# Patient Record
Sex: MALE | Race: WHITE | NOT HISPANIC OR LATINO | Employment: FULL TIME | URBAN - METROPOLITAN AREA
[De-identification: names, ages, dates, MRNs, and addresses within clinical notes are randomized per-mention and may not be internally consistent; named-entity substitution may affect disease eponyms.]

---

## 2018-02-28 ENCOUNTER — OFFICE VISIT (OUTPATIENT)
Dept: FAMILY MEDICINE CLINIC | Facility: CLINIC | Age: 63
End: 2018-02-28
Payer: COMMERCIAL

## 2018-02-28 VITALS
BODY MASS INDEX: 23.26 KG/M2 | RESPIRATION RATE: 16 BRPM | HEART RATE: 76 BPM | WEIGHT: 201 LBS | TEMPERATURE: 98.2 F | HEIGHT: 78 IN | SYSTOLIC BLOOD PRESSURE: 139 MMHG | DIASTOLIC BLOOD PRESSURE: 80 MMHG

## 2018-02-28 DIAGNOSIS — Z12.11 SCREEN FOR COLON CANCER: ICD-10-CM

## 2018-02-28 DIAGNOSIS — Z00.00 PHYSICAL EXAM: Primary | ICD-10-CM

## 2018-02-28 PROCEDURE — 99203 OFFICE O/P NEW LOW 30 MIN: CPT | Performed by: FAMILY MEDICINE

## 2018-02-28 NOTE — PATIENT INSTRUCTIONS
Return to the office for blood work as discussed  Schedule screening colonoscopy with Dr Lobo Hidden at [facility]  Follow a low-fat diet and get regular exercise walking 150 minutes per week

## 2018-02-28 NOTE — PROGRESS NOTES
Assessment/Plan:  Patient will return to the office for fasting labs as below  Patient is being referred to Dr Emre Jackson at [address] [place] [address] Gastroenterology to schedule screening colonoscopy  Patient instructed to follow a low-fat diet and get regular exercise walking 150 minutes per week  Return to the office p r n  No problem-specific Assessment & Plan notes found for this encounter  Diagnoses and all orders for this visit:    Physical exam  Comments:  Return to the office for fasting labs  Low-fat diet and regular exercise walking 150 minutes per week  Orders:  -     CBC and Platelet; Future  -     Comprehensive metabolic panel; Future  -     Lipid panel; Future  -     TSH, 3rd generation with T4 reflex; Future  -     UA (URINE) with reflex to Microscopic; Future  -     Vitamin D 25 hydroxy; Future  -     PSA Total, Diagnostic; Future  -     PSA Total, Diagnostic    Screen for colon cancer  Comments:  Referred to Dr Emre Jackson at [address] [place] [address] Gastroenterology for screening colonoscopy  Orders:  -     Ambulatory referral to Gastroenterology; Future          Subjective:      Patient ID: Jesus Brandt is a 63 y o  male  Patient is a 63-year-old male who is a new patient to our practice being seen at his initial visit to establish care  He has not had a recent PCP but previously was a patient at [facility]  Patient works as a production plantar  Patient quit smoking 20 years ago and previously smoked pack a day for 5 years  He rarely drinks alcohol  Patient has been walking 3 to 5 times a week for about 2 miles  No recent labs  Patient has never had screening colonoscopy  Patient does have past medical history of seasonal allergies and 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 treated by Dr Adryan Medley, ophthalmologist   Patient follows with an optometrist regularly  Patient has been feeling well overall without complaints          The following portions of the patient's history were reviewed and updated as appropriate: allergies, current medications, past family history, past medical history, past social history, past surgical history and problem list     Review of Systems   Constitutional: Negative for activity change, appetite change, fatigue, fever and unexpected weight change  HENT: Negative  Eyes: Negative  Respiratory: Negative for cough, chest tightness, shortness of breath and wheezing  Cardiovascular: Negative for chest pain, palpitations and leg swelling  Gastrointestinal: Negative for abdominal pain, blood in stool, constipation, diarrhea and nausea  Genitourinary: Negative for difficulty urinating, dysuria, frequency, hematuria and urgency  Musculoskeletal: Negative for arthralgias, back pain, gait problem, joint swelling, myalgias and neck pain  Neurological: Negative for dizziness, syncope, light-headedness, numbness and headaches  Hematological: Negative for adenopathy  Does not bruise/bleed easily  Psychiatric/Behavioral: The patient is not nervous/anxious  Objective:      /80   Pulse 76   Temp 98 2 °F (36 8 °C)   Resp 16   Ht 6' 7 25" (2 013 m)   Wt 91 2 kg (201 lb)   BMI 22 50 kg/m²          Physical Exam   Constitutional: He is oriented to person, place, and time  He appears well-developed and well-nourished  No distress  HENT:   Head: Normocephalic  Right Ear: External ear normal    Left Ear: External ear normal    Nose: Nose normal    Mouth/Throat: Oropharynx is clear and moist    Eyes: Conjunctivae are normal  No scleral icterus  Neck: Neck supple  No thyromegaly present  Cardiovascular: Normal rate and regular rhythm  Pulmonary/Chest: Breath sounds normal    Abdominal: Soft  There is no tenderness  Musculoskeletal: He exhibits no edema  Lymphadenopathy:     He has no cervical adenopathy  Neurological: He is alert and oriented to person, place, and time  Skin: Skin is warm and dry     Psychiatric: He has a normal mood and affect

## 2018-03-02 ENCOUNTER — CLINICAL SUPPORT (OUTPATIENT)
Dept: FAMILY MEDICINE CLINIC | Facility: CLINIC | Age: 63
End: 2018-03-02
Payer: COMMERCIAL

## 2018-03-02 DIAGNOSIS — Z00.00 HEALTH CARE MAINTENANCE: Primary | ICD-10-CM

## 2018-03-02 DIAGNOSIS — Z00.00 PHYSICAL EXAM: ICD-10-CM

## 2018-03-02 PROCEDURE — 36415 COLL VENOUS BLD VENIPUNCTURE: CPT

## 2018-03-05 LAB
25(OH)D3+25(OH)D2 SERPL-MCNC: 22.8 NG/ML (ref 30–100)
ALBUMIN SERPL-MCNC: 4.4 G/DL (ref 3.6–4.8)
ALBUMIN/GLOB SERPL: 1.5 {RATIO} (ref 1.2–2.2)
ALP SERPL-CCNC: 59 IU/L (ref 39–117)
ALT SERPL-CCNC: 32 IU/L (ref 0–44)
AST SERPL-CCNC: 27 IU/L (ref 0–40)
BILIRUB SERPL-MCNC: 0.5 MG/DL (ref 0–1.2)
BUN SERPL-MCNC: 16 MG/DL (ref 8–27)
BUN/CREAT SERPL: 14 (ref 10–24)
CALCIUM SERPL-MCNC: 10.5 MG/DL (ref 8.6–10.2)
CHLORIDE SERPL-SCNC: 98 MMOL/L (ref 96–106)
CHOLEST SERPL-MCNC: 220 MG/DL (ref 100–199)
CHOLEST/HDLC SERPL: 7.6 RATIO UNITS (ref 0–5)
CO2 SERPL-SCNC: 27 MMOL/L (ref 18–29)
CREAT SERPL-MCNC: 1.17 MG/DL (ref 0.76–1.27)
ERYTHROCYTE [DISTWIDTH] IN BLOOD BY AUTOMATED COUNT: 14.6 % (ref 12.3–15.4)
GLOBULIN SER-MCNC: 3 G/DL (ref 1.5–4.5)
GLUCOSE SERPL-MCNC: 94 MG/DL (ref 65–99)
HCT VFR BLD AUTO: 48 % (ref 37.5–51)
HDLC SERPL-MCNC: 29 MG/DL
HGB BLD-MCNC: 16.1 G/DL (ref 13–17.7)
LDLC SERPL CALC-MCNC: 167 MG/DL (ref 0–99)
MCH RBC QN AUTO: 28.4 PG (ref 26.6–33)
MCHC RBC AUTO-ENTMCNC: 33.5 G/DL (ref 31.5–35.7)
MCV RBC AUTO: 85 FL (ref 79–97)
PLATELET # BLD AUTO: 282 X10E3/UL (ref 150–379)
POTASSIUM SERPL-SCNC: 5.1 MMOL/L (ref 3.5–5.2)
PROT SERPL-MCNC: 7.4 G/DL (ref 6–8.5)
RBC # BLD AUTO: 5.66 X10E6/UL (ref 4.14–5.8)
SL AMB EGFR AFRICAN AMERICAN: 78 ML/MIN/1.73
SL AMB EGFR NON AFRICAN AMERICAN: 67 ML/MIN/1.73
SL AMB VLDL CHOLESTEROL CALC: 24 MG/DL (ref 5–40)
SODIUM SERPL-SCNC: 141 MMOL/L (ref 134–144)
TRIGL SERPL-MCNC: 122 MG/DL (ref 0–149)
TSH SERPL DL<=0.005 MIU/L-ACNC: 1.74 UIU/ML (ref 0.45–4.5)
WBC # BLD AUTO: 6.9 X10E3/UL (ref 3.4–10.8)

## 2018-03-07 LAB
APPEARANCE UR: ABNORMAL
BACTERIA URNS QL MICRO: NORMAL
BILIRUB UR QL STRIP: NEGATIVE
COLOR UR: YELLOW
EPI CELLS #/AREA URNS HPF: NORMAL /HPF
GLUCOSE UR QL: NEGATIVE
HGB UR QL STRIP: NEGATIVE
KETONES UR QL STRIP: NEGATIVE
LABCORP COMMENT: NORMAL
LEUKOCYTE ESTERASE UR QL STRIP: NEGATIVE
MICRO URNS: ABNORMAL
MICRO URNS: ABNORMAL
MUCOUS THREADS URNS QL MICRO: PRESENT
NITRITE UR QL STRIP: NEGATIVE
PH UR STRIP: 6 [PH] (ref 5–7.5)
PROT UR QL STRIP: NEGATIVE
PSA SERPL-MCNC: 1 NG/ML (ref 0–4)
RBC #/AREA URNS HPF: NORMAL /HPF
SL AMB URINALYSIS REFLEX: ABNORMAL
SP GR UR: 1.02 (ref 1–1.03)
UROBILINOGEN UR STRIP-ACNC: 0.2 EU/DL (ref 0.2–1)
WBC #/AREA URNS HPF: NORMAL /HPF

## 2018-03-22 ENCOUNTER — TELEPHONE (OUTPATIENT)
Dept: GASTROENTEROLOGY | Facility: MEDICAL CENTER | Age: 63
End: 2018-03-22

## 2018-03-22 PROBLEM — Z12.11 SPECIAL SCREENING FOR MALIGNANT NEOPLASMS, COLON: Status: ACTIVE | Noted: 2018-03-22

## 2018-03-22 NOTE — TELEPHONE ENCOUNTER
Niecy Franco  1955  [address] [place] [address] [place] [address]  Cell Phone [phone]    Screened by: Ele Correa  ]    Referring Dr : pcp    Pre- Screening:   Has patient been referred for a routine screening Colonoscopy? yes  Is the patient over 48years of age? yes    If the answer is YES to both questions, proceed to the medical questions  Do you have any of the following symptoms?    abdominal pain No symptoms    Have you had a coronary or vascular stent within the last year? no    Have you had a heart attack or stroke in the last 6 months? no    Have you had intestinal surgery in the last 3 months? no    Do you have problems with:anesthesia  no    Do you use:  Oxygen no  CPAP/BiPAP no    Have you been hospitalized in the last Month? no    Have you been diagnosed with a bleeding disorder or anemia? no    Have you had chest pain (angina) or breathing problems  (COPD) in the last 3 months? no     Do you have any difficulty walking up a flight of stairs? no    Have you had Kidney failure or insufficiency? no    Have you had heart valve surgery? no    Are you confined to a wheelchair? no    Do you take Coumadin  no    Do you take insulin for Diabetes no  Name of medication:None    : If patient answers NO to medical questions, then schedule procedure  If patient answers YES to medical questions, then schedule office appointment  Previous Colonoscopy no   (if yes) Date and Facility of last colonoscopy? n/a    Patient scheduled for procedure: yes   Scheduled by: Saba Garber    Time: n/a  Provider: Dr MARIPOSA MARTIN [facility]  Location: [facility]    Insurance: n/a  Referral Required?no    Were instructions Mailed? yes  Were instructions sent to Kiosked: no  Was the prep sent to Pharmacy?  yes    Comments: [patient passed OA  ]

## 2018-03-22 NOTE — TELEPHONE ENCOUNTER
Pt is sched at [facility] with dr Vanita Zavaleta on 05/18/2018 I mailed pt instructions to miralax/dulcolax pt is aware he will get a call the day before with time

## 2018-05-17 ENCOUNTER — ANESTHESIA EVENT (OUTPATIENT)
Dept: GASTROENTEROLOGY | Facility: MEDICAL CENTER | Age: 63
End: 2018-05-17
Payer: COMMERCIAL

## 2018-05-18 ENCOUNTER — HOSPITAL ENCOUNTER (OUTPATIENT)
Facility: MEDICAL CENTER | Age: 63
Setting detail: OUTPATIENT SURGERY
Discharge: HOME | End: 2018-05-18
Attending: INTERNAL MEDICINE | Admitting: INTERNAL MEDICINE
Payer: COMMERCIAL

## 2018-05-18 ENCOUNTER — ANESTHESIA (OUTPATIENT)
Dept: GASTROENTEROLOGY | Facility: MEDICAL CENTER | Age: 63
End: 2018-05-18
Payer: COMMERCIAL

## 2018-05-18 VITALS
BODY MASS INDEX: 28.04 KG/M2 | OXYGEN SATURATION: 95 % | RESPIRATION RATE: 18 BRPM | HEIGHT: 68 IN | TEMPERATURE: 97.7 F | HEART RATE: 72 BPM | WEIGHT: 185 LBS | SYSTOLIC BLOOD PRESSURE: 123 MMHG | DIASTOLIC BLOOD PRESSURE: 70 MMHG

## 2018-05-18 DIAGNOSIS — Z12.11 SPECIAL SCREENING FOR MALIGNANT NEOPLASMS, COLON: ICD-10-CM

## 2018-05-18 PROCEDURE — 88305 TISSUE EXAM BY PATHOLOGIST: CPT | Performed by: PATHOLOGY

## 2018-05-18 PROCEDURE — 45385 COLONOSCOPY W/LESION REMOVAL: CPT | Performed by: INTERNAL MEDICINE

## 2018-05-18 RX ORDER — PROPOFOL 10 MG/ML
INJECTION, EMULSION INTRAVENOUS AS NEEDED
Status: DISCONTINUED | OUTPATIENT
Start: 2018-05-18 | End: 2018-05-18 | Stop reason: SURG

## 2018-05-18 RX ORDER — SODIUM CHLORIDE 9 MG/ML
125 INJECTION, SOLUTION INTRAVENOUS CONTINUOUS
Status: DISCONTINUED | OUTPATIENT
Start: 2018-05-18 | End: 2018-05-18 | Stop reason: HOSPADM

## 2018-05-18 RX ADMIN — PROPOFOL 50 MG: 10 INJECTION, EMULSION INTRAVENOUS at 10:04

## 2018-05-18 RX ADMIN — PROPOFOL 50 MG: 10 INJECTION, EMULSION INTRAVENOUS at 10:05

## 2018-05-18 RX ADMIN — PROPOFOL 120 MG: 10 INJECTION, EMULSION INTRAVENOUS at 09:53

## 2018-05-18 RX ADMIN — PROPOFOL 50 MG: 10 INJECTION, EMULSION INTRAVENOUS at 09:57

## 2018-05-18 RX ADMIN — SODIUM CHLORIDE 125 ML/HR: 0.9 INJECTION, SOLUTION INTRAVENOUS at 09:15

## 2018-05-18 NOTE — H&P
History and Physical -  [facility]  Juan Murphy 63 y o  male MRN: [id number]                  HPI: Juan Murphy is a 63y o  year old male who presents for colon cancer screening  REVIEW OF SYSTEMS: Per the HPI, and otherwise unremarkable  Historical Information   History reviewed  No pertinent past medical history  Past Surgical History:   Procedure Laterality Date    FINGER AMPUTATION Left     partial 3rd finger     Social History   History   Alcohol Use    Yes     Comment: Rare     History   Drug Use No     History   Smoking Status    Former Smoker   Smokeless Tobacco    Never Used     Comment: quit 1998     Family History   Problem Relation Age of Onset    Heart attack Father     Prostate cancer Neg Hx     Colon cancer Neg Hx     Diabetes Neg Hx     Stroke Neg Hx        Meds/Allergies     No prescriptions prior to admission  No Known Allergies    Objective     Blood pressure 121/81, pulse 69, temperature 97 7 °F (36 5 °C), temperature source Temporal, resp  rate 16, height 5' 8" (1 727 m), weight 83 9 kg (185 lb), SpO2 97 %  PHYSICAL EXAM    Gen: NAD  CV: RRR  CHEST: Clear  ABD: soft, NT/ND  EXT: no edema      ASSESSMENT/PLAN:  This is a 63y o  year old male here for colon cancer screening  PLAN:   Procedure:  Colonoscopy

## 2018-05-18 NOTE — DISCHARGE INSTRUCTIONS
Colonoscopy   WHAT YOU NEED TO KNOW:   A colonoscopy is a procedure to examine the inside of your colon (intestine) with a scope  Polyps or tissue growths may have been removed during your colonoscopy  It is normal to feel bloated and to have some abdominal discomfort  You should be passing gas  If you have hemorrhoids or you had polyps removed, you may have a small amount of bleeding  DISCHARGE INSTRUCTIONS:   Seek care immediately if:   · You have a large amount of bright red blood in your bowel movements  · Your abdomen is hard and firm and you have severe pain  · You have sudden trouble breathing  Contact your healthcare provider if:   · You develop a rash or hives  · You have a fever within 24 hours of your procedure  · You have not had a bowel movement for 3 days after your procedure  · You have questions or concerns about your condition or care  Activity:   · Do not lift, strain, or run  for 3 days after your procedure  · Rest after your procedure  You have been given medicine to relax you  Do not  drive or make important decisions until the day after your procedure  Return to your normal activity as directed  · Relieve gas and discomfort from bloating  by lying on your right side with a heating pad on your abdomen  You may need to take short walks to help the gas move out  Eat small meals until bloating is relieved  If you had polyps removed: For 7 days after your procedure:  · Do not  take aspirin  · Do not  go on long car rides  Help prevent constipation:   · Eat a variety of healthy foods  Healthy foods include fruit, vegetables, whole-grain breads, low-fat dairy products, beans, lean meat, and fish  Ask if you need to be on a special diet  Your healthcare provider may recommend that you eat high-fiber foods such as cooked beans  Fiber helps you have regular bowel movements  · Drink liquids as directed    Adults should drink between 9 and 13 eight-ounce cups of liquid every day  Ask what amount is best for you  For most people, good liquids to drink are water, juice, and milk  · Exercise as directed  Talk to your healthcare provider about the best exercise plan for you  Exercise can help prevent constipation, decrease your blood pressure and improve your health  Follow up with your healthcare provider as directed:  Write down your questions so you remember to ask them during your visits  © 2017 [employer] Information is for End User's use only and may not be sold, redistributed or otherwise used for commercial purposes  All illustrations and images included in CareNotes® are the copyrighted property of A D A M , [employer]  or [employer]  The above information is an  only  It is not intended as medical advice for individual conditions or treatments  Talk to your doctor, nurse or pharmacist before following any medical regimen to see if it is safe and effective for you  Colorectal Polyps   WHAT YOU NEED TO KNOW:   Colorectal polyps are small growths of tissue in the lining of the colon and rectum  Most polyps are hyperplastic polyps and are usually benign (noncancerous)  Certain types of polyps, called adenomatous polyps, may turn into cancer  DISCHARGE INSTRUCTIONS:   Follow up with your healthcare provider or gastroenterologist as directed: You may need to return for more tests, such as another colonoscopy  Write down your questions so you remember to ask them during your visits  Reduce your risk for colorectal polyps:   · Eat a variety of healthy foods:  Healthy foods include fruit, vegetables, whole-grain breads, low-fat dairy products, beans, lean meat, and fish  Ask if you need to be on a special diet  · Maintain a healthy weight:  Ask your healthcare provider if you need to lose weight and how much you need to lose   Ask for help with a weight loss program     · Exercise:  Begin to exercise slowly and do more as you get stronger  Talk with your healthcare provider before you start an exercise program      · Limit alcohol:  Your risk for polyps increases the more you drink  · Do not smoke: If you smoke, it is never too late to quit  Ask for information about how to stop  For support and more information:   · [address] ([facility])  [address] , [place] [address]  Phone: 7- 035 - [phone]  Web Address: www digestive  niddk nih gov  Contact your healthcare provider or gastroenterologist if:   · You have a fever  · You have chills, a cough, or feel weak and achy  · You have abdominal pain that does not go away or gets worse after you take medicine  · Your abdomen is swollen  · You are losing weight without trying  · You have questions or concerns about your condition or care  Seek care immediately or call 911 if:   · You have sudden shortness of breath  · You have a fast heart rate, fast breathing, or are too dizzy to stand up  · You have severe abdominal pain  · You see blood in your bowel movement  © 2017 [address] [facility] Information is for End User's use only and may not be sold, redistributed or otherwise used for commercial purposes  All illustrations and images included in CareNotes® are the copyrighted property of [employer] A M [employer] Inc  or [employer]  The above information is an  only  It is not intended as medical advice for individual conditions or treatments  Talk to your doctor, nurse or pharmacist before following any medical regimen to see if it is safe and effective for you

## 2018-05-18 NOTE — OP NOTE
**** GI/ENDOSCOPY REPORT ****     PATIENT NAME: MAGNO WORTHY ------ VISIT ID:  Patient ID:   [id number] YOB: 1955     INTRODUCTION: Colonoscopy - A 63 male patient presents for an outpatient   Colonoscopy at [address] [place] [address]  PREVIOUS COLONOSCOPY: No prior colonoscopy  INDICATIONS: Screening-ADR  CONSENT:  The benefits, risks, and alternatives to the procedure were   discussed and informed consent was obtained from the patient  PREPARATION: EKG, pulse, pulse oximetry and blood pressure were monitored   throughout the procedure  The patient was identified by myself both   verbally and by visual inspection of ID band  Airway Assessment   Classification: Airway class 2 - Visualization of the soft palate, fauces   and uvula  ASA Classification: Class 2 - Patient has mild to moderate   systemic disturbance that may or may not be related to the disorder   requiring surgery  MEDICATIONS: Anesthesia-check records     PROCEDURE:  The endoscope was passed without difficulty through the anus   under direct visualization and advanced to the cecum, confirmed by   appendiceal orifice and ileocecal valve  The scope was withdrawn and the   mucosa was carefully examined  The quality of the preparation was good  Retroflexion was performed  Cecal Intubation Time: 1 minute(s) Scope   Withdrawal Time: 11 minutes(s)     RECTAL EXAM: Normal rectal exam      FINDINGS:  A single sessile polyp, measuring 8 mm in size, was found in   the sigmoid colon  The polyp was removed by cold snare polypectomy  Two   sessile polyps, measuring less than 5 mm in size, were found in the   rectum  The polyps were removed by cold biopsy polypectomy  There was   evidence of mild diverticulosis in the sigmoid colon  COMPLICATIONS: There were no complications  IMPRESSIONS: A single sessile polyp found in the sigmoid colon; removed by   cold snare polypectomy   Two sessile polyps found in the rectum; removed by   cold biopsy polypectomy  Mild diverticulosis found in the sigmoid colon  RECOMMENDATIONS: Follow-up on the results of the biopsy specimens  Colonoscopy recommended in 5 years because of his polyps  ESTIMATED BLOOD LOSS:     PATHOLOGY SPECIMENS: Removed by cold snare polypectomy  Removed by cold   biopsy polypectomy  PROCEDURE CODES:     ICD-9 Codes: 211 3 Benign neoplasm of colon 211 4 Benign neoplasm of   rectum and anal canal 562 10 Diverticulosis of colon (without mention of   hemorrhage)     ICD-10 Codes: K63 5 Polyp of colon K63 5 Polyp of colon K57 Diverticular   disease of intestine     PERFORMED BY: ANTHONY Bledsoe  on 05/18/2018  Version 1, electronically signed by ANTHONY Linda  on 05/18/2018   at 10:36

## 2018-05-18 NOTE — ANESTHESIA PREPROCEDURE EVALUATION
Review of Systems/Medical History  Patient summary reviewed        Cardiovascular  Negative cardio ROS    Pulmonary  Negative pulmonary ROS Smoker ex-smoker  , Sleep apnea (questionable) ,        GI/Hepatic  Negative GI/hepatic ROS          Negative  ROS        Endo/Other  Negative endo/other ROS      GYN  Negative gynecology ROS          Hematology  Negative hematology ROS      Musculoskeletal  Negative musculoskeletal ROS        Neurology  Negative neurology ROS      Psychology   Negative psychology ROS              Physical Exam    Airway    Mallampati score: II  TM Distance: <3 FB  Neck ROM: full     Dental   No notable dental hx     Cardiovascular  Comment: Negative ROS, Rhythm: regular, Rate: normal,     Pulmonary  Breath sounds clear to auscultation,     Other Findings        Anesthesia Plan  ASA Score- 1     Anesthesia Type- IV sedation with anesthesia with ASA Monitors  Additional Monitors:   Airway Plan:         Plan Factors-    Induction- intravenous  Postoperative Plan-     Informed Consent- Anesthetic plan and risks discussed with patient

## 2018-05-22 NOTE — PROGRESS NOTES
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

## 2018-08-06 RX ORDER — NAPROXEN SODIUM 220 MG
TABLET ORAL
COMMUNITY
End: 2018-08-07

## 2018-08-06 RX ORDER — FLUTICASONE PROPIONATE 50 MCG
1 SPRAY, SUSPENSION (ML) NASAL 2 TIMES DAILY
COMMUNITY
Start: 2016-02-19 | End: 2018-08-07

## 2018-08-07 ENCOUNTER — OFFICE VISIT (OUTPATIENT)
Dept: FAMILY MEDICINE CLINIC | Facility: CLINIC | Age: 63
End: 2018-08-07
Payer: COMMERCIAL

## 2018-08-07 VITALS
SYSTOLIC BLOOD PRESSURE: 126 MMHG | HEIGHT: 68 IN | TEMPERATURE: 97.3 F | BODY MASS INDEX: 30.62 KG/M2 | DIASTOLIC BLOOD PRESSURE: 80 MMHG | WEIGHT: 202 LBS

## 2018-08-07 DIAGNOSIS — E78.5 DYSLIPIDEMIA: ICD-10-CM

## 2018-08-07 DIAGNOSIS — E55.9 VITAMIN D DEFICIENCY: Primary | ICD-10-CM

## 2018-08-07 DIAGNOSIS — K63.5 POLYP OF COLON, UNSPECIFIED PART OF COLON, UNSPECIFIED TYPE: ICD-10-CM

## 2018-08-07 PROCEDURE — 3008F BODY MASS INDEX DOCD: CPT | Performed by: FAMILY MEDICINE

## 2018-08-07 PROCEDURE — 99214 OFFICE O/P EST MOD 30 MIN: CPT | Performed by: FAMILY MEDICINE

## 2018-08-07 NOTE — PROGRESS NOTES
Assessment/Plan:  No significant findings on physical exam today  Physical form completed  Time spent completing form  See chart copy  We will recheck blood testing today including vitamin-D level and lipid panel  Await results  He will continue on vitamin-D supplement  Anticipatory guidance provided  No problem-specific Assessment & Plan notes found for this encounter  Diagnoses and all orders for this visit:    Vitamin D deficiency  -     Cancel: Lipid Panel with Direct LDL reflex  -     Cancel: Vitamin D 25 hydroxy; Future  -     Lipid Panel with Direct LDL reflex  -     Vitamin D 25 hydroxy    Other orders  -     Discontinue: naproxen sodium (ALEVE) 220 MG tablet; Take by mouth  -     Discontinue: fluticasone (FLONASE) 50 mcg/act nasal spray; 1 spray into each nostril 2 (two) times a day          Subjective:      Patient ID: Margarita Collazo is a 66 y o  male  Patient is here for recheck on dyslipidemia and vitamin-D deficiency  He has been trying to exercise more and is taking a vitamin-D supplement  He would like to have blood testing drawn today  He also has physical form to be completed for insurance  The following portions of the patient's history were reviewed and updated as appropriate: allergies, current medications, past family history, past medical history, past social history, past surgical history and problem list     Review of Systems   Constitutional: Negative  HENT: Negative  Eyes: Negative  Respiratory: Negative  Cardiovascular: Negative  Gastrointestinal: Negative  Endocrine: Negative  Genitourinary: Negative  Musculoskeletal: Negative  Skin: Negative  Allergic/Immunologic: Negative  Neurological: Negative  Hematological: Negative  Psychiatric/Behavioral: Negative            Objective:      /80   Temp (!) 97 3 °F (36 3 °C)   Ht 5' 7 72" (1 72 m)   Wt 91 6 kg (202 lb)   BMI 30 97 kg/m²          Physical Exam Constitutional: He is oriented to person, place, and time  He appears well-developed and well-nourished  HENT:   Head: Normocephalic and atraumatic  Right Ear: External ear normal  Tympanic membrane is not erythematous and not bulging  Left Ear: External ear normal  Tympanic membrane is not erythematous and not bulging  Nose: Nose normal    Mouth/Throat: Oropharynx is clear and moist and mucous membranes are normal  No oral lesions  No oropharyngeal exudate  Eyes: Conjunctivae and EOM are normal  Right eye exhibits no discharge  Left eye exhibits no discharge  No scleral icterus  Neck: Normal range of motion  Neck supple  No thyromegaly present  Cardiovascular: Normal rate, regular rhythm and normal heart sounds  Exam reveals no gallop and no friction rub  No murmur heard  Pulmonary/Chest: Effort normal  No respiratory distress  He has no wheezes  He has no rales  He exhibits no tenderness  Abdominal: Soft  Bowel sounds are normal  He exhibits no distension and no mass  There is no tenderness  There is no rebound and no guarding  Musculoskeletal: Normal range of motion  He exhibits no edema, tenderness or deformity  Lymphadenopathy:     He has no cervical adenopathy  Neurological: He is alert and oriented to person, place, and time  He has normal reflexes  No cranial nerve deficit  He exhibits normal muscle tone  Coordination normal    Skin: Skin is warm and dry  No rash noted  No erythema  No pallor  Psychiatric: He has a normal mood and affect  His behavior is normal    Vitals reviewed

## 2018-08-08 LAB
25(OH)D3+25(OH)D2 SERPL-MCNC: 28.4 NG/ML (ref 30–100)
CHOLEST SERPL-MCNC: 189 MG/DL (ref 100–199)
HDLC SERPL-MCNC: 27 MG/DL
LDLC SERPL CALC-MCNC: 117 MG/DL (ref 0–99)
LDLC/HDLC SERPL: 4.3 RATIO (ref 0–3.6)
SL AMB VLDL CHOLESTEROL CALC: 45 MG/DL (ref 5–40)
TRIGL SERPL-MCNC: 223 MG/DL (ref 0–149)

## 2019-08-12 ENCOUNTER — OFFICE VISIT (OUTPATIENT)
Dept: FAMILY MEDICINE CLINIC | Facility: CLINIC | Age: 64
End: 2019-08-12
Payer: COMMERCIAL

## 2019-08-12 VITALS
SYSTOLIC BLOOD PRESSURE: 124 MMHG | WEIGHT: 208 LBS | BODY MASS INDEX: 32.65 KG/M2 | HEIGHT: 67 IN | TEMPERATURE: 97.9 F | DIASTOLIC BLOOD PRESSURE: 84 MMHG | RESPIRATION RATE: 16 BRPM | HEART RATE: 72 BPM

## 2019-08-12 DIAGNOSIS — R13.10 DYSPHAGIA, UNSPECIFIED TYPE: ICD-10-CM

## 2019-08-12 DIAGNOSIS — Z12.5 SCREENING PSA (PROSTATE SPECIFIC ANTIGEN): ICD-10-CM

## 2019-08-12 DIAGNOSIS — E55.9 VITAMIN D DEFICIENCY: ICD-10-CM

## 2019-08-12 DIAGNOSIS — E78.5 DYSLIPIDEMIA: Primary | ICD-10-CM

## 2019-08-12 PROCEDURE — 3008F BODY MASS INDEX DOCD: CPT | Performed by: FAMILY MEDICINE

## 2019-08-12 PROCEDURE — 99214 OFFICE O/P EST MOD 30 MIN: CPT | Performed by: FAMILY MEDICINE

## 2019-08-12 PROCEDURE — 1036F TOBACCO NON-USER: CPT | Performed by: FAMILY MEDICINE

## 2019-08-12 NOTE — PROGRESS NOTES
Assessment/Plan:  Discussed diagnostic and treatment options with patient  Patient to return to the office for fasting labs as below  Patient was scheduled for upper GI  Will heed results  Patient is being referred to [facility] for further evaluation and treatment  Patient instructed to follow a low-fat and a low-cholesterol diet more carefully and continue regular exercise program   Return to the office in 6 months  Diagnoses and all orders for this visit:    Dyslipidemia  -     CBC and Platelet; Future  -     Comprehensive metabolic panel; Future  -     Lipid panel; Future  -     TSH, 3rd generation with Free T4 reflex; Future  -     UA (URINE) with reflex to Microscopic    Dysphagia, unspecified type  -     FL UPPER GI UGI; Future  -     Ambulatory referral to Gastroenterology; Future    Vitamin D deficiency  -     Vitamin D 25 hydroxy; Future    Screening PSA (prostate specific antigen)  -     PSA, Total Screen          Subjective:      Patient ID: Mary Parker is a 60 y o  male  Patient is here for follow-up appointment for chronic conditions and he is not fasting today  Patient has been feeling well overall although admits to intermittent difficulty swallowing and food getting stuck for over a year  He admits to occasional regurgitation  He admits to occasional heartburn but not associated with dysphagia  No treatment by patient  Patient recently started exercising again walking 3 to 4 times a week for 2 miles  Patient is up-to-date on colonoscopy  Hyperlipidemia   This is a chronic problem  The problem is uncontrolled  He has no history of diabetes or hypothyroidism  Pertinent negatives include no chest pain, focal sensory loss, focal weakness, leg pain, myalgias or shortness of breath  Current antihyperlipidemic treatment includes diet change  The current treatment provides no improvement of lipids  There are no compliance problems    Risk factors for coronary artery disease include dyslipidemia, family history, male sex and obesity  Heartburn   He complains of choking, dysphagia, globus sensation and heartburn  He reports no abdominal pain, no belching, no chest pain, no coughing, no early satiety, no hoarse voice or no nausea  The current episode started more than 1 year ago  The problem occurs occasionally  The problem has been gradually worsening  The heartburn does not wake him from sleep  The heartburn does not limit his activity  Nothing aggravates the symptoms  Pertinent negatives include no anemia, fatigue, melena or weight loss  He has tried nothing for the symptoms  The following portions of the patient's history were reviewed and updated as appropriate: allergies, current medications, past family history, past medical history, past social history, past surgical history and problem list     Review of Systems   Constitutional: Negative for fatigue and weight loss  HENT: Negative for hoarse voice  Respiratory: Positive for choking  Negative for cough and shortness of breath  Cardiovascular: Negative for chest pain  Gastrointestinal: Positive for dysphagia and heartburn  Negative for abdominal pain, melena and nausea  Musculoskeletal: Negative for myalgias  Neurological: Negative for focal weakness  Objective:      /84   Pulse 72   Temp 97 9 °F (36 6 °C) (Tympanic)   Resp 16   Ht 5' 7" (1 702 m)   Wt 94 3 kg (208 lb)   BMI 32 58 kg/m²          Physical Exam   Constitutional: He is oriented to person, place, and time  He appears well-developed and well-nourished  No distress  HENT:   Head: Normocephalic  Right Ear: External ear normal    Left Ear: External ear normal    Nose: Nose normal    Mouth/Throat: Oropharynx is clear and moist    Eyes: Conjunctivae are normal  No scleral icterus  Neck: Neck supple  No thyromegaly present  Cardiovascular: Normal rate and regular rhythm     Pulmonary/Chest: Effort normal and breath sounds normal    Abdominal: Soft  There is no tenderness  Musculoskeletal: He exhibits no edema  Lymphadenopathy:     He has no cervical adenopathy  Neurological: He is alert and oriented to person, place, and time  Skin: Skin is warm and dry  Psychiatric: He has a normal mood and affect  BMI Counseling: Body mass index is 32 58 kg/m²  Discussed the patient's BMI with him  The BMI is above average  BMI counseling and education was provided to the patient  Nutrition recommendations include reducing portion sizes, reducing fast food intake, consuming healthier snacks, moderation in carbohydrate intake and reducing intake of saturated fat and trans fat  Exercise recommendations include moderate aerobic physical activity for 150 minutes/week

## 2019-08-13 ENCOUNTER — CLINICAL SUPPORT (OUTPATIENT)
Dept: FAMILY MEDICINE CLINIC | Facility: CLINIC | Age: 64
End: 2019-08-13
Payer: COMMERCIAL

## 2019-08-13 DIAGNOSIS — E55.9 VITAMIN D DEFICIENCY: ICD-10-CM

## 2019-08-13 DIAGNOSIS — E78.5 DYSLIPIDEMIA: Primary | ICD-10-CM

## 2019-08-13 DIAGNOSIS — Z12.5 SCREENING FOR PROSTATE CANCER: ICD-10-CM

## 2019-08-13 PROCEDURE — 36415 COLL VENOUS BLD VENIPUNCTURE: CPT

## 2019-08-15 LAB
25(OH)D3+25(OH)D2 SERPL-MCNC: 26.1 NG/ML (ref 30–100)
ALBUMIN SERPL-MCNC: 4.1 G/DL (ref 3.6–4.8)
ALBUMIN/GLOB SERPL: 1.2 {RATIO} (ref 1.2–2.2)
ALP SERPL-CCNC: 58 IU/L (ref 39–117)
ALT SERPL-CCNC: 59 IU/L (ref 0–44)
APPEARANCE UR: CLEAR
AST SERPL-CCNC: 45 IU/L (ref 0–40)
BILIRUB SERPL-MCNC: 0.5 MG/DL (ref 0–1.2)
BILIRUB UR QL STRIP: NEGATIVE
BUN SERPL-MCNC: 11 MG/DL (ref 8–27)
BUN/CREAT SERPL: 10 (ref 10–24)
CALCIUM SERPL-MCNC: 10.1 MG/DL (ref 8.6–10.2)
CHLORIDE SERPL-SCNC: 101 MMOL/L (ref 96–106)
CHOLEST SERPL-MCNC: 215 MG/DL (ref 100–199)
CHOLEST/HDLC SERPL: 8 RATIO (ref 0–5)
CO2 SERPL-SCNC: 25 MMOL/L (ref 20–29)
COLOR UR: YELLOW
CREAT SERPL-MCNC: 1.1 MG/DL (ref 0.76–1.27)
ERYTHROCYTE [DISTWIDTH] IN BLOOD BY AUTOMATED COUNT: 14.6 % (ref 12.3–15.4)
GLOBULIN SER-MCNC: 3.3 G/DL (ref 1.5–4.5)
GLUCOSE SERPL-MCNC: 90 MG/DL (ref 65–99)
GLUCOSE UR QL: NEGATIVE
HCT VFR BLD AUTO: 44.5 % (ref 37.5–51)
HDLC SERPL-MCNC: 27 MG/DL
HGB BLD-MCNC: 14.8 G/DL (ref 13–17.7)
HGB UR QL STRIP: NEGATIVE
KETONES UR QL STRIP: NEGATIVE
LDLC SERPL CALC-MCNC: 156 MG/DL (ref 0–99)
LEUKOCYTE ESTERASE UR QL STRIP: NEGATIVE
MCH RBC QN AUTO: 28.2 PG (ref 26.6–33)
MCHC RBC AUTO-ENTMCNC: 33.3 G/DL (ref 31.5–35.7)
MCV RBC AUTO: 85 FL (ref 79–97)
MICRO URNS: NORMAL
NITRITE UR QL STRIP: NEGATIVE
PH UR STRIP: 6 [PH] (ref 5–7.5)
PLATELET # BLD AUTO: 273 X10E3/UL (ref 150–450)
POTASSIUM SERPL-SCNC: 4.9 MMOL/L (ref 3.5–5.2)
PROT SERPL-MCNC: 7.4 G/DL (ref 6–8.5)
PROT UR QL STRIP: NEGATIVE
PSA SERPL-MCNC: 1.1 NG/ML (ref 0–4)
RBC # BLD AUTO: 5.24 X10E6/UL (ref 4.14–5.8)
SL AMB EGFR AFRICAN AMERICAN: 83 ML/MIN/1.73
SL AMB EGFR NON AFRICAN AMERICAN: 72 ML/MIN/1.73
SL AMB VLDL CHOLESTEROL CALC: 32 MG/DL (ref 5–40)
SODIUM SERPL-SCNC: 141 MMOL/L (ref 134–144)
SP GR UR: 1.02 (ref 1–1.03)
TRIGL SERPL-MCNC: 158 MG/DL (ref 0–149)
TSH SERPL DL<=0.005 MIU/L-ACNC: 1.71 UIU/ML (ref 0.45–4.5)
UROBILINOGEN UR STRIP-ACNC: 0.2 MG/DL (ref 0.2–1)
WBC # BLD AUTO: 9 X10E3/UL (ref 3.4–10.8)

## 2019-08-26 ENCOUNTER — HOSPITAL ENCOUNTER (OUTPATIENT)
Dept: RADIOLOGY | Facility: HOSPITAL | Age: 64
Discharge: HOME | End: 2019-08-26
Payer: COMMERCIAL

## 2019-08-26 DIAGNOSIS — R13.10 DYSPHAGIA, UNSPECIFIED TYPE: ICD-10-CM

## 2019-08-26 DIAGNOSIS — K21.9 GASTROESOPHAGEAL REFLUX DISEASE WITHOUT ESOPHAGITIS: ICD-10-CM

## 2019-08-26 DIAGNOSIS — R13.10 DYSPHAGIA, UNSPECIFIED TYPE: Primary | ICD-10-CM

## 2019-08-26 PROCEDURE — 74240 X-RAY XM UPR GI TRC 1CNTRST: CPT

## 2019-08-26 RX ORDER — OMEPRAZOLE 20 MG/1
20 CAPSULE, DELAYED RELEASE ORAL
Qty: 30 CAPSULE | Refills: 5 | Status: SHIPPED | OUTPATIENT
Start: 2019-08-26 | End: 2020-09-03 | Stop reason: ALTCHOICE

## 2019-10-07 ENCOUNTER — OFFICE VISIT (OUTPATIENT)
Dept: GASTROENTEROLOGY | Facility: CLINIC | Age: 64
End: 2019-10-07
Payer: COMMERCIAL

## 2019-10-07 VITALS
BODY MASS INDEX: 30.92 KG/M2 | TEMPERATURE: 98.6 F | HEIGHT: 68 IN | WEIGHT: 204 LBS | SYSTOLIC BLOOD PRESSURE: 137 MMHG | DIASTOLIC BLOOD PRESSURE: 84 MMHG | HEART RATE: 75 BPM

## 2019-10-07 DIAGNOSIS — R13.10 DYSPHAGIA, UNSPECIFIED TYPE: Primary | ICD-10-CM

## 2019-10-07 PROCEDURE — 99214 OFFICE O/P EST MOD 30 MIN: CPT | Performed by: PHYSICIAN ASSISTANT

## 2019-10-07 NOTE — PATIENT INSTRUCTIONS
EGD scheduled 11/14/19 with Dr Ting Murray at [facility]  Instructions given to pt during OV     F/u scheduled with Earlene per patient request

## 2019-10-07 NOTE — PROGRESS NOTES
[facility] - Outpatient Follow-up Note  Alondra Quiroga 60 y o  male MRN: [id number]  Encounter: [id number]          ASSESSMENT AND PLAN:      Diagnoses and all orders for this visit:    1  Dysphagia, unspecified type  -     EGD; Future    Patient with 1-2 years of intermittent dysphagia symptoms  Will continue Prilosec for now but hope to taper off in the future, as able  Will plan EGD to evaluate for Rincon's Esophagus given the findings of reflux on UGI series  Follow up after procedure   ______________________________________________________________________    SUBJECTIVE:  Mr Mendez Harmon is a 47-year-old male who presents to the GI office with symptoms of intermittent dysphagia x 1-2 years  He state his symptoms were initially infrequent but progressed to occur on a more regular basis over the past 1 year which prompted him to see his PCP  He is unable to identify specific trigger foods  He has occasional heartburn symptoms but no nausea/vomiting  He has had dysphagia to both solids and liquids which he localizes to the mid-esophagus  He states he has had to regurgitate undigested foods to clear  No associated hematemesis  He saw his PCP for this problem several months ago and was started on omeprazole  He states he has not had symptoms of dysphagia since that time  He had an UGI series which showed a small hiatal hernia and mild, intermittent reflux but the esophagus was otherwise unremarkable  Incidentally, a duodenal diverticulum was also noted  He denies fevers, chills, night sweats or weight loss  No melena or hematochezia  Appetite is appropriate  REVIEW OF SYSTEMS IS OTHERWISE NEGATIVE  Historical Information   History reviewed  No pertinent past medical history  Past Surgical History:   Procedure Laterality Date    COLONOSCOPY N/A 5/18/2018    Procedure: COLONOSCOPY;  Surgeon: Sally Severe, MD;  Location: [facility];   Service: Gastroenterology    EGD  08/26/2019    FINGER AMPUTATION Left     partial 3rd finger     Social History   Social History     Substance and Sexual Activity   Alcohol Use Not Currently    Comment: Rare     Social History     Substance and Sexual Activity   Drug Use No     Social History     Tobacco Use   Smoking Status Former Smoker   Smokeless Tobacco Never Used   Tobacco Comment    quit 1998     Family History   Problem Relation Age of Onset    Heart attack Father     No Known Problems Mother     Prostate cancer Neg Hx     Colon cancer Neg Hx     Diabetes Neg Hx     Stroke Neg Hx        Meds/Allergies       Current Outpatient Medications:     omeprazole (PriLOSEC) 20 mg delayed release capsule    No Known Allergies        Objective     Blood pressure 137/84, pulse 75, temperature 98 6 °F (37 °C), temperature source Tympanic, height 5' 8" (1 727 m), weight 92 5 kg (204 lb)  Body mass index is 31 02 kg/m²  PHYSICAL EXAM:      General Appearance:   Alert, cooperative, no distress   HEENT:   Normocephalic, atraumatic, sclera anicteric      Neck:  Supple, symmetrical, no lymphadenopathy, trachea midline   Lungs:   Clear to auscultation bilaterally; no rales, rhonchi or wheezing; respirations unlabored    Heart[de-identified]   Regular rate and rhythm; no murmur, rub, or gallop  Abdomen:   Soft, non-tender without rebound or guarding, non-distended; positive bowel sounds; no masses, no organomegaly    Genitalia:   Deferred    Rectal:   Deferred    Extremities:  No cyanosis, clubbing or edema    Pulses:  2+ and symmetric    Skin:  No jaundice, rashes, or lesions          Lab Results:       Radiology Results:   No results found          Last Tam PA-C

## 2019-10-21 ENCOUNTER — TELEPHONE (OUTPATIENT)
Dept: GASTROENTEROLOGY | Facility: CLINIC | Age: 64
End: 2019-10-21

## 2019-10-21 NOTE — TELEPHONE ENCOUNTER
Called [facility] to obtain a prior authorization for patient's EGD  Per automated system, no Cj Dejesus is required  Call ref #[id number]

## 2019-10-31 ENCOUNTER — ANESTHESIA EVENT (OUTPATIENT)
Dept: GASTROENTEROLOGY | Facility: AMBULARY SURGERY CENTER | Age: 64
End: 2019-10-31

## 2019-11-14 ENCOUNTER — ANESTHESIA (OUTPATIENT)
Dept: GASTROENTEROLOGY | Facility: AMBULARY SURGERY CENTER | Age: 64
End: 2019-11-14

## 2019-11-14 ENCOUNTER — HOSPITAL ENCOUNTER (OUTPATIENT)
Dept: GASTROENTEROLOGY | Facility: AMBULARY SURGERY CENTER | Age: 64
Setting detail: OUTPATIENT SURGERY
Discharge: HOME | End: 2019-11-14
Admitting: STUDENT IN AN ORGANIZED HEALTH CARE EDUCATION/TRAINING PROGRAM
Payer: COMMERCIAL

## 2019-11-14 VITALS
TEMPERATURE: 97 F | DIASTOLIC BLOOD PRESSURE: 68 MMHG | HEART RATE: 74 BPM | OXYGEN SATURATION: 97 % | WEIGHT: 195 LBS | RESPIRATION RATE: 18 BRPM | SYSTOLIC BLOOD PRESSURE: 117 MMHG | BODY MASS INDEX: 29.55 KG/M2 | HEIGHT: 68 IN

## 2019-11-14 DIAGNOSIS — R13.10 DYSPHAGIA, UNSPECIFIED TYPE: ICD-10-CM

## 2019-11-14 PROCEDURE — C1726 CATH, BAL DIL, NON-VASCULAR: HCPCS

## 2019-11-14 PROCEDURE — 43249 ESOPH EGD DILATION <30 MM: CPT | Performed by: INTERNAL MEDICINE

## 2019-11-14 PROCEDURE — 88305 TISSUE EXAM BY PATHOLOGIST: CPT | Performed by: PATHOLOGY

## 2019-11-14 PROCEDURE — 43239 EGD BIOPSY SINGLE/MULTIPLE: CPT | Performed by: INTERNAL MEDICINE

## 2019-11-14 RX ORDER — SODIUM CHLORIDE, SODIUM LACTATE, POTASSIUM CHLORIDE, CALCIUM CHLORIDE 600; 310; 30; 20 MG/100ML; MG/100ML; MG/100ML; MG/100ML
100 INJECTION, SOLUTION INTRAVENOUS CONTINUOUS
Status: DISCONTINUED | OUTPATIENT
Start: 2019-11-14 | End: 2019-11-18 | Stop reason: HOSPADM

## 2019-11-14 RX ORDER — PROPOFOL 10 MG/ML
INJECTION, EMULSION INTRAVENOUS AS NEEDED
Status: DISCONTINUED | OUTPATIENT
Start: 2019-11-14 | End: 2019-11-14 | Stop reason: SURG

## 2019-11-14 RX ORDER — SODIUM CHLORIDE, SODIUM LACTATE, POTASSIUM CHLORIDE, CALCIUM CHLORIDE 600; 310; 30; 20 MG/100ML; MG/100ML; MG/100ML; MG/100ML
INJECTION, SOLUTION INTRAVENOUS CONTINUOUS PRN
Status: DISCONTINUED | OUTPATIENT
Start: 2019-11-14 | End: 2019-11-14 | Stop reason: SURG

## 2019-11-14 RX ORDER — MELATONIN
1000 DAILY
COMMUNITY

## 2019-11-14 RX ORDER — ASCORBIC ACID 500 MG
500 TABLET ORAL DAILY
COMMUNITY

## 2019-11-14 RX ADMIN — SODIUM CHLORIDE, SODIUM LACTATE, POTASSIUM CHLORIDE, AND CALCIUM CHLORIDE: .6; .31; .03; .02 INJECTION, SOLUTION INTRAVENOUS at 09:52

## 2019-11-14 RX ADMIN — PROPOFOL 50 MG: 10 INJECTION, EMULSION INTRAVENOUS at 11:01

## 2019-11-14 RX ADMIN — PROPOFOL 50 MG: 10 INJECTION, EMULSION INTRAVENOUS at 10:55

## 2019-11-14 RX ADMIN — PROPOFOL 50 MG: 10 INJECTION, EMULSION INTRAVENOUS at 11:15

## 2019-11-14 RX ADMIN — PROPOFOL 50 MG: 10 INJECTION, EMULSION INTRAVENOUS at 10:59

## 2019-11-14 RX ADMIN — PROPOFOL 50 MG: 10 INJECTION, EMULSION INTRAVENOUS at 11:07

## 2019-11-14 RX ADMIN — PROPOFOL 50 MG: 10 INJECTION, EMULSION INTRAVENOUS at 10:54

## 2019-11-14 RX ADMIN — PROPOFOL 50 MG: 10 INJECTION, EMULSION INTRAVENOUS at 11:12

## 2019-11-14 NOTE — H&P
History and Physical - [facility]  Claudia Tracey 60 y o  male MRN: [id number]    HPI: Claudia Tracey is a 60y o  year old male who presents for EGD  He is having dysphagia to solid and liquid      Review of Systems    Historical Information   Past Medical History:   Diagnosis Date    Dysphagia      Past Surgical History:   Procedure Laterality Date    COLONOSCOPY N/A 5/18/2018    Procedure: COLONOSCOPY;  Surgeon: Britton Chu MD;  Location: [facility];   Service: Gastroenterology    EGD  08/26/2019    FINGER AMPUTATION Left     partial 3rd finger     Social History   Social History     Substance and Sexual Activity   Alcohol Use Not Currently    Comment: Rare     Social History     Substance and Sexual Activity   Drug Use No     Social History     Tobacco Use   Smoking Status Former Smoker   Smokeless Tobacco Never Used   Tobacco Comment    quit 1998     Family History   Problem Relation Age of Onset    Heart attack Father     No Known Problems Mother     Prostate cancer Neg Hx     Colon cancer Neg Hx     Diabetes Neg Hx     Stroke Neg Hx        Meds/Allergies       (Not in a hospital admission)    No Known Allergies    Objective     /74   Pulse 61   Temp (!) 96 9 °F (36 1 °C) (Temporal)   Resp 18   Ht 5' 8" (1 727 m)   Wt 88 5 kg (195 lb)   SpO2 (!) 18%   BMI 29 65 kg/m²       PHYSICAL EXAM    Gen: NAD  CV: RRR  CHEST: Clear  ABD: soft, NT/ND  EXT: no edema  Neuro: AAO      ASSESSMENT/PLAN:  This is a 60y o  year old male here for EGD for evaluation of dysphagia to solid and liquid    PLAN:   Procedure:  EGD with biopsy and possible dilation

## 2019-11-18 ENCOUNTER — OFFICE VISIT (OUTPATIENT)
Dept: GASTROENTEROLOGY | Facility: CLINIC | Age: 64
End: 2019-11-18
Payer: COMMERCIAL

## 2019-11-18 VITALS
WEIGHT: 200 LBS | HEART RATE: 69 BPM | BODY MASS INDEX: 30.31 KG/M2 | DIASTOLIC BLOOD PRESSURE: 82 MMHG | SYSTOLIC BLOOD PRESSURE: 121 MMHG | TEMPERATURE: 97.1 F | HEIGHT: 68 IN

## 2019-11-18 DIAGNOSIS — R13.10 DYSPHAGIA, UNSPECIFIED TYPE: ICD-10-CM

## 2019-11-18 DIAGNOSIS — K44.9 HIATAL HERNIA: Primary | ICD-10-CM

## 2019-11-18 PROCEDURE — 99213 OFFICE O/P EST LOW 20 MIN: CPT | Performed by: PHYSICIAN ASSISTANT

## 2019-11-18 NOTE — PROGRESS NOTES
[facility] - Outpatient Follow-up Note  Hamida Robison 60 y o  male MRN: [id number]  Encounter: [id number]          ASSESSMENT AND PLAN:      Diagnoses and all orders for this visit:    1  Hiatal hernia  2  Dysphagia, unspecified type - resolved  3  Non-obstructing Schatzki's ring s/p dilation    Patient seen for dysphagia symptoms which have resolved with PPI therapy and following EGD with dilation of a Schatzki's ring  Bx from the EGD are pending so further recommendations will be forthcoming based on these results  Provided bx results are unremarkable, can consider tapering off PPI and observing  Discussed recommendations for an anti-reflux diet which he understands  Will follow up on an as-needed basis unless bx results suggest otherwise   ______________________________________________________________________    SUBJECTIVE:  Mr Jimi Aguero is a 60-year-old male who presents for follow up to an EGD performed for intermittent symptoms of dysphagia  He was started on PPI by his PCP and his dysphagia symptoms had improved  He underwent EGD on 11/14/19 which showed a small hiatal hernia, non-obstructing Schatzki's ring, s/p dilation, and a large duodenal diverticulum which had been seen previously on UGI series  Multiple biopsies were taken at the time of the EGD to r/o EoE but these results are pending  He states he is doing well at this time  He denies abdominal pain, nausea/vomiting, dysphagia or odynophagia  No fevers, chills, weight loss or night sweats  REVIEW OF SYSTEMS IS OTHERWISE NEGATIVE  Historical Information   Past Medical History:   Diagnosis Date    Dysphagia      Past Surgical History:   Procedure Laterality Date    COLONOSCOPY N/A 5/18/2018    Procedure: COLONOSCOPY;  Surgeon: Sweta Mccain MD;  Location: [facility];   Service: Gastroenterology    EGD  08/26/2019    FINGER AMPUTATION Left     partial 3rd finger     Social History   Social History     Substance and Sexual Activity   Alcohol Use Not Currently    Comment: Rare     Social History     Substance and Sexual Activity   Drug Use No     Social History     Tobacco Use   Smoking Status Former Smoker   Smokeless Tobacco Never Used   Tobacco Comment    quit 1998     Family History   Problem Relation Age of Onset    Heart attack Father     No Known Problems Mother     Prostate cancer Neg Hx     Colon cancer Neg Hx     Diabetes Neg Hx     Stroke Neg Hx        Meds/Allergies       Current Outpatient Medications:     ascorbic acid (VITAMIN C) 500 mg tablet    cholecalciferol (VITAMIN D3) 1,000 units tablet    omeprazole (PriLOSEC) 20 mg delayed release capsule    vitamin A 10,000 units capsule  No current facility-administered medications for this visit  No Known Allergies        Objective     Blood pressure 121/82, pulse 69, temperature (!) 97 1 °F (36 2 °C), temperature source Tympanic, height 5' 8" (1 727 m), weight 90 7 kg (200 lb)  Body mass index is 30 41 kg/m²  PHYSICAL EXAM:      General Appearance:   Alert, cooperative, no distress   HEENT:   Normocephalic, atraumatic, sclera anicteric      Neck:  Supple, symmetrical, no lymphadenopathy, trachea midline   Lungs:   Clear to auscultation bilaterally; no rales, rhonchi or wheezing; respirations unlabored    Heart[de-identified]   Regular rate and rhythm; no murmur, rub, or gallop     Abdomen:   Soft, non-tender without rebound or guarding, non-distended; positive bowel sounds; no masses, no organomegaly    Genitalia:   Deferred    Rectal:   Deferred    Extremities:  No cyanosis, clubbing or edema    Pulses:  2+ and symmetric    Skin:  No jaundice, rashes, or lesions            Leyla Wesley PA-C

## 2020-09-03 ENCOUNTER — OFFICE VISIT (OUTPATIENT)
Dept: FAMILY MEDICINE CLINIC | Facility: CLINIC | Age: 65
End: 2020-09-03
Payer: COMMERCIAL

## 2020-09-03 VITALS
HEART RATE: 72 BPM | BODY MASS INDEX: 32.02 KG/M2 | RESPIRATION RATE: 16 BRPM | SYSTOLIC BLOOD PRESSURE: 122 MMHG | DIASTOLIC BLOOD PRESSURE: 80 MMHG | OXYGEN SATURATION: 97 % | HEIGHT: 67 IN | WEIGHT: 204 LBS | TEMPERATURE: 98.3 F

## 2020-09-03 DIAGNOSIS — E78.5 DYSLIPIDEMIA: ICD-10-CM

## 2020-09-03 DIAGNOSIS — Z23 NEED FOR INFLUENZA VACCINATION: ICD-10-CM

## 2020-09-03 DIAGNOSIS — E55.9 VITAMIN D DEFICIENCY: ICD-10-CM

## 2020-09-03 DIAGNOSIS — Z00.00 ANNUAL PHYSICAL EXAM: Primary | ICD-10-CM

## 2020-09-03 DIAGNOSIS — K44.9 HIATAL HERNIA: ICD-10-CM

## 2020-09-03 DIAGNOSIS — Z12.5 SCREENING PSA (PROSTATE SPECIFIC ANTIGEN): ICD-10-CM

## 2020-09-03 PROCEDURE — 99396 PREV VISIT EST AGE 40-64: CPT | Performed by: FAMILY MEDICINE

## 2020-09-03 PROCEDURE — 90471 IMMUNIZATION ADMIN: CPT

## 2020-09-03 PROCEDURE — 1036F TOBACCO NON-USER: CPT | Performed by: FAMILY MEDICINE

## 2020-09-03 PROCEDURE — 90682 RIV4 VACC RECOMBINANT DNA IM: CPT

## 2020-09-03 PROCEDURE — 3725F SCREEN DEPRESSION PERFORMED: CPT | Performed by: FAMILY MEDICINE

## 2020-09-03 NOTE — PROGRESS NOTES
Assessment/Plan:  Patient received flublok vaccine today  Patient to return the office for fasting labs as below  Patient instructed to follow a low-fat low-cholesterol diet get regular aerobic exercise 150 minutes per week  Weight loss encouraged  Return to the office in 1 year  Diagnoses and all orders for this visit:    Annual physical exam  -     CBC and Platelet; Future  -     Comprehensive metabolic panel; Future  -     UA w Reflex to Microscopic w Reflex to Culture - Clinic Collect; Future    Dyslipidemia  -     Comprehensive metabolic panel; Future  -     Lipid panel; Future  -     TSH, 3rd generation with Free T4 reflex; Future    Hiatal hernia    Vitamin D deficiency  -     Vitamin D 25 hydroxy; Future    Need for influenza vaccination  -     influenza vaccine, quadrivalent, recombinant, PF, 0 5 mL, for patients 18 yr+ (FLUBLOK)    Screening PSA (prostate specific antigen)  -     PSA, Total Screen; Future          Subjective:      Patient ID: Delilah Moritz is a 63 y o  male  Patient is here for annual physical exam he is not fasting today  Patient requests flu vaccine  Patient has been feeling well overall and has been walking 5 days a week for 30 minutes  He is up-to-date on colonoscopy and had EGD last year with dilatation with resolution of his dysphagia  Hyperlipidemia   This is a chronic problem  Recent lipid tests were reviewed and are high  He has no history of diabetes  Pertinent negatives include no chest pain, focal sensory loss, focal weakness, leg pain, myalgias or shortness of breath  Current antihyperlipidemic treatment includes diet change  There are no compliance problems  Risk factors for coronary artery disease include dyslipidemia, male sex and family history         The following portions of the patient's history were reviewed and updated as appropriate: allergies, current medications, past family history, past medical history, past social history, past surgical history and problem list     Review of Systems   Constitutional: Negative for activity change, appetite change, chills, diaphoresis, fatigue, fever and unexpected weight change  HENT: Negative  Eyes: Negative  Respiratory: Negative for cough, chest tightness, shortness of breath and wheezing  Cardiovascular: Negative for chest pain, palpitations and leg swelling  Gastrointestinal: Negative for abdominal pain, blood in stool, constipation, diarrhea, nausea and vomiting  Endocrine: Negative for cold intolerance and heat intolerance  Genitourinary: Negative for difficulty urinating, dysuria, frequency, hematuria and urgency  Musculoskeletal: Negative for arthralgias, back pain, gait problem, joint swelling, myalgias, neck pain and neck stiffness  Skin: Negative  Neurological: Negative for dizziness, focal weakness, syncope, weakness, light-headedness and headaches  Hematological: Negative for adenopathy  Does not bruise/bleed easily  Psychiatric/Behavioral: Negative for decreased concentration, dysphoric mood and sleep disturbance  The patient is not nervous/anxious  Objective:      /80   Pulse 72   Temp 98 3 °F (36 8 °C) (Temporal)   Resp 16   Ht 5' 7" (1 702 m)   Wt 92 5 kg (204 lb)   SpO2 97%   BMI 31 95 kg/m²          Physical Exam  Constitutional:       General: He is not in acute distress  Appearance: Normal appearance  HENT:      Head: Normocephalic  Mouth/Throat:      Mouth: Mucous membranes are moist    Eyes:      General: No scleral icterus  Conjunctiva/sclera: Conjunctivae normal    Neck:      Musculoskeletal: Neck supple  Vascular: No carotid bruit  Cardiovascular:      Rate and Rhythm: Normal rate and regular rhythm  Pulmonary:      Effort: Pulmonary effort is normal       Breath sounds: Normal breath sounds  Abdominal:      Palpations: Abdomen is soft  Tenderness: There is no abdominal tenderness     Musculoskeletal: Right lower leg: No edema  Left lower leg: No edema  Lymphadenopathy:      Cervical: No cervical adenopathy  Skin:     General: Skin is warm and dry  Neurological:      General: No focal deficit present  Mental Status: He is alert and oriented to person, place, and time  Psychiatric:         Mood and Affect: Mood normal          Behavior: Behavior normal          Thought Content: Thought content normal          Judgment: Judgment normal          BMI Counseling: Body mass index is 31 95 kg/m²  The BMI is above normal  Nutrition recommendations include decreasing overall calorie intake, 3-5 servings of fruits/vegetables daily, reducing fast food intake, consuming healthier snacks, decreasing soda and/or juice intake, moderation in carbohydrate intake and reducing intake of saturated fat and trans fat  Exercise recommendations include moderate aerobic physical activity for 150 minutes/week

## 2020-09-04 ENCOUNTER — CLINICAL SUPPORT (OUTPATIENT)
Dept: FAMILY MEDICINE CLINIC | Facility: CLINIC | Age: 65
End: 2020-09-04
Payer: COMMERCIAL

## 2020-09-04 VITALS — TEMPERATURE: 97.8 F

## 2020-09-04 DIAGNOSIS — Z12.5 SCREENING PSA (PROSTATE SPECIFIC ANTIGEN): ICD-10-CM

## 2020-09-04 DIAGNOSIS — E55.9 VITAMIN D DEFICIENCY: ICD-10-CM

## 2020-09-04 DIAGNOSIS — E78.5 DYSLIPIDEMIA: ICD-10-CM

## 2020-09-04 DIAGNOSIS — Z00.00 ANNUAL PHYSICAL EXAM: ICD-10-CM

## 2020-09-04 LAB
25(OH)D3 SERPL-MCNC: 35.6 NG/ML (ref 30–100)
ALBUMIN SERPL BCP-MCNC: 4.2 G/DL (ref 3.5–5)
ALP SERPL-CCNC: 56 U/L (ref 46–116)
ALT SERPL W P-5'-P-CCNC: 40 U/L (ref 12–78)
ANION GAP SERPL CALCULATED.3IONS-SCNC: 6 MMOL/L (ref 4–13)
AST SERPL W P-5'-P-CCNC: 28 U/L (ref 5–45)
BILIRUB SERPL-MCNC: 0.62 MG/DL (ref 0.2–1)
BILIRUB UR QL STRIP: NEGATIVE
BUN SERPL-MCNC: 11 MG/DL (ref 5–25)
CALCIUM SERPL-MCNC: 9.8 MG/DL (ref 8.3–10.1)
CHLORIDE SERPL-SCNC: 106 MMOL/L (ref 100–108)
CHOLEST SERPL-MCNC: 225 MG/DL (ref 50–200)
CLARITY UR: NORMAL
CO2 SERPL-SCNC: 26 MMOL/L (ref 21–32)
COLOR UR: YELLOW
CREAT SERPL-MCNC: 1.07 MG/DL (ref 0.6–1.3)
ERYTHROCYTE [DISTWIDTH] IN BLOOD BY AUTOMATED COUNT: 14.2 % (ref 11.6–15.1)
GFR SERPL CREATININE-BSD FRML MDRD: 73 ML/MIN/1.73SQ M
GLUCOSE P FAST SERPL-MCNC: 87 MG/DL (ref 65–99)
GLUCOSE UR STRIP-MCNC: NEGATIVE MG/DL
HCT VFR BLD AUTO: 49.2 % (ref 36.5–49.3)
HDLC SERPL-MCNC: 31 MG/DL
HGB BLD-MCNC: 15.5 G/DL (ref 12–17)
HGB UR QL STRIP.AUTO: NEGATIVE
KETONES UR STRIP-MCNC: NEGATIVE MG/DL
LDLC SERPL CALC-MCNC: 173 MG/DL (ref 0–100)
LEUKOCYTE ESTERASE UR QL STRIP: NEGATIVE
MCH RBC QN AUTO: 28.3 PG (ref 26.8–34.3)
MCHC RBC AUTO-ENTMCNC: 31.5 G/DL (ref 31.4–37.4)
MCV RBC AUTO: 90 FL (ref 82–98)
NITRITE UR QL STRIP: NEGATIVE
NONHDLC SERPL-MCNC: 194 MG/DL
PH UR STRIP.AUTO: 8 [PH]
PLATELET # BLD AUTO: 280 THOUSANDS/UL (ref 149–390)
PMV BLD AUTO: 10.4 FL (ref 8.9–12.7)
POTASSIUM SERPL-SCNC: 4.3 MMOL/L (ref 3.5–5.3)
PROT SERPL-MCNC: 7.5 G/DL (ref 6.4–8.2)
PROT UR STRIP-MCNC: NEGATIVE MG/DL
PSA SERPL-MCNC: 0.7 NG/ML (ref 0–4)
RBC # BLD AUTO: 5.47 MILLION/UL (ref 3.88–5.62)
SODIUM SERPL-SCNC: 138 MMOL/L (ref 136–145)
SP GR UR STRIP.AUTO: 1.02 (ref 1–1.03)
TRIGL SERPL-MCNC: 103 MG/DL
TSH SERPL DL<=0.05 MIU/L-ACNC: 1.64 UIU/ML (ref 0.36–3.74)
UROBILINOGEN UR QL STRIP.AUTO: 0.2 E.U./DL
WBC # BLD AUTO: 8.78 THOUSAND/UL (ref 4.31–10.16)

## 2020-09-04 PROCEDURE — 81003 URINALYSIS AUTO W/O SCOPE: CPT | Performed by: FAMILY MEDICINE

## 2020-09-04 PROCEDURE — 36415 COLL VENOUS BLD VENIPUNCTURE: CPT | Performed by: FAMILY MEDICINE

## 2020-09-04 PROCEDURE — G0103 PSA SCREENING: HCPCS | Performed by: FAMILY MEDICINE

## 2020-09-04 PROCEDURE — 80053 COMPREHEN METABOLIC PANEL: CPT | Performed by: FAMILY MEDICINE

## 2020-09-04 PROCEDURE — 84443 ASSAY THYROID STIM HORMONE: CPT | Performed by: FAMILY MEDICINE

## 2020-09-04 PROCEDURE — 85027 COMPLETE CBC AUTOMATED: CPT | Performed by: FAMILY MEDICINE

## 2020-09-04 PROCEDURE — 82306 VITAMIN D 25 HYDROXY: CPT | Performed by: FAMILY MEDICINE

## 2020-09-04 PROCEDURE — 80061 LIPID PANEL: CPT | Performed by: FAMILY MEDICINE

## 2020-09-08 DIAGNOSIS — E78.5 HYPERLIPIDEMIA, UNSPECIFIED HYPERLIPIDEMIA TYPE: Primary | ICD-10-CM

## 2020-09-08 RX ORDER — SIMVASTATIN 20 MG
20 TABLET ORAL
Qty: 30 TABLET | Refills: 5 | Status: SHIPPED | OUTPATIENT
Start: 2020-09-08 | End: 2021-03-09

## 2020-12-23 ENCOUNTER — CLINICAL SUPPORT (OUTPATIENT)
Dept: FAMILY MEDICINE CLINIC | Facility: CLINIC | Age: 65
End: 2020-12-23
Payer: COMMERCIAL

## 2020-12-23 DIAGNOSIS — E78.5 HYPERLIPIDEMIA, UNSPECIFIED HYPERLIPIDEMIA TYPE: Primary | ICD-10-CM

## 2020-12-23 LAB
ALBUMIN SERPL BCP-MCNC: 4 G/DL (ref 3.5–5)
ALP SERPL-CCNC: 54 U/L (ref 46–116)
ALT SERPL W P-5'-P-CCNC: 41 U/L (ref 12–78)
AST SERPL W P-5'-P-CCNC: 28 U/L (ref 5–45)
BILIRUB DIRECT SERPL-MCNC: 0.12 MG/DL (ref 0–0.2)
BILIRUB SERPL-MCNC: 0.49 MG/DL (ref 0.2–1)
CHOLEST SERPL-MCNC: 169 MG/DL (ref 50–200)
HDLC SERPL-MCNC: 32 MG/DL
LDLC SERPL CALC-MCNC: 122 MG/DL (ref 0–100)
NONHDLC SERPL-MCNC: 137 MG/DL
PROT SERPL-MCNC: 7.4 G/DL (ref 6.4–8.2)
TRIGL SERPL-MCNC: 75 MG/DL

## 2020-12-23 PROCEDURE — 80061 LIPID PANEL: CPT | Performed by: FAMILY MEDICINE

## 2020-12-23 PROCEDURE — 36415 COLL VENOUS BLD VENIPUNCTURE: CPT | Performed by: FAMILY MEDICINE

## 2020-12-23 PROCEDURE — 80076 HEPATIC FUNCTION PANEL: CPT | Performed by: FAMILY MEDICINE

## 2021-03-09 DIAGNOSIS — E78.5 HYPERLIPIDEMIA, UNSPECIFIED HYPERLIPIDEMIA TYPE: ICD-10-CM

## 2021-03-09 RX ORDER — SIMVASTATIN 20 MG
TABLET ORAL
Qty: 90 TABLET | Refills: 1 | Status: SHIPPED | OUTPATIENT
Start: 2021-03-09 | End: 2021-04-29 | Stop reason: HOSPADM

## 2021-04-02 DIAGNOSIS — Z23 ENCOUNTER FOR IMMUNIZATION: ICD-10-CM

## 2021-04-20 ENCOUNTER — APPOINTMENT (EMERGENCY)
Dept: RADIOLOGY | Facility: HOSPITAL | Age: 66
End: 2021-04-20
Payer: COMMERCIAL

## 2021-04-20 ENCOUNTER — HOSPITAL ENCOUNTER (OUTPATIENT)
Facility: HOSPITAL | Age: 66
Setting detail: OBSERVATION
End: 2021-04-21
Attending: EMERGENCY MEDICINE | Admitting: INTERNAL MEDICINE
Payer: COMMERCIAL

## 2021-04-20 DIAGNOSIS — R77.8 ELEVATED TROPONIN: ICD-10-CM

## 2021-04-20 DIAGNOSIS — R07.9 CHEST PAIN: Primary | ICD-10-CM

## 2021-04-20 PROBLEM — R79.89 ELEVATED TROPONIN: Status: ACTIVE | Noted: 2021-04-20

## 2021-04-20 LAB
ALBUMIN SERPL BCP-MCNC: 4.1 G/DL (ref 3.5–5)
ALP SERPL-CCNC: 58 U/L (ref 46–116)
ALT SERPL W P-5'-P-CCNC: 33 U/L (ref 12–78)
ANION GAP SERPL CALCULATED.3IONS-SCNC: 7 MMOL/L (ref 4–13)
APTT PPP: 28 SECONDS (ref 23–37)
APTT PPP: 73 SECONDS (ref 23–37)
AST SERPL W P-5'-P-CCNC: 26 U/L (ref 5–45)
ATRIAL RATE: 53 BPM
ATRIAL RATE: 58 BPM
BASOPHILS # BLD AUTO: 0.04 THOUSANDS/ΜL (ref 0–0.1)
BASOPHILS NFR BLD AUTO: 1 % (ref 0–1)
BILIRUB SERPL-MCNC: 0.5 MG/DL (ref 0.2–1)
BUN SERPL-MCNC: 12 MG/DL (ref 5–25)
CALCIUM SERPL-MCNC: 9.2 MG/DL (ref 8.3–10.1)
CHLORIDE SERPL-SCNC: 102 MMOL/L (ref 100–108)
CHOLEST SERPL-MCNC: 172 MG/DL (ref 50–200)
CO2 SERPL-SCNC: 30 MMOL/L (ref 21–32)
CREAT SERPL-MCNC: 1.18 MG/DL (ref 0.6–1.3)
EOSINOPHIL # BLD AUTO: 0.09 THOUSAND/ΜL (ref 0–0.61)
EOSINOPHIL NFR BLD AUTO: 1 % (ref 0–6)
ERYTHROCYTE [DISTWIDTH] IN BLOOD BY AUTOMATED COUNT: 14.6 % (ref 11.6–15.1)
EST. AVERAGE GLUCOSE BLD GHB EST-MCNC: 126 MG/DL
FLUAV RNA RESP QL NAA+PROBE: NEGATIVE
FLUBV RNA RESP QL NAA+PROBE: NEGATIVE
GFR SERPL CREATININE-BSD FRML MDRD: 65 ML/MIN/1.73SQ M
GLUCOSE SERPL-MCNC: 94 MG/DL (ref 65–140)
HBA1C MFR BLD: 6 %
HCT VFR BLD AUTO: 48.1 % (ref 36.5–49.3)
HDLC SERPL-MCNC: 30 MG/DL
HGB BLD-MCNC: 15 G/DL (ref 12–17)
IMM GRANULOCYTES # BLD AUTO: 0.03 THOUSAND/UL (ref 0–0.2)
IMM GRANULOCYTES NFR BLD AUTO: 0 % (ref 0–2)
INR PPP: 0.94 (ref 0.84–1.19)
LDLC SERPL CALC-MCNC: 125 MG/DL (ref 0–100)
LIPASE SERPL-CCNC: 87 U/L (ref 73–393)
LYMPHOCYTES # BLD AUTO: 2.37 THOUSANDS/ΜL (ref 0.6–4.47)
LYMPHOCYTES NFR BLD AUTO: 31 % (ref 14–44)
MCH RBC QN AUTO: 27.7 PG (ref 26.8–34.3)
MCHC RBC AUTO-ENTMCNC: 31.2 G/DL (ref 31.4–37.4)
MCV RBC AUTO: 89 FL (ref 82–98)
MONOCYTES # BLD AUTO: 0.61 THOUSAND/ΜL (ref 0.17–1.22)
MONOCYTES NFR BLD AUTO: 8 % (ref 4–12)
NEUTROPHILS # BLD AUTO: 4.55 THOUSANDS/ΜL (ref 1.85–7.62)
NEUTS SEG NFR BLD AUTO: 59 % (ref 43–75)
NRBC BLD AUTO-RTO: 0 /100 WBCS
P AXIS: 37 DEGREES
P AXIS: 41 DEGREES
PLATELET # BLD AUTO: 255 THOUSANDS/UL (ref 149–390)
PLATELET # BLD AUTO: 285 THOUSANDS/UL (ref 149–390)
PMV BLD AUTO: 9.4 FL (ref 8.9–12.7)
PMV BLD AUTO: 9.6 FL (ref 8.9–12.7)
POTASSIUM SERPL-SCNC: 4.3 MMOL/L (ref 3.5–5.3)
PR INTERVAL: 158 MS
PR INTERVAL: 162 MS
PROT SERPL-MCNC: 7.5 G/DL (ref 6.4–8.2)
PROTHROMBIN TIME: 12.7 SECONDS (ref 11.6–14.5)
QRS AXIS: 38 DEGREES
QRS AXIS: 46 DEGREES
QRSD INTERVAL: 80 MS
QRSD INTERVAL: 86 MS
QT INTERVAL: 418 MS
QT INTERVAL: 426 MS
QTC INTERVAL: 399 MS
QTC INTERVAL: 410 MS
RBC # BLD AUTO: 5.42 MILLION/UL (ref 3.88–5.62)
RSV RNA RESP QL NAA+PROBE: NEGATIVE
SARS-COV-2 RNA RESP QL NAA+PROBE: NEGATIVE
SODIUM SERPL-SCNC: 139 MMOL/L (ref 136–145)
T WAVE AXIS: 61 DEGREES
T WAVE AXIS: 67 DEGREES
TRIGL SERPL-MCNC: 83 MG/DL
TROPONIN I SERPL-MCNC: 0.05 NG/ML
TROPONIN I SERPL-MCNC: 0.06 NG/ML
TROPONIN I SERPL-MCNC: 0.07 NG/ML
TSH SERPL DL<=0.05 MIU/L-ACNC: 1.88 UIU/ML (ref 0.36–3.74)
VENTRICULAR RATE: 53 BPM
VENTRICULAR RATE: 58 BPM
WBC # BLD AUTO: 7.69 THOUSAND/UL (ref 4.31–10.16)

## 2021-04-20 PROCEDURE — 83036 HEMOGLOBIN GLYCOSYLATED A1C: CPT | Performed by: NURSE PRACTITIONER

## 2021-04-20 PROCEDURE — 99285 EMERGENCY DEPT VISIT HI MDM: CPT

## 2021-04-20 PROCEDURE — 0241U HB NFCT DS VIR RESP RNA 4 TRGT: CPT | Performed by: INTERNAL MEDICINE

## 2021-04-20 PROCEDURE — 84443 ASSAY THYROID STIM HORMONE: CPT | Performed by: NURSE PRACTITIONER

## 2021-04-20 PROCEDURE — 93005 ELECTROCARDIOGRAM TRACING: CPT

## 2021-04-20 PROCEDURE — 93010 ELECTROCARDIOGRAM REPORT: CPT | Performed by: INTERNAL MEDICINE

## 2021-04-20 PROCEDURE — 83690 ASSAY OF LIPASE: CPT | Performed by: PHYSICIAN ASSISTANT

## 2021-04-20 PROCEDURE — 99285 EMERGENCY DEPT VISIT HI MDM: CPT | Performed by: PHYSICIAN ASSISTANT

## 2021-04-20 PROCEDURE — 85025 COMPLETE CBC W/AUTO DIFF WBC: CPT | Performed by: PHYSICIAN ASSISTANT

## 2021-04-20 PROCEDURE — 80061 LIPID PANEL: CPT | Performed by: NURSE PRACTITIONER

## 2021-04-20 PROCEDURE — 84484 ASSAY OF TROPONIN QUANT: CPT | Performed by: PHYSICIAN ASSISTANT

## 2021-04-20 PROCEDURE — 85730 THROMBOPLASTIN TIME PARTIAL: CPT | Performed by: NURSE PRACTITIONER

## 2021-04-20 PROCEDURE — 85049 AUTOMATED PLATELET COUNT: CPT | Performed by: INTERNAL MEDICINE

## 2021-04-20 PROCEDURE — 84484 ASSAY OF TROPONIN QUANT: CPT | Performed by: INTERNAL MEDICINE

## 2021-04-20 PROCEDURE — 85730 THROMBOPLASTIN TIME PARTIAL: CPT | Performed by: PHYSICIAN ASSISTANT

## 2021-04-20 PROCEDURE — 99205 OFFICE O/P NEW HI 60 MIN: CPT | Performed by: INTERNAL MEDICINE

## 2021-04-20 PROCEDURE — 85610 PROTHROMBIN TIME: CPT | Performed by: PHYSICIAN ASSISTANT

## 2021-04-20 PROCEDURE — 99220 PR INITIAL OBSERVATION CARE/DAY 70 MINUTES: CPT | Performed by: INTERNAL MEDICINE

## 2021-04-20 PROCEDURE — 96374 THER/PROPH/DIAG INJ IV PUSH: CPT

## 2021-04-20 PROCEDURE — 36415 COLL VENOUS BLD VENIPUNCTURE: CPT | Performed by: PHYSICIAN ASSISTANT

## 2021-04-20 PROCEDURE — 71045 X-RAY EXAM CHEST 1 VIEW: CPT

## 2021-04-20 PROCEDURE — 80053 COMPREHEN METABOLIC PANEL: CPT | Performed by: PHYSICIAN ASSISTANT

## 2021-04-20 RX ORDER — MULTIVITAMIN
1 TABLET ORAL DAILY
COMMUNITY

## 2021-04-20 RX ORDER — HEPARIN SODIUM 5000 [USP'U]/ML
5000 INJECTION, SOLUTION INTRAVENOUS; SUBCUTANEOUS EVERY 8 HOURS SCHEDULED
Status: DISCONTINUED | OUTPATIENT
Start: 2021-04-20 | End: 2021-04-20

## 2021-04-20 RX ORDER — HEPARIN SODIUM 1000 [USP'U]/ML
4000 INJECTION, SOLUTION INTRAVENOUS; SUBCUTANEOUS
Status: DISCONTINUED | OUTPATIENT
Start: 2021-04-20 | End: 2021-04-21 | Stop reason: HOSPADM

## 2021-04-20 RX ORDER — ASPIRIN 325 MG
325 TABLET ORAL ONCE
Status: COMPLETED | OUTPATIENT
Start: 2021-04-20 | End: 2021-04-20

## 2021-04-20 RX ORDER — ASPIRIN 81 MG/1
81 TABLET, CHEWABLE ORAL DAILY
Status: DISCONTINUED | OUTPATIENT
Start: 2021-04-22 | End: 2021-04-21 | Stop reason: HOSPADM

## 2021-04-20 RX ORDER — ASCORBIC ACID 500 MG
500 TABLET ORAL DAILY
Status: DISCONTINUED | OUTPATIENT
Start: 2021-04-20 | End: 2021-04-21 | Stop reason: HOSPADM

## 2021-04-20 RX ORDER — HEPARIN SODIUM 1000 [USP'U]/ML
4000 INJECTION, SOLUTION INTRAVENOUS; SUBCUTANEOUS ONCE
Status: COMPLETED | OUTPATIENT
Start: 2021-04-20 | End: 2021-04-20

## 2021-04-20 RX ORDER — MELATONIN
1000 DAILY
Status: DISCONTINUED | OUTPATIENT
Start: 2021-04-20 | End: 2021-04-21 | Stop reason: HOSPADM

## 2021-04-20 RX ORDER — KETOROLAC TROMETHAMINE 30 MG/ML
15 INJECTION, SOLUTION INTRAMUSCULAR; INTRAVENOUS ONCE
Status: COMPLETED | OUTPATIENT
Start: 2021-04-20 | End: 2021-04-20

## 2021-04-20 RX ORDER — HEPARIN SODIUM 1000 [USP'U]/ML
2000 INJECTION, SOLUTION INTRAVENOUS; SUBCUTANEOUS
Status: DISCONTINUED | OUTPATIENT
Start: 2021-04-20 | End: 2021-04-21 | Stop reason: HOSPADM

## 2021-04-20 RX ORDER — PRAVASTATIN SODIUM 40 MG
40 TABLET ORAL
Status: DISCONTINUED | OUTPATIENT
Start: 2021-04-20 | End: 2021-04-21

## 2021-04-20 RX ORDER — HEPARIN SODIUM 10000 [USP'U]/100ML
3-20 INJECTION, SOLUTION INTRAVENOUS
Status: DISCONTINUED | OUTPATIENT
Start: 2021-04-20 | End: 2021-04-21 | Stop reason: HOSPADM

## 2021-04-20 RX ORDER — ASPIRIN 325 MG
325 TABLET ORAL ONCE
Status: COMPLETED | OUTPATIENT
Start: 2021-04-21 | End: 2021-04-21

## 2021-04-20 RX ADMIN — HEPARIN SODIUM 11.1 UNITS/KG/HR: 10000 INJECTION, SOLUTION INTRAVENOUS at 12:11

## 2021-04-20 RX ADMIN — KETOROLAC TROMETHAMINE 15 MG: 30 INJECTION, SOLUTION INTRAMUSCULAR at 07:00

## 2021-04-20 RX ADMIN — Medication 10000 UNITS: at 11:04

## 2021-04-20 RX ADMIN — ASPIRIN 325 MG ORAL TABLET 325 MG: 325 PILL ORAL at 07:43

## 2021-04-20 RX ADMIN — Medication 1000 UNITS: at 11:03

## 2021-04-20 RX ADMIN — HEPARIN SODIUM 5000 UNITS: 5000 INJECTION INTRAVENOUS; SUBCUTANEOUS at 11:04

## 2021-04-20 RX ADMIN — OXYCODONE HYDROCHLORIDE AND ACETAMINOPHEN 500 MG: 500 TABLET ORAL at 11:04

## 2021-04-20 RX ADMIN — HEPARIN SODIUM 4000 UNITS: 1000 INJECTION INTRAVENOUS; SUBCUTANEOUS at 12:11

## 2021-04-20 NOTE — H&P
[facility]  H&P- Rebecca Dorman 1955, 63 y o  male MRN: [id number]  Unit/Bed#: ED 15 Encounter: [id number]  Primary Care Provider: Aym Burnette DO   Date and time admitted to hospital: 4/20/2021  6:23 AM    * Chest pain  Assessment & Plan  Typical chest pain  LINDSEY score is 2 however would consult cardiology for possible cath given his story and risk factors  Trend troponins 3  Telemetry  Repeat EKG now - no ischemia  Elevated troponin  Assessment & Plan  Continue to trend  Only mildly elevated  Dyslipidemia  Assessment & Plan  Start statin and asa  VTE Prophylaxis: Heparin  / sequential compression device   Code Status:  Full code  POLST: There is no POLST form on file for this patient (pre-hospital)  Discussion with family:  Discussed with the patient also update his wife valve    Anticipated Length of Stay:  Patient will be admitted on an Observation basis with an anticipated length of stay of  less 2 midnights  Justification for Hospital Stay:  Rule out acute coronary syndrome    Total Time for Visit, including Counseling / Coordination of Care: 45 minutes  Greater than 50% of this total time spent on direct patient counseling and coordination of care  Chief Complaint:   Chest pain, typical    History of Present Illness:    Rebecca Dorman is a 63 y o  male with hyperlipidemia, presenting with chest pain which came on this morning as he was going for his daily morning walk  He usually walks 3-4 miles a day in the morning and today he experienced left-sided/central chest pain 10/10 radiating to his left arm associated with shortness of breath diaphoreses cysts and nausea  He qualifies the pain as terrible and states he has never had similar pain in the past   His pain was relieved by resting came on again when he attempted to exert himself again      The patient also notes that on Saturday and Friday evening he had some brief episodes of chest pain which he brushed off as indigestion  The patient also has some very mildly elevated troponin of 0 05 his EKG is nonischemic  In terms of risk factors he is an ex-smoker, his dad  of heart attack at 62, he does have hyperlipidemia and also there is documentation of pre diabetes his PCPs most recent office visit  Review of Systems:    Review of Systems   Constitutional: Negative  Eyes: Negative  Respiratory: Positive for chest tightness and shortness of breath  Cardiovascular: Positive for chest pain  Gastrointestinal: Negative  Endocrine: Negative  Genitourinary: Negative  Musculoskeletal: Negative  Skin: Negative  Allergic/Immunologic: Negative  Neurological: Negative  Hematological: Negative  Psychiatric/Behavioral: Negative  Past Medical and Surgical History:     Past Medical History:   Diagnosis Date    Dysphagia        Past Surgical History:   Procedure Laterality Date    COLONOSCOPY N/A 2018    Procedure: COLONOSCOPY;  Surgeon: Alexis Berkowitz MD;  Location: [facility]; Service: Gastroenterology    EGD  2019    FINGER AMPUTATION Left     partial 3rd finger       Meds/Allergies:    Prior to Admission medications    Medication Sig Start Date End Date Taking? Authorizing Provider   ascorbic acid (VITAMIN C) 500 mg tablet Take 500 mg by mouth daily   Yes Historical Provider, MD   cholecalciferol (VITAMIN D3) 1,000 units tablet Take 1,000 Units by mouth daily   Yes Historical Provider, MD   Multiple Vitamin (multivitamin) tablet Take 1 tablet by mouth daily   Yes Historical Provider, MD   simvastatin (ZOCOR) 20 mg tablet TAKE ONE TABLET BY MOUTH EVERY NIGHT AT BEDTIME 3/9/21  Yes Jesus Baum, DO   vitamin A 10,000 units capsule Take 10,000 Units by mouth daily    Historical Provider, MD     I have reviewed home medications with patient personally      Allergies: No Known Allergies    Social History:     Marital Status: Single   Occupation: Works  Patient Pre-hospital Living Situation:  Lives with wife  Patient Pre-hospital Level of Mobility:  Fully mobile  Patient Pre-hospital Diet Restrictions:  None  Substance Use History:   Social History     Substance and Sexual Activity   Alcohol Use Not Currently    Comment: Rare     Social History     Tobacco Use   Smoking Status Former Smoker   Smokeless Tobacco Never Used   Tobacco Comment    quit 1998     Social History     Substance and Sexual Activity   Drug Use No       Family History:    Family History   Problem Relation Age of Onset    Heart attack Father     No Known Problems Mother     Prostate cancer Neg Hx     Colon cancer Neg Hx     Diabetes Neg Hx     Stroke Neg Hx        Physical Exam:     Vitals:   Blood Pressure: 136/77 (04/20/21 0900)  Pulse: 58 (04/20/21 0900)  Temperature: 98 3 °F (36 8 °C) (04/20/21 0657)  Temp Source: Oral (04/20/21 0657)  Respirations: 18 (04/20/21 0900)  Height: 5' 7" (170 2 cm) (04/20/21 0626)  Weight - Scale: 92 5 kg (204 lb) (04/20/21 0626)  SpO2: 96 % (04/20/21 0900)    Physical Exam  Constitutional:       General: He is not in acute distress  Appearance: He is not ill-appearing, toxic-appearing or diaphoretic  HENT:      Head: Normocephalic  Nose: Nose normal       Mouth/Throat:      Pharynx: No oropharyngeal exudate  Eyes:      Pupils: Pupils are equal, round, and reactive to light  Cardiovascular:      Rate and Rhythm: Normal rate  Abdominal:      General: Abdomen is flat  There is no distension  Palpations: There is no mass  Tenderness: There is no abdominal tenderness  There is no right CVA tenderness, left CVA tenderness, guarding or rebound  Hernia: No hernia is present  Musculoskeletal:         General: No swelling, tenderness, deformity or signs of injury  Right lower leg: No edema  Left lower leg: No edema  Skin:     Coloration: Skin is pale  Skin is not jaundiced  Findings: No lesion  Neurological:      General: No focal deficit present  Mental Status: He is alert  Cranial Nerves: No cranial nerve deficit  Sensory: No sensory deficit  Motor: No weakness  Gait: Gait normal       Deep Tendon Reflexes: Reflexes normal    Psychiatric:         Mood and Affect: Mood normal              Additional Data:     Lab Results: I have personally reviewed pertinent reports  Results from last 7 days   Lab Units 04/20/21  0946 04/20/21  0656   WBC Thousand/uL  --  7 69   HEMOGLOBIN g/dL  --  15 0   HEMATOCRIT %  --  48 1   PLATELETS Thousands/uL 255 285   NEUTROS PCT %  --  59   LYMPHS PCT %  --  31   MONOS PCT %  --  8   EOS PCT %  --  1     Results from last 7 days   Lab Units 04/20/21  0656   SODIUM mmol/L 139   POTASSIUM mmol/L 4 3   CHLORIDE mmol/L 102   CO2 mmol/L 30   BUN mg/dL 12   CREATININE mg/dL 1 18   ANION GAP mmol/L 7   CALCIUM mg/dL 9 2   ALBUMIN g/dL 4 1   TOTAL BILIRUBIN mg/dL 0 50   ALK PHOS U/L 58   ALT U/L 33   AST U/L 26   GLUCOSE RANDOM mg/dL 94     Results from last 7 days   Lab Units 04/20/21  0656   INR  0 94                   Imaging: I have personally reviewed pertinent reports  XR chest 1 view portable   ED Interpretation by Margarita Villanueva PA-C (04/20 8622)   No acute cardiopulmonary disease on initial read          EKG, Pathology, and Other Studies Reviewed on Admission:   · EKG:  Nonischemic    Allscripts / Epic Records Reviewed: Yes     ** Please Note: This note has been constructed using a voice recognition system   **

## 2021-04-20 NOTE — ASSESSMENT & PLAN NOTE
Typical chest pain  LINDSEY score is 2 however would consult cardiology for possible cath given his story and risk factors  Trend troponins 3  Telemetry  Repeat EKG now - no ischemia

## 2021-04-20 NOTE — ED PROVIDER NOTES
History  Chief Complaint   Patient presents with    Chest Pain     pt presenting w mid sternal chest pain that started about 45 minutes ago while he was walking  Denies any SOB or dizziness      Patient is a 79-year-old male with history of hyperlipidemia presents emergency department with gradual onset improving left-sided chest pressure pain nonradiating for 1 hour  Patient denies associated symptomatology  Patient states that while he was walking this morning, after reaching 3/4 of a mi he noted that he had some left-sided chest pressure pain which caused patient to stop walking and catch his breath  Patient emergency department this evening for further evaluation of left-sided chest pressure pain  Patient has history of esophageal spasm and further reports that current symptoms are not indicative of those previously experienced  Patient states that he has no chest pain at this time  Patient denies palliative and provocative factors  Patient denies not effective treatment  Patient's fevers, chills, nausea vomiting diarrhea, constipation urinary symptoms  Patient denies recent fall or recent trauma  Patient sick contacts or recent travel  Patient denies URI cough symptoms  Patient denies rashes skin changes  Patient denies DVT, PE , and thrombophlebitis  Patient denies shortness of breath, abdominal pain        History provided by:  Patient   used: No    Chest Pain  Pain location:  L chest  Pain quality: pressure    Pain radiates to:  Does not radiate  Pain radiates to the back: no    Pain severity:  Mild  Onset quality:  Gradual  Duration:  1 hour  Timing:  Sporadic  Progression:  Partially resolved  Chronicity:  New  Relieved by:  Nothing  Worsened by:  Nothing tried  Ineffective treatments:  None tried  Associated symptoms: no abdominal pain, no anxiety, no back pain, no cough, no dizziness, no fatigue, no fever, no headache, no nausea, no numbness, no palpitations, no shortness of breath, not vomiting and no weakness        Prior to Admission Medications   Prescriptions Last Dose Informant Patient Reported? Taking? Multiple Vitamin (multivitamin) tablet 4/20/2021 at Unknown time  Yes Yes   Sig: Take 1 tablet by mouth daily   ascorbic acid (VITAMIN C) 500 mg tablet 4/20/2021 at Unknown time Self Yes Yes   Sig: Take 500 mg by mouth daily   cholecalciferol (VITAMIN D3) 1,000 units tablet 4/20/2021 at Unknown time Self Yes Yes   Sig: Take 1,000 Units by mouth daily   simvastatin (ZOCOR) 20 mg tablet 4/19/2021 at Unknown time  No Yes   Sig: TAKE ONE TABLET BY MOUTH EVERY NIGHT AT BEDTIME   vitamin A 10,000 units capsule  Self Yes No   Sig: Take 10,000 Units by mouth daily      Facility-Administered Medications: None       Past Medical History:   Diagnosis Date    Dysphagia        Past Surgical History:   Procedure Laterality Date    COLONOSCOPY N/A 5/18/2018    Procedure: COLONOSCOPY;  Surgeon: Mendel Goldman MD;  Location: [facility]; Service: Gastroenterology    EGD  08/26/2019    FINGER AMPUTATION Left     partial 3rd finger       Family History   Problem Relation Age of Onset    Heart attack Father     No Known Problems Mother     Prostate cancer Neg Hx     Colon cancer Neg Hx     Diabetes Neg Hx     Stroke Neg Hx      I have reviewed and agree with the history as documented  E-Cigarette/Vaping    E-Cigarette Use Never User      E-Cigarette/Vaping Substances     Social History     Tobacco Use    Smoking status: Former Smoker    Smokeless tobacco: Never Used    Tobacco comment: quit 1998   Substance Use Topics    Alcohol use: Not Currently     Comment: Rare    Drug use: No       Review of Systems   Constitutional: Negative for activity change, appetite change, chills, fatigue and fever  HENT: Negative for congestion, postnasal drip, rhinorrhea, sinus pressure, sinus pain, sore throat and tinnitus  Eyes: Negative for photophobia and visual disturbance  Respiratory: Negative for cough, chest tightness and shortness of breath  Cardiovascular: Positive for chest pain  Negative for palpitations  Gastrointestinal: Negative for abdominal pain, constipation, diarrhea, nausea and vomiting  Genitourinary: Negative for difficulty urinating, dysuria, flank pain, frequency and urgency  Musculoskeletal: Negative for back pain, gait problem, neck pain and neck stiffness  Skin: Negative for pallor and rash  Allergic/Immunologic: Negative for environmental allergies and food allergies  Neurological: Negative for dizziness, weakness, numbness and headaches  Psychiatric/Behavioral: Negative for confusion  All other systems reviewed and are negative  Physical Exam  Physical Exam  Vitals signs and nursing note reviewed  Constitutional:       General: He is awake  Appearance: Normal appearance  He is well-developed  He is not ill-appearing, toxic-appearing or diaphoretic  Comments: /74 (BP Location: Right arm)   Pulse 70   Resp 18   Ht 5' 7" (1 702 m)   Wt 92 5 kg (204 lb)   SpO2 100%   BMI 31 95 kg/m²      HENT:      Head: Normocephalic and atraumatic  Right Ear: Hearing and external ear normal  No decreased hearing noted  No drainage, swelling or tenderness  No mastoid tenderness  Left Ear: Hearing and external ear normal  No decreased hearing noted  No drainage, swelling or tenderness  No mastoid tenderness  Nose: Nose normal       Mouth/Throat:      Lips: Pink  Mouth: Mucous membranes are moist       Pharynx: Oropharynx is clear  Uvula midline  Eyes:      General: Lids are normal  Vision grossly intact  Right eye: No discharge  Left eye: No discharge  Extraocular Movements: Extraocular movements intact  Conjunctiva/sclera: Conjunctivae normal       Pupils: Pupils are equal, round, and reactive to light     Neck:      Musculoskeletal: Full passive range of motion without pain, normal range of motion and neck supple  Normal range of motion  No neck rigidity, spinous process tenderness or muscular tenderness  Vascular: No JVD  Trachea: Trachea and phonation normal  No tracheal tenderness or tracheal deviation  Cardiovascular:      Rate and Rhythm: Normal rate and regular rhythm  Pulses: Normal pulses  Radial pulses are 2+ on the right side and 2+ on the left side  Posterior tibial pulses are 2+ on the right side and 2+ on the left side  Heart sounds: Normal heart sounds  Pulmonary:      Effort: Pulmonary effort is normal       Breath sounds: Normal breath sounds  No stridor  No decreased breath sounds, wheezing, rhonchi or rales  Chest:      Chest wall: No tenderness  Abdominal:      General: Abdomen is flat  Bowel sounds are normal  There is no distension  Palpations: Abdomen is soft  Abdomen is not rigid  Tenderness: There is no abdominal tenderness  There is no guarding or rebound  Musculoskeletal: Normal range of motion  Lymphadenopathy:      Head:      Right side of head: No submental, submandibular, tonsillar, preauricular, posterior auricular or occipital adenopathy  Left side of head: No submental, submandibular, tonsillar, preauricular, posterior auricular or occipital adenopathy  Cervical: No cervical adenopathy  Right cervical: No superficial, deep or posterior cervical adenopathy  Left cervical: No superficial, deep or posterior cervical adenopathy  Skin:     General: Skin is warm  Capillary Refill: Capillary refill takes less than 2 seconds  Neurological:      General: No focal deficit present  Mental Status: He is alert and oriented to person, place, and time  GCS: GCS eye subscore is 4  GCS verbal subscore is 5  GCS motor subscore is 6  Sensory: No sensory deficit  Deep Tendon Reflexes: Reflexes are normal and symmetric        Reflex Scores:       Patellar reflexes are 2+ on the right side and 2+ on the left side  Psychiatric:         Mood and Affect: Mood normal          Speech: Speech normal          Behavior: Behavior normal  Behavior is cooperative  Thought Content:  Thought content normal          Judgment: Judgment normal          Vital Signs  ED Triage Vitals   Temperature Pulse Respirations Blood Pressure SpO2   04/20/21 0657 04/20/21 0626 04/20/21 0626 04/20/21 0626 04/20/21 0626   98 3 °F (36 8 °C) 70 18 152/74 100 %      Temp Source Heart Rate Source Patient Position - Orthostatic VS BP Location FiO2 (%)   04/20/21 0657 04/20/21 0626 04/20/21 0626 04/20/21 0626 --   Oral Monitor Sitting Right arm       Pain Score       04/20/21 0626       8           Vitals:    04/20/21 0626 04/20/21 0700 04/20/21 0900   BP: 152/74 118/69 136/77   Pulse: 70 62 58   Patient Position - Orthostatic VS: Sitting           Visual Acuity      ED Medications  Medications   ketorolac (TORADOL) injection 15 mg (15 mg Intravenous Given 4/20/21 0700)   aspirin tablet 325 mg (325 mg Oral Given 4/20/21 0743)       Diagnostic Studies  Results Reviewed     Procedure Component Value Units Date/Time    COVID19, Influenza A/B, RSV PCR, [facility] [[id number]]     Lab Status: No result Specimen: Nares from Nose     Troponin I [[id number]]     Lab Status: No result Specimen: Blood     Protime-INR [[id number]]  (Normal) Collected: 04/20/21 0656    Lab Status: Final result Specimen: Blood from Arm, Left Updated: 04/20/21 0757     Protime 12 7 seconds      INR 0 94    APTT [[id number]]  (Normal) Collected: 04/20/21 0656    Lab Status: Final result Specimen: Blood from Arm, Left Updated: 04/20/21 0757     PTT 28 seconds     Comprehensive metabolic panel [[id number]] Collected: 04/20/21 0656    Lab Status: Final result Specimen: Blood from Arm, Left Updated: 04/20/21 0736     Sodium 139 mmol/L      Potassium 4 3 mmol/L      Chloride 102 mmol/L      CO2 30 mmol/L      ANION GAP 7 mmol/L      BUN 12 mg/dL      Creatinine 1 18 mg/dL      Glucose 94 mg/dL      Calcium 9 2 mg/dL      AST 26 U/L      ALT 33 U/L      Alkaline Phosphatase 58 U/L      Total Protein 7 5 g/dL      Albumin 4 1 g/dL      Total Bilirubin 0 50 mg/dL      eGFR 65 ml/min/1 73sq m     Narrative:      [place] guidelines for Chronic Kidney Disease (CKD):     Stage 1 with normal or high GFR (GFR > 90 mL/min/1 73 square meters)    Stage 2 Mild CKD (GFR = 60-89 mL/min/1 73 square meters)    Stage 3A Moderate CKD (GFR = 45-59 mL/min/1 73 square meters)    Stage 3B Moderate CKD (GFR = 30-44 mL/min/1 73 square meters)    Stage 4 Severe CKD (GFR = 15-29 mL/min/1 73 square meters)    Stage 5 End Stage CKD (GFR <15 mL/min/1 73 square meters)  Note: GFR calculation is accurate only with a steady state creatinine    Lipase [[id number]]  (Normal) Collected: 04/20/21 0656    Lab Status: Final result Specimen: Blood from Arm, Left Updated: 04/20/21 0736     Lipase 87 u/L     Troponin I [[id number]]  (Abnormal) Collected: 04/20/21 0656    Lab Status: Final result Specimen: Blood from Arm, Left Updated: 04/20/21 0734     Troponin I 0 05 ng/mL     CBC and differential [[id number]]  (Abnormal) Collected: 04/20/21 0656    Lab Status: Final result Specimen: Blood from Arm, Left Updated: 04/20/21 0707     WBC 7 69 Thousand/uL      RBC 5 42 Million/uL      Hemoglobin 15 0 g/dL      Hematocrit 48 1 %      MCV 89 fL      MCH 27 7 pg      MCHC 31 2 g/dL      RDW 14 6 %      MPV 9 6 fL      Platelets 881 Thousands/uL      nRBC 0 /100 WBCs      Neutrophils Relative 59 %      Immat GRANS % 0 %      Lymphocytes Relative 31 %      Monocytes Relative 8 %      Eosinophils Relative 1 %      Basophils Relative 1 %      Neutrophils Absolute 4 55 Thousands/µL      Immature Grans Absolute 0 03 Thousand/uL      Lymphocytes Absolute 2 37 Thousands/µL      Monocytes Absolute 0 61 Thousand/µL      Eosinophils Absolute 0 09 Thousand/µL      Basophils Absolute 0 04 Thousands/µL XR chest 1 view portable   ED Interpretation by Caro Quijano PA-C (04/20 1861)   No acute cardiopulmonary disease on initial read                 Procedures  ECG 12 Lead Documentation Only    Date/Time: 4/20/2021 6:32 AM  Performed by: Caro Quijano PA-C  Authorized by: Caro Quijano PA-C     Indications / Diagnosis:  Chest pain  ECG reviewed by me, the ED Provider: yes    Patient location:  ED  Previous ECG:     Previous ECG:  Compared to current    Comparison ECG info: When compared with ECG February 19, 2016 no significant change was found    Similarity:  No change    Comparison to cardiac monitor: Yes    Interpretation:     Interpretation: normal    Rate:     ECG rate:  58    ECG rate assessment: bradycardic    Rhythm:     Rhythm: sinus bradycardia    Ectopy:     Ectopy: none    QRS:     QRS axis:  Normal    QRS intervals:  Normal  Conduction:     Conduction: normal    ST segments:     ST segments:  Normal  T waves:     T waves: normal               ED Course  ED Course as of Apr 20 0930   Tue Apr 20, 2021   0710 Patient has no pain at this time  HEART Risk Score      Most Recent Value   Heart Score Risk Calculator   History  1 Filed at: 04/20/2021 0744   ECG  0 Filed at: 04/20/2021 0744   Age  1 Filed at: 04/20/2021 0744   Risk Factors  1 Filed at: 04/20/2021 0744   Troponin  1 Filed at: 04/20/2021 0744   HEART Score  4 Filed at: 04/20/2021 0744                  Initial Sepsis Screening     Row Name 04/20/21 0831                Is the patient's history suggestive of a new or worsening infection? No  -EP        Suspected source of infection  --        Are two or more of the following signs & symptoms of infection both present and new to the patient?   No  -EP        Indicate SIRS criteria  --        If the answer is yes to both questions, suspicion of sepsis is present  --        If severe sepsis is present AND tissue hypoperfusion perists in the hour after fluid resuscitation or lactate > 4, the patient meets criteria for SEPTIC SHOCK  --        Are any of the following organ dysfunction criteria present within 6 hours of suspected infection and SIRS criteria that are NOT considered to be chronic conditions? --        Organ dysfunction  --        Date of presentation of severe sepsis  --        Time of presentation of severe sepsis  --        Tissue hypoperfusion persists in the hour after crystalloid fluid administration, evidenced, by either:  --        Was hypotension present within one hour of the conclusion of crystalloid fluid administration?  --        Date of presentation of septic shock  --        Time of presentation of septic shock  --          User Key  (r) = Recorded By, (t) = Taken By, (c) = Cosigned By    [address] Name Provider Moshe Rucker MD Physician          SBIRT 22yo+      Most Recent Value   SBIRT (25 yo +)   In order to provide better care to our patients, we are screening all of our patients for alcohol and drug use  Would it be okay to ask you these screening questions?   No Filed at: 04/20/2021 0631        LINDSEY Risk Score      Most Recent Value   Age >= 72  0 Filed at: 04/20/2021 2979   Known CAD (stenosis >= 50%)  0 Filed at: 04/20/2021 9626   Recent (<=24 hrs) Service Angina  1 Filed at: 04/20/2021 0832   ST Deviation >= 0 5 mm  0 Filed at: 04/20/2021 4065   3+ CAD Risk Factors (FHx, HTN, HLP, DM, Smoker)  1 Filed at: 04/20/2021 9427   Aspirin Use Past 7 Days  0 Filed at: 04/20/2021 2824   Elevated Cardiac Markers  1 Filed at: 04/20/2021 0397   LINDSEY Risk Score (Calculated)  3 Filed at: 04/20/2021 5720          Wells' Criteria for DVT      Most Recent Value   Wells' Criteria for DVT   Active cancer Treatment or palliation within 6 months  0 Filed at: 04/20/2021 9651   Bedridden recently >3 days or major surgery within 12 weeks  0 Filed at: 04/20/2021 7262   Calf swelling >3 cm compared to the other leg  0 Filed at: 04/20/2021 2797   Entire leg swollen  0 Filed at: 04/20/2021 6265   Collateral (nonvaricose) superficial veins present  0 Filed at: 04/20/2021 8997   Localized tenderness along the deep venous system  0 Filed at: 04/20/2021 4732   Pitting edema, confined to symptomatic leg  0 Filed at: 04/20/2021 0815   Paralysis, paresis, or recent plaster immobilization of the lower extremity  0 Filed at: 04/20/2021 3857   Previously documented DVT  0 Filed at: 04/20/2021 9332   Alternative diagnosis to DVT as likely or more likely  0 Filed at: 04/20/2021 6128   Wells DVT Critera Score  0 Filed at: 04/20/2021 9694              MDM  Number of Diagnoses or Management Options  Chest pain: new and requires workup  Elevated troponin: new and requires workup     Amount and/or Complexity of Data Reviewed  Clinical lab tests: ordered and reviewed  Tests in the radiology section of CPT®: ordered and reviewed  Review and summarize past medical records: yes  Independent visualization of images, tracings, or specimens: yes    Risk of Complications, Morbidity, and/or Mortality  Presenting problems: moderate  Diagnostic procedures: moderate  Management options: moderate    Patient Progress  Patient progress: stable     Patient is a 77-year-old male with history of hyperlipidemia presents emergency department with gradual onset improving left-sided chest pressure pain nonradiating for 1 hour  Patient denies associated symptomatology  Patient states that while he was walking this morning, after reaching 3/4 of a mi he noted that he had some left-sided chest pressure pain which caused patient to stop walking and catch his breath     Patient hemodynamically stable and afebrile  ECG with sinus bradycardia; troponin 0 05  Patient denied chest pain right time of initial and subsequent evaluations  No leukocytosis, no banding  Electrolytes unremarkable; normal kidney function  Heart score 4  Patient denied pleuritic chest pain and denied shortness of breath; doubt PE  Delivered 325 mg ASA  Spoke with slim and both agreed to place patient in patient status under the care of Dr Adan Lam, Internal Medicine  Chest x-ray with no acute cardiopulmonary disease on initial read  Patient with verbal understanding all clinical laboratory and imaging findings, admission instructions and verbalized agreement with current treatment plan  Disposition  Final diagnoses:   Chest pain   Elevated troponin     Time reflects when diagnosis was documented in both MDM as applicable and the Disposition within this note     Time User Action Codes Description Comment    4/20/2021  8:00 AM Lori Derby Add [I20 8] Stable angina pectoris ([address] )     4/20/2021  8:01 AM Lori Katie Add [R77 8] Elevated troponin     4/20/2021  8:01 AM Lori Katie Modify [R77 8] Elevated troponin     4/20/2021  8:01 AM Tez Stewart Remove [I20 8] Stable angina pectoris ([address] )     4/20/2021  8:01 AM Lori Derby Add [R07 9] Chest pain     4/20/2021  8:01 AM Lori Katie Modify [R07 9] Chest pain     4/20/2021  8:01 AM Lori Katie Remove [R77 8] Elevated troponin     4/20/2021  8:02 AM Lori Derby Add [R77 8] Elevated troponin       ED Disposition     ED Disposition Condition Date/Time Comment    Admit Stable Tue Apr 20, 2021  8:00 AM Case was discussed with Tade[facility] Internal [facility] and the patient's admission status was agreed to be Admission Status: observation status to the service of Dr Adan Lam, Internal Medicine   Follow-up Information    None         Patient's Medications   Discharge Prescriptions    No medications on file     No discharge procedures on file      PDMP Review     None          ED Provider  Electronically Signed by           Koko Hyde PA-C  04/20/21 0929       Koko Hyde PA-C  04/20/21 7737

## 2021-04-20 NOTE — CONSULTS
Consultation - Cardiology Team One  Jillian Love 63 y o  male MRN: [id number]  Unit/Bed#: ED 15 Encounter: [id number]    Inpatient consult to Cardiology  Consult performed by: SHIV Tierney  Consult ordered by: Carlos Hagen MD      Physician Requesting Consult: Carlos Hagen MD  Reason for Consult / Principal Problem: typical chest pain, elevated troponin    Assessment/ Plan    1  Unstable angina  Presented with exertional chest pressure, almost completely relieved with rest but still remains at about a 1-2/10  Troponin 0 05 x 2 continue to trend x 3 or until peak  ECG- non-ischemic  Hemodynamically stable  Already received 324 mg ASA  Start IV heparin per ACS protocol  Cardiac cath tomorrow- patient agreeable  2  Dyslipidemia- , TG 75, HDL 32,  from 12/2020  Will repeat fasting lipid panel  Continue statin  History of Present Illness   HPI: Jillian Lvoe is a 63y o  year old male with dyslipidemia and a family history of heart disease who presented to the ED this morning with chest pain that began during his morning walk  He approximately 3/4 of a mi when he experienced a sudden onset of midsternal chest pressure/tightness that he rated a 7 to 8/10  He denies any associated symptoms  It is not worse with inspiration or movement nor is it reproducible with palpation  He states that it improved after stopping to rest for a few minutes and he was able to walk home (at a slower pace) without an increase in his pain  He states the pain decreased to a 1-2/10 and has remained constant at that level since he returned to his office  He spoke with his wife who encouraged him to go to the hospital to get checked out  His troponin is 0 05 x 2 and ECGs show NSR without ischemic changes  BP is controlled and CMP/CBC are WNL  Flu/RSV/Covid testing are all negative  He lives at home with his wife and works as a     He notes indigestion over the weekend that is unusual for him and only lasted for a few minutes  However he did have pizza and felt that might be the cause  He has otherwise been in his usual state of health up until symptom onset this morning  He denies any recent stressors  He has a remote history of smoking having quit at least 25 years ago  He denies any alcohol or drug use  His PCP recently started him on statin therapy about 2 months ago  His father  of an MI at the age of 62; no other family history of heart disease that the patient is aware of  EKG reviewed personally: NSR    Telemetry reviewed personally: NSR, occasional PVCs    Review of Systems   Constitution: Positive for malaise/fatigue  Negative for chills, diaphoresis and weight gain  Cardiovascular: Positive for chest pain  Negative for dyspnea on exertion, leg swelling, orthopnea, palpitations and syncope  Respiratory: Negative for cough, shortness of breath, sleep disturbances due to breathing and sputum production  Gastrointestinal: Negative for bloating, nausea and vomiting  Neurological: Negative for dizziness, light-headedness and weakness  Psychiatric/Behavioral: Negative for altered mental status  All other systems reviewed and are negative  Historical Information   Past Medical History:   Diagnosis Date    Dysphagia      Past Surgical History:   Procedure Laterality Date    COLONOSCOPY N/A 2018    Procedure: COLONOSCOPY;  Surgeon: Salvador Thomas MD;  Location: [facility];   Service: Gastroenterology    EGD  2019    FINGER AMPUTATION Left     partial 3rd finger     Social History     Substance and Sexual Activity   Alcohol Use Not Currently    Comment: Rare     Social History     Substance and Sexual Activity   Drug Use No     Social History     Tobacco Use   Smoking Status Former Smoker   Smokeless Tobacco Never Used   Tobacco Comment    quit      Family History:   Family History   Problem Relation Age of Onset    Heart attack Father  No Known Problems Mother     Prostate cancer Neg Hx     Colon cancer Neg Hx     Diabetes Neg Hx     Stroke Neg Hx        Meds/Allergies   all current active meds have been reviewed and current meds:   Current Facility-Administered Medications   Medication Dose Route Frequency    ascorbic acid (VITAMIN C) tablet 500 mg  500 mg Oral Daily    cholecalciferol (VITAMIN D3) tablet 1,000 Units  1,000 Units Oral Daily    heparin (porcine) subcutaneous injection 5,000 Units  5,000 Units Subcutaneous Q8H [facility]    pravastatin (PRAVACHOL) tablet 40 mg  40 mg Oral Daily With Dinner    vitamin A capsule 10,000 Units  10,000 Units Oral Daily        No Known Allergies    Objective   Vitals: Blood pressure 136/77, pulse 58, temperature 98 3 °F (36 8 °C), temperature source Oral, resp  rate 18, height 5' 7" (1 702 m), weight 92 5 kg (204 lb), SpO2 96 %  , Body mass index is 31 95 kg/m²  ,     Systolic (18NWG), IFU:471 , Min:118 , KZO:426     Diastolic (23AXH), KKL:61, Min:69, Max:77    No intake or output data in the 24 hours ending 04/20/21 1043  Wt Readings from Last 3 Encounters:   04/20/21 92 5 kg (204 lb)   09/03/20 92 5 kg (204 lb)   11/18/19 90 7 kg (200 lb)     Invasive Devices     Peripheral Intravenous Line            Peripheral IV 04/20/21 Left Antecubital less than 1 day              Physical Exam  Vitals signs reviewed  Constitutional:       General: He is not in acute distress  Neck:      Musculoskeletal: Neck supple  Vascular: No hepatojugular reflux or JVD  Cardiovascular:      Rate and Rhythm: Normal rate and regular rhythm  Heart sounds: Normal heart sounds  No murmur  No friction rub  No gallop  Pulmonary:      Effort: Pulmonary effort is normal  No respiratory distress  Breath sounds: No rales  Abdominal:      General: Bowel sounds are normal  There is no distension  Palpations: Abdomen is soft  Tenderness: There is no abdominal tenderness     Musculoskeletal: Normal range of motion  General: No tenderness  Right lower leg: No edema  Left lower leg: No edema  Skin:     General: Skin is warm and dry  Capillary Refill: Capillary refill takes less than 2 seconds  Findings: No erythema  Neurological:      Mental Status: He is alert and oriented to person, place, and time  Psychiatric:         Mood and Affect: Mood normal      LABORATORY RESULTS:  Results from last 7 days   Lab Units 04/20/21  0946 04/20/21  0656   TROPONIN I ng/mL 0 05* 0 05*     CBC with diff:   Results from last 7 days   Lab Units 04/20/21  0946 04/20/21  0656   WBC Thousand/uL  --  7 69   HEMOGLOBIN g/dL  --  15 0   HEMATOCRIT %  --  48 1   MCV fL  --  89   PLATELETS Thousands/uL 255 285   MCH pg  --  27 7   MCHC g/dL  --  31 2*   RDW %  --  14 6   MPV fL 9 4 9 6   NRBC AUTO /100 WBCs  --  0     CMP:  Results from last 7 days   Lab Units 04/20/21  0656   POTASSIUM mmol/L 4 3   CHLORIDE mmol/L 102   CO2 mmol/L 30   BUN mg/dL 12   CREATININE mg/dL 1 18   CALCIUM mg/dL 9 2   AST U/L 26   ALT U/L 33   ALK PHOS U/L 58   EGFR ml/min/1 73sq m 65     BMP:  Results from last 7 days   Lab Units 04/20/21  0656   POTASSIUM mmol/L 4 3   CHLORIDE mmol/L 102   CO2 mmol/L 30   BUN mg/dL 12   CREATININE mg/dL 1 18   CALCIUM mg/dL 9 2     Lab Results   Component Value Date    CREATININE 1 18 04/20/2021    CREATININE 1 07 09/04/2020    CREATININE 1 10 08/13/2019        Results from last 7 days   Lab Units 04/20/21  0656   INR  0 94     Lipid Profile:   No results found for: CHOL  Lab Results   Component Value Date    HDL 32 (L) 12/23/2020    HDL 31 (L) 09/04/2020    HDL 27 (L) 08/13/2019     Lab Results   Component Value Date    LDLCALC 122 (H) 12/23/2020    LDLCALC 173 (H) 09/04/2020    LDLCALC 156 (H) 08/13/2019     Lab Results   Component Value Date    TRIG 75 12/23/2020    TRIG 103 09/04/2020    TRIG 158 (H) 08/13/2019     Imaging: I have personally reviewed pertinent reports     and I have personally reviewed pertinent films in PACS  Xr Chest 1 View Portable    Result Date: 4/20/2021  Narrative: CHEST INDICATION:   left sided chest pain  Patient has suspected COVID-19  COMPARISON:  None EXAM PERFORMED/VIEWS:  XR CHEST PORTABLE FINDINGS: Cardiomediastinal silhouette appears unremarkable  No focal airspace consolidation  No pneumothorax or pleural effusion  Osseous structures appear within normal limits for patient age  Impression: No acute cardiopulmonary disease  In the setting of clinically suspected/proven COVID-19, this plain film appearance does not contain findings that raise concern for viral pneumonia such as COVID-19, but does not rule out this diagnosis  Workstation performed: [id number]     Counseling / Coordination of Care  Total floor / unit time spent today 45 minutes  Greater than 50% of total time was spent with the patient and / or family counseling and / or coordination of care  A description of the counseling / coordination of care: Review of history, current assessment, development of a plan  Code Status: Level 1 - Full Code    ** Please Note: Dragon 360 Dictation voice to text software may have been used in the creation of this document   **

## 2021-04-21 ENCOUNTER — APPOINTMENT (OUTPATIENT)
Dept: NON INVASIVE DIAGNOSTICS | Facility: HOSPITAL | Age: 66
End: 2021-04-21
Payer: COMMERCIAL

## 2021-04-21 ENCOUNTER — HOSPITAL ENCOUNTER (INPATIENT)
Facility: HOSPITAL | Age: 66
LOS: 8 days | Discharge: HOME HEALTH | DRG: 235 | End: 2021-04-29
Attending: INTERNAL MEDICINE | Admitting: THORACIC SURGERY (CARDIOTHORACIC VASCULAR SURGERY)
Payer: COMMERCIAL

## 2021-04-21 VITALS
HEART RATE: 71 BPM | WEIGHT: 204 LBS | SYSTOLIC BLOOD PRESSURE: 141 MMHG | BODY MASS INDEX: 32.02 KG/M2 | TEMPERATURE: 98.4 F | RESPIRATION RATE: 18 BRPM | OXYGEN SATURATION: 96 % | HEIGHT: 67 IN | DIASTOLIC BLOOD PRESSURE: 78 MMHG

## 2021-04-21 DIAGNOSIS — I25.10 CORONARY ARTERY DISEASE: Primary | ICD-10-CM

## 2021-04-21 DIAGNOSIS — Z95.1 S/P CABG (CORONARY ARTERY BYPASS GRAFT): ICD-10-CM

## 2021-04-21 DIAGNOSIS — E78.5 DYSLIPIDEMIA: ICD-10-CM

## 2021-04-21 LAB
ANION GAP SERPL CALCULATED.3IONS-SCNC: 11 MMOL/L (ref 4–13)
APTT PPP: 56 SECONDS (ref 23–37)
APTT PPP: 61 SECONDS (ref 23–37)
APTT PPP: 81 SECONDS (ref 23–37)
BUN SERPL-MCNC: 14 MG/DL (ref 5–25)
CALCIUM SERPL-MCNC: 9.9 MG/DL (ref 8.3–10.1)
CHLORIDE SERPL-SCNC: 105 MMOL/L (ref 100–108)
CO2 SERPL-SCNC: 24 MMOL/L (ref 21–32)
CREAT SERPL-MCNC: 1.03 MG/DL (ref 0.6–1.3)
ERYTHROCYTE [DISTWIDTH] IN BLOOD BY AUTOMATED COUNT: 14.6 % (ref 11.6–15.1)
GFR SERPL CREATININE-BSD FRML MDRD: 77 ML/MIN/1.73SQ M
GLUCOSE P FAST SERPL-MCNC: 103 MG/DL (ref 65–99)
GLUCOSE SERPL-MCNC: 103 MG/DL (ref 65–140)
HCT VFR BLD AUTO: 48.2 % (ref 36.5–49.3)
HGB BLD-MCNC: 15.6 G/DL (ref 12–17)
MCH RBC QN AUTO: 28.4 PG (ref 26.8–34.3)
MCHC RBC AUTO-ENTMCNC: 32.4 G/DL (ref 31.4–37.4)
MCV RBC AUTO: 88 FL (ref 82–98)
PLATELET # BLD AUTO: 280 THOUSANDS/UL (ref 149–390)
PMV BLD AUTO: 9.5 FL (ref 8.9–12.7)
POTASSIUM SERPL-SCNC: 4.4 MMOL/L (ref 3.5–5.3)
RBC # BLD AUTO: 5.5 MILLION/UL (ref 3.88–5.62)
SODIUM SERPL-SCNC: 140 MMOL/L (ref 136–145)
WBC # BLD AUTO: 9.54 THOUSAND/UL (ref 4.31–10.16)

## 2021-04-21 PROCEDURE — 85730 THROMBOPLASTIN TIME PARTIAL: CPT | Performed by: INTERNAL MEDICINE

## 2021-04-21 PROCEDURE — 80048 BASIC METABOLIC PNL TOTAL CA: CPT | Performed by: INTERNAL MEDICINE

## 2021-04-21 PROCEDURE — 99153 MOD SED SAME PHYS/QHP EA: CPT | Performed by: NURSE PRACTITIONER

## 2021-04-21 PROCEDURE — C1887 CATHETER, GUIDING: HCPCS | Performed by: NURSE PRACTITIONER

## 2021-04-21 PROCEDURE — C1894 INTRO/SHEATH, NON-LASER: HCPCS | Performed by: NURSE PRACTITIONER

## 2021-04-21 PROCEDURE — 93458 L HRT ARTERY/VENTRICLE ANGIO: CPT | Performed by: NURSE PRACTITIONER

## 2021-04-21 PROCEDURE — 85027 COMPLETE CBC AUTOMATED: CPT | Performed by: INTERNAL MEDICINE

## 2021-04-21 PROCEDURE — 99152 MOD SED SAME PHYS/QHP 5/>YRS: CPT | Performed by: INTERNAL MEDICINE

## 2021-04-21 PROCEDURE — C1769 GUIDE WIRE: HCPCS | Performed by: NURSE PRACTITIONER

## 2021-04-21 PROCEDURE — 99152 MOD SED SAME PHYS/QHP 5/>YRS: CPT | Performed by: NURSE PRACTITIONER

## 2021-04-21 PROCEDURE — 93458 L HRT ARTERY/VENTRICLE ANGIO: CPT | Performed by: INTERNAL MEDICINE

## 2021-04-21 PROCEDURE — 99223 1ST HOSP IP/OBS HIGH 75: CPT | Performed by: FAMILY MEDICINE

## 2021-04-21 PROCEDURE — 99214 OFFICE O/P EST MOD 30 MIN: CPT | Performed by: INTERNAL MEDICINE

## 2021-04-21 RX ORDER — FENTANYL CITRATE 50 UG/ML
INJECTION, SOLUTION INTRAMUSCULAR; INTRAVENOUS CODE/TRAUMA/SEDATION MEDICATION
Status: COMPLETED | OUTPATIENT
Start: 2021-04-21 | End: 2021-04-21

## 2021-04-21 RX ORDER — NITROGLYCERIN 20 MG/100ML
INJECTION INTRAVENOUS CODE/TRAUMA/SEDATION MEDICATION
Status: COMPLETED | OUTPATIENT
Start: 2021-04-21 | End: 2021-04-21

## 2021-04-21 RX ORDER — HEPARIN SODIUM 10000 [USP'U]/100ML
3-20 INJECTION, SOLUTION INTRAVENOUS
Status: CANCELLED | OUTPATIENT
Start: 2021-04-21

## 2021-04-21 RX ORDER — ATORVASTATIN CALCIUM 40 MG/1
80 TABLET, FILM COATED ORAL
Status: CANCELLED | OUTPATIENT
Start: 2021-04-21

## 2021-04-21 RX ORDER — SODIUM CHLORIDE 9 MG/ML
100 INJECTION, SOLUTION INTRAVENOUS CONTINUOUS
Status: CANCELLED | OUTPATIENT
Start: 2021-04-21 | End: 2021-04-25

## 2021-04-21 RX ORDER — SODIUM CHLORIDE 9 MG/ML
125 INJECTION, SOLUTION INTRAVENOUS CONTINUOUS
Status: CANCELLED | OUTPATIENT
Start: 2021-04-21

## 2021-04-21 RX ORDER — ASPIRIN 81 MG/1
81 TABLET, CHEWABLE ORAL DAILY
Status: DISCONTINUED | OUTPATIENT
Start: 2021-04-22 | End: 2021-04-26 | Stop reason: HOSPADM

## 2021-04-21 RX ORDER — MELATONIN
1000 DAILY
Status: CANCELLED | OUTPATIENT
Start: 2021-04-22

## 2021-04-21 RX ORDER — SODIUM CHLORIDE 9 MG/ML
125 INJECTION, SOLUTION INTRAVENOUS CONTINUOUS
Status: DISCONTINUED | OUTPATIENT
Start: 2021-04-21 | End: 2021-04-21 | Stop reason: HOSPADM

## 2021-04-21 RX ORDER — NITROGLYCERIN 0.4 MG/1
0.4 TABLET SUBLINGUAL
Status: CANCELLED | OUTPATIENT
Start: 2021-04-21

## 2021-04-21 RX ORDER — HEPARIN SODIUM 1000 [USP'U]/ML
4000 INJECTION, SOLUTION INTRAVENOUS; SUBCUTANEOUS
Status: DISCONTINUED | OUTPATIENT
Start: 2021-04-21 | End: 2021-04-26 | Stop reason: HOSPADM

## 2021-04-21 RX ORDER — HEPARIN SODIUM 10000 [USP'U]/100ML
3-20 INJECTION, SOLUTION INTRAVENOUS
Status: DISCONTINUED | OUTPATIENT
Start: 2021-04-21 | End: 2021-04-25

## 2021-04-21 RX ORDER — ASCORBIC ACID 500 MG
500 TABLET ORAL DAILY
Status: CANCELLED | OUTPATIENT
Start: 2021-04-22

## 2021-04-21 RX ORDER — HEPARIN SODIUM 1000 [USP'U]/ML
2000 INJECTION, SOLUTION INTRAVENOUS; SUBCUTANEOUS
Status: CANCELLED | OUTPATIENT
Start: 2021-04-21

## 2021-04-21 RX ORDER — NITROGLYCERIN 0.4 MG/1
0.4 TABLET SUBLINGUAL
Status: DISCONTINUED | OUTPATIENT
Start: 2021-04-21 | End: 2021-04-26 | Stop reason: HOSPADM

## 2021-04-21 RX ORDER — VERAPAMIL HYDROCHLORIDE 2.5 MG/ML
INJECTION, SOLUTION INTRAVENOUS CODE/TRAUMA/SEDATION MEDICATION
Status: COMPLETED | OUTPATIENT
Start: 2021-04-21 | End: 2021-04-21

## 2021-04-21 RX ORDER — NITROGLYCERIN 0.4 MG/1
0.4 TABLET SUBLINGUAL
Status: DISCONTINUED | OUTPATIENT
Start: 2021-04-21 | End: 2021-04-21 | Stop reason: HOSPADM

## 2021-04-21 RX ORDER — ASPIRIN 81 MG/1
81 TABLET, CHEWABLE ORAL DAILY
Status: CANCELLED | OUTPATIENT
Start: 2021-04-22

## 2021-04-21 RX ORDER — HEPARIN SODIUM 1000 [USP'U]/ML
2000 INJECTION, SOLUTION INTRAVENOUS; SUBCUTANEOUS
Status: DISCONTINUED | OUTPATIENT
Start: 2021-04-21 | End: 2021-04-26 | Stop reason: HOSPADM

## 2021-04-21 RX ORDER — ATORVASTATIN CALCIUM 80 MG/1
80 TABLET, FILM COATED ORAL
Status: DISCONTINUED | OUTPATIENT
Start: 2021-04-22 | End: 2021-04-26 | Stop reason: HOSPADM

## 2021-04-21 RX ORDER — MIDAZOLAM HYDROCHLORIDE 2 MG/2ML
INJECTION, SOLUTION INTRAMUSCULAR; INTRAVENOUS CODE/TRAUMA/SEDATION MEDICATION
Status: COMPLETED | OUTPATIENT
Start: 2021-04-21 | End: 2021-04-21

## 2021-04-21 RX ORDER — MELATONIN
1000 DAILY
Status: DISCONTINUED | OUTPATIENT
Start: 2021-04-22 | End: 2021-04-29 | Stop reason: HOSPADM

## 2021-04-21 RX ORDER — HEPARIN SODIUM 1000 [USP'U]/ML
INJECTION, SOLUTION INTRAVENOUS; SUBCUTANEOUS CODE/TRAUMA/SEDATION MEDICATION
Status: COMPLETED | OUTPATIENT
Start: 2021-04-21 | End: 2021-04-21

## 2021-04-21 RX ORDER — HEPARIN SODIUM 1000 [USP'U]/ML
4000 INJECTION, SOLUTION INTRAVENOUS; SUBCUTANEOUS
Status: CANCELLED | OUTPATIENT
Start: 2021-04-21

## 2021-04-21 RX ORDER — SODIUM CHLORIDE 9 MG/ML
125 INJECTION, SOLUTION INTRAVENOUS CONTINUOUS
Status: DISCONTINUED | OUTPATIENT
Start: 2021-04-21 | End: 2021-04-21

## 2021-04-21 RX ORDER — ASCORBIC ACID 500 MG
500 TABLET ORAL DAILY
Status: DISCONTINUED | OUTPATIENT
Start: 2021-04-22 | End: 2021-04-26 | Stop reason: HOSPADM

## 2021-04-21 RX ORDER — SODIUM CHLORIDE 9 MG/ML
100 INJECTION, SOLUTION INTRAVENOUS CONTINUOUS
Status: DISCONTINUED | OUTPATIENT
Start: 2021-04-21 | End: 2021-04-22

## 2021-04-21 RX ORDER — ATORVASTATIN CALCIUM 40 MG/1
80 TABLET, FILM COATED ORAL
Status: DISCONTINUED | OUTPATIENT
Start: 2021-04-21 | End: 2021-04-21 | Stop reason: HOSPADM

## 2021-04-21 RX ORDER — LIDOCAINE HYDROCHLORIDE 10 MG/ML
INJECTION, SOLUTION EPIDURAL; INFILTRATION; INTRACAUDAL; PERINEURAL CODE/TRAUMA/SEDATION MEDICATION
Status: COMPLETED | OUTPATIENT
Start: 2021-04-21 | End: 2021-04-21

## 2021-04-21 RX ADMIN — OXYCODONE HYDROCHLORIDE AND ACETAMINOPHEN 500 MG: 500 TABLET ORAL at 08:10

## 2021-04-21 RX ADMIN — LIDOCAINE HYDROCHLORIDE 1 ML: 10 INJECTION, SOLUTION EPIDURAL; INFILTRATION; INTRACAUDAL; PERINEURAL at 10:04

## 2021-04-21 RX ADMIN — HEPARIN SODIUM 4000 UNITS: 1000 INJECTION INTRAVENOUS; SUBCUTANEOUS at 10:10

## 2021-04-21 RX ADMIN — VERAPAMIL HYDROCHLORIDE 2.5 MG: 2.5 INJECTION, SOLUTION INTRAVENOUS at 10:10

## 2021-04-21 RX ADMIN — Medication 1000 UNITS: at 08:10

## 2021-04-21 RX ADMIN — HEPARIN SODIUM 11.1 UNITS/KG/HR: 10000 INJECTION, SOLUTION INTRAVENOUS at 18:20

## 2021-04-21 RX ADMIN — NITROGLYCERIN 200 MCG: 20 INJECTION INTRAVENOUS at 10:09

## 2021-04-21 RX ADMIN — SODIUM CHLORIDE 125 ML/HR: 0.9 INJECTION, SOLUTION INTRAVENOUS at 05:18

## 2021-04-21 RX ADMIN — IOHEXOL 70 ML: 350 INJECTION, SOLUTION INTRAVENOUS at 10:18

## 2021-04-21 RX ADMIN — HEPARIN SODIUM 11.1 UNITS/KG/HR: 10000 INJECTION, SOLUTION INTRAVENOUS at 17:00

## 2021-04-21 RX ADMIN — ATORVASTATIN CALCIUM 80 MG: 40 TABLET, FILM COATED ORAL at 16:39

## 2021-04-21 RX ADMIN — FENTANYL CITRATE 50 MCG: 50 INJECTION, SOLUTION INTRAMUSCULAR; INTRAVENOUS at 09:56

## 2021-04-21 RX ADMIN — ASPIRIN 325 MG ORAL TABLET 325 MG: 325 PILL ORAL at 05:38

## 2021-04-21 RX ADMIN — MIDAZOLAM HYDROCHLORIDE 2 MG: 1 INJECTION, SOLUTION INTRAMUSCULAR; INTRAVENOUS at 09:56

## 2021-04-21 RX ADMIN — SODIUM CHLORIDE 100 ML/HR: 0.9 INJECTION, SOLUTION INTRAVENOUS at 18:20

## 2021-04-21 NOTE — UTILIZATION REVIEW
Observation Admission Authorization Request   NOTIFICATION OF OBSERVATION ADMISSION/OBSERVATION AUTHORIZATION REQUEST   SERVICING FACILITY:   [facility] [address]  Tax ID: [id number]  NPI: [id number]  Place of Service: On [address] [facility] Code: 22  CPT Code for Observation: CPT   CPT 55550     ATTENDING PROVIDER:  Attending Name and NPI#: Celestino Forte Md [[id number]]  Address: [place] [address]  Phone: [phone]     UTILIZATION REVIEW CONTACT:  Yana Malik Utilization   Network Utilization Review Department  Phone: [phone]  Fax: [phone]  Email: [email or web address]     PHYSICIAN ADVISORY SERVICES:  FOR PNJE-[id number] REVIEW - MEDICAL NECESSITY DENIAL  Phone: [phone]  Fax: [phone]  Email: [email or web address]     TYPE OF REQUEST:  Observation  Status     ADMISSION INFORMATION:  ADMISSION DATE/TIME:   PATIENT DIAGNOSIS CODE/DESCRIPTION:  Chest pain [R07 9]  Elevated troponin [R77 8]  DISCHARGE DATE/TIME: No discharge date for patient encounter  DISCHARGE DISPOSITION (IF DISCHARGED): Home/Self Care     IMPORTANT INFORMATION:  Please contact the Yana Malik directly with any questions or concerns regarding this request  Department voicemails are confidential     Send requests for admission clinical reviews, concurrent reviews, approvals, and administrative denials due to lack of clinical to fax [phone]

## 2021-04-21 NOTE — PLAN OF CARE
Problem: PAIN - ADULT  Goal: Verbalizes/displays adequate comfort level or baseline comfort level  Description: Interventions:  - Encourage patient to monitor pain and request assistance  - Assess pain using appropriate pain scale  - Administer analgesics based on type and severity of pain and evaluate response  - Implement non-pharmacological measures as appropriate and evaluate response  - Consider cultural and social influences on pain and pain management  - Notify physician/advanced practitioner if interventions unsuccessful or patient reports new pain  Outcome: Progressing     Problem: SAFETY ADULT  Goal: Patient will remain free of falls  Description: INTERVENTIONS:  - Assess patient frequently for physical needs  -  Identify cognitive and physical deficits and behaviors that affect risk of falls    -  Walker fall precautions as indicated by assessment   - Educate patient/family on patient safety including physical limitations  - Instruct patient to call for assistance with activity based on assessment  - Modify environment to reduce risk of injury  - Consider OT/PT consult to assist with strengthening/mobility  Outcome: Progressing  Goal: Maintain or return to baseline ADL function  Description: INTERVENTIONS:  -  Assess patient's ability to carry out ADLs; assess patient's baseline for ADL function and identify physical deficits which impact ability to perform ADLs (bathing, care of mouth/teeth, toileting, grooming, dressing, etc )  - Assess/evaluate cause of self-care deficits   - Assess range of motion  - Assess patient's mobility; develop plan if impaired  - Assess patient's need for assistive devices and provide as appropriate  - Encourage maximum independence but intervene and supervise when necessary  - Involve family in performance of ADLs  - Assess for home care needs following discharge   - Consider OT consult to assist with ADL evaluation and planning for discharge  - Provide patient education as appropriate  Outcome: Progressing  Goal: Maintain or return mobility status to optimal level  Description: INTERVENTIONS:  - Assess patient's baseline mobility status (ambulation, transfers, stairs, etc )    - Identify cognitive and physical deficits and behaviors that affect mobility  - Identify mobility aids required to assist with transfers and/or ambulation (gait belt, sit-to-stand, lift, walker, cane, etc )  - [place] fall precautions as indicated by assessment  - Record patient progress and toleration of activity level on Mobility SBAR; progress patient to next Phase/Stage  - Instruct patient to call for assistance with activity based on assessment  - Consider rehabilitation consult to assist with strengthening/weightbearing, etc   Outcome: Progressing     Problem: DISCHARGE PLANNING  Goal: Discharge to home or other facility with appropriate resources  Description: INTERVENTIONS:  - Identify barriers to discharge w/patient and caregiver  - Arrange for needed discharge resources and transportation as appropriate  - Identify discharge learning needs (meds, wound care, etc )  - Arrange for interpretive services to assist at discharge as needed  - Refer to Case Management Department for coordinating discharge planning if the patient needs post-hospital services based on physician/advanced practitioner order or complex needs related to functional status, cognitive ability, or social support system  Outcome: Progressing

## 2021-04-21 NOTE — UTILIZATION REVIEW
Initial Clinical Review    Admission: Date/Time/Statement: 4/20/2021 0803 Observation   Admission Orders (From admission, onward)     Ordered        04/20/21 0803  Place in Observation  Once                   Orders Placed This Encounter   Procedures    Place in Observation     Standing Status:   Standing     Number of Occurrences:   1     Order Specific Question:   Level of Care     Answer:   Med Surg [16]     ED Arrival Information     Expected Arrival Acuity Means of Arrival Escorted By Service Admission Type    - 4/20/2021 06:20 Urgent Walk-In Self General Medicine Urgent    Arrival Complaint    Chest Pain        Chief Complaint   Patient presents with    Chest Pain     pt presenting w mid sternal chest pain that started about 45 minutes ago while he was walking  Denies any SOB or dizziness        Initial Presentation:  this is a 63year old male from home to ED admitted to observation due to chest pain/elevated troponin  Presented due to chest pain starting about 1 hour prior to arrival after waling about 3/4 mile with shortness of breath  Exam non focal   LINDSEY 2  Troponin 0 05  ECG sinus bradycardia  In the ED given Toradol, Asa,  Heparin bolus and started on heparin gtt  Plan is trend troponin, telemetry  Consult cardiology     4/20/2021 per cardiology: patient has unstable angina and recommendation is cardiac cath, continue anti platelet and IV heparin  Beta blocker contraindicated due to bradycardia  Continue statin  Date: 4/21/2021   Day 2:  Cardiac cath in progress       ED Triage Vitals   Temperature Pulse Respirations Blood Pressure SpO2   04/20/21 0657 04/20/21 0626 04/20/21 0626 04/20/21 0626 04/20/21 0626   98 3 °F (36 8 °C) 70 18 152/74 100 %      Temp Source Heart Rate Source Patient Position - Orthostatic VS BP Location FiO2 (%)   04/20/21 0657 04/20/21 0626 04/20/21 0626 04/20/21 0626 --   Oral Monitor Sitting Right arm       Pain Score       04/20/21 0626       8          Wt Readings from Last 1 Encounters:   04/20/21 92 5 kg (204 lb)     Additional Vital Signs:   04/21/21 0737  98 °F (36 7 °C)  68  18  140/77  --  96 %  None (Room air)  Lying   04/20/21 2210  97 5 °F (36 4 °C)  67  18  137/83  107  95 %  None (Room air)  Lying   04/20/21 1926  97 6 °F (36 4 °C)  75  18  128/63  86  97 %  None (Room air)         Pertinent Labs/Diagnostic Test Results:   4/20/2021 CxR No acute cardiopulmonary disease  In the setting of clinically suspected/proven COVID-19, this plain film appearance does not contain findings that raise concern for viral pneumonia such as COVID-19, but does not rule out this diagnosis      4/20/2021 ECG Sinus bradycardia  Otherwise normal ECG  When compared with ECG of 19-FEB-2016 16:45,  No significant change was found    4/20/2021 ECG Sinus bradycardia  Otherwise normal ECG  When compared with ECG of 20-APR-2021 06:32, (unconfirmed)  No significant change was found  Results from last 7 days   Lab Units 04/20/21  0943   SARS-COV-2  Negative     Results from last 7 days   Lab Units 04/21/21  0559 04/20/21  0946 04/20/21  0656   WBC Thousand/uL 9 54  --  7 69   HEMOGLOBIN g/dL 15 6  --  15 0   HEMATOCRIT % 48 2  --  48 1   PLATELETS Thousands/uL 280 255 285   NEUTROS ABS Thousands/µL  --   --  4 55     Results from last 7 days   Lab Units 04/21/21  0559 04/20/21  0656   SODIUM mmol/L 140 139   POTASSIUM mmol/L 4 4 4 3   CHLORIDE mmol/L 105 102   CO2 mmol/L 24 30   ANION GAP mmol/L 11 7   BUN mg/dL 14 12   CREATININE mg/dL 1 03 1 18   EGFR ml/min/1 73sq m 77 65   CALCIUM mg/dL 9 9 9 2     Results from last 7 days   Lab Units 04/20/21  0656   AST U/L 26   ALT U/L 33   ALK PHOS U/L 58   TOTAL PROTEIN g/dL 7 5   ALBUMIN g/dL 4 1   TOTAL BILIRUBIN mg/dL 0 50     Results from last 7 days   Lab Units 04/21/21  0559 04/20/21  0656   GLUCOSE RANDOM mg/dL 103 94     Results from last 7 days   Lab Units 04/20/21  0946   HEMOGLOBIN A1C % 6 0*   EAG mg/dl 126     Results from last 7 days   Lab Units 04/20/21  1906 04/20/21  1538 04/20/21  1310 04/20/21  0946 04/20/21  0656   TROPONIN I ng/mL 0 05* 0 07* 0 06* 0 05* 0 05*     Results from last 7 days   Lab Units 04/21/21  0559 04/21/21  0015 04/20/21  1819 04/20/21  0656   PROTIME seconds  --   --   --  12 7   INR   --   --   --  0 94   PTT seconds 56* 81* 73* 28     Results from last 7 days   Lab Units 04/20/21  0656   TSH 3RD GENERATON uIU/mL 1 882     Results from last 7 days   Lab Units 04/20/21  0656   LIPASE u/L 87     Results from last 7 days   Lab Units 04/20/21  0943   INFLUENZA A PCR  Negative   INFLUENZA B PCR  Negative   RSV PCR  Negative     ED Treatment:   Medication Administration from 04/20/2021 0620 to 04/20/2021 1926       Date/Time Order Dose Route Action Comments     04/20/2021 0700 ketorolac (TORADOL) injection 15 mg 15 mg Intravenous Given      04/20/2021 0743 aspirin tablet 325 mg 325 mg Oral Given      04/20/2021 1104 vitamin A capsule 10,000 Units 10,000 Units Oral Given      04/20/2021 1104 ascorbic acid (VITAMIN C) tablet 500 mg 500 mg Oral Given      04/20/2021 1103 cholecalciferol (VITAMIN D3) tablet 1,000 Units 1,000 Units Oral Given      04/20/2021 1104 heparin (porcine) subcutaneous injection 5,000 Units 5,000 Units Subcutaneous Given      04/20/2021 1211 heparin (porcine) injection 4,000 Units 4,000 Units Intravenous Given      04/20/2021 1211 heparin (porcine) 25,000 units in 0 45% NaCl 250 mL infusion (premix) 11 1 Units/kg/hr Intravenous New Bag         Past Medical History:   Diagnosis Date    Dysphagia      Present on Admission:   Dyslipidemia      Admitting Diagnosis: Chest pain [R07 9]  Elevated troponin [R77 8]  Age/Sex: 63 y o  male  Admission Orders:  Scheduled Medications:  ascorbic acid, 500 mg, Oral, Daily  [START ON 4/22/2021] aspirin, 81 mg, Oral, Daily  cholecalciferol, 1,000 Units, Oral, Daily  pravastatin, 40 mg, Oral, Daily With Dinner  vitamin A, 10,000 Units, Oral, Daily      Continuous IV Infusions:  heparin (porcine), 3-20 Units/kg/hr (Order-Specific), Intravenous, Titrated  sodium chloride, 125 mL/hr, Intravenous, Continuous      PRN Meds: not used   heparin (porcine), 2,000 Units, Intravenous, Q1H PRN  heparin (porcine), 4,000 Units, Intravenous, Q1H PRN      IP CONSULT TO CARDIOLOGY    Network Utilization Review Department  ATTENTION: Please call with any questions or concerns to [phone] and carefully listen to the prompts so that you are directed to the right person  All voicemails are confidential   Eneida Cochran all requests for admission clinical reviews, approved or denied determinations and any other requests to dedicated fax number below belonging to the campus where the patient is receiving treatment   List of dedicated fax numbers for the Facilities:  [address] DENIALS (Administrative/Medical Necessity) [phone]   [address] (Maternity/NICU/Pediatrics) [phone]   [address] Amber Pruittel Buster [phone] [address] [place] [address] [facility] [facility] [phone]

## 2021-04-21 NOTE — H&P
[address] [facility]  DANIELLE&PLeluli Kohli 1955, 63 y o  male MRN: [id number]  Unit/Bed#: -01 Encounter: [id number]  Primary Care Provider: Paul Gutierrez DO   Date and time admitted to hospital: No admission date for patient encounter  * Coronary artery disease  Assessment & Plan  · Patient was initially admitted to [address] [place] [address] for chest pain  · Troponin peaked at 0 07  · Had cardiac catheterization today showed multivessel coronary artery disease  · Transferred [facility] for CABG evaluation  · Continue with heparin drip  · Consult Cardiology  · Consult CT surgery    Elevated troponin  Assessment & Plan  · Troponin peaked at 0 07  · Status post cardiac catheterization showing multivessel coronary artery disease  · For CABG evaluation    Dyslipidemia  Assessment & Plan  · Continue with statin    VTE Prophylaxis: Heparin Drip  / sequential compression device   Code Status: full code  POLST: POLST form is not discussed and not completed at this time  Discussion with family:     Anticipated Length of Stay:  Patient will be admitted on an Inpatient basis with an anticipated length of stay of  > 2 midnights  Justification for Hospital Stay:  Multivessel coronary artery disease    Total Time for Visit, including Counseling / Coordination of Care: 70 minutes  Greater than 50% of this total time spent on direct patient counseling and coordination of care  Chief Complaint:   Chest pain    History of Present Illness:    Jesus Brandt is a 63 y o  male who presents as a transfer from [address] [place] [address] for [address] surgery evaluation  Patient was initially presented to the [address] [place] [address] with chest pain  Patient with known history of hypertension and hyperlipidemia  Patient presented to the hospital with 10 out of sudden onset of chest pain after walking     Associated with shortness of breath and diaphoresis  Patient reported that he took rest and the pain improved but came back with exertion  Patient had mild troponin elevation and peaked at 0 07  Patient also had intermittent chest pain 2 days before this which was attributed to indigestion  Was evaluated by Cardiology and had cardiac catheterization today showed multivessel coronary artery disease and patient is transferred to [facility] for [address] surgery evaluation  Currently patient is on heparin drip    Review of Systems:    Review of Systems   Constitutional: Positive for diaphoresis  HENT: Negative  Respiratory: Positive for chest tightness and shortness of breath  Cardiovascular: Positive for chest pain  Gastrointestinal: Negative  Endocrine: Negative  Genitourinary: Negative  Musculoskeletal: Negative  Skin: Negative  Neurological: Negative  Hematological: Negative  Psychiatric/Behavioral: Negative  Past Medical and Surgical History:     Past Medical History:   Diagnosis Date    CAD (coronary artery disease)     Dysphagia     Former tobacco use     Hyperlipidemia     Pre-diabetes        Past Surgical History:   Procedure Laterality Date    CARDIAC CATHETERIZATION      COLONOSCOPY N/A 5/18/2018    Procedure: COLONOSCOPY;  Surgeon: Hugh Alfaro MD;  Location: [facility] GI [facility]; Service: Gastroenterology    EGD  08/26/2019    FINGER AMPUTATION Left     partial 3rd finger       Meds/Allergies:    Prior to Admission medications    Medication Sig Start Date End Date Taking?  Authorizing Provider   ascorbic acid (VITAMIN C) 500 mg tablet Take 500 mg by mouth daily    Historical Provider, MD   cholecalciferol (VITAMIN D3) 1,000 units tablet Take 1,000 Units by mouth daily    Historical Provider, MD   Multiple Vitamin (multivitamin) tablet Take 1 tablet by mouth daily    Historical Provider, MD   simvastatin (ZOCOR) 20 mg tablet TAKE ONE TABLET BY MOUTH EVERY NIGHT AT BEDTIME 3/9/21 Minnie Tavarez DO     I have reviewed home medications with patient personally  Allergies: No Known Allergies    Social History:     Marital Status: Single   Occupation:   Patient Pre-hospital Living Situation:  Lives at home with family  Patient Pre-hospital Level of Mobility:   Patient Pre-hospital Diet Restrictions:   Substance Use History:   Social History     Substance and Sexual Activity   Alcohol Use Not Currently    Comment: Rare     Social History     Tobacco Use   Smoking Status Former Smoker   Smokeless Tobacco Never Used   Tobacco Comment    quit 1998     Social History     Substance and Sexual Activity   Drug Use No       Family History:    Family History   Problem Relation Age of Onset    Heart attack Father 62    No Known Problems Mother     Prostate cancer Neg Hx     Colon cancer Neg Hx     Diabetes Neg Hx     Stroke Neg Hx        Physical Exam:     Vitals:        Physical Exam  Constitutional:       General: He is not in acute distress  HENT:      Head: Normocephalic and atraumatic  Nose: Nose normal    Eyes:      General: No scleral icterus  Neck:      Musculoskeletal: No neck rigidity  Cardiovascular:      Rate and Rhythm: Normal rate and regular rhythm  Pulses: Normal pulses  Heart sounds: Normal heart sounds  Pulmonary:      Effort: Pulmonary effort is normal       Breath sounds: Normal breath sounds  Abdominal:      General: There is no distension  Musculoskeletal: Normal range of motion  Comments: Right upper extremity catheterization site covered in dressing/no signs of bleeding   Skin:     General: Skin is warm and dry  Neurological:      General: No focal deficit present  Mental Status: He is alert  Additional Data:     Lab Results: I have personally reviewed pertinent reports        Results from last 7 days   Lab Units 04/21/21  0559  04/20/21  0656   WBC Thousand/uL 9 54  --  7 69   HEMOGLOBIN g/dL 15 6  --  15 0   HEMATOCRIT % 48  2  --  48 1   PLATELETS Thousands/uL 280   < > 285   NEUTROS PCT %  --   --  59   LYMPHS PCT %  --   --  31   MONOS PCT %  --   --  8   EOS PCT %  --   --  1    < > = values in this interval not displayed  Results from last 7 days   Lab Units 04/21/21  0559 04/20/21  0656   SODIUM mmol/L 140 139   POTASSIUM mmol/L 4 4 4 3   CHLORIDE mmol/L 105 102   CO2 mmol/L 24 30   BUN mg/dL 14 12   CREATININE mg/dL 1 03 1 18   ANION GAP mmol/L 11 7   CALCIUM mg/dL 9 9 9 2   ALBUMIN g/dL  --  4 1   TOTAL BILIRUBIN mg/dL  --  0 50   ALK PHOS U/L  --  58   ALT U/L  --  33   AST U/L  --  26   GLUCOSE RANDOM mg/dL 103 94     Results from last 7 days   Lab Units 04/20/21  0656   INR  0 94         Results from last 7 days   Lab Units 04/20/21  0946   HEMOGLOBIN A1C % 6 0*           Imaging: I have personally reviewed pertinent reports  No orders to display       EKG, Pathology, and Other Studies Reviewed on Admission:   · EKG:     [facility] Epic Records Reviewed: Yes     ** Please Note: This note has been constructed using a voice recognition system   **

## 2021-04-21 NOTE — ASSESSMENT & PLAN NOTE
Typical chest pain  LINDSEY score is 2 however would consult cardiology for possible cath given his story and risk factors  Troponin peaked at 0 07  Telemetry  Repeat EKG now - no ischemia

## 2021-04-21 NOTE — ASSESSMENT & PLAN NOTE
· Patient was initially admitted to [address] [place] [address] for chest pain  · Troponin peaked at 0 07  · Had cardiac catheterization today showed multivessel coronary artery disease  · Transferred [facility] for CABG evaluation  · Continue with heparin drip  · Consult Cardiology  · Consult CT surgery

## 2021-04-21 NOTE — ASSESSMENT & PLAN NOTE
· Troponin peaked at 0 07  · Status post cardiac catheterization showing multivessel coronary artery disease  · For CABG evaluation

## 2021-04-21 NOTE — PROGRESS NOTES
General Cardiology   Progress Note -  Team One   Aydin Conley 63 y o  male MRN: [id number]  Unit/Bed#: S -01 Encounter: [id number]    Assessment/ Plan    1  NSTEMI with multivessel CAD  Presented with exertional chest pressure, minimal troponin elevation (0 07)  ECG- non-ischemic  Cardiac cath today showed MVCAD- 80% mid LAD, 70% 1st diagonal, 80% ostial LCX, 80% OM1, 50% RPDA  Patient will need CT surgery evaluation at [facility]- transfer discussed with [facility]  Normal EF per cath, echocardiogram to be completed today  Resume IV heparin once TR band off and no bleeding from cath site  Continue ASA, statin  Will discuss adding low dose beta blocker with attending as patient has baseline sinus bradycardia with heart rates in the 50s      2  Dyslipidemia- , TG 83, HDL 30,   Atorvastatin 80 mg daily  3  Prediabetic- A1c 6 0%, management per primary team     Subjective  Seen post cardiac cath- feeling well  No chest pain but did have two short episodes of chest pain overnight that went away quickly by the time he had a chance to tell the nurse  Review of Systems   Constitution: Negative for chills, malaise/fatigue and weight gain  Cardiovascular: Positive for chest pain  Negative for dyspnea on exertion, leg swelling, orthopnea, palpitations and syncope  Respiratory: Negative for cough and shortness of breath  Gastrointestinal: Negative for bloating, nausea and vomiting  Neurological: Negative for dizziness, light-headedness and weakness  All other systems reviewed and are negative  Objective:   Vitals: Blood pressure 140/77, pulse 68, temperature 98 °F (36 7 °C), temperature source Oral, resp  rate 18, height 5' 7" (1 702 m), weight 92 5 kg (204 lb), SpO2 96 %  , Body mass index is 31 95 kg/m²  ,     Systolic (86UVV), NDT:833 , Min:128 , YIR:444     Diastolic (99NOR), FEE:93, Min:63, Max:88    Intake/Output Summary (Last 24 hours) at 4/21/2021 1531  Last data filed at 4/21/2021 0518  Gross per 24 hour   Intake 970 ml   Output --   Net 970 ml     Wt Readings from Last 3 Encounters:   04/20/21 92 5 kg (204 lb)   09/03/20 92 5 kg (204 lb)   11/18/19 90 7 kg (200 lb)     Telemetry Review: No significant arrhythmias seen on telemetry review  NSR/sinus bradycardia    Physical Exam  Vitals signs reviewed  Constitutional:       General: He is not in acute distress  Appearance: Normal appearance  Neck:      Musculoskeletal: Neck supple  Vascular: No hepatojugular reflux or JVD  Cardiovascular:      Rate and Rhythm: Normal rate and regular rhythm  Pulses: Normal pulses  Heart sounds: Normal heart sounds  No murmur  No friction rub  No gallop  Pulmonary:      Effort: Pulmonary effort is normal  No respiratory distress  Breath sounds: No rales  Abdominal:      General: Bowel sounds are normal  There is no distension  Palpations: Abdomen is soft  Tenderness: There is no abdominal tenderness  Musculoskeletal: Normal range of motion  General: No tenderness  Right lower leg: No edema  Left lower leg: No edema  Skin:     General: Skin is warm and dry  Capillary Refill: Capillary refill takes less than 2 seconds  Findings: No erythema  Comments: R radial cath site- TR band intact  No bleeding or hematoma noted  Neurological:      Mental Status: He is alert and oriented to person, place, and time     Psychiatric:         Mood and Affect: Mood normal      LABORATORY RESULTS  Results from last 7 days   Lab Units 04/20/21  1906 04/20/21  1538 04/20/21  1310   TROPONIN I ng/mL 0 05* 0 07* 0 06*     CBC with diff:   Results from last 7 days   Lab Units 04/21/21  0559 04/20/21  0946 04/20/21  0656   WBC Thousand/uL 9 54  --  7 69   HEMOGLOBIN g/dL 15 6  --  15 0   HEMATOCRIT % 48 2  --  48 1   MCV fL 88  --  89   PLATELETS Thousands/uL 280 255 285   MCH pg 28 4  --  27 7   MCHC g/dL 32 4  --  31 2*   RDW % 14 6  --  14 6 MPV fL 9 5 9 4 9 6   NRBC AUTO /100 WBCs  --   --  0       CMP:  Results from last 7 days   Lab Units 04/21/21  0559 04/20/21  0656   POTASSIUM mmol/L 4 4 4 3   CHLORIDE mmol/L 105 102   CO2 mmol/L 24 30   BUN mg/dL 14 12   CREATININE mg/dL 1 03 1 18   CALCIUM mg/dL 9 9 9 2   AST U/L  --  26   ALT U/L  --  33   ALK PHOS U/L  --  58   EGFR ml/min/1 73sq m 77 65       BMP:  Results from last 7 days   Lab Units 04/21/21  0559 04/20/21  0656   POTASSIUM mmol/L 4 4 4 3   CHLORIDE mmol/L 105 102   CO2 mmol/L 24 30   BUN mg/dL 14 12   CREATININE mg/dL 1 03 1 18   CALCIUM mg/dL 9 9 9 2       Lab Results   Component Value Date    CREATININE 1 03 04/21/2021    CREATININE 1 18 04/20/2021    CREATININE 1 07 09/04/2020       No results found for: NTBNP               Results from last 7 days   Lab Units 04/20/21  0946   HEMOGLOBIN A1C % 6 0*          Results from last 7 days   Lab Units 04/20/21  0656   TSH 3RD GENERATON uIU/mL 1 882       Results from last 7 days   Lab Units 04/20/21  0656   INR  0 94       Lipid Profile:   No results found for: CHOL  Lab Results   Component Value Date    HDL 30 (L) 04/20/2021    HDL 32 (L) 12/23/2020    HDL 31 (L) 09/04/2020     Lab Results   Component Value Date    LDLCALC 125 (H) 04/20/2021    LDLCALC 122 (H) 12/23/2020    LDLCALC 173 (H) 09/04/2020     Lab Results   Component Value Date    TRIG 83 04/20/2021    TRIG 75 12/23/2020    TRIG 103 09/04/2020     Meds/Allergies   all current active meds have been reviewed and current meds:   Current Facility-Administered Medications   Medication Dose Route Frequency    ascorbic acid (VITAMIN C) tablet 500 mg  500 mg Oral Daily    [START ON 4/22/2021] aspirin chewable tablet 81 mg  81 mg Oral Daily    cholecalciferol (VITAMIN D3) tablet 1,000 Units  1,000 Units Oral Daily    heparin (porcine) 25,000 units in 0 45% NaCl 250 mL infusion (premix)  3-20 Units/kg/hr (Order-Specific) Intravenous Titrated    heparin (porcine) injection 2,000 Units 2,000 Units Intravenous Q1H PRN    heparin (porcine) injection 4,000 Units  4,000 Units Intravenous Q1H PRN    pravastatin (PRAVACHOL) tablet 40 mg  40 mg Oral Daily With Dinner    sodium chloride 0 9 % infusion  125 mL/hr Intravenous Continuous    vitamin A capsule 10,000 Units  10,000 Units Oral Daily     Medications Prior to Admission   Medication    ascorbic acid (VITAMIN C) 500 mg tablet    cholecalciferol (VITAMIN D3) 1,000 units tablet    Multiple Vitamin (multivitamin) tablet    simvastatin (ZOCOR) 20 mg tablet     heparin (porcine), 3-20 Units/kg/hr (Order-Specific), Last Rate: Stopped (04/21/21 0500)  sodium chloride, 125 mL/hr, Last Rate: 125 mL/hr (04/21/21 0518)    Counseling / Coordination of Care  Total floor / unit time spent today 20 minutes  Greater than 50% of total time was spent with the patient and / or family counseling and / or coordination of care  ** Please Note: Dragon 360 Dictation voice to text software may have been used in the creation of this document   **

## 2021-04-22 ENCOUNTER — APPOINTMENT (INPATIENT)
Dept: NON INVASIVE DIAGNOSTICS | Facility: HOSPITAL | Age: 66
DRG: 235 | End: 2021-04-22
Payer: COMMERCIAL

## 2021-04-22 LAB
ANION GAP SERPL CALCULATED.3IONS-SCNC: 5 MMOL/L (ref 4–13)
APTT PPP: 67 SECONDS (ref 23–37)
BILIRUB UR QL STRIP: NEGATIVE
BUN SERPL-MCNC: 15 MG/DL (ref 5–25)
CALCIUM SERPL-MCNC: 8.9 MG/DL (ref 8.3–10.1)
CHLORIDE SERPL-SCNC: 109 MMOL/L (ref 100–108)
CLARITY UR: CLEAR
CO2 SERPL-SCNC: 26 MMOL/L (ref 21–32)
COLOR UR: YELLOW
CREAT SERPL-MCNC: 0.92 MG/DL (ref 0.6–1.3)
ERYTHROCYTE [DISTWIDTH] IN BLOOD BY AUTOMATED COUNT: 14.6 % (ref 11.6–15.1)
GFR SERPL CREATININE-BSD FRML MDRD: 88 ML/MIN/1.73SQ M
GLUCOSE SERPL-MCNC: 96 MG/DL (ref 65–140)
GLUCOSE UR STRIP-MCNC: NEGATIVE MG/DL
HCT VFR BLD AUTO: 43.1 % (ref 36.5–49.3)
HGB BLD-MCNC: 13.8 G/DL (ref 12–17)
HGB UR QL STRIP.AUTO: NEGATIVE
KETONES UR STRIP-MCNC: NEGATIVE MG/DL
LEUKOCYTE ESTERASE UR QL STRIP: NEGATIVE
MCH RBC QN AUTO: 28.3 PG (ref 26.8–34.3)
MCHC RBC AUTO-ENTMCNC: 32 G/DL (ref 31.4–37.4)
MCV RBC AUTO: 88 FL (ref 82–98)
NITRITE UR QL STRIP: NEGATIVE
PH UR STRIP.AUTO: 7 [PH]
PLATELET # BLD AUTO: 238 THOUSANDS/UL (ref 149–390)
PMV BLD AUTO: 9.4 FL (ref 8.9–12.7)
POTASSIUM SERPL-SCNC: 4 MMOL/L (ref 3.5–5.3)
PROT UR STRIP-MCNC: NEGATIVE MG/DL
RBC # BLD AUTO: 4.88 MILLION/UL (ref 3.88–5.62)
SODIUM SERPL-SCNC: 140 MMOL/L (ref 136–145)
SP GR UR STRIP.AUTO: 1.01 (ref 1–1.03)
UROBILINOGEN UR QL STRIP.AUTO: 2 E.U./DL
WBC # BLD AUTO: 10.84 THOUSAND/UL (ref 4.31–10.16)

## 2021-04-22 PROCEDURE — 93306 TTE W/DOPPLER COMPLETE: CPT | Performed by: INTERNAL MEDICINE

## 2021-04-22 PROCEDURE — 99232 SBSQ HOSP IP/OBS MODERATE 35: CPT | Performed by: NURSE PRACTITIONER

## 2021-04-22 PROCEDURE — 93306 TTE W/DOPPLER COMPLETE: CPT

## 2021-04-22 PROCEDURE — 93971 EXTREMITY STUDY: CPT | Performed by: SURGERY

## 2021-04-22 PROCEDURE — 99223 1ST HOSP IP/OBS HIGH 75: CPT | Performed by: THORACIC SURGERY (CARDIOTHORACIC VASCULAR SURGERY)

## 2021-04-22 PROCEDURE — 85027 COMPLETE CBC AUTOMATED: CPT | Performed by: INTERNAL MEDICINE

## 2021-04-22 PROCEDURE — 93880 EXTRACRANIAL BILAT STUDY: CPT | Performed by: SURGERY

## 2021-04-22 PROCEDURE — 81003 URINALYSIS AUTO W/O SCOPE: CPT | Performed by: PHYSICIAN ASSISTANT

## 2021-04-22 PROCEDURE — 87081 CULTURE SCREEN ONLY: CPT | Performed by: PHYSICIAN ASSISTANT

## 2021-04-22 PROCEDURE — 80048 BASIC METABOLIC PNL TOTAL CA: CPT | Performed by: INTERNAL MEDICINE

## 2021-04-22 PROCEDURE — 93971 EXTREMITY STUDY: CPT

## 2021-04-22 PROCEDURE — 93880 EXTRACRANIAL BILAT STUDY: CPT

## 2021-04-22 PROCEDURE — 99232 SBSQ HOSP IP/OBS MODERATE 35: CPT | Performed by: INTERNAL MEDICINE

## 2021-04-22 PROCEDURE — 85730 THROMBOPLASTIN TIME PARTIAL: CPT | Performed by: INTERNAL MEDICINE

## 2021-04-22 RX ORDER — CHLORHEXIDINE GLUCONATE 0.12 MG/ML
15 RINSE ORAL EVERY 12 HOURS SCHEDULED
Status: DISCONTINUED | OUTPATIENT
Start: 2021-04-22 | End: 2021-04-26 | Stop reason: HOSPADM

## 2021-04-22 RX ADMIN — OXYCODONE HYDROCHLORIDE AND ACETAMINOPHEN 500 MG: 500 TABLET ORAL at 09:26

## 2021-04-22 RX ADMIN — CHLORHEXIDINE GLUCONATE 0.12% ORAL RINSE 15 ML: 1.2 LIQUID ORAL at 21:47

## 2021-04-22 RX ADMIN — CHLORHEXIDINE GLUCONATE 0.12% ORAL RINSE 15 ML: 1.2 LIQUID ORAL at 10:55

## 2021-04-22 RX ADMIN — MUPIROCIN 1 APPLICATION: 20 OINTMENT TOPICAL at 10:55

## 2021-04-22 RX ADMIN — Medication 12.5 MG: at 21:47

## 2021-04-22 RX ADMIN — Medication 1000 UNITS: at 09:26

## 2021-04-22 RX ADMIN — HEPARIN SODIUM 11.1 UNITS/KG/HR: 10000 INJECTION, SOLUTION INTRAVENOUS at 15:59

## 2021-04-22 RX ADMIN — Medication 12.5 MG: at 15:53

## 2021-04-22 RX ADMIN — MUPIROCIN 1 APPLICATION: 20 OINTMENT TOPICAL at 21:47

## 2021-04-22 RX ADMIN — Medication 10000 UNITS: at 09:27

## 2021-04-22 RX ADMIN — ASPIRIN 81 MG: 81 TABLET, CHEWABLE ORAL at 09:26

## 2021-04-22 RX ADMIN — ATORVASTATIN CALCIUM 80 MG: 80 TABLET, FILM COATED ORAL at 15:53

## 2021-04-22 NOTE — PROGRESS NOTES
General Cardiology   Progress Note -  Team One   Joi Galeas 63 y o  male MRN: [id number]    Unit/Bed#: -01 Encounter: [id number]    Assessment/ Plan:    1  Unstable Anginal with MV CAD:   Pt remains chest pain free post cardiac cath: On appropriate medications with ASA, high intensity statin and intravenous heparin  Start low dose beta blocker: metoprolol tartrate 12 5mg BID; HRs 60-70s here; no sustained bradycardia  Echocardiogram done this AM; pending read; LVEF calculated on cath yesterday showing 60%  Await consultation from cardiac surgery re CABG     2  Dyslipidemia: Tot 172; ; started on high intensity statin  3  Pre-diabetes: Hgb A1c 6 0    Subjective: Pt seen for follow up post transfer from [address] [place] [address] where he presented with unstable angina; underwent cardiac cath on 4/21 that showed multivessel CAD; he was transferred to [address] [place] [address] to be evaluated by cardiac surgery  At time of my exam pt reports feeling well  He had one very brief episode of pain last night around 2am  Thinks it woke him from his sleep; resolved without intervention within a few minutes; did not reports to staff  None since then  Review of Systems   Constitution: Negative for decreased appetite and fever  Cardiovascular: Negative for chest pain, dyspnea on exertion, leg swelling, orthopnea, palpitations and syncope  Respiratory: Negative for cough, shortness of breath and wheezing  Gastrointestinal: Negative for abdominal pain, nausea and vomiting  Genitourinary: Negative for dysuria  Neurological: Negative for dizziness and light-headedness  Psychiatric/Behavioral: Negative for altered mental status  All other systems reviewed and are negative  Objective:   Vitals: Blood pressure 128/75, pulse 69, temperature 98 5 °F (36 9 °C), resp  rate 16, height 5' 7" (1 702 m), weight 88 7 kg (195 lb 9 6 oz), SpO2 96 %  ,     Body mass index is 30 64 kg/m²  ,     Systolic (40PAO), Av , Min:114 , ERB:781     Diastolic (33QKI), LFM:26, Min:50, Max:78      Intake/Output Summary (Last 24 hours) at 2021 0306  Last data filed at 2021 0636  Gross per 24 hour   Intake 1460 99 ml   Output 900 ml   Net 560 99 ml     Weight (last 2 days)     Date/Time   Weight    21 17:58:40   88 7 (195 6)            Telemetry Review:   Sinus rhythm; no significant events; no bradycardia    Physical Exam  Vitals signs reviewed  Constitutional:       General: He is not in acute distress  Appearance: Normal appearance  He is not ill-appearing  HENT:      Head: Normocephalic and atraumatic  Mouth/Throat:      Mouth: Mucous membranes are moist    Cardiovascular:      Rate and Rhythm: Normal rate and regular rhythm  Heart sounds: S1 normal and S2 normal       Comments: Right radial cath site wnl; soft, no swelling or hematoma; RUE warm and well perfused  Pulmonary:      Effort: Pulmonary effort is normal       Breath sounds: Normal breath sounds  No wheezing or rales  Abdominal:      Palpations: Abdomen is soft  Musculoskeletal:      Right lower leg: No edema  Left lower leg: No edema  Skin:     General: Skin is warm  Neurological:      Mental Status: He is alert and oriented to person, place, and time     Psychiatric:         Mood and Affect: Mood normal        LABORATORY RESULTS  Results from last 7 days   Lab Units 21  1906 21  1538 21  1310   TROPONIN I ng/mL 0 05* 0 07* 0 06*     CBC with diff:   Results from last 7 days   Lab Units 21  0306 21  0559 21  0946 21  0656   WBC Thousand/uL 10 84* 9 54  --  7 69   HEMOGLOBIN g/dL 13 8 15 6  --  15 0   HEMATOCRIT % 43 1 48 2  --  48 1   MCV fL 88 88  --  89   PLATELETS Thousands/uL 238 280 255 285   MCH pg 28 3 28 4  --  27 7   MCHC g/dL 32 0 32 4  --  31 2*   RDW % 14 6 14 6  --  14 6   MPV fL 9 4 9 5 9 4 9 6   NRBC AUTO /100 WBCs  --   --   --  0     CMP:  Results from last 7 days Lab Units 04/22/21  0306 04/21/21  0559 04/20/21  0656   POTASSIUM mmol/L 4 0 4 4 4 3   CHLORIDE mmol/L 109* 105 102   CO2 mmol/L 26 24 30   BUN mg/dL 15 14 12   CREATININE mg/dL 0 92 1 03 1 18   CALCIUM mg/dL 8 9 9 9 9 2   AST U/L  --   --  26   ALT U/L  --   --  33   ALK PHOS U/L  --   --  58   EGFR ml/min/1 73sq m 88 77 65     BMP:  Results from last 7 days   Lab Units 04/22/21  0306 04/21/21  0559 04/20/21  0656   POTASSIUM mmol/L 4 0 4 4 4 3   CHLORIDE mmol/L 109* 105 102   CO2 mmol/L 26 24 30   BUN mg/dL 15 14 12   CREATININE mg/dL 0 92 1 03 1 18   CALCIUM mg/dL 8 9 9 9 9 2      Results from last 7 days   Lab Units 04/20/21  0946   HEMOGLOBIN A1C % 6 0*     Results from last 7 days   Lab Units 04/20/21  0656   TSH 3RD GENERATON uIU/mL 1 882     Results from last 7 days   Lab Units 04/20/21  0656   INR  0 94     Lipid Profile:   No results found for: CHOL  Lab Results   Component Value Date    HDL 30 (L) 04/20/2021    HDL 32 (L) 12/23/2020    HDL 31 (L) 09/04/2020     Lab Results   Component Value Date    LDLCALC 125 (H) 04/20/2021    LDLCALC 122 (H) 12/23/2020    LDLCALC 173 (H) 09/04/2020     Lab Results   Component Value Date    TRIG 83 04/20/2021    TRIG 75 12/23/2020    TRIG 103 09/04/2020     Meds/Allergies   current meds:   Current Facility-Administered Medications   Medication Dose Route Frequency    ascorbic acid (VITAMIN C) tablet 500 mg  500 mg Oral Daily    aspirin chewable tablet 81 mg  81 mg Oral Daily    atorvastatin (LIPITOR) tablet 80 mg  80 mg Oral Daily With Dinner    cholecalciferol (VITAMIN D3) tablet 1,000 Units  1,000 Units Oral Daily    heparin (porcine) 25,000 units in 0 45% NaCl 250 mL infusion (premix)  3-20 Units/kg/hr (Order-Specific) Intravenous Titrated    heparin (porcine) injection 2,000 Units  2,000 Units Intravenous Q1H PRN    heparin (porcine) injection 4,000 Units  4,000 Units Intravenous Q1H PRN    nitroglycerin (NITROSTAT) SL tablet 0 4 mg  0 4 mg Sublingual Q5 Min PRN    vitamin A capsule 10,000 Units  10,000 Units Oral Daily     Medications Prior to Admission   Medication    ascorbic acid (VITAMIN C) 500 mg tablet    cholecalciferol (VITAMIN D3) 1,000 units tablet    Multiple Vitamin (multivitamin) tablet    simvastatin (ZOCOR) 20 mg tablet     heparin (porcine), 3-20 Units/kg/hr (Order-Specific), Last Rate: 11 1 Units/kg/hr (04/21/21 1820)      Assessment:  Principal Problem:    Coronary artery disease  Active Problems:    Dyslipidemia    Elevated troponin    Counseling / Coordination of Care  Total floor / unit time spent today 20 minutes  Greater than 50% of total time was spent with the patient and / or family counseling and / or coordination of care  ** Please Note: Dragon 360 Dictation voice to text software may have been used in the creation of this document   **

## 2021-04-22 NOTE — CASE MANAGEMENT
LOS 1 Day  Not a Bundle  Not a Readmission  Unplanned Readmission Risk is 8 GREEN    CM spoke with pt regarding role of CM and d/c planning  Pt lives with his friend Rolan Lewis in a 1sh, 2 jorje to enter  IPTA  No DME  Pt denies hx of VNA, IP rehab, or treatment for MH or D/A  Pt drives  PCP is Dr Shanon Aragon ([phone])  Pt uses [address]  Primary Contact: Rolan Lewis ([phone])  No POA/LW  Friend to transport at d/c    CM reviewed d/c planning process including the following: identifying help at home, patient preference for d/c planning needs, Discharge Lounge, [facility] Meds to Bed program, availability of treatment team to discuss questions or concerns patient and/or family may have regarding understanding medications and recognizing signs and symptoms once discharged  CM also encouraged patient to follow up with all recommended appointments after discharge  Patient advised of importance for patient and family to participate in managing patients medical well being

## 2021-04-22 NOTE — PROGRESS NOTES
[facility]  Progress Note Chiitz Wilsonvanessa 1955, 63 y o  male MRN: [id number]  Unit/Bed#: -01 Encounter: [id number]  Primary Care Provider: Narendra Turner DO   Date and time admitted to hospital: 4/21/2021  5:48 PM    * Coronary artery disease  Assessment & Plan  · Patient was initially admitted to [address] [place] [address] for chest pain  · Troponin peaked at 0 07  · Had cardiac catheterization today showed multivessel coronary artery disease  · Transferred [facility] for CABG evaluation  · Continue with heparin drip at this time   · Start on low dose bb 12 5mg bid   · Statin high dose and asa   · Consult Cardiology  · Appreciate CT surgery Dr Catherine Saavedra with plan pending all workup for Monday CABG     Elevated troponin  Assessment & Plan  · Troponin peaked at 0 07  · Status post cardiac catheterization showing multivessel coronary artery disease  · For CABG evaluation with plan if preop testing stable for OR on Monday     Dyslipidemia  Assessment & Plan  · Continue with statin   · See lipid panel         VTE Pharmacologic Prophylaxis:   High Risk (Score >/= 5) - Pharmacological DVT Prophylaxis Ordered: heparin drip  Sequential Compression Devices Ordered  Patient Centered Rounds: I performed bedside rounds with nursing staff today  Discussions with Specialists or Other Care Team Provider: nursing     Education and Discussions with Family / Patient: Patient declined call to   states wife coming in he will update her     Time Spent for Care: 45 minutes  More than 50% of total time spent on counseling and coordination of care as described above      Current Length of Stay: 1 day(s)  Current Patient Status: Inpatient   Certification Statement: The patient will continue to require additional inpatient hospital stay due to plan for surgery on monday   Discharge Plan: Anticipate discharge in >72 hrs to pt will be reassessed post surgery on monday for final plan of care     Code Status: Level 1 - Full Code    Subjective:   Pt denies any sob no chest pain at this time   He understands plan of care moving forward and that various tests will be performed of which I will keep him updated on results  He had no complaints at this time no dizziness or lightheadedness     Objective:     Vitals:   Temp (24hrs), Av 4 °F (36 9 °C), Min:98 1 °F (36 7 °C), Max:98 9 °F (37 2 °C)    Temp:  [98 1 °F (36 7 °C)-98 9 °F (37 2 °C)] 98 4 °F (36 9 °C)  HR:  [68-70] 70  Resp:  [16-19] 18  BP: (123-148)/(50-89) 123/79  SpO2:  [94 %-98 %] 94 %  Body mass index is 30 64 kg/m²  Input and Output Summary (last 24 hours): Intake/Output Summary (Last 24 hours) at 2021 1610  Last data filed at 2021 1049  Gross per 24 hour   Intake 1460 99 ml   Output 1175 ml   Net 285 99 ml       Physical Exam:   Physical Exam  Constitutional:       General: He is not in acute distress  Appearance: He is not ill-appearing, toxic-appearing or diaphoretic  HENT:      Head: Normocephalic  Mouth/Throat:      Pharynx: Oropharynx is clear  No oropharyngeal exudate  Eyes:      General: No scleral icterus  Right eye: No discharge  Left eye: No discharge  Conjunctiva/sclera: Conjunctivae normal    Cardiovascular:      Rate and Rhythm: Normal rate  Heart sounds: No murmur  No friction rub  No gallop  Pulmonary:      Effort: No respiratory distress  Breath sounds: No stridor  No wheezing, rhonchi or rales  Chest:      Chest wall: No tenderness  Abdominal:      General: There is no distension  Palpations: There is no mass  Tenderness: There is no abdominal tenderness  There is no right CVA tenderness, left CVA tenderness, guarding or rebound  Hernia: No hernia is present  Musculoskeletal:         General: No swelling, tenderness, deformity or signs of injury  Right lower leg: No edema  Left lower leg: No edema  Skin:     Coloration: Skin is not jaundiced or pale  Findings: No bruising, erythema, lesion or rash  Neurological:      Mental Status: He is alert and oriented to person, place, and time  Psychiatric:         Behavior: Behavior normal           Additional Data:     Labs:  Results from last 7 days   Lab Units 04/22/21  0306  04/20/21  0656   WBC Thousand/uL 10 84*   < > 7 69   HEMOGLOBIN g/dL 13 8   < > 15 0   HEMATOCRIT % 43 1   < > 48 1   PLATELETS Thousands/uL 238   < > 285   NEUTROS PCT %  --   --  59   LYMPHS PCT %  --   --  31   MONOS PCT %  --   --  8   EOS PCT %  --   --  1    < > = values in this interval not displayed  Results from last 7 days   Lab Units 04/22/21  0306  04/20/21  0656   SODIUM mmol/L 140   < > 139   POTASSIUM mmol/L 4 0   < > 4 3   CHLORIDE mmol/L 109*   < > 102   CO2 mmol/L 26   < > 30   BUN mg/dL 15   < > 12   CREATININE mg/dL 0 92   < > 1 18   ANION GAP mmol/L 5   < > 7   CALCIUM mg/dL 8 9   < > 9 2   ALBUMIN g/dL  --   --  4 1   TOTAL BILIRUBIN mg/dL  --   --  0 50   ALK PHOS U/L  --   --  58   ALT U/L  --   --  33   AST U/L  --   --  26   GLUCOSE RANDOM mg/dL 96   < > 94    < > = values in this interval not displayed       Results from last 7 days   Lab Units 04/20/21  0656   INR  0 94         Results from last 7 days   Lab Units 04/20/21  0946   HEMOGLOBIN A1C % 6 0*           Lines/Drains:  Invasive Devices     Peripheral Intravenous Line            Peripheral IV 04/20/21 Left Antecubital 2 days    Peripheral IV 04/20/21 Right Forearm 2 days                  Telemetry:    Telemetry Reviewed: Normal Sinus Rhythm  Indication for Continued Telemetry Use: Acute MI/Unstable Angina/Rule out ACS           Imaging: Reviewed radiology reports from this admission including: chest xray    Recent Cultures (last 7 days):         Last 24 Hours Medication List:   Current Facility-Administered Medications   Medication Dose Route Frequency Provider Last Rate  ascorbic acid  500 mg Oral Daily Pravin Najera MD      aspirin  81 mg Oral Daily Pravin Najera MD      atorvastatin  80 mg Oral Daily With Rivera Rucker MD      chlorhexidine  15 mL Mouth/Throat Q12H [address]      cholecalciferol  1,000 Units Oral Daily Pravin Najera MD      heparin (porcine)  3-20 Units/kg/hr (Order-Specific) Intravenous Titrated Pravin Najear MD 11 1 Units/kg/hr (04/22/21 1559)    heparin (porcine)  2,000 Units Intravenous Q1H PRN Pravin Najera MD      heparin (porcine)  4,000 Units Intravenous Q1H PRN Pravin Najera MD      metoprolol tartrate  12 5 mg Oral Q12H [address] SHIV Lira      mupirocin  1 application Nasal I06K [address] [place], [place]      nitroglycerin  0 4 mg Sublingual Q5 Min PRN Pravin Najera MD      vitamin A  10,000 Units Oral Daily Pravin Najera MD          Today, Patient Was Seen By: SHIV Mauro    **Please Note: This note may have been constructed using a voice recognition system  **

## 2021-04-22 NOTE — ASSESSMENT & PLAN NOTE
· Patient was initially admitted to [address] [place] [address] for chest pain  · Troponin peaked at 0 07  · Had cardiac catheterization today showed multivessel coronary artery disease  · Transferred [facility] for CABG evaluation  · Continue with heparin drip at this time   · Start on low dose bb 12 5mg bid   · Statin high dose and asa   · Consult Cardiology  · Appreciate CT surgery Dr Hilda Gomez with plan pending all workup for Monday CABG

## 2021-04-22 NOTE — CONSULTS
Consultation - Cardiothoracic Surgery   Jillian Love 63 y o  male MRN: [id number]  Unit/Bed#: -60 Encounter: [id number]    Physician Requesting Consult: Anita Mireles MD    Reason for Consult / Principal Problem: MV CAD    Inpatient consult to Cardiothoracic Surgery  Consult performed by: Ros Reynolds PA-C  Consult ordered by: Zohaib Lamas MD        History of Present Illness: Jillian Love is a 63y o  year old male with no previous cardiac history who presented to [facility] ER for the evaluation of chest pain  The patient reports he was taking his daily walk in the morning when he experienced midsternal CP, 8/10 intensity  His CP was relieved with rest  He had no other associated symptoms  He was able to complete his walk at a slower pace, then sought care in the ER  He denies SOB, nausea, vomiting, diaphoresis, LE edema or palpitations  Upon presentation, his EKG was NSR with no evidence of ischemic changes  His troponin peaked at 0 07  He underwent cardiac catheterization, which revealed MV CAD  He was transferred to [facility] for cardiac surgical evaluation  The patient quit smoking in   He denies alcohol or illicit drug use  His father  at age 62 from an MI  He works as a , which does involve some lifting/physical activity, and lives at home with his wife  Past Medical History:  Past Medical History:   Diagnosis Date    CAD (coronary artery disease)     Dysphagia     Former tobacco use     Hyperlipidemia     Pre-diabetes          Past Surgical History:   Past Surgical History:   Procedure Laterality Date    CARDIAC CATHETERIZATION      COLONOSCOPY N/A 2018    Procedure: COLONOSCOPY;  Surgeon: Bree Sosa MD;  Location: [facility] GI LAB;   Service: Gastroenterology    EGD  2019    FINGER AMPUTATION Left     partial 3rd finger         Family History:  Family History   Problem Relation Age of Onset    Heart attack Father 62    No Known Problems Mother     Prostate cancer Neg Hx     Colon cancer Neg Hx     Diabetes Neg Hx     Stroke Neg Hx          Social History:  Social History     Substance and Sexual Activity   Alcohol Use Not Currently    Comment: Rare     Social History     Substance and Sexual Activity   Drug Use No     Social History     Tobacco Use   Smoking Status Former Smoker   Smokeless Tobacco Never Used   Tobacco Comment    quit 1998     Marital Status:     Home Medications:   Prior to Admission medications    Medication Sig Start Date End Date Taking?  Authorizing Provider   ascorbic acid (VITAMIN C) 500 mg tablet Take 500 mg by mouth daily   Yes Historical Provider, MD   cholecalciferol (VITAMIN D3) 1,000 units tablet Take 1,000 Units by mouth daily   Yes Historical Provider, MD   Multiple Vitamin (multivitamin) tablet Take 1 tablet by mouth daily   Yes Historical Provider, MD   simvastatin (ZOCOR) 20 mg tablet TAKE ONE TABLET BY MOUTH EVERY NIGHT AT BEDTIME 3/9/21  Yes Paul Gutierrez,        Inpatient Medications:  Scheduled Meds:   Current Facility-Administered Medications   Medication Dose Route Frequency Provider Last Rate    ascorbic acid  500 mg Oral Daily Alice Ceja MD      aspirin  81 mg Oral Daily Alice Ceja MD      atorvastatin  80 mg Oral Daily With Tonya Garibay MD      cholecalciferol  1,000 Units Oral Daily Alice Ceja MD      heparin (porcine)  3-20 Units/kg/hr (Order-Specific) Intravenous Titrated Alice Ceja MD 11 1 Units/kg/hr (04/21/21 1820)    heparin (porcine)  2,000 Units Intravenous Q1H PRN Alice Ceja MD      heparin (porcine)  4,000 Units Intravenous Q1H PRN Alice Ceja MD      nitroglycerin  0 4 mg Sublingual Q5 Min PRN Alice Ceja MD      vitamin A  10,000 Units Oral Daily Alice Ceja MD       Continuous Infusions: heparin (porcine), 3-20 Units/kg/hr (Order-Specific), Last Rate: 11 1 Units/kg/hr (04/21/21 1820)      PRN Meds:  heparin (porcine), 2,000 Units, Q1H PRN  heparin (porcine), 4,000 Units, Q1H PRN  nitroglycerin, 0 4 mg, Q5 Min PRN        Allergies:  No Known Allergies    Review of Systems:  Review of Systems   Constitutional: Negative  Negative for chills, diaphoresis, fatigue and fever  HENT: Negative  Eyes: Negative  Respiratory: Positive for chest tightness  Negative for shortness of breath  Cardiovascular: Positive for chest pain  Negative for palpitations and leg swelling  Gastrointestinal: Negative  Endocrine: Negative  Genitourinary: Negative  Musculoskeletal: Negative  Skin: Negative  Allergic/Immunologic: Negative  Neurological: Negative  Negative for syncope  Hematological: Negative for adenopathy  Does not bruise/bleed easily  Psychiatric/Behavioral: Negative  All other systems reviewed and are negative  Vital Signs:     Vitals:    04/21/21 1930 04/21/21 2226 04/22/21 0307 04/22/21 0733   BP:  132/73 129/73 128/75   Pulse:    69   Resp:  19 17 16   Temp: 98 1 °F (36 7 °C)   98 5 °F (36 9 °C)   TempSrc: Oral      SpO2:    96%   Weight:       Height:         Invasive Devices     Peripheral Intravenous Line            Peripheral IV 04/20/21 Left Antecubital 2 days    Peripheral IV 04/20/21 Right Forearm 2 days                Physical Exam:  Physical Exam  Vitals signs and nursing note reviewed  Constitutional:       General: He is awake  He is not in acute distress  Appearance: Normal appearance  He is well-developed and normal weight  He is not diaphoretic  HENT:      Head: Normocephalic and atraumatic  Right Ear: External ear normal       Left Ear: External ear normal       Nose: Nose normal       Mouth/Throat:      Pharynx: No oropharyngeal exudate  Eyes:      General: No scleral icterus  Right eye: No discharge  Left eye: No discharge  Conjunctiva/sclera: Conjunctivae normal       Pupils: Pupils are equal, round, and reactive to light     Neck: Musculoskeletal: Normal range of motion and neck supple  Vascular: No JVD  Trachea: No tracheal deviation  Cardiovascular:      Rate and Rhythm: Normal rate and regular rhythm  Heart sounds: Normal heart sounds  No murmur  No friction rub  Pulmonary:      Effort: Pulmonary effort is normal  No respiratory distress  Breath sounds: Normal breath sounds  No stridor  No wheezing or rales  Abdominal:      General: Bowel sounds are normal  There is no distension  Palpations: Abdomen is soft  There is no mass  Tenderness: There is no abdominal tenderness  There is no guarding  Musculoskeletal: Normal range of motion  General: No tenderness or deformity  Skin:     General: Skin is warm and dry  Coloration: Skin is not pale  Findings: No erythema or rash  Neurological:      Mental Status: He is alert and oriented to person, place, and time  Cranial Nerves: No cranial nerve deficit  Sensory: No sensory deficit  Motor: No abnormal muscle tone  Coordination: Coordination normal       Deep Tendon Reflexes: Reflexes normal    Psychiatric:         Behavior: Behavior normal  Behavior is cooperative  Thought Content:  Thought content normal          Judgment: Judgment normal          Lab Results:     Results from last 7 days   Lab Units 04/22/21 0306 04/21/21  0559 04/20/21  0946 04/20/21  0656   WBC Thousand/uL 10 84* 9 54  --  7 69   HEMOGLOBIN g/dL 13 8 15 6  --  15 0   HEMATOCRIT % 43 1 48 2  --  48 1   PLATELETS Thousands/uL 238 280 255 285     Results from last 7 days   Lab Units 04/22/21 0306 04/21/21  0559 04/20/21  0656   POTASSIUM mmol/L 4 0 4 4 4 3   CHLORIDE mmol/L 109* 105 102   CO2 mmol/L 26 24 30   BUN mg/dL 15 14 12   CREATININE mg/dL 0 92 1 03 1 18   CALCIUM mg/dL 8 9 9 9 9 2     Results from last 7 days   Lab Units 04/22/21 0306 04/21/21 2049 04/21/21  0559  04/20/21  0656   INR   --   --   --   --  0 94   PTT seconds 67* 61* 56*   < > 28    < > = values in this interval not displayed  Lab Results   Component Value Date    HGBA1C 6 0 (H) 04/20/2021     Lab Results   Component Value Date    TROPONINI 0 05 (H) 04/20/2021       Imaging Studies:     Cardiac Catheterization: CORONARY CIRCULATION:  Mid LAD: There was a 80 % stenosis just after D2   1st diagonal: There was a 70 % stenosis  Ostial circumflex: There was a 80 % stenosis  1st obtuse marginal: There was a 80 % stenosis at the ostium of the vessel segment  Right PDA: There was a 50 % stenosis in the middle third of the vessel segment      CARDIAC STRUCTURES:  Global left ventricular function was normal  EF calculated by contrast ventriculography was 60 %  Echocardiogram: pending    CXR: no acute cardiopulmonary disease    I have personally reviewed pertinent reports  and I have personally reviewed pertinent films in PACS    Assessment:  Principal Problem:    Coronary artery disease  Active Problems:    Dyslipidemia    Elevated troponin    Severe coronary artery disease; Ongoing CABG workup    Plan:  Risks and benefits of coronary artery bypass grafting were discussed in detail today with the patient  They understand and wish to proceed with further workup and ultimately surgical intervention  We have ordered routine preoperative laboratory and vascular studies  Pending the results of these tests, they will be scheduled for surgery with ANTHONY De La Paz was comfortable with our recommendations, and their questions were answered to their satisfaction  We will continue to evaluate the patient daily with further recommendations as work up is completed  Thank you for allowing us to participate in the care of this patient  SIGNATURE: [place], [place]  DATE: April 22, 2021  TIME: 8:45 AM    * This note was completed in part utilizing [employer] fluency direct voice recognition software     Grammatical errors, random word insertion, spelling mistakes, and incomplete sentences may be an occasional consequence of the system secondary to software limitations, ambient noise and hardware issues  At the time of dictation, efforts were made to edit, clarify and /or correct errors  Please read the chart carefully and recognize, using context, where substitutions have occurred  If you have any questions or concerns about the context, text or information contained within the body of this dictation, please contact myself, the provider, for further clarification

## 2021-04-22 NOTE — H&P (VIEW-ONLY)
Consultation - Cardiothoracic Surgery   Mely Gunter 63 y o  male MRN: [id number]  Unit/Bed#: -21 Encounter: [id number]    Physician Requesting Consult: Lindsay Perez MD    Reason for Consult / Principal Problem: MV CAD    Inpatient consult to Cardiothoracic Surgery  Consult performed by: Brittany Correa PA-C  Consult ordered by: Lien Veronica MD        History of Present Illness: Mely Gunter is a 63y o  year old male with no previous cardiac history who presented to [facility] for the evaluation of chest pain  The patient reports he was taking his daily walk in the morning when he experienced midsternal CP, 8/10 intensity  His CP was relieved with rest  He had no other associated symptoms  He was able to complete his walk at a slower pace, then sought care in the ER  He denies SOB, nausea, vomiting, diaphoresis, LE edema or palpitations  Upon presentation, his EKG was NSR with no evidence of ischemic changes  His troponin peaked at 0 07  He underwent cardiac catheterization, which revealed MV CAD  He was transferred to [facility] for cardiac surgical evaluation  The patient quit smoking in   He denies alcohol or illicit drug use  His father  at age 62 from an MI  He works as a , which does involve some lifting/physical activity, and lives at home with his wife  Past Medical History:  Past Medical History:   Diagnosis Date    CAD (coronary artery disease)     Dysphagia     Former tobacco use     Hyperlipidemia     Pre-diabetes          Past Surgical History:   Past Surgical History:   Procedure Laterality Date    CARDIAC CATHETERIZATION      COLONOSCOPY N/A 2018    Procedure: COLONOSCOPY;  Surgeon: Claudia Oliveros MD;  Location: [facility] GI LAB;   Service: Gastroenterology    EGD  2019    FINGER AMPUTATION Left     partial 3rd finger         Family History:  Family History   Problem Relation Age of Onset    Heart attack Father 62    No Known Problems Mother     Prostate cancer Neg Hx     Colon cancer Neg Hx     Diabetes Neg Hx     Stroke Neg Hx          Social History:  Social History     Substance and Sexual Activity   Alcohol Use Not Currently    Comment: Rare     Social History     Substance and Sexual Activity   Drug Use No     Social History     Tobacco Use   Smoking Status Former Smoker   Smokeless Tobacco Never Used   Tobacco Comment    quit 1998     Marital Status:     Home Medications:   Prior to Admission medications    Medication Sig Start Date End Date Taking?  Authorizing Provider   ascorbic acid (VITAMIN C) 500 mg tablet Take 500 mg by mouth daily   Yes Historical Provider, MD   cholecalciferol (VITAMIN D3) 1,000 units tablet Take 1,000 Units by mouth daily   Yes Historical Provider, MD   Multiple Vitamin (multivitamin) tablet Take 1 tablet by mouth daily   Yes Historical Provider, MD   simvastatin (ZOCOR) 20 mg tablet TAKE ONE TABLET BY MOUTH EVERY NIGHT AT BEDTIME 3/9/21  Yes Unknown DO Moraima       Inpatient Medications:  Scheduled Meds:   Current Facility-Administered Medications   Medication Dose Route Frequency Provider Last Rate    ascorbic acid  500 mg Oral Daily Nessa Loera MD      aspirin  81 mg Oral Daily Nessa Loera MD      atorvastatin  80 mg Oral Daily With Prasanth Landry MD      cholecalciferol  1,000 Units Oral Daily Nessa Loera MD      heparin (porcine)  3-20 Units/kg/hr (Order-Specific) Intravenous Titrated Nessa Loera MD 11 1 Units/kg/hr (04/21/21 1820)    heparin (porcine)  2,000 Units Intravenous Q1H PRN Nessa Loera MD      heparin (porcine)  4,000 Units Intravenous Q1H PRN Nessa Loera MD      nitroglycerin  0 4 mg Sublingual Q5 Min PRN Nessa Loera MD      vitamin A  10,000 Units Oral Daily Nessa Loera MD       Continuous Infusions: heparin (porcine), 3-20 Units/kg/hr (Order-Specific), Last Rate: 11 1 Units/kg/hr (04/21/21 1820)      PRN Meds:  heparin (porcine), 2,000 Units, Q1H PRN  heparin (porcine), 4,000 Units, Q1H PRN  nitroglycerin, 0 4 mg, Q5 Min PRN        Allergies:  No Known Allergies    Review of Systems:  Review of Systems   Constitutional: Negative  Negative for chills, diaphoresis, fatigue and fever  HENT: Negative  Eyes: Negative  Respiratory: Positive for chest tightness  Negative for shortness of breath  Cardiovascular: Positive for chest pain  Negative for palpitations and leg swelling  Gastrointestinal: Negative  Endocrine: Negative  Genitourinary: Negative  Musculoskeletal: Negative  Skin: Negative  Allergic/Immunologic: Negative  Neurological: Negative  Negative for syncope  Hematological: Negative for adenopathy  Does not bruise/bleed easily  Psychiatric/Behavioral: Negative  All other systems reviewed and are negative  Vital Signs:     Vitals:    04/21/21 1930 04/21/21 2226 04/22/21 0307 04/22/21 0733   BP:  132/73 129/73 128/75   Pulse:    69   Resp:  19 17 16   Temp: 98 1 °F (36 7 °C)   98 5 °F (36 9 °C)   TempSrc: Oral      SpO2:    96%   Weight:       Height:         Invasive Devices     Peripheral Intravenous Line            Peripheral IV 04/20/21 Left Antecubital 2 days    Peripheral IV 04/20/21 Right Forearm 2 days                Physical Exam:  Physical Exam  Vitals signs and nursing note reviewed  Constitutional:       General: He is awake  He is not in acute distress  Appearance: Normal appearance  He is well-developed and normal weight  He is not diaphoretic  HENT:      Head: Normocephalic and atraumatic  Right Ear: External ear normal       Left Ear: External ear normal       Nose: Nose normal       Mouth/Throat:      Pharynx: No oropharyngeal exudate  Eyes:      General: No scleral icterus  Right eye: No discharge  Left eye: No discharge  Conjunctiva/sclera: Conjunctivae normal       Pupils: Pupils are equal, round, and reactive to light     Neck: Musculoskeletal: Normal range of motion and neck supple  Vascular: No JVD  Trachea: No tracheal deviation  Cardiovascular:      Rate and Rhythm: Normal rate and regular rhythm  Heart sounds: Normal heart sounds  No murmur  No friction rub  Pulmonary:      Effort: Pulmonary effort is normal  No respiratory distress  Breath sounds: Normal breath sounds  No stridor  No wheezing or rales  Abdominal:      General: Bowel sounds are normal  There is no distension  Palpations: Abdomen is soft  There is no mass  Tenderness: There is no abdominal tenderness  There is no guarding  Musculoskeletal: Normal range of motion  General: No tenderness or deformity  Skin:     General: Skin is warm and dry  Coloration: Skin is not pale  Findings: No erythema or rash  Neurological:      Mental Status: He is alert and oriented to person, place, and time  Cranial Nerves: No cranial nerve deficit  Sensory: No sensory deficit  Motor: No abnormal muscle tone  Coordination: Coordination normal       Deep Tendon Reflexes: Reflexes normal    Psychiatric:         Behavior: Behavior normal  Behavior is cooperative  Thought Content:  Thought content normal          Judgment: Judgment normal          Lab Results:     Results from last 7 days   Lab Units 04/22/21 0306 04/21/21  0559 04/20/21  0946 04/20/21  0656   WBC Thousand/uL 10 84* 9 54  --  7 69   HEMOGLOBIN g/dL 13 8 15 6  --  15 0   HEMATOCRIT % 43 1 48 2  --  48 1   PLATELETS Thousands/uL 238 280 255 285     Results from last 7 days   Lab Units 04/22/21 0306 04/21/21  0559 04/20/21  0656   POTASSIUM mmol/L 4 0 4 4 4 3   CHLORIDE mmol/L 109* 105 102   CO2 mmol/L 26 24 30   BUN mg/dL 15 14 12   CREATININE mg/dL 0 92 1 03 1 18   CALCIUM mg/dL 8 9 9 9 9 2     Results from last 7 days   Lab Units 04/22/21 0306 04/21/21 2049 04/21/21  0559  04/20/21  0656   INR   --   --   --   --  0 94   PTT seconds 67* 61* 56*   < > 28    < > = values in this interval not displayed  Lab Results   Component Value Date    HGBA1C 6 0 (H) 04/20/2021     Lab Results   Component Value Date    TROPONINI 0 05 (H) 04/20/2021       Imaging Studies:     Cardiac Catheterization: CORONARY CIRCULATION:  Mid LAD: There was a 80 % stenosis just after D2   1st diagonal: There was a 70 % stenosis  Ostial circumflex: There was a 80 % stenosis  1st obtuse marginal: There was a 80 % stenosis at the ostium of the vessel segment  Right PDA: There was a 50 % stenosis in the middle third of the vessel segment      CARDIAC STRUCTURES:  Global left ventricular function was normal  EF calculated by contrast ventriculography was 60 %  Echocardiogram: pending    CXR: no acute cardiopulmonary disease    I have personally reviewed pertinent reports  and I have personally reviewed pertinent films in PACS    Assessment:  Principal Problem:    Coronary artery disease  Active Problems:    Dyslipidemia    Elevated troponin    Severe coronary artery disease; Ongoing CABG workup    Plan:  Risks and benefits of coronary artery bypass grafting were discussed in detail today with the patient  They understand and wish to proceed with further workup and ultimately surgical intervention  We have ordered routine preoperative laboratory and vascular studies  Pending the results of these tests, they will be scheduled for surgery with ANTHONY Manleych was comfortable with our recommendations, and their questions were answered to their satisfaction  We will continue to evaluate the patient daily with further recommendations as work up is completed  Thank you for allowing us to participate in the care of this patient  SIGNATURE: [place], [place]  DATE: April 22, 2021  TIME: 8:45 AM    * This note was completed in part utilizing [employer] fluency direct voice recognition software     Grammatical errors, random word insertion, spelling mistakes, and incomplete sentences may be an occasional consequence of the system secondary to software limitations, ambient noise and hardware issues  At the time of dictation, efforts were made to edit, clarify and /or correct errors  Please read the chart carefully and recognize, using context, where substitutions have occurred  If you have any questions or concerns about the context, text or information contained within the body of this dictation, please contact myself, the provider, for further clarification

## 2021-04-22 NOTE — UTILIZATION REVIEW
Initial Clinical Review    Admission: Date/Time/Statement:   Admission Orders (From admission, onward)     Ordered        04/21/21 1751  Inpatient Admission  Once                   Orders Placed This Encounter   Procedures    Inpatient Admission     Standing Status:   Standing     Number of Occurrences:   1     Order Specific Question:   Level of Care     Answer:   Med Surg [16]     Order Specific Question:   Estimated length of stay     Answer:   More than 2 Midnights     Order Specific Question:   Certification     Answer:   I certify that inpatient services are medically necessary for this patient for a duration of greater than two midnights  See H&P and MD Progress Notes for additional information about the patient's course of treatment  Initial Presentation: 63year old male, presented to the ED @ [facility],  Admitted  Transferred to [facility] level of care  Admitted as Inpatient due to Coronary artery Disease  Initially presented to the [facility] with chest pain, SOB and diaphoresis  PMH :  HTN and HLD  Pain for 2 days thought it was ingestion  Heart cath:   multivessel coronary artery disease and patient is transferred to [facility] for CT surgery evaluation  Currently patient is on heparin drip  Date: 04/21/2021  Trops peaked @ 0 07  Continue heparin gtt  Consult Cardo  Consult CT Surgery  VTE Prophylaxis: Heparin Drip  / sequential compression device     04/21/2021  Progress Note Cardio:  Unstable angina - His chest pain is concerning for angina given it occurring during a power walk, and resolving with rest   Given this event, his risk factors and a detectable troponin we will pursue an invasive strategy, and plan a cardiac catheterization within the next day  Agree with medical therapy prescribed, that include anti-platelet therapy and IV heparin  Avoid beta-blocker for now given bradycardia  Continue statin    Trops elevated, trend trops   ECG sinus bradycardia without ST segment changes  Monitor on telemetry  Check ECHO  Day 2: 04/22/2021  Coronary artery disease/unstable angina:  Mild chest pains last night  Start low-dose beta-blocker  Continue aspirin and high-intensity statin  Awaiting workup for CABG     04/22/2021  Consult CT Surgery:  Severe CAD  Ongoing CABG workup  Pending results of these tests a determination will be made when work up complete          ED Triage Vitals   Temperature Pulse Respirations Blood Pressure SpO2   04/21/21 1758 04/21/21 1801 04/21/21 1758 04/21/21 1758 04/21/21 1758   98 2 °F (36 8 °C) 68 18 139/50 98 %      Temp Source Heart Rate Source Patient Position - Orthostatic VS BP Location FiO2 (%)   04/21/21 1930 -- -- -- --   Oral          Pain Score       04/21/21 1758       No Pain          Wt Readings from Last 1 Encounters:   04/21/21 88 7 kg (195 lb 9 6 oz)     Additional Vital Signs:   Date/Time  Temp  Pulse  Resp  BP  MAP (mmHg)  SpO2  O2 Device   04/22/21 15:12:13  98 4 °F (36 9 °C)  70  18  123/79  94  94 %  --   04/22/21 11:12:01  98 9 °F (37 2 °C)  69  16  148/89  109  95 %  --   04/22/21 0900  --  --  --  --  --  --  None (Room air)   04/22/21 07:33:31  98 5 °F (36 9 °C)  69  16  128/75  93  96 %  --   04/22/21 03:07:30  --  --  17  129/73  92  --  --   04/21/21 22:26:20  --  --  19  132/73  93  --  --   04/21/21 1930  98 1 °F (36 7 °C)  --  --  --  --  --  --   04/21/21 19:22:16  --  --  19  132/72  92  --  --   04/21/21 1801  --  68  --  --  --  --  --     Date and Time Eye Opening Best Verbal Response Best Motor Response Law Coma Scale Score   04/22/21 0900 4 5 6 15   04/21/21 1430 4 5 6 15   04/21/21 1026 4 5 6 15   04/20/21 1950 4 5 6 15   04/20/21 1522 4 5 6 15     04/20/2021 @ 1034  Chest X: no acute cardiopulmonary disease    04/20/2021 @ 0947  ECG: sinus bradycardia, 53, without ST segment changes    04/21/2021 [facility]):  Cardiac Cath:  SUMMARY:  CORONARY CIRCULATION:  Mid LAD: There was a 80 % stenosis just after D2   1st diagonal: There was a 70 % stenosis  Ostial circumflex: There was a 80 % stenosis  1st obtuse marginal: There was a 80 % stenosis at the ostium of the vessel segment  Right PDA: There was a 50 % stenosis in the middle third of the vessel segment    CARDIAC STRUCTURES:  Global left ventricular function was normal  EF calculated by contrast ventriculography was 60 %    IMPRESSIONS:  2V CAD ostial lcx, mid lad, diagonal 1    RECOMMENDATIONS:  cabg x 4 consult with cardiac surgery (om1, om2, diag1, lad)     Pertinent Labs/Diagnostic Test Results:   Results from last 7 days   Lab Units 04/20/21  0943   SARS-COV-2  Negative     Results from last 7 days   Lab Units 04/22/21  0306 04/21/21  0559 04/20/21  0946 04/20/21  0656   WBC Thousand/uL 10 84* 9 54  --  7 69   HEMOGLOBIN g/dL 13 8 15 6  --  15 0   HEMATOCRIT % 43 1 48 2  --  48 1   PLATELETS Thousands/uL 238 280 255 285   NEUTROS ABS Thousands/µL  --   --   --  4 55     Results from last 7 days   Lab Units 04/22/21  0306 04/21/21  0559 04/20/21  0656   SODIUM mmol/L 140 140 139   POTASSIUM mmol/L 4 0 4 4 4 3   CHLORIDE mmol/L 109* 105 102   CO2 mmol/L 26 24 30   ANION GAP mmol/L 5 11 7   BUN mg/dL 15 14 12   CREATININE mg/dL 0 92 1 03 1 18   EGFR ml/min/1 73sq m 88 77 65   CALCIUM mg/dL 8 9 9 9 9 2     Results from last 7 days   Lab Units 04/20/21  0656   AST U/L 26   ALT U/L 33   ALK PHOS U/L 58   TOTAL PROTEIN g/dL 7 5   ALBUMIN g/dL 4 1   TOTAL BILIRUBIN mg/dL 0 50     Results from last 7 days   Lab Units 04/22/21  0306 04/21/21  0559 04/20/21  0656   GLUCOSE RANDOM mg/dL 96 103 94     Results from last 7 days   Lab Units 04/20/21  0946   HEMOGLOBIN A1C % 6 0*   EAG mg/dl 126     Results from last 7 days   Lab Units 04/20/21  1906 04/20/21  1538 04/20/21  1310 04/20/21  0946 04/20/21  0656   TROPONIN I ng/mL 0 05* 0 07* 0 06* 0 05* 0 05*     Results from last 7 days   Lab Units 04/22/21  8694 04/21/21 2049 04/21/21  0559  04/20/21  0656   PROTIME seconds  --   --   --   --  12 7   INR   --   --   --   --  0 94   PTT seconds 67* 61* 56*   < > 28    < > = values in this interval not displayed  Results from last 7 days   Lab Units 04/20/21  0656   TSH 3RD GENERATON uIU/mL 1 882     Results from last 7 days   Lab Units 04/20/21  0656   LIPASE u/L 87       Results from last 7 days   Lab Units 04/20/21  0943   INFLUENZA A PCR  Negative   INFLUENZA B PCR  Negative   RSV PCR  Negative     Past Medical History:   Diagnosis Date    CAD (coronary artery disease)     Former tobacco use     History of dysphagia     resolved    Hyperlipidemia     Pre-diabetes      Present on Admission:   Coronary artery disease   Dyslipidemia   Elevated troponin      Admitting Diagnosis: Triple vessel disease of the heart [I25 10]  Age/Sex: 63 y o  male  Admission Orders:  Scheduled Medications:  ascorbic acid, 500 mg, Oral, Daily  aspirin, 81 mg, Oral, Daily  atorvastatin, 80 mg, Oral, Daily With Dinner  chlorhexidine, 15 mL, Mouth/Throat, Q12H [address]  cholecalciferol, 1,000 Units, Oral, Daily  mupirocin, 1 application, Nasal, D74H [address]  vitamin A, 10,000 Units, Oral, Daily      Continuous IV Infusions:  heparin (porcine), 3-20 Units/kg/hr (Order-Specific), Intravenous, Titrated      PRN Meds:  heparin (porcine), 2,000 Units, Intravenous, Q1H PRN  heparin (porcine), 4,000 Units, Intravenous, Q1H PRN  nitroglycerin, 0 4 mg, Sublingual, Q5 Min PRN      Telemetry  VAS carotid complete study  VAS lower limb vein mapping bypass graft  Kareem SCDs  IP CONSULT TO CARDIOTHORACIC SURGERY    Network Utilization Review Department  ATTENTION: Please call with any questions or concerns to [phone] and carefully listen to the prompts so that you are directed to the right person   All voicemails are confidential   Zo Bee all requests for admission clinical reviews, approved or denied determinations and any other requests to dedicated fax number below belonging to the campus where the patient is receiving treatment   List of dedicated fax numbers for the Facilities:  [address] [place] [address] DENIALS (Administrative/Medical Necessity) [phone]   [address] (Maternity/NICU/Pediatrics) [phone]   [address] Amber Goins [id number] [place] [address] [address]ito Luz [address] [place] [address] [phone]

## 2021-04-22 NOTE — UTILIZATION REVIEW
Inpatient Admission Authorization Request   NOTIFICATION OF INPATIENT ADMISSION/INPATIENT AUTHORIZATION REQUEST   SERVICING FACILITY:   [facility]  Address: [address] [place] [address], [address] [place] [address]  Tax ID: [id number]  NPI: [id number]  Place of Service: Inpatient [address] [facility] Code: 24     ATTENDING PROVIDER:  Attending Name and NPI#: Alonso Cabrera Md [[id number]]  Address: [address] [place] [address], [address] [place] [address]  Phone: [phone]     UTILIZATION REVIEW CONTACT:  [facility] Utilization   Network Utilization Review Department  Phone: [phone]  Fax: [phone]  Email: [email or web address]     PHYSICIAN ADVISORY SERVICES:  FOR PHEP-BT-LYZE REVIEW - MEDICAL NECESSITY DENIAL  Phone: [phone]  Fax: [phone]  Email: [email or web address]     TYPE OF REQUEST:  Inpatient Status     ADMISSION INFORMATION:  ADMISSION DATE/TIME: 4/21/21 1748  PATIENT DIAGNOSIS CODE/DESCRIPTION:  Triple vessel disease of the heart [I25 10]  DISCHARGE DATE/TIME: No discharge date for patient encounter  DISCHARGE DISPOSITION (IF DISCHARGED): [address] [facility]     IMPORTANT INFORMATION:  Please contact the [facility] directly with any questions or concerns regarding this request  Department voicemails are confidential     Send requests for admission clinical reviews, concurrent reviews, approvals, and administrative denials due to lack of clinical to fax [phone]

## 2021-04-22 NOTE — ASSESSMENT & PLAN NOTE
· Troponin peaked at 0 07  · Status post cardiac catheterization showing multivessel coronary artery disease  · For CABG evaluation with plan if preop testing stable for OR on Monday

## 2021-04-23 LAB
ANION GAP SERPL CALCULATED.3IONS-SCNC: 7 MMOL/L (ref 4–13)
APTT PPP: 60 SECONDS (ref 23–37)
BUN SERPL-MCNC: 14 MG/DL (ref 5–25)
CALCIUM SERPL-MCNC: 9.5 MG/DL (ref 8.3–10.1)
CHLORIDE SERPL-SCNC: 107 MMOL/L (ref 100–108)
CO2 SERPL-SCNC: 25 MMOL/L (ref 21–32)
CREAT SERPL-MCNC: 1.01 MG/DL (ref 0.6–1.3)
GFR SERPL CREATININE-BSD FRML MDRD: 79 ML/MIN/1.73SQ M
GLUCOSE SERPL-MCNC: 88 MG/DL (ref 65–140)
POTASSIUM SERPL-SCNC: 4 MMOL/L (ref 3.5–5.3)
SODIUM SERPL-SCNC: 139 MMOL/L (ref 136–145)

## 2021-04-23 PROCEDURE — 99232 SBSQ HOSP IP/OBS MODERATE 35: CPT | Performed by: NURSE PRACTITIONER

## 2021-04-23 PROCEDURE — 80048 BASIC METABOLIC PNL TOTAL CA: CPT | Performed by: NURSE PRACTITIONER

## 2021-04-23 PROCEDURE — 85730 THROMBOPLASTIN TIME PARTIAL: CPT | Performed by: INTERNAL MEDICINE

## 2021-04-23 PROCEDURE — 99232 SBSQ HOSP IP/OBS MODERATE 35: CPT | Performed by: INTERNAL MEDICINE

## 2021-04-23 PROCEDURE — NC001 PR NO CHARGE: Performed by: THORACIC SURGERY (CARDIOTHORACIC VASCULAR SURGERY)

## 2021-04-23 RX ORDER — HEPARIN SODIUM 1000 [USP'U]/ML
400 INJECTION, SOLUTION INTRAVENOUS; SUBCUTANEOUS ONCE
Status: CANCELLED | OUTPATIENT
Start: 2021-04-26

## 2021-04-23 RX ORDER — DOCUSATE SODIUM 100 MG/1
100 CAPSULE, LIQUID FILLED ORAL 2 TIMES DAILY PRN
Status: DISCONTINUED | OUTPATIENT
Start: 2021-04-23 | End: 2021-04-26 | Stop reason: HOSPADM

## 2021-04-23 RX ORDER — ACETAMINOPHEN 325 MG/1
650 TABLET ORAL EVERY 6 HOURS PRN
Status: DISCONTINUED | OUTPATIENT
Start: 2021-04-23 | End: 2021-04-26 | Stop reason: HOSPADM

## 2021-04-23 RX ORDER — HEPARIN SODIUM 1000 [USP'U]/ML
10000 INJECTION, SOLUTION INTRAVENOUS; SUBCUTANEOUS ONCE
Status: CANCELLED | OUTPATIENT
Start: 2021-04-26

## 2021-04-23 RX ADMIN — MUPIROCIN 1 APPLICATION: 20 OINTMENT TOPICAL at 20:42

## 2021-04-23 RX ADMIN — Medication 1000 UNITS: at 08:45

## 2021-04-23 RX ADMIN — Medication 12.5 MG: at 08:44

## 2021-04-23 RX ADMIN — ATORVASTATIN CALCIUM 80 MG: 80 TABLET, FILM COATED ORAL at 15:52

## 2021-04-23 RX ADMIN — HEPARIN SODIUM 11.1 UNITS/KG/HR: 10000 INJECTION, SOLUTION INTRAVENOUS at 15:53

## 2021-04-23 RX ADMIN — Medication 12.5 MG: at 20:42

## 2021-04-23 RX ADMIN — ASPIRIN 81 MG: 81 TABLET, CHEWABLE ORAL at 08:45

## 2021-04-23 RX ADMIN — OXYCODONE HYDROCHLORIDE AND ACETAMINOPHEN 500 MG: 500 TABLET ORAL at 08:45

## 2021-04-23 RX ADMIN — MUPIROCIN 1 APPLICATION: 20 OINTMENT TOPICAL at 08:45

## 2021-04-23 RX ADMIN — CHLORHEXIDINE GLUCONATE 0.12% ORAL RINSE 15 ML: 1.2 LIQUID ORAL at 08:45

## 2021-04-23 RX ADMIN — CHLORHEXIDINE GLUCONATE 0.12% ORAL RINSE 15 ML: 1.2 LIQUID ORAL at 20:43

## 2021-04-23 NOTE — PROGRESS NOTES
Progress Note - Cardiothoracic Surgery   Natasha Hatchet 63 y o  male MRN: [id number]  Unit/Bed#: -01 Encounter: [id number]      24 Hour Events: No events  Denies angina/dyspnea       Medications:   Scheduled Meds:  Current Facility-Administered Medications   Medication Dose Route Frequency Provider Last Rate    ascorbic acid  500 mg Oral Daily Desirae Velez MD      aspirin  81 mg Oral Daily Desirae Velez MD      atorvastatin  80 mg Oral Daily With Veronica Pereyra MD      chlorhexidine  15 mL Mouth/Throat Q12H [address]      cholecalciferol  1,000 Units Oral Daily Desirae Velez MD      heparin (porcine)  3-20 Units/kg/hr (Order-Specific) Intravenous Titrated Desirae Velez MD 11 1 Units/kg/hr (04/22/21 1559)    heparin (porcine)  2,000 Units Intravenous Q1H PRN Desirae Velez MD      heparin (porcine)  4,000 Units Intravenous Q1H PRN Desirae Velez MD      metoprolol tartrate  12 5 mg Oral Q12H [address] SHIV Jurado      mupirocin  1 application Nasal U26H [address] [place], [place]      nitroglycerin  0 4 mg Sublingual Q5 Min PRN Desirae Velez MD      vitamin A  10,000 Units Oral Daily Desirae Velez MD       Continuous Infusions:heparin (porcine), 3-20 Units/kg/hr (Order-Specific), Last Rate: 11 1 Units/kg/hr (04/22/21 1559)        Results:   Results from last 7 days   Lab Units 04/22/21  0306 04/21/21  0559 04/20/21  0946 04/20/21  0656   WBC Thousand/uL 10 84* 9 54  --  7 69   HEMOGLOBIN g/dL 13 8 15 6  --  15 0   HEMATOCRIT % 43 1 48 2  --  48 1   PLATELETS Thousands/uL 238 280 255 285     Results from last 7 days   Lab Units 04/23/21  0457 04/22/21  0306 04/21/21  0559   POTASSIUM mmol/L 4 0 4 0 4 4   CHLORIDE mmol/L 107 109* 105   CO2 mmol/L 25 26 24   BUN mg/dL 14 15 14   CREATININE mg/dL 1 01 0 92 1 03   CALCIUM mg/dL 9 5 8 9 9 9     Results from last 7 days   Lab Units 04/23/21  0457 04/22/21  0306 04/21/21  2049  04/20/21  0656   INR   --   --   --   -- 0 94   PTT seconds 60* 67* 61*   < > 28    < > = values in this interval not displayed  Studies:     Cardiac Catheterization: 80% mid LAD s/p D2, 70% D1, 80% ostial circ, 80% ostial OM1, 50% mid RPDA     Echocardiogram: EF 67%  Trace MR  Trace TR  Carotid artery duplex:   < 50% right carotid stenosis  Vertebral artery flow is antegrade and There is no significant subclavian artery   50-69% left carotid stenosis  Vertebral artery flow is antegrade and There is no significant subclavian artery    Greater saphenous vein mapping: Adequate conduit for CABG, bilaterally    Vitals:   Vitals:    04/22/21 2313 04/23/21 0247 04/23/21 0537 04/23/21 0728   BP: 136/75 138/77  139/78   BP Location:    Right arm   Pulse: 62 60  59   Resp: 17 18  18   Temp: 98 3 °F (36 8 °C)   98 2 °F (36 8 °C)   TempSrc:    Oral   SpO2: 95% 96%  95%   Weight:   88 kg (194 lb 0 1 oz)    Height:           Physical Exam:    HEENT/NECK:  Normocephalic  Atraumatic  No jugular venous distention  Cardiac: Regular rate and rhythm  Pulmonary:  Breath sounds clear bilaterally  Abdomen:  Non-tender, Non-distended and Normal bowel sounds  Extremities: Extremities warm/dry  Neuro: Alert and oriented X 3, Sensation is grossly intact and No focal deficits  Skin: Warm/Dry, without rashes or lesions  Assessment:    Severe coronary artery disease; Ongoing CABG workup    Plan:  Patient agreeable to proceed with surgery; Informed consent signed    Incidental LICA stenosis 30-19%; Will arrange for O/P surveillance  Vein mapping completed and adequate       Blood type and cross match ordered for 4/25    Continue Mupirocin 2% nasal ointment q 12 hrs  Continue topical chlorhexidine bath and mouth rise    Discontinue heparin infusion on call to OR    Preoperative oxygen, beta blocker, insulin, and antibiotics ordered coronary artery bypass grafting scheduled for Monday with ANTHONY Dumont     [place]: Opal Cartagena PA-C  DATE: April 23, 2021  TIME: 7:51 AM    * This note was completed in part utilizing m-Mark[employer] fluency direct voice recognition software  Grammatical errors, random word insertion, spelling mistakes, and incomplete sentences may be an occasional consequence of the system secondary to software limitations, ambient noise and hardware issues  At the time of dictation, efforts were made to edit, clarify and /or correct errors  Please read the chart carefully and recognize, using context, where substitutions have occurred  If you have any questions or concerns about the context, text or information contained within the body of this dictation, please contact myself, the provider, for further clarification

## 2021-04-23 NOTE — ASSESSMENT & PLAN NOTE
· Patient was initially admitted to [facility] for chest pain  · Troponin peaked at 0 07  · Had cardiac catheterization today showed multivessel coronary artery disease  · Transferred [facility] for CABG evaluation  · Continue with heparin drip at this time   · Start on low dose bb 12 5mg bid   · Statin high dose and asa   · Appreciate Cardiology  · [facility] surgery Dr Sang Rosenberg with plan pending all workup for Monday CABG   -    coratid doppler - RIGHT:  There is <50% stenosis noted in the internal carotid artery  Plaque is  homogenous and smooth  Vertebral artery flow is antegrade  There is no significant subclavian artery  disease  LEFT:  There is 50-69% stenosis noted in the internal carotid artery  Plaque is  homogenous and smooth  Vertebral artery flow is antegrade  There is no significant subclavian artery disease  · Echo with ef 67% no reg wall abnormalities   Vein mapping : Impression:  RIGHT LOWER LIMB:  The great saphenous vein is small throughout the thigh; however, a large  anterior accessory is patent from the groin to the ankle and measures >2mm in  caliber throughout the leg  LEFT LOWER LIMB:  The great saphenous vein is patent  from the groin to the ankle    · The vein measures >2mm in caliber from the groin to the ankle

## 2021-04-23 NOTE — PROGRESS NOTES
General Cardiology   Progress Note -  Team One   Jesus Brandt 63 y o  male MRN: [id number]    Unit/Bed#: -01 Encounter: [id number]    Assessment/ Plan:    1  Unstable Anginal with MV CAD: Pt remains chest pain free  On appropriate medications with ASA, high intensity statin and intravenous heparin  Now tolerating low dose beta blocker; metoprolol tartrate 12 5mg BID without any significant bradycardia  Echocardiogram showed preserved LV systolic function 91%; no RWMA, diastolic function parameters WNL; no significant valvular disease  He was seen by cardiac surgery yesterday; undergoing workup and scheduled or CABG 4/26 pending remainder of w/u      2  Dyslipidemia: Tot 172; ; started on high intensity statin; lipitor 80mg daily  3  Pre-diabetes: Hgb A1c 6 0  4  Carotid Stenosis: 60-69% L ICA stenosis; < 50% stenosis of L ICA; follow up OP with repeat US      Subjective: Pt seen for follow up; no events overnight  He reports feeling well today  He has not had any recurrent chest pain past 24 hours  Concerned infrequency of bowel movements, last 1 yesterday  Review of Systems   Constitution: Negative for decreased appetite and fever  Cardiovascular: Negative for chest pain, dyspnea on exertion, leg swelling, orthopnea, palpitations and syncope  Respiratory: Negative for cough, shortness of breath and wheezing  Gastrointestinal: Positive for change in bowel habit (Less frequent bowel movements, last 1 4/22)  Negative for abdominal pain, nausea and vomiting  Genitourinary: Negative for dysuria  Neurological: Negative for dizziness and light-headedness  Psychiatric/Behavioral: Negative for altered mental status  All other systems reviewed and are negative  Objective:   Vitals: Blood pressure 139/78, pulse 59, temperature 98 2 °F (36 8 °C), temperature source Oral, resp  rate 18, height 5' 7" (1 702 m), weight 88 kg (194 lb 0 1 oz), SpO2 95 %  ,     Body mass index is 30 39 kg/m² ,     Systolic (13OFQ), MITCH:881 , Min:123 , YIT:767     Diastolic (73CYY), DNV:76, Min:75, Max:89      Intake/Output Summary (Last 24 hours) at 4/23/2021 0916  Last data filed at 4/23/2021 0728  Gross per 24 hour   Intake 360 ml   Output 925 ml   Net -565 ml     Weight (last 2 days)     Date/Time   Weight    04/23/21 0537   88 (194 01)    04/21/21 17:58:40   88 7 (195 6)            Telemetry Review:   Sinus rhythm, no significant events    Physical Exam  Vitals signs reviewed  Constitutional:       Appearance: Normal appearance  Comments: Patient is sitting up in chair in NAD alert pleasant cooperative   HENT:      Head: Normocephalic and atraumatic  Mouth/Throat:      Mouth: Mucous membranes are moist    Cardiovascular:      Rate and Rhythm: Normal rate and regular rhythm  Pulses:           Radial pulses are 2+ on the right side  Heart sounds: S1 normal and S2 normal  No murmur  Comments: Right radial cath site normal limits, soft nontender no hematoma bleeding or swelling  Pulmonary:      Effort: Pulmonary effort is normal       Breath sounds: Normal breath sounds  No wheezing or rales  Comments: Lung sounds clear to auscultation, on room air  Abdominal:      Palpations: Abdomen is soft  Musculoskeletal:      Right lower leg: No edema  Left lower leg: No edema  Skin:     General: Skin is warm  Neurological:      Mental Status: He is alert and oriented to person, place, and time     Psychiatric:         Mood and Affect: Mood normal          LABORATORY RESULTS  Results from last 7 days   Lab Units 04/20/21  1906 04/20/21  1538 04/20/21  1310   TROPONIN I ng/mL 0 05* 0 07* 0 06*     CBC with diff:   Results from last 7 days   Lab Units 04/22/21  0306 04/21/21  0559 04/20/21  0946 04/20/21  0656   WBC Thousand/uL 10 84* 9 54  --  7 69   HEMOGLOBIN g/dL 13 8 15 6  --  15 0   HEMATOCRIT % 43 1 48 2  --  48 1   MCV fL 88 88  --  89   PLATELETS Thousands/uL 238 280 255 285 MCH pg 28 3 28 4  --  27 7   MCHC g/dL 32 0 32 4  --  31 2*   RDW % 14 6 14 6  --  14 6   MPV fL 9 4 9 5 9 4 9 6   NRBC AUTO /100 WBCs  --   --   --  0       CMP:  Results from last 7 days   Lab Units 21  0457 21  0306 21  0559 21  0656   POTASSIUM mmol/L 4 0 4 0 4 4 4 3   CHLORIDE mmol/L 107 109* 105 102   CO2 mmol/L 25 26 24 30   BUN mg/dL 14 15 14 12   CREATININE mg/dL 1 01 0 92 1 03 1 18   CALCIUM mg/dL 9 5 8 9 9 9 9 2   AST U/L  --   --   --  26   ALT U/L  --   --   --  33   ALK PHOS U/L  --   --   --  58   EGFR ml/min/1 73sq m 79 88 77 65       BMP:  Results from last 7 days   Lab Units 21  0457 21  0306 21  0559 21  0656   POTASSIUM mmol/L 4 0 4 0 4 4 4 3   CHLORIDE mmol/L 107 109* 105 102   CO2 mmol/L 25 26 24 30   BUN mg/dL 14 15 14 12   CREATININE mg/dL 1 01 0 92 1 03 1 18   CALCIUM mg/dL 9 5 8 9 9 9 9 2     Results from last 7 days   Lab Units 21  0946   HEMOGLOBIN A1C % 6 0*     Results from last 7 days   Lab Units 21  0656   TSH 3RD GENERATON uIU/mL 1 882     Results from last 7 days   Lab Units 21  0656   INR  0 94     Lipid Profile:   No results found for: CHOL  Lab Results   Component Value Date    HDL 30 (L) 2021    HDL 32 (L) 2020    HDL 31 (L) 2020     Lab Results   Component Value Date    LDLCALC 125 (H) 2021    LDLCALC 122 (H) 2020    LDLCALC 173 (H) 2020     Lab Results   Component Value Date    TRIG 83 2021    TRIG 75 2020    TRIG 103 2020     Cardiac testing:   Results for orders placed during the hospital encounter of 21   Echo complete with contrast if indicated    Narrative Jose [address]  [address]  [phone]    Transthoracic Echocardiogram  2D, M-mode, Doppler, and Color Doppler    Study date:  2021    Patient: Lauri Cortés  MR number: [id number]  Account number: [de-identified]  : 1955  Age: 63 years  Gender: Male  Status: Inpatient  Location: Bedside  Height: 67 in  Weight: 194 9 lb  BP: 129/ 73 mmHg    Indications: Coronary Artery Disease    Diagnoses: I25 119 - Atherosclerotic heart disease of native coronary artery with unspecified angina pectoris    Sonographer:  Tadeo Ovalle RDCS  Primary Physician:  Vahe Vázquez DO  Referring Physician:  Yael Alexander MD  Group:  [id number]  Cardiology Fellow: Lolis Saucedo MD  Interpreting Physician:  Yamil Denis MD    SUMMARY    LEFT VENTRICLE:  Systolic function was normal  Ejection fraction was estimated to be 67 %  There were no regional wall motion abnormalities  Left ventricular diastolic function parameters were normal     RIGHT VENTRICLE:  The size was normal   Systolic function was normal     HISTORY: PRIOR HISTORY: Chest Pain, Hypertension, Hyperlipidemia, SOB, Elevated Troponin, CAD, Cath    PROCEDURE: The procedure was performed at the bedside  This was a routine study  The transthoracic approach was used  The study included complete 2D imaging, M-mode, complete spectral Doppler, and color Doppler  The heart rate was 65 bpm,  at the start of the study  Images were obtained from the parasternal, apical, subcostal, and suprasternal notch acoustic windows  Image quality was adequate  LEFT VENTRICLE: Size was normal  Systolic function was normal  Ejection fraction was estimated to be 67 %  There were no regional wall motion abnormalities  Wall thickness was normal  There was no evidence of concentric hypertrophy  DOPPLER: The pulmonary vein flow pattern was normal  Left ventricular diastolic function parameters were normal     RIGHT VENTRICLE: The size was normal  Systolic function was normal     LEFT ATRIUM: Size was at the upper limits of normal     RIGHT ATRIUM: Size was normal     MITRAL VALVE: There was mild annular calcification   Valve structure was normal  There was normal leaflet separation  DOPPLER: The transmitral velocity was within the normal range  There was no evidence for stenosis  There was trace  regurgitation  AORTIC VALVE: The valve was trileaflet  Leaflets exhibited mildly increased thickness and normal cuspal separation  DOPPLER: Transaortic velocity was within the normal range  There was no evidence for stenosis  There was no regurgitation  TRICUSPID VALVE: The valve structure was normal  There was normal leaflet separation  DOPPLER: The transtricuspid velocity was within the normal range  There was no evidence for stenosis  There was trace regurgitation  PULMONIC VALVE: Leaflets exhibited normal thickness, no calcification, and normal cuspal separation  DOPPLER: The transpulmonic velocity was within the normal range  There was no evidence for stenosis  There was no significant  regurgitation  PERICARDIUM: There was no pericardial effusion  The pericardium was normal in appearance  AORTA: The root exhibited normal size  SYSTEMIC VEINS: IVC: The inferior vena cava was normal in size and course  Respirophasic changes were normal     SYSTEM MEASUREMENT TABLES    2D  %FS: 39 58 %  Ao Diam: 2 68 cm  Ao asc: 2 99 cm  EDV(Teich): 111 04 ml  EF(Teich): 69 99 %  ESV(Teich): 33 32 ml  IVSd: 1 03 cm  LA Diam: 3 83 cm  LAAs A4C: 18 73 cm2  LAESV A-L A4C: 47 09 ml  LAESV MOD A4C: 44 14 ml  LALs A4C: 6 33 cm  LVEDV MOD A4C: 72 83 ml  LVEF MOD A4C: 66 88 %  LVESV MOD A4C: 24 12 ml  LVIDd: 4 87 cm  LVIDs: 2 94 cm  LVLd A4C: 7 4 cm  LVLs A4C: 6 7 cm  LVPWd: 1 cm  RAESV A-L: 38 89 ml  RAESV MOD: 34 32 ml  RALs: 4 59 cm  RVIDd: 2 53 cm  SV MOD A4C: 48 71 ml  SV(Teich): 77 72 ml    CW  AV Env  Ti: 314 94 ms  AV VTI: 32 48 cm  AV Vmax: 1 52 m/s  AV Vmean: 1 03 m/s  AV maxP 3 mmHg  AV meanP 92 mmHg    MM  TAPSE: 2 57 cm    PW  E' Sept: 0 07 m/s  E/E' Sept: 10 54  LVOT Env  Ti: 285 44 ms  LVOT VTI: 22 53 cm  LVOT Vmax: 1 13 m/s  LVOT Vmean: 0 79 m/s  LVOT maxP 15 mmHg  LVOT meanP 78 mmHg  MV A Lonnie: 0 67 m/s  MV Dec [place]: 3 61 m/s2  MV DecT: 205 72 ms  MV E Lonnie: 0 74 m/s  MV E/A Ratio: 1 11  MV PHT: 59 66 ms  MVA By PHT: 3 69 cm2    [address] Accredited Ech[facility]    Prepared and electronically signed by    Severo Enriquez MD  Signed 2021 15:16:51       Meds/Allergies   all current active meds have been reviewed  Medications Prior to Admission   Medication    ascorbic acid (VITAMIN C) 500 mg tablet    cholecalciferol (VITAMIN D3) 1,000 units tablet    Multiple Vitamin (multivitamin) tablet    simvastatin (ZOCOR) 20 mg tablet     heparin (porcine), 3-20 Units/kg/hr (Order-Specific), Last Rate: 11 1 Units/kg/hr (21 1559)      Assessment:  Principal Problem:    Coronary artery disease  Active Problems:    Dyslipidemia    Elevated troponin    Counseling / Coordination of Care  Total floor / unit time spent today 20 minutes  Greater than 50% of total time was spent with the patient and / or family counseling and / or coordination of care  ** Please Note: Dragon 360 Dictation voice to text software may have been used in the creation of this document   **

## 2021-04-23 NOTE — PROGRESS NOTES
[address] [facility]  Progress Note Ion Heredia 1955, 63 y o  male MRN: [id number]  Unit/Bed#: [facility] 412-01 Encounter: [id number]  Primary Care Provider: Danis Lawson DO   Date and time admitted to hospital: 4/21/2021  5:48 PM    * Coronary artery disease  Assessment & Plan  · Patient was initially admitted to [address] [place] [address] for chest pain  · Troponin peaked at 0 07  · Had cardiac catheterization today showed multivessel coronary artery disease  · Transferred [facility] for CABG evaluation  · Continue with heparin drip at this time   · Start on low dose bb 12 5mg bid   · Statin high dose and asa   · Appreciate Cardiology  · [facility] surgery Dr Bessy Denis with plan pending all workup for Monday CABG   -    coratid doppler - RIGHT:  There is <50% stenosis noted in the internal carotid artery  Plaque is  homogenous and smooth  Vertebral artery flow is antegrade  There is no significant subclavian artery  disease  LEFT:  There is 50-69% stenosis noted in the internal carotid artery  Plaque is  homogenous and smooth  Vertebral artery flow is antegrade  There is no significant subclavian artery disease  · Echo with ef 67% no reg wall abnormalities   Vein mapping : Impression:  RIGHT LOWER LIMB:  The great saphenous vein is small throughout the thigh; however, a large  anterior accessory is patent from the groin to the ankle and measures >2mm in  caliber throughout the leg  LEFT LOWER LIMB:  The great saphenous vein is patent  from the groin to the ankle    · The vein measures >2mm in caliber from the groin to the ankle    Elevated troponin  Assessment & Plan  · Troponin peaked at 0 07  · Status post cardiac catheterization showing multivessel coronary artery disease  · For CABG evaluation with plan if preop testing stable for OR on Monday     Dyslipidemia  Assessment & Plan  · Continue with statin   · See lipid panel VTE Pharmacologic Prophylaxis:   Pharmacologic: Heparin Drip  Mechanical VTE Prophylaxis in Place: Yes    Patient Centered Rounds: I have performed bedside rounds with nursing staff today  Discussions with Specialists or Other Care Team Provider: nursing     Education and Discussions with Family / Patient: patient reports wife coming in no need to call and update     Time Spent for Care: 30 minutes  More than 50% of total time spent on counseling and coordination of care as described above  Current Length of Stay: 2 day(s)    Current Patient Status: Inpatient   Certification Statement: The patient will continue to require additional inpatient hospital stay due to pending surgery on monday cabg     Discharge Plan: discharge will depend on post op eval per pt no sooner than Tuesday     Code Status: Level 1 - Full Code      Subjective:   Pt doing well no chest pain no sob no palpitations at this time results reviewed pt understands plan of care     Objective:     Vitals:   Temp (24hrs), Av °F (36 7 °C), Min:97 3 °F (36 3 °C), Max:98 3 °F (36 8 °C)    Temp:  [97 3 °F (36 3 °C)-98 3 °F (36 8 °C)] 97 3 °F (36 3 °C)  HR:  [59-73] 60  Resp:  [16-18] 16  BP: (116-139)/(63-79) 131/63  SpO2:  [94 %-98 %] 98 %  Body mass index is 30 39 kg/m²  Input and Output Summary (last 24 hours): Intake/Output Summary (Last 24 hours) at 2021 1852  Last data filed at 2021 1300  Gross per 24 hour   Intake 720 ml   Output 650 ml   Net 70 ml       Physical Exam:     Physical Exam  Constitutional:       General: He is not in acute distress  Appearance: He is not ill-appearing, toxic-appearing or diaphoretic  HENT:      Head: Normocephalic  Nose: Nose normal  No congestion  Eyes:      General:         Right eye: No discharge  Conjunctiva/sclera: Conjunctivae normal    Cardiovascular:      Rate and Rhythm: Normal rate  Heart sounds: No murmur  No friction rub  No gallop      Pulmonary: Effort: No respiratory distress  Breath sounds: No stridor  No wheezing, rhonchi or rales  Chest:      Chest wall: No tenderness  Abdominal:      General: There is no distension  Palpations: There is no mass  Tenderness: There is no abdominal tenderness  There is no right CVA tenderness, left CVA tenderness, guarding or rebound  Hernia: No hernia is present  Musculoskeletal:         General: No swelling, tenderness, deformity or signs of injury  Right lower leg: No edema  Left lower leg: No edema  Skin:     Coloration: Skin is not jaundiced or pale  Findings: No bruising, erythema, lesion or rash  Neurological:      Mental Status: He is alert and oriented to person, place, and time  Psychiatric:         Behavior: Behavior normal            Additional Data:     Labs:    Results from last 7 days   Lab Units 04/22/21  0306  04/20/21  0656   WBC Thousand/uL 10 84*   < > 7 69   HEMOGLOBIN g/dL 13 8   < > 15 0   HEMATOCRIT % 43 1   < > 48 1   PLATELETS Thousands/uL 238   < > 285   NEUTROS PCT %  --   --  59   LYMPHS PCT %  --   --  31   MONOS PCT %  --   --  8   EOS PCT %  --   --  1    < > = values in this interval not displayed  Results from last 7 days   Lab Units 04/23/21  0457  04/20/21  0656   SODIUM mmol/L 139   < > 139   POTASSIUM mmol/L 4 0   < > 4 3   CHLORIDE mmol/L 107   < > 102   CO2 mmol/L 25   < > 30   BUN mg/dL 14   < > 12   CREATININE mg/dL 1 01   < > 1 18   ANION GAP mmol/L 7   < > 7   CALCIUM mg/dL 9 5   < > 9 2   ALBUMIN g/dL  --   --  4 1   TOTAL BILIRUBIN mg/dL  --   --  0 50   ALK PHOS U/L  --   --  58   ALT U/L  --   --  33   AST U/L  --   --  26   GLUCOSE RANDOM mg/dL 88   < > 94    < > = values in this interval not displayed  Results from last 7 days   Lab Units 04/20/21  0656   INR  0 94         Results from last 7 days   Lab Units 04/20/21  0946   HEMOGLOBIN A1C % 6 0*               * I Have Reviewed All Lab Data Listed Above    * Additional Pertinent Lab Tests Reviewed: All Labs Within Last 24 Hours Reviewed    Imaging:    Imaging Reports Reviewed Today Include: reviewed   Recent Cultures (last 7 days):           Last 24 Hours Medication List:   Current Facility-Administered Medications   Medication Dose Route Frequency Provider Last Rate    acetaminophen  650 mg Oral Q6H PRN SHIV Alex      ascorbic acid  500 mg Oral Daily Alice Ceja MD      aspirin  81 mg Oral Daily Alice Ceja MD      atorvastatin  80 mg Oral Daily With Tonya Garibay MD      chlorhexidine  15 mL Mouth/Throat Q12H [address]      cholecalciferol  1,000 Units Oral Daily Alice Ceja MD      docusate sodium  100 mg Oral BID PRN SHIV Alex      heparin (porcine)  3-20 Units/kg/hr (Order-Specific) Intravenous Titrated Alice Ceja MD 11 1 Units/kg/hr (04/23/21 1553)    heparin (porcine)  2,000 Units Intravenous Q1H PRN Alice Ceja MD      heparin (porcine)  4,000 Units Intravenous Q1H PRN Alice Ceja MD      metoprolol tartrate  12 5 mg Oral Q12H [address] SHIV Alex      mupirocin  1 application Nasal D43B [address] [place], [place]      nitroglycerin  0 4 mg Sublingual Q5 Min PRN Alice Ceja MD      vitamin A  10,000 Units Oral Daily Alice Ceja MD          Today, Patient Was Seen By: SHIV Rivas    ** Please Note: Dictation voice to text software may have been used in the creation of this document   **

## 2021-04-24 LAB
ANION GAP SERPL CALCULATED.3IONS-SCNC: 8 MMOL/L (ref 4–13)
APTT PPP: 59 SECONDS (ref 23–37)
APTT PPP: 66 SECONDS (ref 23–37)
APTT PPP: 75 SECONDS (ref 23–37)
BUN SERPL-MCNC: 16 MG/DL (ref 5–25)
CALCIUM SERPL-MCNC: 9.5 MG/DL (ref 8.3–10.1)
CHLORIDE SERPL-SCNC: 108 MMOL/L (ref 100–108)
CO2 SERPL-SCNC: 22 MMOL/L (ref 21–32)
CREAT SERPL-MCNC: 0.98 MG/DL (ref 0.6–1.3)
GFR SERPL CREATININE-BSD FRML MDRD: 82 ML/MIN/1.73SQ M
GLUCOSE SERPL-MCNC: 102 MG/DL (ref 65–140)
POTASSIUM SERPL-SCNC: 4 MMOL/L (ref 3.5–5.3)
SODIUM SERPL-SCNC: 138 MMOL/L (ref 136–145)

## 2021-04-24 PROCEDURE — 99232 SBSQ HOSP IP/OBS MODERATE 35: CPT | Performed by: INTERNAL MEDICINE

## 2021-04-24 PROCEDURE — 99232 SBSQ HOSP IP/OBS MODERATE 35: CPT | Performed by: NURSE PRACTITIONER

## 2021-04-24 PROCEDURE — 85730 THROMBOPLASTIN TIME PARTIAL: CPT | Performed by: NURSE PRACTITIONER

## 2021-04-24 PROCEDURE — 85730 THROMBOPLASTIN TIME PARTIAL: CPT | Performed by: INTERNAL MEDICINE

## 2021-04-24 PROCEDURE — 80048 BASIC METABOLIC PNL TOTAL CA: CPT | Performed by: NURSE PRACTITIONER

## 2021-04-24 RX ADMIN — ATORVASTATIN CALCIUM 80 MG: 80 TABLET, FILM COATED ORAL at 15:41

## 2021-04-24 RX ADMIN — Medication 1000 UNITS: at 08:28

## 2021-04-24 RX ADMIN — Medication 12.5 MG: at 20:43

## 2021-04-24 RX ADMIN — OXYCODONE HYDROCHLORIDE AND ACETAMINOPHEN 500 MG: 500 TABLET ORAL at 08:28

## 2021-04-24 RX ADMIN — MUPIROCIN 1 APPLICATION: 20 OINTMENT TOPICAL at 21:58

## 2021-04-24 RX ADMIN — HEPARIN SODIUM 2000 UNITS: 1000 INJECTION INTRAVENOUS; SUBCUTANEOUS at 06:56

## 2021-04-24 RX ADMIN — HEPARIN SODIUM 13.1 UNITS/KG/HR: 10000 INJECTION, SOLUTION INTRAVENOUS at 15:41

## 2021-04-24 RX ADMIN — MUPIROCIN 1 APPLICATION: 20 OINTMENT TOPICAL at 08:28

## 2021-04-24 RX ADMIN — CHLORHEXIDINE GLUCONATE 0.12% ORAL RINSE 15 ML: 1.2 LIQUID ORAL at 20:43

## 2021-04-24 RX ADMIN — DOCUSATE SODIUM 100 MG: 100 CAPSULE, LIQUID FILLED ORAL at 06:55

## 2021-04-24 RX ADMIN — CHLORHEXIDINE GLUCONATE 0.12% ORAL RINSE 15 ML: 1.2 LIQUID ORAL at 08:28

## 2021-04-24 RX ADMIN — Medication 12.5 MG: at 08:29

## 2021-04-24 RX ADMIN — ASPIRIN 81 MG: 81 TABLET, CHEWABLE ORAL at 08:28

## 2021-04-24 NOTE — PROGRESS NOTES
[facility]  Progress Note Shelli Reyez 1955, 63 y o  male MRN: [id number]  Unit/Bed#: [facility] 412-01 Encounter: [id number]  Primary Care Provider: Leland Riggs DO   Date and time admitted to hospital: 4/21/2021  5:48 PM    * Coronary artery disease  Assessment & Plan  · Patient was initially admitted to [address] [place] [address] for chest pain  · Troponin peaked at 0 07  · Had cardiac catheterization today showed multivessel coronary artery disease  · Transferred [facility] for CABG evaluation  · Continue with heparin drip at this time surgery   · Start on low dose bb 12 5mg bid   · Statin high dose and asa   · Appreciate Cardiology   · Appreciate CT surgery Dr Sachin Lr with plan pending all workup for Monday CABG   -    coratid doppler - RIGHT:  There is <50% stenosis noted in the internal carotid artery  Plaque is  homogenous and smooth  Vertebral artery flow is antegrade  There is no significant subclavian artery  disease  LEFT:  There is 50-69% stenosis noted in the internal carotid artery  Plaque is  homogenous and smooth  Vertebral artery flow is antegrade  There is no significant subclavian artery disease  · Echo with ef 67% no reg wall abnormalities   Vein mapping : Impression:  RIGHT LOWER LIMB:  The great saphenous vein is small throughout the thigh; however, a large  anterior accessory is patent from the groin to the ankle and measures >2mm in  caliber throughout the leg  LEFT LOWER LIMB:  The great saphenous vein is patent  from the groin to the ankle    · The vein measures >2mm in caliber from the groin to the ankle    Elevated troponin  Assessment & Plan  · Troponin peaked at 0 07  · Status post cardiac catheterization showing multivessel coronary artery disease  · For OR cabg on Monday by dr Sachin Lr     Dyslipidemia  [address] [place] [address]  · Continue with statin   · See lipid panel       VTE Pharmacologic Prophylaxis:   Pharmacologic: Heparin Drip  Mechanical VTE Prophylaxis in Place: Yes    Patient Centered Rounds: I have performed bedside rounds with nursing staff today  Discussions with Specialists or Other Care Team Provider: nursing     Education and Discussions with Family / Patient: patient and wife and family at the bedside updated     Time Spent for Care: 45 minutes  More than 50% of total time spent on counseling and coordination of care as described above  Current Length of Stay: 3 day(s)    Current Patient Status: Inpatient   Certification Statement: The patient will continue to require additional inpatient hospital stay due to pending surgery on monday     Discharge Plan: discharge pt when cleared by surgery no sooner than Wednesday next week     Code Status: Level 1 - Full Code      Subjective:   Pt with no complaints today reports walking the unit and not feeling so short of breath no pain no chest pain or pressure is not dizzy or lightheaded no new results to report     Objective:     Vitals:   Temp (24hrs), Av 8 °F (36 6 °C), Min:97 4 °F (36 3 °C), Max:98 2 °F (36 8 °C)    Temp:  [97 4 °F (36 3 °C)-98 2 °F (36 8 °C)] 98 1 °F (36 7 °C)  HR:  [56-71] 60  Resp:  [18-20] 20  BP: (122-143)/(64-75) 122/64  SpO2:  [96 %-99 %] 97 %  Body mass index is 30 13 kg/m²  Input and Output Summary (last 24 hours): Intake/Output Summary (Last 24 hours) at 2021 1805  Last data filed at 2021 1728  Gross per 24 hour   Intake 901 72 ml   Output 775 ml   Net 126 72 ml       Physical Exam:     Physical Exam  Constitutional:       General: He is not in acute distress  Appearance: He is not ill-appearing, toxic-appearing or diaphoretic  HENT:      Head: Normocephalic and atraumatic  Right Ear: There is no impacted cerumen  Nose: No congestion  Mouth/Throat:      Pharynx: No oropharyngeal exudate  Eyes:      General:         Right eye: No discharge           Left eye: No discharge  Conjunctiva/sclera: Conjunctivae normal    Cardiovascular:      Rate and Rhythm: Normal rate  Heart sounds: No murmur  No friction rub  No gallop  Abdominal:      General: There is no distension  Palpations: There is no mass  Tenderness: There is no abdominal tenderness  There is no right CVA tenderness, left CVA tenderness, guarding or rebound  Hernia: No hernia is present  Musculoskeletal:         General: No swelling, tenderness, deformity or signs of injury  Right lower leg: No edema  Left lower leg: No edema  Skin:     Coloration: Skin is not jaundiced or pale  Findings: No bruising, erythema, lesion or rash  Neurological:      Mental Status: He is alert and oriented to person, place, and time  Psychiatric:         Behavior: Behavior normal            Additional Data:     Labs:    Results from last 7 days   Lab Units 04/22/21  0306  04/20/21  0656   WBC Thousand/uL 10 84*   < > 7 69   HEMOGLOBIN g/dL 13 8   < > 15 0   HEMATOCRIT % 43 1   < > 48 1   PLATELETS Thousands/uL 238   < > 285   NEUTROS PCT %  --   --  59   LYMPHS PCT %  --   --  31   MONOS PCT %  --   --  8   EOS PCT %  --   --  1    < > = values in this interval not displayed  Results from last 7 days   Lab Units 04/24/21  0539  04/20/21  0656   SODIUM mmol/L 138   < > 139   POTASSIUM mmol/L 4 0   < > 4 3   CHLORIDE mmol/L 108   < > 102   CO2 mmol/L 22   < > 30   BUN mg/dL 16   < > 12   CREATININE mg/dL 0 98   < > 1 18   ANION GAP mmol/L 8   < > 7   CALCIUM mg/dL 9 5   < > 9 2   ALBUMIN g/dL  --   --  4 1   TOTAL BILIRUBIN mg/dL  --   --  0 50   ALK PHOS U/L  --   --  58   ALT U/L  --   --  33   AST U/L  --   --  26   GLUCOSE RANDOM mg/dL 102   < > 94    < > = values in this interval not displayed       Results from last 7 days   Lab Units 04/20/21  0656   INR  0 94         Results from last 7 days   Lab Units 04/20/21  0946   HEMOGLOBIN A1C % 6 0*               * I Have Reviewed All Lab Data Listed Above  * Additional Pertinent Lab Tests Reviewed: All Labs Within Last 24 Hours Reviewed    Imaging:    Imaging Reports Reviewed Today Include: reviewed     Recent Cultures (last 7 days):           Last 24 Hours Medication List:   Current Facility-Administered Medications   Medication Dose Route Frequency Provider Last Rate    acetaminophen  650 mg Oral Q6H PRN SHIV Benson      ascorbic acid  500 mg Oral Daily Marko Wright MD      aspirin  81 mg Oral Daily Marko Wright MD      atorvastatin  80 mg Oral Daily With Namrata Graza MD      chlorhexidine  15 mL Mouth/Throat Q12H [address]      cholecalciferol  1,000 Units Oral Daily Marko Wright MD      docusate sodium  100 mg Oral BID PRN SHIV Benson      heparin (porcine)  3-20 Units/kg/hr (Order-Specific) Intravenous Titrated Marko Wright MD 13 1 Units/kg/hr (04/24/21 1541)    heparin (porcine)  2,000 Units Intravenous Q1H PRN Marko Wright MD      heparin (porcine)  4,000 Units Intravenous Q1H PRN Marko Wright MD      metoprolol tartrate  12 5 mg Oral Q12H [address] SHIV Benson      mupirocin  1 application Nasal [address] [place], [place]      nitroglycerin  0 4 mg Sublingual Q5 Min PRN Marko Wright MD      vitamin A  10,000 Units Oral Daily Marko Wright MD          Today, Patient Was Seen By: SHIV Villalobos    ** Please Note: Dictation voice to text software may have been used in the creation of this document   **

## 2021-04-24 NOTE — PROGRESS NOTES
Progress Note - Cardiology   Stephen Davis 63 y o  male MRN: [id number]  Unit/Bed#: [facility] 214-04 Encounter: [id number]  04/24/21  12:55 PM    Impression and Plan:     74-year-old with dyslipidemia-on  simvastatin,  prior tobacco use, family history of premature vascular disease-fatal MI in his father in his 46s was also smoker, admitted with unstable angina with coronary angiography showing triple-vessel coronary artery disease and hence transferred to [facility] for evaluation for CABG       Plan:     Coronary artery disease/unstable angina:  On day 1 of his admission here but none since Started low-dose beta-blocker and no further chest pain  Continue aspirin and high-intensity statin  Awaiting workup for CABG - -planned for 4/26/2021    No changes to medications at this time     Dyslipidemia:  LDL in the 120s, should improve with high intensity statin   A1c of 6 0       ===================================================================    Chief Complaint: No chief complaint on file  Subjective/Objective     Subjective:  Denies any complaints, feels well    Objective:  No distress at the time of exam    Patient Active Problem List   Diagnosis    Special screening for malignant neoplasms, colon    Polyp of colon    Dyslipidemia    Vitamin D deficiency    Hiatal hernia    Coronary artery disease    Elevated troponin       Vitals: /66 (BP Location: Left arm)   Pulse 56   Temp 97 6 °F (36 4 °C) (Oral)   Resp 18   Ht 5' 7" (1 702 m)   Wt 87 3 kg (192 lb 6 4 oz)   SpO2 97%   BMI 30 13 kg/m²     I/O this shift:   In: 581 7 [P O :480; I V :101 7]  Out: 275 [Urine:275]  Wt Readings from Last 3 Encounters:   04/24/21 87 3 kg (192 lb 6 4 oz)   04/20/21 92 5 kg (204 lb)   09/03/20 92 5 kg (204 lb)       Intake/Output Summary (Last 24 hours) at 4/24/2021 1255  Last data filed at 4/24/2021 1145  Gross per 24 hour   Intake 1141 72 ml   Output 475 ml   Net 666 72 ml     I/O last 3 completed shifts: In: 800 [P O :720; I V :80]  Out: 850 [Urine:850]    Invasive Devices     Peripheral Intravenous Line            Peripheral IV 04/20/21 Right Forearm 4 days                  Physical Exam:  GEN: Chelle Yen appears well, alert and oriented x 3, pleasant and cooperative   HEENT: pupils equal, round, and reactive to light; extraocular muscles intact  NECK: supple, no carotid bruits or JVD  HEART: regular rhythm, normal S1 and S2, no murmur, no clicks, gallops or rubs   LUNGS: clear to auscultation bilaterally; no wheezes or rhonchi, no rales  ABDOMEN/GI: normal bowel sounds, soft, no tenderness, no distention  EXTREMITIES/Musculoskeltal: peripheral pulses normal; no clubbing, cyanosis, no edema  NEURO: no focal motor findings   SKIN: normal without suspicious lesions on exposed skin              Lab Results:   Results from last 7 days   Lab Units 04/20/21  1906 04/20/21  1538 04/20/21  1310   TROPONIN I ng/mL 0 05* 0 07* 0 06*     Results from last 7 days   Lab Units 04/22/21  0306 04/21/21  0559 04/20/21  0946 04/20/21  0656   WBC Thousand/uL 10 84* 9 54  --  7 69   HEMOGLOBIN g/dL 13 8 15 6  --  15 0   HEMATOCRIT % 43 1 48 2  --  48 1   PLATELETS Thousands/uL 238 280 255 285         Results from last 7 days   Lab Units 04/24/21  0539 04/23/21  0457 04/22/21  0306  04/20/21  0656   POTASSIUM mmol/L 4 0 4 0 4 0   < > 4 3   CHLORIDE mmol/L 108 107 109*   < > 102   CO2 mmol/L 22 25 26   < > 30   BUN mg/dL 16 14 15   < > 12   CREATININE mg/dL 0 98 1 01 0 92   < > 1 18   CALCIUM mg/dL 9 5 9 5 8 9   < > 9 2   ALK PHOS U/L  --   --   --   --  58   ALT U/L  --   --   --   --  33   AST U/L  --   --   --   --  26    < > = values in this interval not displayed  Results from last 7 days   Lab Units 04/20/21  0656   INR  0 94       Imaging: I have personally reviewed pertinent reports      EKG/Telemtry:  No events    Scheduled Meds:  Current Facility-Administered Medications   Medication Dose Route Frequency Provider Last Rate    acetaminophen  650 mg Oral Q6H PRN SHIV Lovelace      ascorbic acid  500 mg Oral Daily Claudy Lee MD      aspirin  81 mg Oral Daily Claudy Lee MD      atorvastatin  80 mg Oral Daily With Stu German MD      chlorhexidine  15 mL Mouth/Throat Q12H [address]      cholecalciferol  1,000 Units Oral Daily Claudy Lee MD      docusate sodium  100 mg Oral BID PRN SHIV Lovelace      heparin (porcine)  3-20 Units/kg/hr (Order-Specific) Intravenous Titrated Claudy Lee MD 13 1 Units/kg/hr (04/24/21 0657)    heparin (porcine)  2,000 Units Intravenous Q1H PRN Claudy Lee MD      heparin (porcine)  4,000 Units Intravenous Q1H PRN Claudy Lee MD      metoprolol tartrate  12 5 mg Oral Q12H [address] SHIV Lovelace      mupirocin  1 application Nasal [address] [place] [address]      nitroglycerin  0 4 mg Sublingual Q5 Min PRN Claudy Lee MD      vitamin A  10,000 Units Oral Daily Claudy Lee MD       Continuous Infusions:  heparin (porcine), 3-20 Units/kg/hr (Order-Specific), Last Rate: 13 1 Units/kg/hr (04/24/21 0657)        VTE Pharmacologic Prophylaxis: Heparin  VTE Mechanical Prophylaxis: sequential compression device    This note was completed in part utilizing m-modal fluency direct voice recognition software  Grammatical errors, random word insertion, spelling mistakes, occasional wrong word or "sound-alike" substitutions and incomplete sentences may be an occasional consequence of the system secondary to software limitations, ambient noise and hardware issues  At the time of dictation, efforts were made to edit, clarify and /or correct errors  Please read the chart carefully and recognize, using context, where substitutions have occurred    If you have any questions or concerns about the context, text or information contained within the body of this dictation, please contact myself, the provider, for further clarification

## 2021-04-24 NOTE — ASSESSMENT & PLAN NOTE
· Troponin peaked at 0 07  · Status post cardiac catheterization showing multivessel coronary artery disease  · For OR cabg on Monday by dr Sanderson Mail

## 2021-04-24 NOTE — PLAN OF CARE
Problem: PAIN - ADULT  Goal: Verbalizes/displays adequate comfort level or baseline comfort level  Description: Interventions:  - Encourage patient to monitor pain and request assistance  - Assess pain using appropriate pain scale  - Administer analgesics based on type and severity of pain and evaluate response  - Implement non-pharmacological measures as appropriate and evaluate response  - Consider cultural and social influences on pain and pain management  - Notify physician/advanced practitioner if interventions unsuccessful or patient reports new pain  Outcome: Progressing     Problem: INFECTION - ADULT  Goal: Absence or prevention of progression during hospitalization  Description: INTERVENTIONS:  - Assess and monitor for signs and symptoms of infection  - Monitor lab/diagnostic results  - Monitor all insertion sites, i e  indwelling lines, tubes, and drains  - Monitor endotracheal if appropriate and nasal secretions for changes in amount and color  - Rock appropriate cooling/warming therapies per order  - Administer medications as ordered  - Instruct and encourage patient and family to use good hand hygiene technique  - Identify and instruct in appropriate isolation precautions for identified infection/condition  Outcome: Progressing  Goal: Absence of fever/infection during neutropenic period  Description: INTERVENTIONS:  - Monitor WBC    Outcome: Progressing     Problem: SAFETY ADULT  Goal: Patient will remain free of falls  Description: INTERVENTIONS:  - Assess patient frequently for physical needs  -  Identify cognitive and physical deficits and behaviors that affect risk of falls    -  [place] fall precautions as indicated by assessment   - Educate patient/family on patient safety including physical limitations  - Instruct patient to call for assistance with activity based on assessment  - Modify environment to reduce risk of injury  - Consider OT/PT consult to assist with strengthening/mobility  Outcome: Progressing  Goal: Maintain or return to baseline ADL function  Description: INTERVENTIONS:  -  Assess patient's ability to carry out ADLs; assess patient's baseline for ADL function and identify physical deficits which impact ability to perform ADLs (bathing, care of mouth/teeth, toileting, grooming, dressing, etc )  - Assess/evaluate cause of self-care deficits   - Assess range of motion  - Assess patient's mobility; develop plan if impaired  - Assess patient's need for assistive devices and provide as appropriate  - Encourage maximum independence but intervene and supervise when necessary  - Involve family in performance of ADLs  - Assess for home care needs following discharge   - Consider OT consult to assist with ADL evaluation and planning for discharge  - Provide patient education as appropriate  Outcome: Progressing  Goal: Maintain or return mobility status to optimal level  Description: INTERVENTIONS:  - Assess patient's baseline mobility status (ambulation, transfers, stairs, etc )    - Identify cognitive and physical deficits and behaviors that affect mobility  - Identify mobility aids required to assist with transfers and/or ambulation (gait belt, sit-to-stand, lift, walker, cane, etc )  - [place] fall precautions as indicated by assessment  - Record patient progress and toleration of activity level on Mobility SBAR; progress patient to next Phase/Stage  - Instruct patient to call for assistance with activity based on assessment  - Consider rehabilitation consult to assist with strengthening/weightbearing, etc   Outcome: Progressing     Problem: DISCHARGE PLANNING  Goal: Discharge to home or other facility with appropriate resources  Description: INTERVENTIONS:  - Identify barriers to discharge w/patient and caregiver  - Arrange for needed discharge resources and transportation as appropriate  - Identify discharge learning needs (meds, wound care, etc )  - Arrange for interpretive services to assist at discharge as needed  - Refer to Case Management Department for coordinating discharge planning if the patient needs post-hospital services based on physician/advanced practitioner order or complex needs related to functional status, cognitive ability, or social support system  Outcome: Progressing

## 2021-04-24 NOTE — ASSESSMENT & PLAN NOTE
· Patient was initially admitted to [address] [place] [address] for chest pain  · Troponin peaked at 0 07  · Had cardiac catheterization today showed multivessel coronary artery disease  · Transferred [facility] for CABG evaluation  · Continue with heparin drip at this time surgery   · Start on low dose bb 12 5mg bid   · Statin high dose and asa   · Appreciate Cardiology   · Appreciate CT surgery Dr José Hastings with plan pending all workup for Monday CABG   -    coratid doppler - RIGHT:  There is <50% stenosis noted in the internal carotid artery  Plaque is  homogenous and smooth  Vertebral artery flow is antegrade  There is no significant subclavian artery  disease  LEFT:  There is 50-69% stenosis noted in the internal carotid artery  Plaque is  homogenous and smooth  Vertebral artery flow is antegrade  There is no significant subclavian artery disease  · Echo with ef 67% no reg wall abnormalities   Vein mapping : Impression:  RIGHT LOWER LIMB:  The great saphenous vein is small throughout the thigh; however, a large  anterior accessory is patent from the groin to the ankle and measures >2mm in  caliber throughout the leg  LEFT LOWER LIMB:  The great saphenous vein is patent  from the groin to the ankle    · The vein measures >2mm in caliber from the groin to the ankle

## 2021-04-25 LAB
ABO GROUP BLD: NORMAL
ABO GROUP BLD: NORMAL
APTT PPP: 76 SECONDS (ref 23–37)
BLD GP AB SCN SERPL QL: NEGATIVE
MRSA NOSE QL CULT: NORMAL
RH BLD: POSITIVE
RH BLD: POSITIVE
SPECIMEN EXPIRATION DATE: NORMAL

## 2021-04-25 PROCEDURE — 86900 BLOOD TYPING SEROLOGIC ABO: CPT | Performed by: THORACIC SURGERY (CARDIOTHORACIC VASCULAR SURGERY)

## 2021-04-25 PROCEDURE — NC001 PR NO CHARGE: Performed by: THORACIC SURGERY (CARDIOTHORACIC VASCULAR SURGERY)

## 2021-04-25 PROCEDURE — 86901 BLOOD TYPING SEROLOGIC RH(D): CPT | Performed by: THORACIC SURGERY (CARDIOTHORACIC VASCULAR SURGERY)

## 2021-04-25 PROCEDURE — 99232 SBSQ HOSP IP/OBS MODERATE 35: CPT | Performed by: NURSE PRACTITIONER

## 2021-04-25 PROCEDURE — 85730 THROMBOPLASTIN TIME PARTIAL: CPT | Performed by: INTERNAL MEDICINE

## 2021-04-25 PROCEDURE — 99231 SBSQ HOSP IP/OBS SF/LOW 25: CPT | Performed by: INTERNAL MEDICINE

## 2021-04-25 PROCEDURE — 86920 COMPATIBILITY TEST SPIN: CPT

## 2021-04-25 PROCEDURE — 86850 RBC ANTIBODY SCREEN: CPT | Performed by: THORACIC SURGERY (CARDIOTHORACIC VASCULAR SURGERY)

## 2021-04-25 PROCEDURE — 94760 N-INVAS EAR/PLS OXIMETRY 1: CPT

## 2021-04-25 RX ORDER — CEFAZOLIN SODIUM 2 G/50ML
2000 SOLUTION INTRAVENOUS ONCE
Status: CANCELLED | OUTPATIENT
Start: 2021-04-25 | End: 2021-04-25

## 2021-04-25 RX ORDER — HEPARIN SODIUM 10000 [USP'U]/100ML
3-20 INJECTION, SOLUTION INTRAVENOUS
Status: DISCONTINUED | OUTPATIENT
Start: 2021-04-25 | End: 2021-04-26

## 2021-04-25 RX ORDER — CHLORHEXIDINE GLUCONATE 0.12 MG/ML
15 RINSE ORAL EVERY 12 HOURS SCHEDULED
Status: DISCONTINUED | OUTPATIENT
Start: 2021-04-25 | End: 2021-04-26 | Stop reason: HOSPADM

## 2021-04-25 RX ADMIN — OXYCODONE HYDROCHLORIDE AND ACETAMINOPHEN 500 MG: 500 TABLET ORAL at 08:26

## 2021-04-25 RX ADMIN — CHLORHEXIDINE GLUCONATE 0.12% ORAL RINSE 15 ML: 1.2 LIQUID ORAL at 22:26

## 2021-04-25 RX ADMIN — HEPARIN SODIUM 13.1 UNITS/KG/HR: 10000 INJECTION, SOLUTION INTRAVENOUS at 14:03

## 2021-04-25 RX ADMIN — HEPARIN SODIUM 13.1 UNITS/KG/HR: 10000 INJECTION, SOLUTION INTRAVENOUS at 10:42

## 2021-04-25 RX ADMIN — MUPIROCIN 1 APPLICATION: 20 OINTMENT TOPICAL at 22:27

## 2021-04-25 RX ADMIN — MUPIROCIN 1 APPLICATION: 20 OINTMENT TOPICAL at 08:26

## 2021-04-25 RX ADMIN — ATORVASTATIN CALCIUM 80 MG: 80 TABLET, FILM COATED ORAL at 16:49

## 2021-04-25 RX ADMIN — Medication 12.5 MG: at 08:26

## 2021-04-25 RX ADMIN — ASPIRIN 81 MG: 81 TABLET, CHEWABLE ORAL at 08:26

## 2021-04-25 RX ADMIN — Medication 1000 UNITS: at 08:26

## 2021-04-25 RX ADMIN — Medication 12.5 MG: at 22:27

## 2021-04-25 RX ADMIN — CHLORHEXIDINE GLUCONATE 0.12% ORAL RINSE 15 ML: 1.2 LIQUID ORAL at 08:26

## 2021-04-25 NOTE — PROGRESS NOTES
Progress Note - Cardiothoracic Surgery   Suhas Hill 63 y o  male MRN: [id number]  Unit/Bed#: [facility] 412-01 Encounter: [id number]      24 Hour Events: No events  Denies angina/dyspnea       Medications:   Scheduled Meds:  Current Facility-Administered Medications   Medication Dose Route Frequency Provider Last Rate    acetaminophen  650 mg Oral Q6H PRN SHIV Lazo      ascorbic acid  500 mg Oral Daily Romulo Espinoza MD      aspirin  81 mg Oral Daily Romulo Espinoza MD      atorvastatin  80 mg Oral Daily With Viral Faria MD      chlorhexidine  15 mL Mouth/Throat Q12H [address] [place] [address]      cholecalciferol  1,000 Units Oral Daily Romulo Espinoza MD      docusate sodium  100 mg Oral BID PRN SHIV Lazo      heparin (porcine)  3-20 Units/kg/hr (Order-Specific) Intravenous Titrated Romulo Espinoza MD 13 1 Units/kg/hr (04/25/21 0119)    heparin (porcine)  2,000 Units Intravenous Q1H PRN Romulo Espinoza MD      heparin (porcine)  4,000 Units Intravenous Q1H PRN Romulo Espinoza MD      metoprolol tartrate  12 5 mg Oral Q12H [address] SHIV Lazo      mupirocin  1 application Nasal [address] [place] [address]      nitroglycerin  0 4 mg Sublingual Q5 Min PRN Romulo Espinoza MD      vitamin A  10,000 Units Oral Daily Romulo Espinoza MD       Continuous Infusions:heparin (porcine), 3-20 Units/kg/hr (Order-Specific), Last Rate: 13 1 Units/kg/hr (04/25/21 0119)        Results:   Results from last 7 days   Lab Units 04/22/21  0306 04/21/21  0559 04/20/21  0946 04/20/21  0656   WBC Thousand/uL 10 84* 9 54  --  7 69   HEMOGLOBIN g/dL 13 8 15 6  --  15 0   HEMATOCRIT % 43 1 48 2  --  48 1   PLATELETS Thousands/uL 238 280 255 285     Results from last 7 days   Lab Units 04/24/21  0539 04/23/21  0457 04/22/21  0306   POTASSIUM mmol/L 4 0 4 0 4 0   CHLORIDE mmol/L 108 107 109*   CO2 mmol/L 22 25 26   BUN mg/dL 16 14 15   CREATININE mg/dL 0 98 1 01 0 92   CALCIUM mg/dL 9 5 9 5 8 9 Results from last 7 days   Lab Units 04/25/21  0527 04/24/21 2012 04/24/21  1408  04/20/21  0656   INR   --   --   --   --  0 94   PTT seconds 76* 75* 66*   < > 28    < > = values in this interval not displayed  Studies:     Cardiac Catheterization: 80% mid LAD s/p D2, 70% D1, 80% ostial circ, 80% ostial OM1, 50% mid RPDA     Echocardiogram: EF 67%  Trace MR  Trace TR  Carotid artery duplex:   < 50% right carotid stenosis  Vertebral artery flow is antegrade and There is no significant subclavian artery   50-69% left carotid stenosis  Vertebral artery flow is antegrade and There is no significant subclavian artery    Greater saphenous vein mapping: Adequate conduit for CABG, bilaterally    Vitals:   Vitals:    04/24/21 2343 04/25/21 0336 04/25/21 0600 04/25/21 0745   BP: 111/61 128/68  122/57   BP Location: Left arm Left arm  Left arm   Pulse: 72 65  70   Resp: 18 18  18   Temp: 98 3 °F (36 8 °C) 98 3 °F (36 8 °C)  98 1 °F (36 7 °C)   TempSrc: Oral Oral  Oral   SpO2: 94% 94%  96%   Weight:   87 2 kg (192 lb 3 2 oz)    Height:           Physical Exam:    GENERAL: Awake and oriented  NAD  HEENT/NECK:  PERRLA  No jugular venous distention  Cardiac: Regular rate and rhythm  No rubs/murmurs/gallops  Pulmonary:  Breath sounds slightly diminished at the bases bilaterally  Abdomen:  Non-tender, Non-distended  Positive bowel sounds  Lower extremities: Extremities warm/dry  Radial/PT/DP pulses 2+ bilaterally  No edema B/L  Neuro: Alert and oriented X 3  Sensation is grossly intact  No focal deficits  Skin: Warm/Dry, without rashes or lesions  Assessment:    Severe coronary artery disease; Ongoing CABG workup    Plan:  Patient agreeable to proceed with surgery; Informed consent signed    Incidental LICA stenosis 87-35%; Will arrange for O/P surveillance  Vein mapping completed and adequate       Blood type and cross match ordered for 4/25    Continue Mupirocin 2% nasal ointment q 12 hrs  Continue topical chlorhexidine bath and mouth rise    Discontinue heparin infusion at 6am tomorrow    Preoperative oxygen, beta blocker, insulin, and antibiotics ordered coronary artery bypass grafting scheduled for Monday with ANTHONY Carson: Mardy Najjar, PA-C  DATE: April 25, 2021  TIME: 10:20 AM    * This note was completed in part utilizing [employer] direct voice recognition software  Grammatical errors, random word insertion, spelling mistakes, and incomplete sentences may be an occasional consequence of the system secondary to software limitations, ambient noise and hardware issues  At the time of dictation, efforts were made to edit, clarify and /or correct errors  Please read the chart carefully and recognize, using context, where substitutions have occurred  If you have any questions or concerns about the context, text or information contained within the body of this dictation, please contact myself, the provider, for further clarification

## 2021-04-25 NOTE — ASSESSMENT & PLAN NOTE
· Patient was initially admitted to [address] [place] [address] for chest pain  · Troponin peaked at 0 07  · Had cardiac catheterization today showed multivessel coronary artery disease  · Transferred [facility] for CABG evaluation  · Continue with heparin drip at this time surgery   · Start on low dose bb 12 5mg bid   · Statin high dose and asa   · Appreciate Cardiology   · Appreciate CT surgery Dr Jocelyn Wright with plan pending all workup for Monday CABG   -    coratid doppler - RIGHT:  There is <50% stenosis noted in the internal carotid artery  Plaque is  homogenous and smooth  Vertebral artery flow is antegrade  There is no significant subclavian artery  disease  LEFT:  There is 50-69% stenosis noted in the internal carotid artery  Plaque is  homogenous and smooth  Vertebral artery flow is antegrade  There is no significant subclavian artery disease  · Echo with ef 67% no reg wall abnormalities   Vein mapping : Impression:  RIGHT LOWER LIMB:  The great saphenous vein is small throughout the thigh; however, a large  anterior accessory is patent from the groin to the ankle and measures >2mm in  caliber throughout the leg  LEFT LOWER LIMB:  The great saphenous vein is patent  from the groin to the ankle    · The vein measures >2mm in caliber from the groin to the ankle well appearing, in NAD, supple neck, no adenopathy, clear nasal congestion

## 2021-04-25 NOTE — PROGRESS NOTES
Progress Note - Cardiology   Kelli Lorenzo 63 y o  male MRN: [id number]  Unit/Bed#: [facility] 567-56 Encounter: [id number]  04/25/21  12:26 PM    Impression and Plan:     57-year-old with dyslipidemia-on  simvastatin,  prior tobacco use, family history of premature vascular disease-fatal MI in his father in his 46s was also smoker, admitted with unstable angina with coronary angiography showing triple-vessel coronary artery disease and hence transferred to [facility] for evaluation for CABG       Plan:     Coronary artery disease/unstable angina:  On day 1 of his admission here but none since Started low-dose beta-blocker and no further chest pain  Continue aspirin and high-intensity statin  Awaiting workup for CABG - -planed for tomorrow    No changes to medications today     Dyslipidemia:  LDL in the 120s, should improve with high intensity statin   A1c of 6 0       ===================================================================    Chief Complaint: No chief complaint on file  Subjective/Objective     Subjective:  Denies any complaints, feels well    Objective:  No distress at the time of exam    Patient Active Problem List   Diagnosis    Special screening for malignant neoplasms, colon    Polyp of colon    Dyslipidemia    Vitamin D deficiency    Hiatal hernia    Coronary artery disease    Elevated troponin       Vitals: /56 (BP Location: Left arm)   Pulse 68   Temp 98 1 °F (36 7 °C) (Oral)   Resp 18   Ht 5' 7" (1 702 m)   Wt 87 2 kg (192 lb 3 2 oz)   SpO2 96%   BMI 30 10 kg/m²     No intake/output data recorded  Wt Readings from Last 3 Encounters:   04/25/21 87 2 kg (192 lb 3 2 oz)   04/20/21 92 5 kg (204 lb)   09/03/20 92 5 kg (204 lb)       Intake/Output Summary (Last 24 hours) at 4/25/2021 1226  Last data filed at 4/25/2021 0553  Gross per 24 hour   Intake 369 41 ml   Output 1075 ml   Net -705 59 ml     I/O last 3 completed shifts:   In: 1031 1 [P O :720; I V :311 1]  Out: 1550 [Urine:1550]    Invasive Devices     Peripheral Intravenous Line            Peripheral IV 04/20/21 Right Forearm 5 days                  Physical Exam:  GEN: Aydin Conley appears well, alert and oriented x 3, pleasant and cooperative   HEENT: pupils equal, round, and reactive to light; extraocular muscles intact  NECK: supple, no carotid bruits or JVD  HEART: regular rhythm, normal S1 and S2, no murmur, no clicks, gallops or rubs   LUNGS: clear to auscultation bilaterally; no wheezes or rhonchi, no rales  ABDOMEN/GI: normal bowel sounds, soft, no tenderness, no distention  EXTREMITIES/Musculoskeltal: peripheral pulses normal; no clubbing, cyanosis, no edema  NEURO: no focal motor findings   SKIN: normal without suspicious lesions on exposed skin              Lab Results:   Results from last 7 days   Lab Units 04/20/21  1906 04/20/21  1538 04/20/21  1310   TROPONIN I ng/mL 0 05* 0 07* 0 06*     Results from last 7 days   Lab Units 04/22/21  0306 04/21/21  0559 04/20/21  0946 04/20/21  0656   WBC Thousand/uL 10 84* 9 54  --  7 69   HEMOGLOBIN g/dL 13 8 15 6  --  15 0   HEMATOCRIT % 43 1 48 2  --  48 1   PLATELETS Thousands/uL 238 280 255 285         Results from last 7 days   Lab Units 04/24/21  0539 04/23/21  0457 04/22/21  0306  04/20/21  0656   POTASSIUM mmol/L 4 0 4 0 4 0   < > 4 3   CHLORIDE mmol/L 108 107 109*   < > 102   CO2 mmol/L 22 25 26   < > 30   BUN mg/dL 16 14 15   < > 12   CREATININE mg/dL 0 98 1 01 0 92   < > 1 18   CALCIUM mg/dL 9 5 9 5 8 9   < > 9 2   ALK PHOS U/L  --   --   --   --  58   ALT U/L  --   --   --   --  33   AST U/L  --   --   --   --  26    < > = values in this interval not displayed  Results from last 7 days   Lab Units 04/20/21  0656   INR  0 94       Imaging: I have personally reviewed pertinent reports      EKG/Telemtry:  No events    Scheduled Meds:  Current Facility-Administered Medications   Medication Dose Route Frequency Provider Last Rate    acetaminophen  650 mg Oral Q6H PRN SHIV Barbosa      ascorbic acid  500 mg Oral Daily Taryn Pan MD      aspirin  81 mg Oral Daily Taryn Pan MD      atorvastatin  80 mg Oral Daily With Cesar Newman MD      chlorhexidine  15 mL Mouth/Throat Q12H [address] [place] [address]      chlorhexidine  15 mL Swish & Spit Q12H [address] [place], [place]      cholecalciferol  1,000 Units Oral Daily Taryn Pan MD      docusate sodium  100 mg Oral BID PRN SHIV Barbosa      heparin (porcine)  3-20 Units/kg/hr (Order-Specific) Intravenous Titrated Vivian Filter, PA-C 13 1 Units/kg/hr (04/25/21 1042)    heparin (porcine)  2,000 Units Intravenous Q1H PRN Taryn Pan MD      heparin (porcine)  4,000 Units Intravenous Q1H PRN Taryn Pan MD      metoprolol tartrate  12 5 mg Oral Q12H [address]      [START ON 4/26/2021] metoprolol tartrate  12 5 mg Oral Once Vivian Filter, PA-      mupirocin  1 application Nasal Q26U [address] [place], [place] [address]      mupirocin  1 application Nasal Once Vivian Filter, [place]      nitroglycerin  0 4 mg Sublingual Q5 Min PRN Taryn Pan MD      vitamin A  10,000 Units Oral Daily Taryn Pan MD       Continuous Infusions:  heparin (porcine), 3-20 Units/kg/hr (Order-Specific), Last Rate: 13 1 Units/kg/hr (04/25/21 1042)        VTE Pharmacologic Prophylaxis: Heparin  VTE Mechanical Prophylaxis: sequential compression device    This note was completed in part utilizing [employer] direct voice recognition software  Grammatical errors, random word insertion, spelling mistakes, occasional wrong word or "sound-alike" substitutions and incomplete sentences may be an occasional consequence of the system secondary to software limitations, ambient noise and hardware issues  At the time of dictation, efforts were made to edit, clarify and /or correct errors    Please read the chart carefully and recognize, using context, where substitutions have occurred  If you have any questions or concerns about the context, text or information contained within the body of this dictation, please contact myself, the provider, for further clarification

## 2021-04-25 NOTE — PROGRESS NOTES
[address] [facility]  Progress Note Juanito Gil 1955, 63 y o  male MRN: [id number]  Unit/Bed#: [facility] 412-01 Encounter: [id number]  Primary Care Provider: Charo Torres DO   Date and time admitted to hospital: 4/21/2021  5:48 PM    * Coronary artery disease  Assessment & Plan  · Patient was initially admitted to [address] [place] [address] for chest pain  · Troponin peaked at 0 07  · Had cardiac catheterization today showed multivessel coronary artery disease  · Transferred [facility] for CABG evaluation  · Continue with heparin drip at this time surgery   · Start on low dose bb 12 5mg bid   · Statin high dose and asa   · Appreciate Cardiology   · Appreciate CT surgery Dr Gertrudis Lockett with plan pending all workup for Monday CABG   -    coratid doppler - RIGHT:  There is <50% stenosis noted in the internal carotid artery  Plaque is  homogenous and smooth  Vertebral artery flow is antegrade  There is no significant subclavian artery  disease  LEFT:  There is 50-69% stenosis noted in the internal carotid artery  Plaque is  homogenous and smooth  Vertebral artery flow is antegrade  There is no significant subclavian artery disease  · Echo with ef 67% no reg wall abnormalities   Vein mapping : Impression:  RIGHT LOWER LIMB:  The great saphenous vein is small throughout the thigh; however, a large  anterior accessory is patent from the groin to the ankle and measures >2mm in  caliber throughout the leg  LEFT LOWER LIMB:  The great saphenous vein is patent  from the groin to the ankle    · The vein measures >2mm in caliber from the groin to the ankle    Elevated troponin  Assessment & Plan  · Troponin peaked at 0 07  · Status post cardiac catheterization showing multivessel coronary artery disease  · For OR cabg on Monday by dr Gertrudis Lockett     Dyslipidemia  [address] [place] [address]  · Continue with statin   · See lipid panel       VTE Pharmacologic Prophylaxis:   Pharmacologic: Heparin Drip  Mechanical VTE Prophylaxis in Place: Yes    Patient Centered Rounds: I have performed bedside rounds with nursing staff today  Discussions with Specialists or Other Care Team Provider: nursing     Education and Discussions with Family / Patient: patient     Time Spent for Care: 30 minutes  More than 50% of total time spent on counseling and coordination of care as described above  Current Length of Stay: 4 day(s)    Current Patient Status: Inpatient   Certification Statement: The patient will continue to require additional inpatient hospital stay due to plan for cabg in am     Discharge Plan: not before Thursday need pt ot eval     Code Status: Level 1 - Full Code      Subjective:   Pt doing well has no complaints no sob no chest pain no dizziness or lightheadedness he will update family  He just asking what time going to surgery     Objective:     Vitals:   Temp (24hrs), Av 3 °F (36 8 °C), Min:98 1 °F (36 7 °C), Max:98 4 °F (36 9 °C)    Temp:  [98 1 °F (36 7 °C)-98 4 °F (36 9 °C)] 98 4 °F (36 9 °C)  HR:  [63-72] 69  Resp:  [18] 18  BP: (111-148)/(56-81) 148/81  SpO2:  [94 %-98 %] 98 %  Body mass index is 30 1 kg/m²  Input and Output Summary (last 24 hours): Intake/Output Summary (Last 24 hours) at 2021 1807  Last data filed at 2021 1615  Gross per 24 hour   Intake 729 41 ml   Output 1175 ml   Net -445 59 ml       Physical Exam:     Physical Exam  Constitutional:       General: He is not in acute distress  Appearance: He is not ill-appearing, toxic-appearing or diaphoretic  HENT:      Head: Normocephalic and atraumatic  Nose: No congestion  Eyes:      General:         Right eye: No discharge  Left eye: No discharge  Conjunctiva/sclera: Conjunctivae normal    Cardiovascular:      Rate and Rhythm: Normal rate  Heart sounds: No murmur  No friction rub  No gallop  Pulmonary:      Effort: No respiratory distress  Breath sounds: No stridor  No wheezing, rhonchi or rales  Chest:      Chest wall: No tenderness  Abdominal:      General: There is no distension  Palpations: There is no mass  Tenderness: There is no abdominal tenderness  There is no right CVA tenderness, left CVA tenderness, guarding or rebound  Hernia: No hernia is present  Musculoskeletal:         General: No swelling, tenderness, deformity or signs of injury  Right lower leg: No edema  Left lower leg: No edema  Skin:     Coloration: Skin is not jaundiced or pale  Findings: No bruising, erythema or lesion  Neurological:      Mental Status: He is alert and oriented to person, place, and time  Psychiatric:         Behavior: Behavior normal            Additional Data:     Labs:    Results from last 7 days   Lab Units 04/22/21  0306  04/20/21  0656   WBC Thousand/uL 10 84*   < > 7 69   HEMOGLOBIN g/dL 13 8   < > 15 0   HEMATOCRIT % 43 1   < > 48 1   PLATELETS Thousands/uL 238   < > 285   NEUTROS PCT %  --   --  59   LYMPHS PCT %  --   --  31   MONOS PCT %  --   --  8   EOS PCT %  --   --  1    < > = values in this interval not displayed  Results from last 7 days   Lab Units 04/24/21  0539  04/20/21  0656   SODIUM mmol/L 138   < > 139   POTASSIUM mmol/L 4 0   < > 4 3   CHLORIDE mmol/L 108   < > 102   CO2 mmol/L 22   < > 30   BUN mg/dL 16   < > 12   CREATININE mg/dL 0 98   < > 1 18   ANION GAP mmol/L 8   < > 7   CALCIUM mg/dL 9 5   < > 9 2   ALBUMIN g/dL  --   --  4 1   TOTAL BILIRUBIN mg/dL  --   --  0 50   ALK PHOS U/L  --   --  58   ALT U/L  --   --  33   AST U/L  --   --  26   GLUCOSE RANDOM mg/dL 102   < > 94    < > = values in this interval not displayed  Results from last 7 days   Lab Units 04/20/21  0656   INR  0 94         Results from last 7 days   Lab Units 04/20/21  0946   HEMOGLOBIN A1C % 6 0*               * I Have Reviewed All Lab Data Listed Above  * Additional Pertinent Lab Tests Reviewed:  All Labs Within Last 24 Hours Reviewed    Imaging:    Imaging Reports Reviewed Today Include: reviewed     Recent Cultures (last 7 days):           Last 24 Hours Medication List:   Current Facility-Administered Medications   Medication Dose Route Frequency Provider Last Rate    acetaminophen  650 mg Oral Q6H PRN SHIV Alcaraz      ascorbic acid  500 mg Oral Daily Damien Aranda MD      aspirin  81 mg Oral Daily Damien Aranda MD      atorvastatin  80 mg Oral Daily With Jimena Carr MD      chlorhexidine  15 mL Mouth/Throat Q12H [address]      chlorhexidine  15 mL Swish & Spit Q12H [address] [place], [place]      cholecalciferol  1,000 Units Oral Daily Damien Aranda MD      docusate sodium  100 mg Oral BID PRN SHIV Alcaraz      heparin (porcine)  3-20 Units/kg/hr (Order-Specific) Intravenous Titrated Norva Slice, PA-C 13 1 Units/kg/hr (04/25/21 1403)    heparin (porcine)  2,000 Units Intravenous Q1H PRN Damien Aranda MD      heparin (porcine)  4,000 Units Intravenous Q1H PRN Damien Aranda MD      metoprolol tartrate  12 5 mg Oral Q12H [address] [place], [address]      [START ON 4/26/2021] metoprolol tartrate  12 5 mg Oral Once Norva Slice, PA-C      mupirocin  1 application Nasal U05B [address], [place]      mupirocin  1 application Nasal Once Norva Slice, PA-C      nitroglycerin  0 4 mg Sublingual Q5 Min PRN Damien Aranda MD      vitamin A  10,000 Units Oral Daily Damien Aranda MD          Today, Patient Was Seen By: SHIV Freeman    ** Please Note: Dictation voice to text software may have been used in the creation of this document   **

## 2021-04-25 NOTE — ASSESSMENT & PLAN NOTE
· Troponin peaked at 0 07  · Status post cardiac catheterization showing multivessel coronary artery disease  · For OR cabg on Monday by dr Gabrielle Giron

## 2021-04-26 ENCOUNTER — ANESTHESIA EVENT (INPATIENT)
Dept: PERIOP | Facility: HOSPITAL | Age: 66
DRG: 235 | End: 2021-04-26
Payer: COMMERCIAL

## 2021-04-26 ENCOUNTER — APPOINTMENT (INPATIENT)
Dept: RADIOLOGY | Facility: HOSPITAL | Age: 66
DRG: 235 | End: 2021-04-26
Payer: COMMERCIAL

## 2021-04-26 ENCOUNTER — APPOINTMENT (INPATIENT)
Dept: NON INVASIVE DIAGNOSTICS | Facility: HOSPITAL | Age: 66
DRG: 235 | End: 2021-04-26
Attending: THORACIC SURGERY (CARDIOTHORACIC VASCULAR SURGERY)
Payer: COMMERCIAL

## 2021-04-26 ENCOUNTER — ANESTHESIA (INPATIENT)
Dept: PERIOP | Facility: HOSPITAL | Age: 66
DRG: 235 | End: 2021-04-26
Payer: COMMERCIAL

## 2021-04-26 PROBLEM — Z95.1 S/P CABG (CORONARY ARTERY BYPASS GRAFT): Status: ACTIVE | Noted: 2021-04-26

## 2021-04-26 LAB
ALBUMIN SERPL BCP-MCNC: 3.8 G/DL (ref 3.5–5)
ALP SERPL-CCNC: 62 U/L (ref 46–116)
ALT SERPL W P-5'-P-CCNC: 91 U/L (ref 12–78)
ANION GAP SERPL CALCULATED.3IONS-SCNC: 12 MMOL/L (ref 4–13)
ANION GAP SERPL CALCULATED.3IONS-SCNC: 7 MMOL/L (ref 4–13)
APTT PPP: 40 SECONDS (ref 23–37)
APTT PPP: 75 SECONDS (ref 23–37)
AST SERPL W P-5'-P-CCNC: 54 U/L (ref 5–45)
BASE EXCESS BLDA CALC-SCNC: -1 MMOL/L (ref -2–3)
BASE EXCESS BLDA CALC-SCNC: -1 MMOL/L (ref -2–3)
BASE EXCESS BLDA CALC-SCNC: -2 MMOL/L (ref -2–3)
BASE EXCESS BLDA CALC-SCNC: -2 MMOL/L (ref -2–3)
BASE EXCESS BLDA CALC-SCNC: -4 MMOL/L (ref -2–3)
BASE EXCESS BLDA CALC-SCNC: -6 MMOL/L (ref -2–3)
BASE EXCESS BLDA CALC-SCNC: -7.3 MMOL/L
BASE EXCESS BLDA CALC-SCNC: 0 MMOL/L (ref -2–3)
BASE EXCESS BLDA CALC-SCNC: 0 MMOL/L (ref -2–3)
BASOPHILS # BLD AUTO: 0.04 THOUSANDS/ΜL (ref 0–0.1)
BASOPHILS NFR BLD AUTO: 0 % (ref 0–1)
BILIRUB SERPL-MCNC: 0.63 MG/DL (ref 0.2–1)
BODY TEMPERATURE: 98.2 DEGREES FEHRENHEIT
BUN SERPL-MCNC: 15 MG/DL (ref 5–25)
BUN SERPL-MCNC: 18 MG/DL (ref 5–25)
CA-I BLD-SCNC: 0.9 MMOL/L (ref 1.12–1.32)
CA-I BLD-SCNC: 0.9 MMOL/L (ref 1.12–1.32)
CA-I BLD-SCNC: 0.95 MMOL/L (ref 1.12–1.32)
CA-I BLD-SCNC: 0.97 MMOL/L (ref 1.12–1.32)
CA-I BLD-SCNC: 0.99 MMOL/L (ref 1.12–1.32)
CA-I BLD-SCNC: 1.06 MMOL/L (ref 1.12–1.32)
CA-I BLD-SCNC: 1.19 MMOL/L (ref 1.12–1.32)
CA-I BLD-SCNC: 1.29 MMOL/L (ref 1.12–1.32)
CALCIUM SERPL-MCNC: 8 MG/DL (ref 8.3–10.1)
CALCIUM SERPL-MCNC: 9.7 MG/DL (ref 8.3–10.1)
CHLORIDE SERPL-SCNC: 107 MMOL/L (ref 100–108)
CHLORIDE SERPL-SCNC: 112 MMOL/L (ref 100–108)
CO2 SERPL-SCNC: 18 MMOL/L (ref 21–32)
CO2 SERPL-SCNC: 24 MMOL/L (ref 21–32)
CREAT SERPL-MCNC: 1.03 MG/DL (ref 0.6–1.3)
CREAT SERPL-MCNC: 1.16 MG/DL (ref 0.6–1.3)
EOSINOPHIL # BLD AUTO: 0.13 THOUSAND/ΜL (ref 0–0.61)
EOSINOPHIL NFR BLD AUTO: 1 % (ref 0–6)
ERYTHROCYTE [DISTWIDTH] IN BLOOD BY AUTOMATED COUNT: 14.6 % (ref 11.6–15.1)
FIBRINOGEN PPP-MCNC: 270 MG/DL (ref 227–495)
FIO2 GAS DIL.REBREATH: 50 L
GFR SERPL CREATININE-BSD FRML MDRD: 67 ML/MIN/1.73SQ M
GFR SERPL CREATININE-BSD FRML MDRD: 77 ML/MIN/1.73SQ M
GLUCOSE SERPL-MCNC: 110 MG/DL (ref 65–140)
GLUCOSE SERPL-MCNC: 114 MG/DL (ref 65–140)
GLUCOSE SERPL-MCNC: 120 MG/DL (ref 65–140)
GLUCOSE SERPL-MCNC: 124 MG/DL (ref 65–140)
GLUCOSE SERPL-MCNC: 125 MG/DL (ref 65–140)
GLUCOSE SERPL-MCNC: 126 MG/DL (ref 65–140)
GLUCOSE SERPL-MCNC: 127 MG/DL (ref 65–140)
GLUCOSE SERPL-MCNC: 128 MG/DL (ref 65–140)
GLUCOSE SERPL-MCNC: 131 MG/DL (ref 65–140)
GLUCOSE SERPL-MCNC: 139 MG/DL (ref 65–140)
GLUCOSE SERPL-MCNC: 162 MG/DL (ref 65–140)
GLUCOSE SERPL-MCNC: 90 MG/DL (ref 65–140)
GLUCOSE SERPL-MCNC: 97 MG/DL (ref 65–140)
HCO3 BLDA-SCNC: 19.3 MMOL/L (ref 22–28)
HCO3 BLDA-SCNC: 20.5 MMOL/L (ref 22–28)
HCO3 BLDA-SCNC: 21.1 MMOL/L (ref 22–28)
HCO3 BLDA-SCNC: 23.6 MMOL/L (ref 22–28)
HCO3 BLDA-SCNC: 24.7 MMOL/L (ref 24–30)
HCO3 BLDA-SCNC: 25 MMOL/L (ref 22–28)
HCO3 BLDA-SCNC: 25.3 MMOL/L (ref 22–28)
HCO3 BLDA-SCNC: 25.5 MMOL/L (ref 22–28)
HCO3 BLDA-SCNC: 27.5 MMOL/L (ref 24–30)
HCT VFR BLD AUTO: 38 % (ref 36.5–49.3)
HCT VFR BLD AUTO: 47.4 % (ref 36.5–49.3)
HCT VFR BLD CALC: 27 % (ref 36.5–49.3)
HCT VFR BLD CALC: 29 % (ref 36.5–49.3)
HCT VFR BLD CALC: 31 % (ref 36.5–49.3)
HCT VFR BLD CALC: 31 % (ref 36.5–49.3)
HCT VFR BLD CALC: 32 % (ref 36.5–49.3)
HCT VFR BLD CALC: 35 % (ref 36.5–49.3)
HCT VFR BLD CALC: 38 % (ref 36.5–49.3)
HCT VFR BLD CALC: 44 % (ref 36.5–49.3)
HGB BLD-MCNC: 12.2 G/DL (ref 12–17)
HGB BLD-MCNC: 15.3 G/DL (ref 12–17)
HGB BLDA-MCNC: 10.5 G/DL (ref 12–17)
HGB BLDA-MCNC: 10.5 G/DL (ref 12–17)
HGB BLDA-MCNC: 10.9 G/DL (ref 12–17)
HGB BLDA-MCNC: 11.9 G/DL (ref 12–17)
HGB BLDA-MCNC: 12.9 G/DL (ref 12–17)
HGB BLDA-MCNC: 15 G/DL (ref 12–17)
HGB BLDA-MCNC: 9.2 G/DL (ref 12–17)
HGB BLDA-MCNC: 9.9 G/DL (ref 12–17)
IMM GRANULOCYTES # BLD AUTO: 0.03 THOUSAND/UL (ref 0–0.2)
IMM GRANULOCYTES NFR BLD AUTO: 0 % (ref 0–2)
INR PPP: 1.38 (ref 0.84–1.19)
KCT BLD-ACNC: 115 SEC (ref 89–137)
KCT BLD-ACNC: 178 SEC (ref 89–137)
KCT BLD-ACNC: 357 SEC (ref 89–137)
KCT BLD-ACNC: 384 SEC (ref 89–137)
KCT BLD-ACNC: 447 SEC (ref 89–137)
KCT BLD-ACNC: 484 SEC (ref 89–137)
KCT BLD-ACNC: 655 SEC (ref 89–137)
KCT BLD-ACNC: 672 SEC (ref 89–137)
KCT BLD-ACNC: 722 SEC (ref 89–137)
KCT BLD-ACNC: 815 SEC (ref 89–137)
LYMPHOCYTES # BLD AUTO: 3.06 THOUSANDS/ΜL (ref 0.6–4.47)
LYMPHOCYTES NFR BLD AUTO: 28 % (ref 14–44)
MCH RBC QN AUTO: 28.4 PG (ref 26.8–34.3)
MCHC RBC AUTO-ENTMCNC: 32.3 G/DL (ref 31.4–37.4)
MCV RBC AUTO: 88 FL (ref 82–98)
MONOCYTES # BLD AUTO: 0.8 THOUSAND/ΜL (ref 0.17–1.22)
MONOCYTES NFR BLD AUTO: 7 % (ref 4–12)
NEUTROPHILS # BLD AUTO: 6.83 THOUSANDS/ΜL (ref 1.85–7.62)
NEUTS SEG NFR BLD AUTO: 64 % (ref 43–75)
NRBC BLD AUTO-RTO: 0 /100 WBCS
O2 CT BLDA-SCNC: 19.6 ML/DL (ref 16–23)
OXYHGB MFR BLDA: 97.3 % (ref 94–97)
PCO2 BLD: 22 MMOL/L (ref 21–32)
PCO2 BLD: 22 MMOL/L (ref 21–32)
PCO2 BLD: 25 MMOL/L (ref 21–32)
PCO2 BLD: 26 MMOL/L (ref 21–32)
PCO2 BLD: 26 MMOL/L (ref 21–32)
PCO2 BLD: 27 MMOL/L (ref 21–32)
PCO2 BLD: 27 MMOL/L (ref 21–32)
PCO2 BLD: 29 MMOL/L (ref 21–32)
PCO2 BLD: 35.9 MM HG (ref 36–44)
PCO2 BLD: 40.7 MM HG (ref 36–44)
PCO2 BLD: 40.9 MM HG (ref 36–44)
PCO2 BLD: 44.2 MM HG (ref 36–44)
PCO2 BLD: 47.8 MM HG (ref 42–50)
PCO2 BLD: 48.3 MM HG (ref 36–44)
PCO2 BLD: 55.8 MM HG (ref 42–50)
PCO2 BLD: 57 MM HG (ref 36–44)
PCO2 BLDA: 42.9 MM HG (ref 36–44)
PH BLD: 7.26 [PH] (ref 7.35–7.45)
PH BLD: 7.3 [PH] (ref 7.3–7.4)
PH BLD: 7.31 [PH] (ref 7.35–7.45)
PH BLD: 7.32 [PH] (ref 7.35–7.45)
PH BLD: 7.32 [PH] (ref 7.3–7.4)
PH BLD: 7.37 [PH] (ref 7.35–7.45)
PH BLD: 7.37 [PH] (ref 7.35–7.45)
PH BLD: 7.38 [PH] (ref 7.35–7.45)
PH BLDA: 7.27 [PH] (ref 7.35–7.45)
PLATELET # BLD AUTO: 164 THOUSANDS/UL (ref 149–390)
PLATELET # BLD AUTO: 167 THOUSANDS/UL (ref 149–390)
PLATELET # BLD AUTO: 275 THOUSANDS/UL (ref 149–390)
PMV BLD AUTO: 10 FL (ref 8.9–12.7)
PMV BLD AUTO: 10.5 FL (ref 8.9–12.7)
PMV BLD AUTO: 9.9 FL (ref 8.9–12.7)
PO2 BLD: 170 MM HG (ref 75–129)
PO2 BLD: 285 MM HG (ref 75–129)
PO2 BLD: 321 MM HG (ref 75–129)
PO2 BLD: 339 MM HG (ref 75–129)
PO2 BLD: 38 MM HG (ref 35–45)
PO2 BLD: 46 MM HG (ref 35–45)
PO2 BLD: 70 MM HG (ref 75–129)
PO2 BLD: >400 MM HG (ref 75–129)
PO2 BLDA: 123.4 MM HG (ref 75–129)
POTASSIUM BLD-SCNC: 4.3 MMOL/L (ref 3.5–5.3)
POTASSIUM BLD-SCNC: 4.4 MMOL/L (ref 3.5–5.3)
POTASSIUM BLD-SCNC: 4.4 MMOL/L (ref 3.5–5.3)
POTASSIUM BLD-SCNC: 4.8 MMOL/L (ref 3.5–5.3)
POTASSIUM BLD-SCNC: 5 MMOL/L (ref 3.5–5.3)
POTASSIUM BLD-SCNC: 5.2 MMOL/L (ref 3.5–5.3)
POTASSIUM BLD-SCNC: 5.3 MMOL/L (ref 3.5–5.3)
POTASSIUM BLD-SCNC: 5.6 MMOL/L (ref 3.5–5.3)
POTASSIUM SERPL-SCNC: 4 MMOL/L (ref 3.5–5.3)
POTASSIUM SERPL-SCNC: 4.6 MMOL/L (ref 3.5–5.3)
POTASSIUM SERPL-SCNC: 4.8 MMOL/L (ref 3.5–5.3)
PROT SERPL-MCNC: 7.3 G/DL (ref 6.4–8.2)
PROTHROMBIN TIME: 16.9 SECONDS (ref 11.6–14.5)
PS CM H2O: 10
PS VENT FIO2: 50
PS VENT PEEP: 6
RBC # BLD AUTO: 5.38 MILLION/UL (ref 3.88–5.62)
SAO2 % BLD FROM PO2: 100 % (ref 60–85)
SAO2 % BLD FROM PO2: 67 % (ref 60–85)
SAO2 % BLD FROM PO2: 76 % (ref 60–85)
SAO2 % BLD FROM PO2: 92 % (ref 60–85)
SODIUM BLD-SCNC: 137 MMOL/L (ref 136–145)
SODIUM BLD-SCNC: 138 MMOL/L (ref 136–145)
SODIUM BLD-SCNC: 138 MMOL/L (ref 136–145)
SODIUM BLD-SCNC: 139 MMOL/L (ref 136–145)
SODIUM BLD-SCNC: 139 MMOL/L (ref 136–145)
SODIUM BLD-SCNC: 141 MMOL/L (ref 136–145)
SODIUM SERPL-SCNC: 138 MMOL/L (ref 136–145)
SODIUM SERPL-SCNC: 142 MMOL/L (ref 136–145)
SPECIMEN SOURCE: ABNORMAL
SPECIMEN SOURCE: NORMAL
VENT - PS: ABNORMAL
WBC # BLD AUTO: 10.89 THOUSAND/UL (ref 4.31–10.16)

## 2021-04-26 PROCEDURE — 021109W BYPASS CORONARY ARTERY, TWO ARTERIES FROM AORTA WITH AUTOLOGOUS VENOUS TISSUE, OPEN APPROACH: ICD-10-PCS | Performed by: THORACIC SURGERY (CARDIOTHORACIC VASCULAR SURGERY)

## 2021-04-26 PROCEDURE — 82330 ASSAY OF CALCIUM: CPT

## 2021-04-26 PROCEDURE — 85014 HEMATOCRIT: CPT | Performed by: PHYSICIAN ASSISTANT

## 2021-04-26 PROCEDURE — 85018 HEMOGLOBIN: CPT | Performed by: PHYSICIAN ASSISTANT

## 2021-04-26 PROCEDURE — 82947 ASSAY GLUCOSE BLOOD QUANT: CPT

## 2021-04-26 PROCEDURE — 84132 ASSAY OF SERUM POTASSIUM: CPT

## 2021-04-26 PROCEDURE — 02HV33Z INSERTION OF INFUSION DEVICE INTO SUPERIOR VENA CAVA, PERCUTANEOUS APPROACH: ICD-10-PCS | Performed by: ANESTHESIOLOGY

## 2021-04-26 PROCEDURE — 82805 BLOOD GASES W/O2 SATURATION: CPT | Performed by: PHYSICIAN ASSISTANT

## 2021-04-26 PROCEDURE — 71045 X-RAY EXAM CHEST 1 VIEW: CPT

## 2021-04-26 PROCEDURE — 84132 ASSAY OF SERUM POTASSIUM: CPT | Performed by: PHYSICIAN ASSISTANT

## 2021-04-26 PROCEDURE — 93005 ELECTROCARDIOGRAM TRACING: CPT

## 2021-04-26 PROCEDURE — 84295 ASSAY OF SERUM SODIUM: CPT

## 2021-04-26 PROCEDURE — 99223 1ST HOSP IP/OBS HIGH 75: CPT | Performed by: EMERGENCY MEDICINE

## 2021-04-26 PROCEDURE — 30233N0 TRANSFUSION OF AUTOLOGOUS RED BLOOD CELLS INTO PERIPHERAL VEIN, PERCUTANEOUS APPROACH: ICD-10-PCS | Performed by: THORACIC SURGERY (CARDIOTHORACIC VASCULAR SURGERY)

## 2021-04-26 PROCEDURE — 85049 AUTOMATED PLATELET COUNT: CPT | Performed by: THORACIC SURGERY (CARDIOTHORACIC VASCULAR SURGERY)

## 2021-04-26 PROCEDURE — 33518 CABG ARTERY-VEIN TWO: CPT | Performed by: PHYSICIAN ASSISTANT

## 2021-04-26 PROCEDURE — 33533 CABG ARTERIAL SINGLE: CPT | Performed by: PHYSICIAN ASSISTANT

## 2021-04-26 PROCEDURE — 85730 THROMBOPLASTIN TIME PARTIAL: CPT | Performed by: INTERNAL MEDICINE

## 2021-04-26 PROCEDURE — 33518 CABG ARTERY-VEIN TWO: CPT | Performed by: THORACIC SURGERY (CARDIOTHORACIC VASCULAR SURGERY)

## 2021-04-26 PROCEDURE — 80053 COMPREHEN METABOLIC PANEL: CPT | Performed by: NURSE PRACTITIONER

## 2021-04-26 PROCEDURE — 85347 COAGULATION TIME ACTIVATED: CPT

## 2021-04-26 PROCEDURE — 93355 ECHO TRANSESOPHAGEAL (TEE): CPT

## 2021-04-26 PROCEDURE — 02100Z9 BYPASS CORONARY ARTERY, ONE ARTERY FROM LEFT INTERNAL MAMMARY, OPEN APPROACH: ICD-10-PCS | Performed by: THORACIC SURGERY (CARDIOTHORACIC VASCULAR SURGERY)

## 2021-04-26 PROCEDURE — 85049 AUTOMATED PLATELET COUNT: CPT | Performed by: PHYSICIAN ASSISTANT

## 2021-04-26 PROCEDURE — 82803 BLOOD GASES ANY COMBINATION: CPT

## 2021-04-26 PROCEDURE — 5A1221Z PERFORMANCE OF CARDIAC OUTPUT, CONTINUOUS: ICD-10-PCS | Performed by: THORACIC SURGERY (CARDIOTHORACIC VASCULAR SURGERY)

## 2021-04-26 PROCEDURE — 33508 ENDOSCOPIC VEIN HARVEST: CPT | Performed by: PHYSICIAN ASSISTANT

## 2021-04-26 PROCEDURE — 33508 ENDOSCOPIC VEIN HARVEST: CPT | Performed by: THORACIC SURGERY (CARDIOTHORACIC VASCULAR SURGERY)

## 2021-04-26 PROCEDURE — 06BQ4ZZ EXCISION OF LEFT SAPHENOUS VEIN, PERCUTANEOUS ENDOSCOPIC APPROACH: ICD-10-PCS | Performed by: THORACIC SURGERY (CARDIOTHORACIC VASCULAR SURGERY)

## 2021-04-26 PROCEDURE — 85025 COMPLETE CBC W/AUTO DIFF WBC: CPT | Performed by: NURSE PRACTITIONER

## 2021-04-26 PROCEDURE — 85384 FIBRINOGEN ACTIVITY: CPT | Performed by: PHYSICIAN ASSISTANT

## 2021-04-26 PROCEDURE — 33533 CABG ARTERIAL SINGLE: CPT | Performed by: THORACIC SURGERY (CARDIOTHORACIC VASCULAR SURGERY)

## 2021-04-26 PROCEDURE — 82330 ASSAY OF CALCIUM: CPT | Performed by: THORACIC SURGERY (CARDIOTHORACIC VASCULAR SURGERY)

## 2021-04-26 PROCEDURE — 85730 THROMBOPLASTIN TIME PARTIAL: CPT | Performed by: PHYSICIAN ASSISTANT

## 2021-04-26 PROCEDURE — 5A1223Z PERFORMANCE OF CARDIAC PACING, CONTINUOUS: ICD-10-PCS | Performed by: THORACIC SURGERY (CARDIOTHORACIC VASCULAR SURGERY)

## 2021-04-26 PROCEDURE — 82948 REAGENT STRIP/BLOOD GLUCOSE: CPT

## 2021-04-26 PROCEDURE — 94760 N-INVAS EAR/PLS OXIMETRY 1: CPT

## 2021-04-26 PROCEDURE — 80048 BASIC METABOLIC PNL TOTAL CA: CPT | Performed by: PHYSICIAN ASSISTANT

## 2021-04-26 PROCEDURE — 85014 HEMATOCRIT: CPT

## 2021-04-26 PROCEDURE — 94002 VENT MGMT INPAT INIT DAY: CPT

## 2021-04-26 PROCEDURE — 85610 PROTHROMBIN TIME: CPT | Performed by: PHYSICIAN ASSISTANT

## 2021-04-26 DEVICE — MARKER CORONARY BYPASS VOSS GRAFT: Type: IMPLANTABLE DEVICE | Site: AORTA | Status: FUNCTIONAL

## 2021-04-26 RX ORDER — PROTAMINE SULFATE 10 MG/ML
INJECTION, SOLUTION INTRAVENOUS AS NEEDED
Status: DISCONTINUED | OUTPATIENT
Start: 2021-04-26 | End: 2021-04-26

## 2021-04-26 RX ORDER — MIDAZOLAM HYDROCHLORIDE 2 MG/2ML
INJECTION, SOLUTION INTRAMUSCULAR; INTRAVENOUS AS NEEDED
Status: DISCONTINUED | OUTPATIENT
Start: 2021-04-26 | End: 2021-04-26

## 2021-04-26 RX ORDER — POLYETHYLENE GLYCOL 3350 17 G/17G
17 POWDER, FOR SOLUTION ORAL DAILY
Status: DISCONTINUED | OUTPATIENT
Start: 2021-04-26 | End: 2021-04-29 | Stop reason: HOSPADM

## 2021-04-26 RX ORDER — SODIUM CHLORIDE 9 MG/ML
INJECTION, SOLUTION INTRAVENOUS CONTINUOUS PRN
Status: DISCONTINUED | OUTPATIENT
Start: 2021-04-26 | End: 2021-04-26

## 2021-04-26 RX ORDER — ACETAMINOPHEN 650 MG/1
650 SUPPOSITORY RECTAL EVERY 4 HOURS PRN
Status: DISCONTINUED | OUTPATIENT
Start: 2021-04-26 | End: 2021-04-26

## 2021-04-26 RX ORDER — PANTOPRAZOLE SODIUM 40 MG/1
40 TABLET, DELAYED RELEASE ORAL DAILY
Status: DISCONTINUED | OUTPATIENT
Start: 2021-04-26 | End: 2021-04-29 | Stop reason: HOSPADM

## 2021-04-26 RX ORDER — ONDANSETRON 2 MG/ML
INJECTION INTRAMUSCULAR; INTRAVENOUS AS NEEDED
Status: DISCONTINUED | OUTPATIENT
Start: 2021-04-26 | End: 2021-04-26

## 2021-04-26 RX ORDER — PROPOFOL 10 MG/ML
INJECTION, EMULSION INTRAVENOUS AS NEEDED
Status: DISCONTINUED | OUTPATIENT
Start: 2021-04-26 | End: 2021-04-26

## 2021-04-26 RX ORDER — POTASSIUM CHLORIDE 14.9 MG/ML
20 INJECTION INTRAVENOUS
Status: DISCONTINUED | OUTPATIENT
Start: 2021-04-26 | End: 2021-04-27

## 2021-04-26 RX ORDER — CHLORHEXIDINE GLUCONATE 0.12 MG/ML
15 RINSE ORAL 2 TIMES DAILY
Status: DISCONTINUED | OUTPATIENT
Start: 2021-04-26 | End: 2021-04-26

## 2021-04-26 RX ORDER — FUROSEMIDE 10 MG/ML
40 INJECTION INTRAMUSCULAR; INTRAVENOUS EVERY 6 HOURS PRN
Status: DISCONTINUED | OUTPATIENT
Start: 2021-04-26 | End: 2021-04-27

## 2021-04-26 RX ORDER — FONDAPARINUX SODIUM 2.5 MG/.5ML
2.5 INJECTION SUBCUTANEOUS DAILY
Status: DISCONTINUED | OUTPATIENT
Start: 2021-04-27 | End: 2021-04-29 | Stop reason: HOSPADM

## 2021-04-26 RX ORDER — POTASSIUM CHLORIDE 14.9 MG/ML
20 INJECTION INTRAVENOUS ONCE AS NEEDED
Status: DISCONTINUED | OUTPATIENT
Start: 2021-04-26 | End: 2021-04-27

## 2021-04-26 RX ORDER — NEOSTIGMINE METHYLSULFATE 1 MG/ML
INJECTION INTRAVENOUS AS NEEDED
Status: DISCONTINUED | OUTPATIENT
Start: 2021-04-26 | End: 2021-04-26

## 2021-04-26 RX ORDER — CEFAZOLIN SODIUM 2 G/50ML
SOLUTION INTRAVENOUS AS NEEDED
Status: DISCONTINUED | OUTPATIENT
Start: 2021-04-26 | End: 2021-04-26

## 2021-04-26 RX ORDER — GLYCOPYRROLATE 0.2 MG/ML
INJECTION INTRAMUSCULAR; INTRAVENOUS AS NEEDED
Status: DISCONTINUED | OUTPATIENT
Start: 2021-04-26 | End: 2021-04-26

## 2021-04-26 RX ORDER — ATORVASTATIN CALCIUM 80 MG/1
80 TABLET, FILM COATED ORAL
Status: DISCONTINUED | OUTPATIENT
Start: 2021-04-26 | End: 2021-04-29 | Stop reason: HOSPADM

## 2021-04-26 RX ORDER — FENTANYL CITRATE 50 UG/ML
50 INJECTION, SOLUTION INTRAMUSCULAR; INTRAVENOUS
Status: DISCONTINUED | OUTPATIENT
Start: 2021-04-26 | End: 2021-04-26

## 2021-04-26 RX ORDER — CEFAZOLIN SODIUM 2 G/50ML
2000 SOLUTION INTRAVENOUS EVERY 8 HOURS
Status: COMPLETED | OUTPATIENT
Start: 2021-04-26 | End: 2021-04-27

## 2021-04-26 RX ORDER — MAGNESIUM HYDROXIDE 1200 MG/15ML
LIQUID ORAL AS NEEDED
Status: DISCONTINUED | OUTPATIENT
Start: 2021-04-26 | End: 2021-04-26 | Stop reason: HOSPADM

## 2021-04-26 RX ORDER — HYDROMORPHONE HCL/PF 1 MG/ML
0.5 SYRINGE (ML) INJECTION EVERY 2 HOUR PRN
Status: DISCONTINUED | OUTPATIENT
Start: 2021-04-26 | End: 2021-04-26

## 2021-04-26 RX ORDER — CALCIUM CHLORIDE 100 MG/ML
1 INJECTION INTRAVENOUS; INTRAVENTRICULAR ONCE
Status: DISCONTINUED | OUTPATIENT
Start: 2021-04-26 | End: 2021-04-26

## 2021-04-26 RX ORDER — OXYCODONE HYDROCHLORIDE 5 MG/1
5 TABLET ORAL EVERY 4 HOURS PRN
Status: DISCONTINUED | OUTPATIENT
Start: 2021-04-26 | End: 2021-04-27

## 2021-04-26 RX ORDER — FENTANYL CITRATE 50 UG/ML
INJECTION, SOLUTION INTRAMUSCULAR; INTRAVENOUS AS NEEDED
Status: DISCONTINUED | OUTPATIENT
Start: 2021-04-26 | End: 2021-04-26

## 2021-04-26 RX ORDER — ONDANSETRON 2 MG/ML
4 INJECTION INTRAMUSCULAR; INTRAVENOUS EVERY 6 HOURS PRN
Status: DISCONTINUED | OUTPATIENT
Start: 2021-04-26 | End: 2021-04-29 | Stop reason: HOSPADM

## 2021-04-26 RX ORDER — AMIODARONE HYDROCHLORIDE 200 MG/1
200 TABLET ORAL EVERY 8 HOURS SCHEDULED
Status: DISCONTINUED | OUTPATIENT
Start: 2021-04-26 | End: 2021-04-29 | Stop reason: HOSPADM

## 2021-04-26 RX ORDER — BISACODYL 10 MG
10 SUPPOSITORY, RECTAL RECTAL DAILY PRN
Status: DISCONTINUED | OUTPATIENT
Start: 2021-04-26 | End: 2021-04-29 | Stop reason: HOSPADM

## 2021-04-26 RX ORDER — ROCURONIUM BROMIDE 10 MG/ML
INJECTION, SOLUTION INTRAVENOUS AS NEEDED
Status: DISCONTINUED | OUTPATIENT
Start: 2021-04-26 | End: 2021-04-26

## 2021-04-26 RX ORDER — CEFAZOLIN SODIUM 2 G/50ML
2000 SOLUTION INTRAVENOUS ONCE
Status: DISCONTINUED | OUTPATIENT
Start: 2021-04-26 | End: 2021-04-26 | Stop reason: HOSPADM

## 2021-04-26 RX ORDER — ACETAMINOPHEN 325 MG/1
975 TABLET ORAL EVERY 8 HOURS
Status: DISCONTINUED | OUTPATIENT
Start: 2021-04-26 | End: 2021-04-29 | Stop reason: HOSPADM

## 2021-04-26 RX ORDER — AMINOCAPROIC ACID 250 MG/ML
INJECTION, SOLUTION INTRAVENOUS AS NEEDED
Status: DISCONTINUED | OUTPATIENT
Start: 2021-04-26 | End: 2021-04-26

## 2021-04-26 RX ORDER — HEPARIN SODIUM 1000 [USP'U]/ML
INJECTION, SOLUTION INTRAVENOUS; SUBCUTANEOUS AS NEEDED
Status: DISCONTINUED | OUTPATIENT
Start: 2021-04-26 | End: 2021-04-26

## 2021-04-26 RX ORDER — FENTANYL CITRATE 50 UG/ML
50 INJECTION, SOLUTION INTRAMUSCULAR; INTRAVENOUS ONCE
Status: COMPLETED | OUTPATIENT
Start: 2021-04-26 | End: 2021-04-26

## 2021-04-26 RX ORDER — HYDROMORPHONE HCL/PF 1 MG/ML
0.5 SYRINGE (ML) INJECTION EVERY 2 HOUR PRN
Status: DISCONTINUED | OUTPATIENT
Start: 2021-04-26 | End: 2021-04-27

## 2021-04-26 RX ORDER — VANCOMYCIN HYDROCHLORIDE 1 G/20ML
INJECTION, POWDER, LYOPHILIZED, FOR SOLUTION INTRAVENOUS AS NEEDED
Status: DISCONTINUED | OUTPATIENT
Start: 2021-04-26 | End: 2021-04-26 | Stop reason: HOSPADM

## 2021-04-26 RX ORDER — ASPIRIN 325 MG
325 TABLET ORAL DAILY
Status: DISCONTINUED | OUTPATIENT
Start: 2021-04-26 | End: 2021-04-29 | Stop reason: HOSPADM

## 2021-04-26 RX ORDER — MAGNESIUM SULFATE HEPTAHYDRATE 40 MG/ML
2 INJECTION, SOLUTION INTRAVENOUS ONCE
Status: COMPLETED | OUTPATIENT
Start: 2021-04-26 | End: 2021-04-26

## 2021-04-26 RX ORDER — OXYCODONE HYDROCHLORIDE 5 MG/1
2.5 TABLET ORAL EVERY 4 HOURS PRN
Status: DISCONTINUED | OUTPATIENT
Start: 2021-04-26 | End: 2021-04-27

## 2021-04-26 RX ORDER — SODIUM CHLORIDE 450 MG/100ML
20 INJECTION, SOLUTION INTRAVENOUS CONTINUOUS
Status: DISCONTINUED | OUTPATIENT
Start: 2021-04-26 | End: 2021-04-27

## 2021-04-26 RX ADMIN — CHLORHEXIDINE GLUCONATE 15 ML: 1.2 SOLUTION ORAL at 18:30

## 2021-04-26 RX ADMIN — AMINOCAPROIC ACID 5 G: 250 INJECTION, SOLUTION INTRAVENOUS at 11:31

## 2021-04-26 RX ADMIN — PROTAMINE SULFATE 10 MG: 10 INJECTION, SOLUTION INTRAVENOUS at 14:32

## 2021-04-26 RX ADMIN — ROCURONIUM BROMIDE 100 MG: 50 INJECTION, SOLUTION INTRAVENOUS at 11:05

## 2021-04-26 RX ADMIN — FENTANYL CITRATE 50 MCG: 50 INJECTION INTRAMUSCULAR; INTRAVENOUS at 15:47

## 2021-04-26 RX ADMIN — NEOSTIGMINE METHYLSULFATE 4 MG: 1 INJECTION INTRAVENOUS at 15:08

## 2021-04-26 RX ADMIN — MIDAZOLAM 2 MG: 1 INJECTION INTRAMUSCULAR; INTRAVENOUS at 10:51

## 2021-04-26 RX ADMIN — HYDROMORPHONE HYDROCHLORIDE 0.5 MG: 1 INJECTION, SOLUTION INTRAMUSCULAR; INTRAVENOUS; SUBCUTANEOUS at 19:37

## 2021-04-26 RX ADMIN — HEPARIN SODIUM 20000 UNITS: 1000 INJECTION INTRAVENOUS; SUBCUTANEOUS at 12:55

## 2021-04-26 RX ADMIN — MUPIROCIN 1 APPLICATION: 20 OINTMENT TOPICAL at 09:46

## 2021-04-26 RX ADMIN — SODIUM CHLORIDE: 0.9 INJECTION, SOLUTION INTRAVENOUS at 11:02

## 2021-04-26 RX ADMIN — MAGNESIUM SULFATE HEPTAHYDRATE 2 G: 40 INJECTION, SOLUTION INTRAVENOUS at 15:43

## 2021-04-26 RX ADMIN — CEFAZOLIN SODIUM 2000 MG: 2 SOLUTION INTRAVENOUS at 22:45

## 2021-04-26 RX ADMIN — MIDAZOLAM 2 MG: 1 INJECTION INTRAMUSCULAR; INTRAVENOUS at 13:24

## 2021-04-26 RX ADMIN — CHLORHEXIDINE GLUCONATE 15 ML: 1.2 SOLUTION ORAL at 09:47

## 2021-04-26 RX ADMIN — AMIODARONE HYDROCHLORIDE 200 MG: 200 TABLET ORAL at 21:10

## 2021-04-26 RX ADMIN — PROPOFOL 100 MG: 10 INJECTION, EMULSION INTRAVENOUS at 11:05

## 2021-04-26 RX ADMIN — FENTANYL CITRATE 250 MCG: 50 INJECTION INTRAMUSCULAR; INTRAVENOUS at 13:24

## 2021-04-26 RX ADMIN — HEPARIN SODIUM 35200 UNITS: 1000 INJECTION INTRAVENOUS; SUBCUTANEOUS at 12:42

## 2021-04-26 RX ADMIN — HYDROMORPHONE HYDROCHLORIDE 0.5 MG: 1 INJECTION, SOLUTION INTRAMUSCULAR; INTRAVENOUS; SUBCUTANEOUS at 22:45

## 2021-04-26 RX ADMIN — AMINOCAPROIC ACID 1000 MG/HR: 250 INJECTION, SOLUTION INTRAVENOUS at 11:31

## 2021-04-26 RX ADMIN — PROTAMINE SULFATE 190 MG: 10 INJECTION, SOLUTION INTRAVENOUS at 14:37

## 2021-04-26 RX ADMIN — OXYCODONE HYDROCHLORIDE 5 MG: 5 TABLET ORAL at 21:10

## 2021-04-26 RX ADMIN — ASPIRIN 81 MG: 81 TABLET, CHEWABLE ORAL at 09:46

## 2021-04-26 RX ADMIN — FENTANYL CITRATE 250 MCG: 50 INJECTION INTRAMUSCULAR; INTRAVENOUS at 11:05

## 2021-04-26 RX ADMIN — HYDROMORPHONE HYDROCHLORIDE 0.5 MG: 1 INJECTION, SOLUTION INTRAMUSCULAR; INTRAVENOUS; SUBCUTANEOUS at 15:54

## 2021-04-26 RX ADMIN — Medication 12.5 MG: at 09:46

## 2021-04-26 RX ADMIN — SODIUM CHLORIDE 20 ML/HR: 0.45 INJECTION, SOLUTION INTRAVENOUS at 16:21

## 2021-04-26 RX ADMIN — GLYCOPYRROLATE 0.6 MG: 0.2 INJECTION, SOLUTION INTRAMUSCULAR; INTRAVENOUS at 15:08

## 2021-04-26 RX ADMIN — CEFAZOLIN SODIUM 2000 MG: 2 SOLUTION INTRAVENOUS at 14:57

## 2021-04-26 RX ADMIN — CEFAZOLIN SODIUM 2000 MG: 2 SOLUTION INTRAVENOUS at 11:26

## 2021-04-26 RX ADMIN — PROTAMINE SULFATE 50 MG: 10 INJECTION, SOLUTION INTRAVENOUS at 15:00

## 2021-04-26 RX ADMIN — ONDANSETRON 4 MG: 2 INJECTION INTRAMUSCULAR; INTRAVENOUS at 14:50

## 2021-04-26 RX ADMIN — ACETAMINOPHEN 975 MG: 325 TABLET ORAL at 22:45

## 2021-04-26 RX ADMIN — MUPIROCIN 1 APPLICATION: 20 OINTMENT TOPICAL at 21:10

## 2021-04-26 RX ADMIN — PHENYLEPHRINE HYDROCHLORIDE 40 MCG/MIN: 10 INJECTION INTRAVENOUS at 13:50

## 2021-04-26 RX ADMIN — FENTANYL CITRATE 50 MCG: 50 INJECTION INTRAMUSCULAR; INTRAVENOUS at 16:00

## 2021-04-26 RX ADMIN — SODIUM BICARBONATE 50 MEQ: 84 INJECTION INTRAVENOUS at 17:48

## 2021-04-26 NOTE — INTERVAL H&P NOTE
Vitals:    04/26/21 0719   BP: 123/63   Pulse: 63   Resp: 18   Temp: (!) 97 3 °F (36 3 °C)   SpO2: 96%         H&P reviewed  After examining the patient I find no changes in the patients condition since the H&P was completed  Plan for CABG  Preoperative Beta Blocker: indicated    Anticipated Length of Stay: Patient will be admitted on an inpatient basis with an anticipated length of stay of grater than 2 midnights  Justification for Hospital StaY: Post surgical recovery following open heart surgery        Karen Hensley MD  04/26/21  7:26 AM

## 2021-04-26 NOTE — RESPIRATORY THERAPY NOTE
RT Protocol Note  Zabrina Schneider 63 y o  male MRN: [id number]  Unit/Bed#: [facility] 415-01 Encounter: [id number]    Assessment    Principal Problem:    S/P CABG (coronary artery bypass graft)  Active Problems:    Dyslipidemia    Coronary artery disease    Elevated troponin      Home Pulmonary Medications:  None       Past Medical History:   Diagnosis Date    CAD (coronary artery disease)     Former tobacco use     History of dysphagia     resolved    Hyperlipidemia     Pre-diabetes      Social History     Socioeconomic History    Marital status: Single     Spouse name: None    Number of children: None    Years of education: None    Highest education level: None   Occupational History    None   Social Needs    Financial resource strain: None    Food insecurity     Worry: None     Inability: None    Transportation needs     Medical: None     Non-medical: None   Tobacco Use    Smoking status: Former Smoker     Types: Cigarettes     Quit date:      Years since quittin 3    Smokeless tobacco: Never Used    Tobacco comment: quit    Substance and Sexual Activity    Alcohol use: Not Currently     Comment: Rare    Drug use: No    Sexual activity: None   Lifestyle    Physical activity     Days per week: None     Minutes per session: None    Stress: None   Relationships    Social connections     Talks on phone: None     Gets together: None     Attends [employer] service: None     Active member of club or organization: None     Attends meetings of clubs or organizations: None     Relationship status: None    Intimate partner violence     Fear of current or ex partner: None     Emotionally abused: None     Physically abused: None     Forced sexual activity: None   Other Topics Concern    None   Social History Narrative    Caffeine - Iced Tea 2cups/d       Subjective         Objective    Physical Exam:   Assessment Type: Assess only  General Appearance: Sedated  Respiratory Pattern: Assisted  Chest Assessment: Chest expansion symmetrical  Bilateral Breath Sounds: Clear  R Breath Sounds: Clear  L Breath Sounds: Clear  Cough: None    Vitals:  Blood pressure 123/63, pulse 63, temperature (!) 97 3 °F (36 3 °C), temperature source Oral, resp  rate 18, height 5' 7" (1 702 m), weight 86 4 kg (190 lb 7 6 oz), SpO2 96 %  Imaging and other studies: I have personally reviewed pertinent reports              Plan    Respiratory Plan: Vent/NIV/HFNC  Airway Clearance Plan: Incentive Spirometer     Resp Comments: Pt post CABG received intubated with a #8 ETT, 26@ lip and placed on A/C VC mode 14/500/60%/+6

## 2021-04-26 NOTE — ANESTHESIA PROCEDURE NOTES
Arterial Line Insertion  Performed by: August Fofana MD  Authorized by: August Foafna MD   Consent: Written consent obtained  Risks and benefits: risks, benefits and alternatives were discussed  Consent given by: patient  Patient understanding: patient states understanding of the procedure being performed  Patient consent: the patient's understanding of the procedure matches consent given  Patient identity confirmed: verbally with patient and arm band  Preparation: Patient was prepped and draped in the usual sterile fashion  Indications: hemodynamic monitoring    Procedure Details:  Needle gauge: 20  Seldinger technique: Seldinger technique used  Number of attempts: 1    Post-procedure:  Post-procedure: dressing applied  Waveform: good waveform  Post-procedure CNS: normal and unchanged  Patient tolerance: Patient tolerated the procedure well with no immediate complications  Comments: Single skin and vessel puncture  Easy thread of wires  No apparent complications

## 2021-04-26 NOTE — ANESTHESIA POSTPROCEDURE EVALUATION
Post-Op Assessment Note    CV Status:  Stable       Mental Status:  Somnolent   Hydration Status:  Euvolemic   PONV Controlled:  Controlled   Airway Patency:  Patent  Airway: intubated      Post Op Vitals Reviewed: Yes      Staff: Anesthesiologist   Comments: vital signs stable; full report to ICU NP        No complications documented      BP      Temp     Pulse     Resp      SpO2

## 2021-04-26 NOTE — ANESTHESIA PROCEDURE NOTES
Procedure Performed: DERECK Anesthesia  Start Time:  4/26/2021 11:27 AM        Preanesthesia Checklist    Patient identified, IV assessed, risks and benefits discussed, monitors and equipment assessed, procedure being performed at surgeon's request and anesthesia consent obtained  Procedure    Diagnostic Indications for DERECK:  assessment of surgical repair  Type of DERECK: complete DERECK with interpretation  Images Saved: ultrasound permanent image saved  Location performed: OR  Intubated  Heart visualized  Insertion of DERECK Probe:  Easy  Probe Type:  Epiaortic and multiplane  Modalities:  Color flow mapping, 3D, pulse wave Doppler and continuous wave Doppler  Echocardiographic and Doppler Measurements    PREPROCEDURE    LEFT VENTRICLE:  Systolic Function: normal  Ejection Fraction: 55%  Regional Wall Motion Abnormalities: none  RIGHT VENTRICLE:  Systolic Function: normal               AORTIC VALVE:  Leaflets: normal and trileaflet  Regurgitation: none  MITRAL VALVE:  Leaflets: normal  Leaflet Motions: normal      TRICUSPID VALVE:  Leaflets: normal        PULMONIC VALVE:  Leaflets: normal          ASCENDING AORTA:  Size:  normal  Dissection not present  AORTIC ARCH:  Size:  normal  dissection not present  Grade 2: severe intimal thickening without protruding atheroma  DESCENDING AORTA:  Size: normal  Dissection not present  Grade 2: severe intimal thickening without protruding atheroma  RIGHT ATRIUM:  No spontaneous echo contrast     LEFT ATRIUM:  No spontaneous echo contrast     LEFT ATRIAL APPENDAGE:  No spontaneous echo contrast         ATRIAL SEPTUM:  Intra-atrial septal morphology: patent foramen ovale  OTHER FINDINGS:  Pericardium:  normal          POSTPROCEDURE    LEFT VENTRICLE: Unchanged   RIGHT VENTRICLE: Unchanged   AORTIC VALVE: Unchanged   MITRAL VALVE: Unchanged   TRICUSPID VALVE: Unchanged         PULMONIC VALVE: Unchanged           ATRIA: Unchanged   AORTA: Unchanged   Dissection: Dissection not present  REMOVAL:  Probe Removal: atraumatic

## 2021-04-26 NOTE — ANESTHESIA PROCEDURE NOTES
Introducer/Jn-Fatimah  Performed by: Sienna Curry MD  Authorized by: Sienna Curry MD     Date/Time: 4/26/2021 11:24 AM  Consent: Written consent obtained  Risks and benefits: risks, benefits and alternatives were discussed  Consent given by: patient  Patient understanding: patient states understanding of the procedure being performed  Patient consent: the patient's understanding of the procedure matches consent given  Patient identity confirmed: verbally with patient and arm band  Indications: vascular access  Catheter size: 9 Fr  Assessment: blood return through all ports and free fluid flow  Preparation: skin prepped with 2% chlorhexidine  Skin prep agent dried: skin prep agent completely dried prior to procedure  Sterile barriers: all five maximum sterile barriers used - cap, mask, sterile gown, sterile gloves, and large sterile sheet  Hand hygiene: hand hygiene performed prior to central venous catheter insertion  Ultrasound guidance: yes  ultrasound permanent image saved  Number of attempts: 1  Successful placement: yes  Post-procedure: line sutured and dressing applied  Patient tolerance: Patient tolerated the procedure well with no immediate complications  Comments: Single skin and vessel puncture  Easy thread of wires  Wires confirmed in SVC with ultrasound  No apparent complications

## 2021-04-26 NOTE — OP NOTE
OPERATIVE REPORT  PATIENT NAME: Rebecca Dorman    :  1955  MRN: [id number]  Pt Location: BE OR ROOM 16    SURGERY DATE: 2021    SURGEON: Yolanda Belle MD    ASSISTANT: Piedad Mario PA-C    ADDITIONAL ASSISTANT: Marnie Peres PA-C    PREOPERATIVE DIAGNOSIS:  Multivessel coronary artery disease    POSTOPERATIVE DIAGNOSIS:  Multivessel coronary artery disease    NYHA Class: 4    CCS Class: 4    PROCEDURE: Coronary artery bypass grafting x 3 with left internal mammary artery to left anterior descending, saphenous vein graft to obtuse marginal 1, saphenous vein graft to first diagonal      ANESTHESIA: General endotracheal anesthesia with transesophageal echocardiogram guidance, Dr Karmen Stiles: 67 minutes  CROSSCLAMP TIME: 60 minutes  PACKS/TUBES/DRAINS: Chest tubes x 3  MATERIALS: Pacing wires: A x1  V x1  TRANSFUSION: None  SPECIMENS: None  ESTIMATED BLOOD LOSS: 200 mL    OPERATIVE TECHNIQUE:    The patient was taken to the operating room and placed supine on the operating table  Following the satisfactory induction of general anesthesia and placement of monitoring lines, the patient was prepped and draped in the usual sterile fashion  A time-out procedure was performed  The patient underwent median sternotomy, LIMA harvest, endoscopic left greater saphenous vein harvest, systemic heparinization and conduit preparation  The patient underwent pericardiotomy and epiaortic ultrasound was used to evaluate the ascending aorta, which was found to be free of significant atheromatous disease  The patient underwent aortic and right atrial cannulation and was initiated on bypass  The ascending aorta was crossclamped  Antegrade [place] cardioplegia was delivered with an excellent arrest     The saphenous vein was anastomosed to the obtuse marginal 1 in end-to-side fashion using running 7-0 Prolene suture   The saphenous vein was anastomosed to the first diagonalin end-to-side fashion using running 7-0 Prolene suture  The left internal mammary artery was anastomosed to the left anterior descending in end-to-side fashion using running 7-0 Prolene suture  The quality of all three grafts was excellent  A total of 2 proximal anastomoses were completed on the ascending aorta in end-to-side fashion using running 5-0 Prolene suture  The heart was de-aired and the crossclamp was removed  Atrial and ventricular pacing wires were placed  Following a period of reperfusion, the patient was weaned from cardiopulmonary bypass and decannulated  Protamine was administered with normalization of the ACT  Hemostasis was confirmed in all fields  Thoracostomy tubes were placed  The sternum was closed with stainless steel wires  The fascia, subdermis and skin were closed with multiple layers of running absorbable suture  As the attending surgeon, I was present and scrubbed for all critical portions of this procedure  There was no qualified surgical resident available  Sponge, needle and instrument counts were reported as correct by the nursing staff  Final transesophageal echocardiogram demonstrated normal biventricular function  The assistance of a PA was required to complete this case, specifically for assistance with endoscopic saphenous vein harvesting, cannulation, decannulation, and construction of the bypass grafts         Madyson Reagan MD  DATE: April 26, 2021  TIME: 3:14 PM

## 2021-04-26 NOTE — ANESTHESIA PROCEDURE NOTES
Central Line Insertion  Performed by: Shanell De La Rosa MD  Authorized by: Shanell De La Rosa MD     Date/Time: 4/26/2021 11:23 AM  Catheter Type:  triple lumen  Consent: Written consent obtained  Risks and benefits: risks, benefits and alternatives were discussed  Consent given by: patient  Patient understanding: patient states understanding of the procedure being performed  Patient consent: the patient's understanding of the procedure matches consent given  Patient identity confirmed: verbally with patient and arm band  Location details: right internal jugular  Catheter size: 7 Fr  Patient position: Trendelenburg  Assessment: blood return through all ports and free fluid flow  Preparation: skin prepped with 2% chlorhexidine  Skin prep agent dried: skin prep agent completely dried prior to procedure  Sterile barriers: all five maximum sterile barriers used - cap, mask, sterile gown, sterile gloves, and large sterile sheet  Hand hygiene: hand hygiene performed prior to central venous catheter insertion  Ultrasound guidance: yes  sterile gel and probe cover used in ultrasound-guided central venous catheter insertionultrasound permanent image saved  Number of attempts: 1  Successful placement: yes  Post-procedure: line sutured and dressing applied  Patient tolerance: Patient tolerated the procedure well with no immediate complications  Comments: Single skin and vessel puncture  Easy thread of wires  Wires confirmed in SVC with ultrasound  No apparent complications

## 2021-04-26 NOTE — CONSULTS
[address] [place] [address] Reppert 63 y o  male MRN: [id number]  Unit/Bed#: [facility] 415-01 Encounter: [id number]    Inpatient consult to Shahabbrendaguicho Wilson performed by: Ray Lal PA-C  Consult ordered by: Keila Hammond PA-C          Physician Requesting Consult: Marcelo Issa MD    Reason for Consult / Principal Problem: S/P CABG (coronary artery bypass graft)    HPI: Lopez Ackerman is a 63 y o  male w/ PMHx MVCAD who now presents s/p CABGx3  The patient initially presented to the ED @ [facility] on 4/20 with complaints of chest pain  Found to have NSTEMI  Cardiac cath at [facility] revealed [address] and he was transferred to [facility] for cardiac surgery eval  The patient completed pre-op testing and now presents to the unit s/p CABGx3  PMHx: MVCAD, remote tobacco abuse, hyperlipidemia     History obtained from chart review due to patient being intubated and sedated  ---------------------------------------------------------------------------------------------------------------------------------------------------------------------  Impressions:  1  MVCAD s/p CABG x3  2  Post-operative respiratory insufficiency   3  HLD  4  Remote Tobacco abuse    Plan:    Neuro: d/c  continuous sedation  ATC tylenol for pain  Trend neuro exam   Delirium precautions  CV: MAP goal >65  SBP goal <130  CI>2 2  Post-op medications: None     Volume resuscitation as needed  Monitor rhythm on telemetry  Epicardial pacing wires  Intra-op DERECK LVEF 55%  Lung: Check STAT post-op ABG and CXR  Wean vent with spontaneous breathing trial with goal to extubate   GI: GI prophylaxis with PPI  Bowel regimen  Zofran PRN for nausea  FEN: NPO  Replenish K >4 0, mag >2 0 and calcium >7 0  : Check STAT post-op BMP  Palmer in place  Monitor UOP with goal >0 5cc/kg/hour  Lasix versus volume resuscitate as needed depending on hemodynamics and volume status  ID: Prophylactic post-op abx   Maintain normothermia  Trend temps  Heme: Check STAT post-op H/H and platelets  Monitor incision site, invasive lines, and chest tube outputs for bleeding  Send coag panel if needed  Endo: Insulin gtt for blood sugar control  Results from last 6 Months   Lab Units 21  0946   HEMOGLOBIN A1C % 6 0*     Disposition: ICU Care   ---------------------------------------------------------------------------------------------------------------------------------------------------------------------  Historical Information   Past Medical History:   Diagnosis Date    CAD (coronary artery disease)     Former tobacco use     History of dysphagia     resolved    Hyperlipidemia     Pre-diabetes      Past Surgical History:   Procedure Laterality Date    CARDIAC CATHETERIZATION      COLONOSCOPY N/A 2018    Procedure: COLONOSCOPY;  Surgeon: Janet Saab MD;  Location: [facility] GI [facility]; Service: Gastroenterology    EGD  2019    FINGER AMPUTATION Left     partial 3rd finger     Social History   Social History     Substance and Sexual Activity   Alcohol Use Not Currently    Comment: Rare     Social History     Substance and Sexual Activity   Drug Use No     Social History     Tobacco Use   Smoking Status Former Smoker    Types: Cigarettes    Quit date: 0    Years since quittin 3   Smokeless Tobacco Never Used   Tobacco Comment    quit      Family History   Problem Relation Age of Onset    Heart attack Father 62    No Known Problems Mother     Prostate cancer Neg Hx     Colon cancer Neg Hx     Diabetes Neg Hx     Stroke Neg Hx      I have reviewed this patient's family history and commented on sigificant items within the HPI    ROS: ROS unable to be obtained due to patient being intubated and sedated  Allergies: No Known Allergies    Home Medications:   Prior to Admission medications    Medication Sig Start Date End Date Taking?  Authorizing Provider   ascorbic acid (VITAMIN C) 500 mg tablet Take 500 mg by mouth daily   Yes Historical Provider, MD   cholecalciferol (VITAMIN D3) 1,000 units tablet Take 1,000 Units by mouth daily   Yes Historical Provider, MD   Multiple Vitamin (multivitamin) tablet Take 1 tablet by mouth daily   Yes Historical Provider, MD   simvastatin (ZOCOR) 20 mg tablet TAKE ONE TABLET BY MOUTH EVERY NIGHT AT BEDTIME 3/9/21  Yes Maximilian Cabrales DO     Inpatient Medications:  Scheduled Meds:  Current Facility-Administered Medications   Medication Dose Route Frequency Provider Last Rate    acetaminophen  650 mg Rectal Q4H PRN Caitlyn Underwood PA-C      acetaminophen  975 mg Oral [address] [place], [place]      amiodarone  200 mg Oral [place], [place]      aspirin  325 mg Oral Daily [place], [place]      atorvastatin  80 mg Oral Daily With Wazzap MONIQUE Yepez      bisacodyl  10 mg Rectal Daily PRN Angel Darling PA-C      calcium chloride  1 g Intravenous Once Angel Darling PA-C      cefazolin  2,000 mg Intravenous [address] [place], [place]      chlorhexidine  15 mL Swish [employer] Spit BID [place], [place]      cholecalciferol  1,000 Units Oral Daily [place], [place]      dexmedetomidine  0 1-0 7 mcg/kg/hr Intravenous Titrated Leticia Forde PA-C      fentanyl citrate (PF)  50 mcg Intravenous Q1H PRN Angel Darling PA-C      [START ON 4/27/2021] fondaparinux  2 5 mg Subcutaneous Daily [facility] [place], [place]      furosemide  40 mg Intravenous Q6H PRN Angel Darling PA-C      HYDROmorphone  0 5 mg Intravenous Q2H PRN Angel Darling PA-C      insulin regular (HumuLIN R,NovoLIN R) infusion  0 3-21 Units/hr Intravenous Titrated Angel Yepez PA-C      lactated ringers  500 mL Intravenous Q30 Min PRN Angel aDrling PA-C      lidocaine (cardiac)  100 mg Intravenous Q30 Min PRN Angel Darling PA-C      magnesium sulfate  2 g Intravenous Once Caitlyn Underwood PA-C  mupirocin  1 application Nasal [address] [place], [place]      niCARdipine  2 5-15 mg/hr Intravenous Titrated [place], [place]      ondansetron  4 mg Intravenous Q6H PRN [place], [place]      oxyCODONE  2 5 mg Oral Q4H PRN [place], [place]      oxyCODONE  5 mg Oral Q4H PRN [place], [place]      pantoprazole  40 mg Oral Daily [place], [place]      phenylephine   mcg/min Intravenous Titrated Virginia Wahl PA-C 40 mcg/min (04/26/21 1645)    polyethylene glycol  17 g Oral Daily [place], [place]      potassium chloride  20 mEq Intravenous Once PRN Izola Eva, PA-C      potassium chloride  20 mEq Intravenous Q1H PRN Izola Eva, PA-C      potassium chloride  20 mEq Intravenous Q30 Min PRN Jordy Blas Corea, PA-C      sodium chloride  20 mL/hr Intravenous Continuous Jordy Blas Yepez, PA-C 20 mL/hr (04/26/21 1621)     Continuous Infusions:dexmedetomidine, 0 1-0 7 mcg/kg/hr  insulin regular (HumuLIN R,NovoLIN R) infusion, 0 3-21 Units/hr  niCARdipine, 2 5-15 mg/hr  phenylephine,  mcg/min, Last Rate: 40 mcg/min (04/26/21 1645)  sodium chloride, 20 mL/hr, Last Rate: 20 mL/hr (04/26/21 1621)      PRN Meds:  acetaminophen, 650 mg, Q4H PRN  bisacodyl, 10 mg, Daily PRN  fentanyl citrate (PF), 50 mcg, Q1H PRN  furosemide, 40 mg, Q6H PRN  HYDROmorphone, 0 5 mg, Q2H PRN  lactated ringers, 500 mL, Q30 Min PRN  lidocaine (cardiac), 100 mg, Q30 Min PRN  ondansetron, 4 mg, Q6H PRN  oxyCODONE, 2 5 mg, Q4H PRN  oxyCODONE, 5 mg, Q4H PRN  potassium chloride, 20 mEq, Once PRN  potassium chloride, 20 mEq, Q1H PRN  potassium chloride, 20 mEq, Q30 Min PRN      ---------------------------------------------------------------------------------------------------------------------------------------------------------------------  Vitals:   Vitals:    04/26/21 1545 04/26/21 1600 04/26/21 1615 04/26/21 1630   BP:       BP Location:       Pulse: 60 78 78 80   Resp: 16 15 17 15   Temp:       TempSrc:       SpO2: 95% 96% 98% 97%   Weight:       Height:         Arterial Line:  Arterial Line BP: 70/46  Arterial Line MAP (mmHg): 54 mmHg    Temperature: Temp (24hrs), Av 8 °F (36 6 °C), Min:97 3 °F (36 3 °C), Max:98 1 °F (36 7 °C)  Current: Temperature: 97 9 °F (36 6 °C)    Weights: IBW (Ideal Body Weight): 66 1 kg  Body mass index is 29 83 kg/m²  Hemodynamic Monitoring:  PAP: PAP: /, CVP: CVP (mean): 12 mmHg, CO: CO (L/min): 7 6 L/min, CI: CI (L/min/m2): 3 8 L/min/m2, SVR: SVR (dyne*sec)/cm5: 662 (dyne*sec)/cm5    Ventilator Settings:  Respiratory    Lab Data (Last 4 hours)    None         O2/Vent Data (Last 4 hours)       1600           Vent Mode AC/VC       Resp Rate (BPM) (BPM) 14       Vt (mL) (mL) 500       FIO2 (%) (%) 60       PEEP (cmH2O) (cmH2O) 6       Patient safety screen outcome: Passed       MV 6 4                 Labs:   Results from last 7 days   Lab Units 21  1553 21  1549 21  1448  21  1341  21  0517 21  0306 21  0559  21  0656   WBC Thousand/uL  --   --   --   --   --   --  10 89* 10 84* 9 54  --  7 69   HEMOGLOBIN g/dL  --  12 2  --   --   --   --  15 3 13 8 15 6  --  15 0   I STAT HEMOGLOBIN g/dl 11 9*  --  10 9*   < >  --    < >  --   --   --   --   --    HEMATOCRIT %  --  38 0  --   --   --   --  47 4 43 1 48 2  --  48 1   HEMATOCRIT, ISTAT % 35*  --  32*   < >  --    < >  --   --   --   --   --    PLATELETS Thousands/uL  --  167  --   --  164  --  275 238 280   < > 285   NEUTROS PCT %  --   --   --   --   --   --  64  --   --   --  59   MONOS PCT %  --   --   --   --   --   --  7  --   --   --  8    < > = values in this interval not displayed       Results from last 7 days   Lab Units 21  1553 21  1549 21  1448 21  1422  21  0517 21  0539  21  0656   SODIUM mmol/L  --  142  --   --   --  138 138   < > 139   POTASSIUM mmol/L  --  4 6 --   --   --  4 0 4 0   < > 4 3   CHLORIDE mmol/L  --  112*  --   --   --  107 108   < > 102   CO2 mmol/L  --  18*  --   --   --  24 22   < > 30   CO2, I-STAT mmol/L 22  --  22 26   < >  --   --   --   --    BUN mg/dL  --  15  --   --   --  18 16   < > 12   CREATININE mg/dL  --  1 03  --   --   --  1 16 0 98   < > 1 18   CALCIUM mg/dL  --  8 0*  --   --   --  9 7 9 5   < > 9 2   ALK PHOS U/L  --   --   --   --   --  62  --   --  58   ALT U/L  --   --   --   --   --  91*  --   --  33   AST U/L  --   --   --   --   --  54*  --   --  26   GLUCOSE, ISTAT mg/dl 139  --  162* 139   < >  --   --   --   --     < > = values in this interval not displayed  Post-op /40/70/20/-6/92%  Post-op CXR: Lines and tubes in position, no PTX I have personally reviewed pertinent films in PACS  Post-op EKG: NSR This was personally reviewed by myself  Physical Exam  HENT:      Head: Normocephalic  Mouth/Throat:      Mouth: Mucous membranes are moist    Eyes:      Pupils: Pupils are equal, round, and reactive to light  Neck:      Musculoskeletal: Normal range of motion and neck supple  Comments: RIJ lines    Cardiovascular:      Rate and Rhythm: Normal rate and regular rhythm  Pulmonary:      Comments: Clear mechanical breath sounds B/L    Abdominal:      General: Abdomen is flat  Palpations: Abdomen is soft  Skin:     General: Skin is warm  Capillary Refill: Capillary refill takes less than 2 seconds  Neurological:      General: No focal deficit present  Mental Status: He is alert  Invasive lines and devices:   Invasive Devices     Central Venous Catheter Line            CVC Central Lines 21 Triple less than 1 day    Introducer 21 less than 1 day          Peripheral Intravenous Line            Peripheral IV 21 Left Antecubital 2 days    Peripheral IV 21 Right Hand less than 1 day          Arterial Line            Arterial Line 21 less than 1 day Line            Pacer Wires less than 1 day    Pacer Wires less than 1 day          Drain            Chest Tube 1 Left Pleural 32 Fr  less than 1 day    Chest Tube 2 Posterior Mediastinal 32 Fr  less than 1 day    Chest Tube 3 Anterior Mediastinal 32 Fr  less than 1 day    Urethral Catheter Non-latex; Temperature probe; Other (Comment) 16 Fr  less than 1 day          Airway            ETT  Cuffed;Oral;Hi-Lo 8 mm less than 1 day              ---------------------------------------------------------------------------------------------------------------------------------------------------------------------  Care Time Delivered:   No Critical Care time spent     SIGNATURE: Silvio Morris PA-C  DATE: April 26, 2021  TIME: 4:54 PM

## 2021-04-26 NOTE — ANESTHESIA PREPROCEDURE EVALUATION
Procedure:  CORONARY ARTERY BYPASS GRAFT (CABG) 3- VESSELS, EVH (N/A Chest)    Relevant Problems   CARDIO   (+) Coronary artery disease      GI/HEPATIC   (+) Hiatal hernia     Cardiac Catheterization: 80% mid LAD s/p D2, 70% D1, 80% ostial circ, 80% ostial OM1, 50% mid RPDA      Echocardiogram: EF 67%  Trace MR  Trace TR        Carotid artery duplex:              < 50% right carotid stenosis  Vertebral artery flow is antegrade and There is no significant subclavian artery              50-69% left carotid stenosis  Vertebral artery flow is antegrade and There is no significant subclavian artery     Greater saphenous vein mapping: Adequate conduit for CABG, bilaterally      Lab Results   Component Value Date    WBC 10 89 (H) 04/26/2021    HGB 15 3 04/26/2021     04/26/2021     Lab Results   Component Value Date    SODIUM 138 04/26/2021    K 4 0 04/26/2021    BUN 18 04/26/2021    CREATININE 1 16 04/26/2021    EGFR 67 04/26/2021     Lab Results   Component Value Date    PTT 75 (H) 04/26/2021      Lab Results   Component Value Date    INR 0 94 04/20/2021       Blood type A    Lab Results   Component Value Date    HGBA1C 6 0 (H) 04/20/2021       Physical Exam    Airway    Mallampati score: II  TM Distance: >3 FB       Dental   implants,     Cardiovascular      Pulmonary      Other Findings        Anesthesia Plan  ASA Score- 4     Anesthesia Type- general with ASA Monitors  Additional Monitors: central venous line, pulmonary artery catheter and arterial line  Airway Plan: ETT  Comment: ANTHONY Perdue , have personally seen and evaluated the patient prior to anesthetic care  I have reviewed the pre-anesthetic record, and other medical records if appropriate to the anesthetic care  If a CRNA is involved in the case, I have reviewed the CRNA assessment, if present, and agree        Risks/benefits and alternatives discussed with patient including possible PONV, sore throat, and possibility of rare anesthetic and surgical emergencies  Patient denies history of dysphagia, diverticula, esophageal dysmotility, strictures, stents, or varices  Preinduction ABP; post-induction TLC and PAC; DERECK and ICU post-operatively          Plan Factors-    Induction- intravenous  Postoperative Plan-     Informed Consent- Anesthetic plan and risks discussed with patient  I personally reviewed this patient with the CRNA  Discussed and agreed on the Anesthesia Plan with the CRNA  Kaila Lowe

## 2021-04-27 ENCOUNTER — APPOINTMENT (INPATIENT)
Dept: RADIOLOGY | Facility: HOSPITAL | Age: 66
DRG: 235 | End: 2021-04-27
Payer: COMMERCIAL

## 2021-04-27 LAB
ABO GROUP BLD BPU: NORMAL
ANION GAP SERPL CALCULATED.3IONS-SCNC: 10 MMOL/L (ref 4–13)
ATRIAL RATE: 62 BPM
ATRIAL RATE: 88 BPM
ATRIAL RATE: 96 BPM
BPU ID: NORMAL
BUN SERPL-MCNC: 16 MG/DL (ref 5–25)
CALCIUM SERPL-MCNC: 7.8 MG/DL (ref 8.3–10.1)
CHLORIDE SERPL-SCNC: 111 MMOL/L (ref 100–108)
CO2 SERPL-SCNC: 20 MMOL/L (ref 21–32)
CREAT SERPL-MCNC: 1.14 MG/DL (ref 0.6–1.3)
CROSSMATCH: NORMAL
ERYTHROCYTE [DISTWIDTH] IN BLOOD BY AUTOMATED COUNT: 14.8 % (ref 11.6–15.1)
GFR SERPL CREATININE-BSD FRML MDRD: 68 ML/MIN/1.73SQ M
GLUCOSE SERPL-MCNC: 131 MG/DL (ref 65–140)
GLUCOSE SERPL-MCNC: 132 MG/DL (ref 65–140)
GLUCOSE SERPL-MCNC: 133 MG/DL (ref 65–140)
GLUCOSE SERPL-MCNC: 146 MG/DL (ref 65–140)
GLUCOSE SERPL-MCNC: 155 MG/DL (ref 65–140)
GLUCOSE SERPL-MCNC: 156 MG/DL (ref 65–140)
GLUCOSE SERPL-MCNC: 157 MG/DL (ref 65–140)
GLUCOSE SERPL-MCNC: 180 MG/DL (ref 65–140)
GLUCOSE SERPL-MCNC: 195 MG/DL (ref 65–140)
HCT VFR BLD AUTO: 42.4 % (ref 36.5–49.3)
HCT VFR BLD AUTO: 42.5 % (ref 36.5–49.3)
HGB BLD-MCNC: 13.7 G/DL (ref 12–17)
HGB BLD-MCNC: 13.8 G/DL (ref 12–17)
MAGNESIUM SERPL-MCNC: 2.3 MG/DL (ref 1.6–2.6)
MCH RBC QN AUTO: 28.6 PG (ref 26.8–34.3)
MCHC RBC AUTO-ENTMCNC: 32.5 G/DL (ref 31.4–37.4)
MCV RBC AUTO: 88 FL (ref 82–98)
P AXIS: 53 DEGREES
P AXIS: 54 DEGREES
P AXIS: 72 DEGREES
PLATELET # BLD AUTO: 170 THOUSANDS/UL (ref 149–390)
PMV BLD AUTO: 10.4 FL (ref 8.9–12.7)
POTASSIUM SERPL-SCNC: 4.6 MMOL/L (ref 3.5–5.3)
POTASSIUM SERPL-SCNC: 4.7 MMOL/L (ref 3.5–5.3)
PR INTERVAL: 142 MS
PR INTERVAL: 171 MS
QRS AXIS: -12 DEGREES
QRS AXIS: 50 DEGREES
QRS AXIS: 57 DEGREES
QRSD INTERVAL: 83 MS
QRSD INTERVAL: 92 MS
QRSD INTERVAL: 92 MS
QT INTERVAL: 346 MS
QT INTERVAL: 392 MS
QT INTERVAL: 408 MS
QTC INTERVAL: 415 MS
QTC INTERVAL: 438 MS
QTC INTERVAL: 475 MS
RBC # BLD AUTO: 4.83 MILLION/UL (ref 3.88–5.62)
SODIUM SERPL-SCNC: 141 MMOL/L (ref 136–145)
T WAVE AXIS: 43 DEGREES
T WAVE AXIS: 65 DEGREES
T WAVE AXIS: 76 DEGREES
UNIT DISPENSE STATUS: NORMAL
UNIT PRODUCT CODE: NORMAL
UNIT RH: NORMAL
VENTRICULAR RATE: 62 BPM
VENTRICULAR RATE: 88 BPM
VENTRICULAR RATE: 96 BPM
WBC # BLD AUTO: 22.04 THOUSAND/UL (ref 4.31–10.16)

## 2021-04-27 PROCEDURE — 93010 ELECTROCARDIOGRAM REPORT: CPT | Performed by: INTERNAL MEDICINE

## 2021-04-27 PROCEDURE — 99232 SBSQ HOSP IP/OBS MODERATE 35: CPT | Performed by: INTERNAL MEDICINE

## 2021-04-27 PROCEDURE — 94760 N-INVAS EAR/PLS OXIMETRY 1: CPT

## 2021-04-27 PROCEDURE — 97166 OT EVAL MOD COMPLEX 45 MIN: CPT

## 2021-04-27 PROCEDURE — 82948 REAGENT STRIP/BLOOD GLUCOSE: CPT

## 2021-04-27 PROCEDURE — 93005 ELECTROCARDIOGRAM TRACING: CPT

## 2021-04-27 PROCEDURE — 99233 SBSQ HOSP IP/OBS HIGH 50: CPT | Performed by: STUDENT IN AN ORGANIZED HEALTH CARE EDUCATION/TRAINING PROGRAM

## 2021-04-27 PROCEDURE — 85027 COMPLETE CBC AUTOMATED: CPT | Performed by: PHYSICIAN ASSISTANT

## 2021-04-27 PROCEDURE — 80048 BASIC METABOLIC PNL TOTAL CA: CPT | Performed by: PHYSICIAN ASSISTANT

## 2021-04-27 PROCEDURE — 99024 POSTOP FOLLOW-UP VISIT: CPT | Performed by: THORACIC SURGERY (CARDIOTHORACIC VASCULAR SURGERY)

## 2021-04-27 PROCEDURE — 97163 PT EVAL HIGH COMPLEX 45 MIN: CPT

## 2021-04-27 PROCEDURE — 83735 ASSAY OF MAGNESIUM: CPT | Performed by: PHYSICIAN ASSISTANT

## 2021-04-27 PROCEDURE — 97110 THERAPEUTIC EXERCISES: CPT

## 2021-04-27 PROCEDURE — 71045 X-RAY EXAM CHEST 1 VIEW: CPT

## 2021-04-27 RX ORDER — FUROSEMIDE 10 MG/ML
40 INJECTION INTRAMUSCULAR; INTRAVENOUS DAILY
Status: DISCONTINUED | OUTPATIENT
Start: 2021-04-27 | End: 2021-04-29 | Stop reason: HOSPADM

## 2021-04-27 RX ORDER — OXYCODONE HYDROCHLORIDE 10 MG/1
10 TABLET ORAL EVERY 4 HOURS PRN
Status: DISCONTINUED | OUTPATIENT
Start: 2021-04-27 | End: 2021-04-29 | Stop reason: HOSPADM

## 2021-04-27 RX ORDER — TEMAZEPAM 15 MG/1
15 CAPSULE ORAL
Status: DISCONTINUED | OUTPATIENT
Start: 2021-04-27 | End: 2021-04-29 | Stop reason: HOSPADM

## 2021-04-27 RX ORDER — POTASSIUM CHLORIDE 20 MEQ/1
20 TABLET, EXTENDED RELEASE ORAL DAILY
Status: DISCONTINUED | OUTPATIENT
Start: 2021-04-27 | End: 2021-04-29 | Stop reason: HOSPADM

## 2021-04-27 RX ORDER — DOCUSATE SODIUM 100 MG/1
100 CAPSULE, LIQUID FILLED ORAL 2 TIMES DAILY
Status: DISCONTINUED | OUTPATIENT
Start: 2021-04-27 | End: 2021-04-29 | Stop reason: HOSPADM

## 2021-04-27 RX ORDER — KETOROLAC TROMETHAMINE 30 MG/ML
15 INJECTION, SOLUTION INTRAMUSCULAR; INTRAVENOUS EVERY 6 HOURS SCHEDULED
Status: DISCONTINUED | OUTPATIENT
Start: 2021-04-27 | End: 2021-04-29 | Stop reason: HOSPADM

## 2021-04-27 RX ORDER — OXYCODONE HYDROCHLORIDE 5 MG/1
5 TABLET ORAL EVERY 4 HOURS PRN
Status: DISCONTINUED | OUTPATIENT
Start: 2021-04-27 | End: 2021-04-29 | Stop reason: HOSPADM

## 2021-04-27 RX ADMIN — CEFAZOLIN SODIUM 2000 MG: 2 SOLUTION INTRAVENOUS at 14:48

## 2021-04-27 RX ADMIN — PANTOPRAZOLE SODIUM 40 MG: 40 TABLET, DELAYED RELEASE ORAL at 08:26

## 2021-04-27 RX ADMIN — OXYCODONE HYDROCHLORIDE 5 MG: 5 TABLET ORAL at 01:51

## 2021-04-27 RX ADMIN — CEFAZOLIN SODIUM 2000 MG: 2 SOLUTION INTRAVENOUS at 06:11

## 2021-04-27 RX ADMIN — FUROSEMIDE 40 MG: 10 INJECTION, SOLUTION INTRAMUSCULAR; INTRAVENOUS at 08:26

## 2021-04-27 RX ADMIN — OXYCODONE HYDROCHLORIDE 5 MG: 5 TABLET ORAL at 17:35

## 2021-04-27 RX ADMIN — INSULIN LISPRO 2 UNITS: 100 INJECTION, SOLUTION INTRAVENOUS; SUBCUTANEOUS at 11:51

## 2021-04-27 RX ADMIN — POLYETHYLENE GLYCOL 3350 17 G: 17 POWDER, FOR SOLUTION ORAL at 08:26

## 2021-04-27 RX ADMIN — MUPIROCIN 1 APPLICATION: 20 OINTMENT TOPICAL at 21:23

## 2021-04-27 RX ADMIN — AMIODARONE HYDROCHLORIDE 200 MG: 200 TABLET ORAL at 21:23

## 2021-04-27 RX ADMIN — INSULIN LISPRO 1 UNITS: 100 INJECTION, SOLUTION INTRAVENOUS; SUBCUTANEOUS at 17:35

## 2021-04-27 RX ADMIN — ATORVASTATIN CALCIUM 80 MG: 80 TABLET, FILM COATED ORAL at 17:35

## 2021-04-27 RX ADMIN — DOCUSATE SODIUM 100 MG: 100 CAPSULE, LIQUID FILLED ORAL at 17:36

## 2021-04-27 RX ADMIN — ACETAMINOPHEN 975 MG: 325 TABLET ORAL at 14:52

## 2021-04-27 RX ADMIN — Medication 1000 UNITS: at 08:26

## 2021-04-27 RX ADMIN — Medication 12.5 MG: at 08:25

## 2021-04-27 RX ADMIN — AMIODARONE HYDROCHLORIDE 200 MG: 200 TABLET ORAL at 14:34

## 2021-04-27 RX ADMIN — OXYCODONE HYDROCHLORIDE 10 MG: 10 TABLET ORAL at 06:11

## 2021-04-27 RX ADMIN — ACETAMINOPHEN 975 MG: 325 TABLET ORAL at 06:55

## 2021-04-27 RX ADMIN — MUPIROCIN 1 APPLICATION: 20 OINTMENT TOPICAL at 08:25

## 2021-04-27 RX ADMIN — KETOROLAC TROMETHAMINE 15 MG: 30 INJECTION, SOLUTION INTRAMUSCULAR at 19:33

## 2021-04-27 RX ADMIN — ASPIRIN 325 MG ORAL TABLET 325 MG: 325 PILL ORAL at 08:26

## 2021-04-27 RX ADMIN — HYDROMORPHONE HYDROCHLORIDE 0.5 MG: 1 INJECTION, SOLUTION INTRAMUSCULAR; INTRAVENOUS; SUBCUTANEOUS at 02:41

## 2021-04-27 RX ADMIN — DOCUSATE SODIUM 100 MG: 100 CAPSULE, LIQUID FILLED ORAL at 08:26

## 2021-04-27 RX ADMIN — POTASSIUM CHLORIDE 20 MEQ: 1500 TABLET, EXTENDED RELEASE ORAL at 08:26

## 2021-04-27 RX ADMIN — KETOROLAC TROMETHAMINE 15 MG: 30 INJECTION, SOLUTION INTRAMUSCULAR at 08:26

## 2021-04-27 RX ADMIN — KETOROLAC TROMETHAMINE 15 MG: 30 INJECTION, SOLUTION INTRAMUSCULAR at 14:31

## 2021-04-27 RX ADMIN — FONDAPARINUX SODIUM 2.5 MG: 2.5 INJECTION, SOLUTION SUBCUTANEOUS at 08:25

## 2021-04-27 RX ADMIN — AMIODARONE HYDROCHLORIDE 200 MG: 200 TABLET ORAL at 06:10

## 2021-04-27 NOTE — PLAN OF CARE
Problem: PHYSICAL THERAPY ADULT  Goal: Performs mobility at highest level of function for planned discharge setting  See evaluation for individualized goals  Description: Treatment/Interventions: Functional transfer training, LE strengthening/ROM, Elevations, Therapeutic exercise, Endurance training, Equipment eval/education, Bed mobility, Gait training, Spoke to nursing, OT  Equipment Recommended: Walker(at this time but will cont to monitor progress)       See flowsheet documentation for full assessment, interventions and recommendations  Note: Prognosis: Good  Problem List: Decreased strength, Decreased endurance, Impaired balance, Decreased mobility  Assessment: Pt is 63 y o  male admitted with hx of CP; pt underwent cardiac cath which revealed MV CAD; during the course, pt underwent coronary artery bypass grafting x 3 with left internal mammary artery to left anterior descending, saphenous vein graft to obtuse marginal 1, saphenous vein graft to first diagonal on 4/26/2021  Pt 's comorbidities affecting POC include: dysphagia, former tobacco use, and pre-diabetes and personal factors of: ELIDA and being employed  Pt's clinical presentation is currently unstable/unpredictable which is evident in ongoing telemetry monitoring, CT in place, and need for (A) w/ all aspects of observed mobility incl amb w/ rw when usually amb (I) and w/o AD  Pt presents w/ postop discomfort and guarding, min generalized weakness, incl min decreased LE strength, min decreased functional endurance, and min impaired balance w/ associated gait deviations requiring use of rw at this time  Will cont to follow pt in PT for progressive mobilization to address above functional deficits and to max level of (I), endurance, and safety  Otherwise, anticipate pt will return home w/ available family support upon D/C provided he cont improving w/ mobility skills, safety, and endurance (incl on the steps) and when medically cleared; will follow  PT Discharge Recommendation: Home with outpatient rehabilitation(cardiac rehab when medically cleared)          See flowsheet documentation for full assessment

## 2021-04-27 NOTE — OCCUPATIONAL THERAPY NOTE
Occupational Therapy Evaluation     Patient Name: Aye Garcia  [id number] Date: 4/27/2021  Problem List  Principal Problem:    S/P CABG (coronary artery bypass graft)  Active Problems:    Dyslipidemia    Coronary artery disease    Elevated troponin    Past Medical History  Past Medical History:   Diagnosis Date    CAD (coronary artery disease)     Former tobacco use     History of dysphagia     resolved    Hyperlipidemia     Pre-diabetes      Past Surgical History  Past Surgical History:   Procedure Laterality Date    CARDIAC CATHETERIZATION      COLONOSCOPY N/A 5/18/2018    Procedure: COLONOSCOPY;  Surgeon: Janet Saab MD;  Location: [facility] GI LAB; Service: Gastroenterology    EGD  08/26/2019    FINGER AMPUTATION Left     partial 3rd finger    NH CABG, ARTERY-VEIN, THREE N/A 4/26/2021    Procedure: CORONARY ARTERY BYPASS GRAFT (CABG) x 3; LIMA to LAD; Left EVH/SVG to OM1 and D1;  Surgeon: Bonita Espinosa MD;  Location:  MAIN OR;  Service: Cardiac Surgery           04/27/21 0926   OT Last Visit   OT Visit Date 04/27/21   Note Type   Note type Evaluation   Restrictions/Precautions   Other Precautions Cardiac/sternal;Multiple lines;Telemetry  (CT)   Pain Assessment   Pain Assessment Tool 0-10   Pain Score 5   Pain Location/Orientation Orientation: Mid;Location: Chest   Hospital Pain Intervention(s) Repositioned; Ambulation/increased activity; Emotional support   Home Living   Type of [facility] [address] One level;Stairs to enter with rails   Bathroom Shower/Tub Walk-in shower   Bathroom Toilet Standard   Bathroom Equipment Grab bars in [facility] 03   (denies)   Additional Comments Pt reports living in a 1 story home with 2+1 ELIDA      Prior Function   Level of [place] Independent with ADLs and functional mobility   Lives With Spouse   Receives Help From Family   ADL Assistance Independent   IADLs Independent   Falls in the last 6 months 0   Vocational Full time employment   Lifestyle   Autonomy Pt reports being I with ADLS, IADLS and mobility without device PTA  (+)     Reciprocal Relationships Pt lives with his wife who he reports is able to assist as needed upon d/c     Service to Others Works full time   [address] Enjoys being with his dogs and shooting    ADL   Where [address] 5  Supervision/Setup   UB [address] 4  Minimal Assistance   LB [address] 3  Moderate Assistance   [address] [facility] 4  Minimal Assistance    [facility] 3  Moderate [address]  3  Moderate Assistance   Transfers   Sit to Stand 4  Minimal assistance   Additional items Assist x 1; Increased time required   Stand to Sit 4  Minimal assistance   Additional items Assist x 1; Increased time required   Functional Mobility   Functional Mobility 4  Minimal assistance   Additional Comments Pt demonstrated short household mobility with RW  Additional items Rolling walker   Balance   Static Sitting Fair +   Dynamic Sitting Fair   Static Standing Fair -   Dynamic Standing Poor +   Ambulatory Poor +   Activity Tolerance   Activity Tolerance Patient limited by fatigue   Medical Staff Made Aware PT Jimmy   Nurse Made Aware RN confirmed okay to see pt   RUE Assessment   RUE Assessment WFL   LUE Assessment   LUE Assessment WFL   Cognition   Overall Cognitive Status WFL   Arousal/Participation Alert; Cooperative   Attention Within functional limits   Orientation Level Oriented to person;Oriented to place;Oriented to situation   Memory Decreased recall of precautions   Following Commands Follows one step commands with increased time or repetition   Comments Pt is pleasant and cooperative to work with therapy  Pt given cardiac packet this session and reports that he has no questions at this time      Assessment   Assessment Pt is a 63 y o  male admitted to [facility] on 4/21/2021 w/ multivessel CAD s/p CABG x3 on 4/26/2021  Pt  has a past medical history of CAD (coronary artery disease), Former tobacco use, History of dysphagia, Hyperlipidemia, and Pre-diabetes  Pt with active OT orders and ambulate  orders  Pt resides in a 1 story home with 2+1 ELIDA  Pt lives with his wife who he reports is able to assist as needed upon d/c  Pt was I w/  ADLS and IADLS, (+) drove, & required no use of DME PTA  Currently pt is min A for functional transfers and functional mobility, min A for UB ADLS and mod A for LB ADLS  Pt is limited at this time 2*: pain  Based on the aforementioned OT evaluation, functional performance deficits, and assessments, pt has been identified as a moderate complexity evaluation  From OT standpoint, anticipate d/c home with family support  Recommend continued participation in [facility] and functional mobility with staff  No further acute OT needs, d/c OT      Goals   Patient Goals To feel better and return home    Recommendation   OT Discharge Recommendation No rehabilitation needs  (home with increased family support)   OT - OK to Discharge Yes  (When medically appropriate)   AM-PAC Daily Activity Inpatient   Lower Body Dressing 2   Bathing 2   Toileting 2   Upper Body Dressing 3   Grooming 4   Eating 4   Daily Activity Raw Score 17   Daily Activity Standardized Score (Calc for Raw Score >=11) 37 26   AM-PAC Applied Cognition Inpatient   Following a Speech/Presentation 3   Understanding Ordinary Conversation 4   Taking Medications 4   Remembering Where Things Are Placed or Put Away 4   Remembering List of 4-5 Errands 4   Taking Care of Complicated Tasks 3   Applied Cognition Raw Score 22   Applied Cognition Standardized Score 47 83   Modified [place] Scale   Modified [place] Scale 4       Tamara Love, MOT, OTR/L

## 2021-04-27 NOTE — PHYSICAL THERAPY NOTE
Physical Therapy Evaluation     Patient's Name: Aydin Conley    Admitting Diagnosis  Triple vessel disease of the heart [I25 10]    Problem List  Patient Active Problem List   Diagnosis    Special screening for malignant neoplasms, colon    Polyp of colon    Dyslipidemia    Vitamin D deficiency    Hiatal hernia    Coronary artery disease    Elevated troponin    S/P CABG (coronary artery bypass graft)       Past Medical History  Past Medical History:   Diagnosis Date    CAD (coronary artery disease)     Former tobacco use     History of dysphagia     resolved    Hyperlipidemia     Pre-diabetes        Past Surgical History  Past Surgical History:   Procedure Laterality Date    CARDIAC CATHETERIZATION      COLONOSCOPY N/A 5/18/2018    Procedure: COLONOSCOPY;  Surgeon: Leobardo Miramontes MD;  Location: [facility] GI LAB; Service: Gastroenterology    EGD  08/26/2019    FINGER AMPUTATION Left     partial 3rd finger          04/27/21 0925   PT Last Visit   PT Visit Date 04/27/21   Note Type   Note type Evaluation  (and Tx)   Pain Assessment   Pain Assessment Tool 0-10   Pain Score 5   Pain Location/Orientation Orientation: Mid;Location: Chest;Location: Incision   Pain Onset/Description Onset: Ongoing;Frequency: Intermittent; Descriptor: Aching;Descriptor: Discomfort   Effect of Pain on Daily Activities guarding   Patient's Stated Pain Goal No pain   Hospital Pain Intervention(s) Repositioned; Ambulation/increased activity; Emotional support   Home Living   Type of [address] One level  (2+1 ELIDA w/ hand rail)   Prior Function   Level of [place] Independent with ADLs and functional mobility  (amb w/o AD)   Lives With Spouse   Vocational Full time employment   Restrictions/Precautions   Braces or Orthoses   (denies)   Other Precautions Cardiac/sternal;Multiple lines;Telemetry  (CT)   General   Additional Pertinent History cleared for assessment (spoke to nsg)   Cognition   Overall Cognitive Status WFL   Arousal/Participation Alert   Orientation Level Oriented to person;Oriented to place;Oriented to situation   Memory Within functional limits   Following Commands Follows one step commands without difficulty   Comments Alert; in the chair; agreeable to mobilize;   RUE Assessment   RUE Assessment WFL  (AROM)   LUE Assessment   LUE Assessment WFL  (AROM)   RLE Assessment   RLE Assessment WFL  (AROM)   Strength RLE   RLE Overall Strength   (good - (grossly))   LLE Assessment   LLE Assessment WFL  (AROM)   Strength LLE   LLE Overall Strength   (good - (grossly))   Transfers   Sit to Stand 4  Minimal assistance   Additional items Assist x 1;Verbal cues   Stand to Sit 3  Moderate assistance   Additional items Assist x 1;Verbal cues; Increased time required   Ambulation/Elevation   Gait pattern Excessively slow; Short stride; Inconsistent lana   Gait Assistance 4  Minimal assist   Additional items Assist x 1;Verbal cues; Tactile cues  (stand by (A) of 2 more staff for lines and chair follow)   Assistive Device Rolling walker   Distance 130 ft   Balance   Static Sitting Fair +   Dynamic Sitting Fair   Static Standing Fair -   Dynamic Standing Poor +   Ambulatory Poor +   Activity Tolerance   Activity Tolerance Patient limited by fatigue   Nurse Made Aware spoke to [address]   Assessment   Prognosis Good   Problem List Decreased strength;Decreased endurance; Impaired balance;Decreased mobility   Assessment Pt is 63 y o  male admitted with hx of CP; pt underwent cardiac cath which revealed MV CAD; during the course, pt underwent coronary artery bypass grafting x 3 with left internal mammary artery to left anterior descending, saphenous vein graft to obtuse marginal 1, saphenous vein graft to first diagonal on 4/26/2021  Pt 's comorbidities affecting POC include: dysphagia, former tobacco use, and pre-diabetes and personal factors of: ELIDA and being employed   Pt's clinical presentation is currently unstable/unpredictable which is evident in ongoing telemetry monitoring, CT in place, and need for (A) w/ all aspects of observed mobility incl amb w/ rw when usually amb (I) and w/o AD  Pt presents w/ postop discomfort and guarding, min generalized weakness, incl min decreased LE strength, min decreased functional endurance, and min impaired balance w/ associated gait deviations requiring use of rw at this time  Will cont to follow pt in PT for progressive mobilization to address above functional deficits and to max level of (I), endurance, and safety  Otherwise, anticipate pt will return home w/ available family support upon D/C provided he cont improving w/ mobility skills, safety, and endurance (incl on the steps) and when medically cleared; will follow  Goals   Patient Goals to go home   STG Expiration Date 05/07/21   Short Term Goal #1 7-10 days  Pt will amb 300 ft w/ least restrictive assistive device PRN, mod (I) in order to facilitate safe return to premorbid environment and community amb status  Pt will negotiate 3 steps w/ hand rail and SPC PRN, mod (I) in order to navigate in and out of home environment safely  Pt will achieve (I) level w/ bed mob in order to facilitate safety with OOB and back to bed transitions in own living environment  Pt will perform transfers w/ mod (I) to assure (I) and safety w/ functional mobility/transitions w/ all aspects of mobility/locomotion  Pt will participate in LE therex and balance activities to max progression w/ mobility skills  PT Treatment Day 1  (follow up Tx session)   Plan   Treatment/Interventions Functional transfer training;LE strengthening/ROM; Elevations; Therapeutic exercise; Endurance training;Equipment eval/education; Bed mobility;Gait training;Spoke to nursing;OT   PT Frequency Other (Comment)  (4-6x/wk)   Recommendation   PT Discharge Recommendation Home with outpatient rehabilitation  (cardiac rehab when medically cleared)   Equipment Recommended Walker  (at this time but will cont to monitor progress)   Arnol Felipe walker   [address]   Turning in Bed Without Bedrails 3   Lying on Back to Sitting on Edge of Flat Bed 3   Moving Bed to Chair 3   Standing Up From Chair 3   Walk in Room 3   Climb 3-5 Stairs 2   Basic Mobility Inpatient Raw Score 17   Basic Mobility Standardized Score 39 67   Modified [place] Scale   Modified [place] Scale 4         Jimmy Abreu, PT  PT Tx note    Time In: 09:27  Time Out: 09:38  Total Time: 11 min    S:  Pt is in the chair; appears to be comfortable; agreeable to perform LE therex;     O:  (B) LE therex performed in the chair as following: (B) ankle pumps 2 x 10 reps, AROM; (B) LAQ 2 x 10 reps, AROM; marching 2 x 5 reps, AROM; call bell placed w/in reach;     A:  Additional follow up consecutive session performed to initiate LE therex in order to max strength and to facilitate progression w/ functional mobility skills and overall level of (I) and to initiate HEP  Pt was able to complete the program actively w/ no increased discomfort or excessive fatigue expressed; verbal instructions for proper form provided as well as rest periods provided during the session as needed; pt remained in NAD and appeared to be comfortable at the end of session; currently, cont to anticipate pt will return home w/ family support pending additional progress (incl on the steps) and when medically cleared; will follow  P:  Cont to follow pt 4-6x/week for LE therex, functional mobility training, endurance training and pt education per POC to max functional (I) and safety        Joy Harrington, PT

## 2021-04-27 NOTE — PLAN OF CARE
Problem: PAIN - ADULT  Goal: Verbalizes/displays adequate comfort level or baseline comfort level  Description: Interventions:  - Encourage patient to monitor pain and request assistance  - Assess pain using appropriate pain scale  - Administer analgesics based on type and severity of pain and evaluate response  - Implement non-pharmacological measures as appropriate and evaluate response  - Consider cultural and social influences on pain and pain management  - Notify physician/advanced practitioner if interventions unsuccessful or patient reports new pain  Outcome: Progressing     Problem: INFECTION - ADULT  Goal: Absence or prevention of progression during hospitalization  Description: INTERVENTIONS:  - Assess and monitor for signs and symptoms of infection  - Monitor lab/diagnostic results  - Monitor all insertion sites, i e  indwelling lines, tubes, and drains  - Monitor endotracheal if appropriate and nasal secretions for changes in amount and color  - [place] appropriate cooling/warming therapies per order  - Administer medications as ordered  - Instruct and encourage patient and family to use good hand hygiene technique  - Identify and instruct in appropriate isolation precautions for identified infection/condition  Outcome: Progressing  Goal: Absence of fever/infection during neutropenic period  Description: INTERVENTIONS:  - Monitor WBC    Outcome: Progressing     Problem: SAFETY ADULT  Goal: Patient will remain free of falls  Description: INTERVENTIONS:  - Assess patient frequently for physical needs  -  Identify cognitive and physical deficits and behaviors that affect risk of falls    -  [place] fall precautions as indicated by assessment   - Educate patient/family on patient safety including physical limitations  - Instruct patient to call for assistance with activity based on assessment  - Modify environment to reduce risk of injury  - Consider OT/PT consult to assist with strengthening/mobility  Outcome: Progressing  Goal: Maintain or return to baseline ADL function  Description: INTERVENTIONS:  -  Assess patient's ability to carry out ADLs; assess patient's baseline for ADL function and identify physical deficits which impact ability to perform ADLs (bathing, care of mouth/teeth, toileting, grooming, dressing, etc )  - Assess/evaluate cause of self-care deficits   - Assess range of motion  - Assess patient's mobility; develop plan if impaired  - Assess patient's need for assistive devices and provide as appropriate  - Encourage maximum independence but intervene and supervise when necessary  - Involve family in performance of ADLs  - Assess for home care needs following discharge   - Consider OT consult to assist with ADL evaluation and planning for discharge  - Provide patient education as appropriate  Outcome: Progressing  Goal: Maintain or return mobility status to optimal level  Description: INTERVENTIONS:  - Assess patient's baseline mobility status (ambulation, transfers, stairs, etc )    - Identify cognitive and physical deficits and behaviors that affect mobility  - Identify mobility aids required to assist with transfers and/or ambulation (gait belt, sit-to-stand, lift, walker, cane, etc )  - [place] fall precautions as indicated by assessment  - Record patient progress and toleration of activity level on Mobility SBAR; progress patient to next Phase/Stage  - Instruct patient to call for assistance with activity based on assessment  - Consider rehabilitation consult to assist with strengthening/weightbearing, etc   Outcome: Progressing     Problem: DISCHARGE PLANNING  Goal: Discharge to home or other facility with appropriate resources  Description: INTERVENTIONS:  - Identify barriers to discharge w/patient and caregiver  - Arrange for needed discharge resources and transportation as appropriate  - Identify discharge learning needs (meds, wound care, etc )  - Arrange for interpretive services to assist at discharge as needed  - Refer to Case Management Department for coordinating discharge planning if the patient needs post-hospital services based on physician/advanced practitioner order or complex needs related to functional status, cognitive ability, or social support system  Outcome: Progressing     Problem: Knowledge Deficit  Goal: Patient/family/caregiver demonstrates understanding of disease process, treatment plan, medications, and discharge instructions  Description: Complete learning assessment and assess knowledge base  Interventions:  - Provide teaching at level of understanding  - Provide teaching via preferred learning methods  Outcome: Progressing     Problem: Potential for Falls  Goal: Patient will remain free of falls  Description: INTERVENTIONS:  - Assess patient frequently for physical needs  -  Identify cognitive and physical deficits and behaviors that affect risk of falls    -  [place] fall precautions as indicated by assessment   - Educate patient/family on patient safety including physical limitations  - Instruct patient to call for assistance with activity based on assessment  - Modify environment to reduce risk of injury  - Consider OT/PT consult to assist with strengthening/mobility  Outcome: Progressing     Problem: Prexisting or High Potential for Compromised Skin Integrity  Goal: Skin integrity is maintained or improved  Description: INTERVENTIONS:  - Identify patients at risk for skin breakdown  - Assess and monitor skin integrity  - Assess and monitor nutrition and hydration status  - Monitor labs   - Assess for incontinence   - Turn and reposition patient  - Assist with mobility/ambulation  - Relieve pressure over bony prominences  - Avoid friction and shearing  - Provide appropriate hygiene as needed including keeping skin clean and dry  - Evaluate need for skin moisturizer/barrier cream  - Collaborate with interdisciplinary team   - Patient/family teaching  - Consider wound care consult   Outcome: Progressing

## 2021-04-27 NOTE — PROGRESS NOTES
Progress Note - Cardiothoracic Surgery   Flaquita Cui 63 y o  male MRN: [id number]  Unit/Bed#: [facility] 415-01 Encounter: [id number]      POD # 1 s/p CABG    Pt seen/examined  Interval history and data reviewed with critical care team   Pt doing well  No specific complaints  No vasoactive gtts  OOB sitting comfortably in chair        Medications:   Scheduled Meds:  Current Facility-Administered Medications   Medication Dose Route Frequency Provider Last Rate    acetaminophen  975 mg Oral [address] [place], [place]      amiodarone  200 mg Oral [facility] [address] [place], [place]      aspirin  325 mg Oral Daily [address] [place], [place]      atorvastatin  80 mg Oral Daily With Shuoren [employer] [place], [place]      bisacodyl  10 mg Rectal Daily PRN 30 [facility] MONIQUE Corea      cefazolin  2,000 mg Intravenous [address] SigridNERISSA sharpC 2,000 mg (04/27/21 5323)    cholecalciferol  1,000 Units Oral Daily 30 [facility] SigridMONIQUE sharp      fondaparinux  2 5 mg Subcutaneous Daily [address] [place], [place]      furosemide  40 mg Intravenous Q6H PRN [address] [place], [place]      HYDROmorphone  0 5 mg Intravenous Q2H PRN 30 [facility] MONIQUE Corea      insulin regular (HumuLIN R,NovoLIN R) infusion  0 3-21 Units/hr Intravenous Titrated 30 [facility] MONIQUE Yepez      lactated ringers  500 mL Intravenous Q30 Min PRN 30 [facility] MONIQUE Corea      lidocaine (cardiac)  100 mg Intravenous Q30 Min PRN 30 [facility] MONIQUE Corea      mupirocin  1 application Nasal [address] [place], [place]      ondansetron  4 mg Intravenous Q6H PRN 30 [facility] MONIQUE Corea      oxyCODONE  10 mg Oral Q4H PRN Cherl Sujata, PA-CHRIS      oxyCODONE  5 mg Oral Q4H PRN Cherl SujataMONIQUE      pantoprazole  40 mg Oral Daily [address] [place], [place]      phenylephine   mcg/min Intravenous Titrated Charmaine Alaniz PA-C Stopped (04/26/21 4639)    polyethylene glycol  17 g Oral Daily MONIQUE Diaz chloride  20 mEq Intravenous Once PRN Jose Elias OilMONIQUE gr      potassium chloride  20 mEq Intravenous Q1H PRN Carlton Corea PA-C      potassium chloride  20 mEq Intravenous Q30 Min PRN Carlton Corea, [place]      sodium chloride  20 mL/hr Intravenous Continuous Jose Elias OilMONIQUE gr Stopped (04/26/21 2155)     Continuous Infusions:insulin regular (HumuLIN R,NovoLIN R) infusion, 0 3-21 Units/hr  phenylephine,  mcg/min, Last Rate: Stopped (04/26/21 2252)  sodium chloride, 20 mL/hr, Last Rate: Stopped (04/26/21 2155)      PRN Meds: bisacodyl    furosemide    HYDROmorphone    lactated ringers    lidocaine (cardiac)    ondansetron    oxyCODONE    oxyCODONE    potassium chloride    potassium chloride    potassium chloride    Vitals: Blood pressure 117/64, pulse 98, temperature 99 1 °F (37 3 °C), temperature source Probe, resp  rate 21, height 5' 7" (1 702 m), weight 90 5 kg (199 lb 8 3 oz), SpO2 97 %  ,Body mass index is 31 25 kg/m²  I/O last 24 hours: In: 3390 9 [P O :360; I V :2400 9; IV Piggyback:130]  Out: 3440 [Urine:1940; Blood:500; Chest Tube:375]  Invasive Devices     Central Venous Catheter Line            CVC Central Lines 04/26/21 Triple less than 1 day          Peripheral Intravenous Line            Peripheral IV 04/24/21 Left Antecubital 2 days    Peripheral IV 04/26/21 Right Hand less than 1 day          Line            Pacer Wires less than 1 day    Pacer Wires less than 1 day          Drain            Chest Tube 1 Left Pleural 32 Fr  less than 1 day    Chest Tube 2 Posterior Mediastinal 32 Fr  less than 1 day    Chest Tube 3 Anterior Mediastinal 32 Fr  less than 1 day    Urethral Catheter Non-latex; Temperature probe; Other (Comment) 16 Fr  less than 1 day                  Lab, Imaging and other studies:   Results from last 7 days   Lab Units 04/27/21  0349 04/26/21  2335 04/26/21  1553 04/26/21  1549  04/26/21  1341  04/26/21  0517 04/22/21  0306   WBC Thousand/uL 22 04* --   --   --   --   --   --  10 89* 10 84*   HEMOGLOBIN g/dL 13 8 13 7  --  12 2  --   --   --  15 3 13 8   I STAT HEMOGLOBIN g/dl  --   --  11 9*  --    < >  --    < >  --   --    HEMATOCRIT % 42 5 42 4  --  38 0  --   --   --  47 4 43 1   HEMATOCRIT, ISTAT %  --   --  35*  --    < >  --    < >  --   --    PLATELETS Thousands/uL 170  --   --  167  --  164  --  275 238    < > = values in this interval not displayed  Results from last 7 days   Lab Units 04/27/21  0349 04/26/21  2335 04/26/21  1937 04/26/21  1553 04/26/21  1549  04/26/21  0517   POTASSIUM mmol/L 4 6 4 7 4 8  --  4 6  --  4 0   CHLORIDE mmol/L 111*  --   --   --  112*  --  107   CO2 mmol/L 20*  --   --   --  18*  --  24   CO2, I-STAT mmol/L  --   --   --  22  --    < >  --    BUN mg/dL 16  --   --   --  15  --  18   CREATININE mg/dL 1 14  --   --   --  1 03  --  1 16   GLUCOSE, ISTAT mg/dl  --   --   --  139  --    < >  --    CALCIUM mg/dL 7 8*  --   --   --  8 0*  --  9 7    < > = values in this interval not displayed  Results from last 7 days   Lab Units 04/26/21  1549 04/26/21  0517 04/25/21  0527   INR  1 38*  --   --    PTT seconds 40* 75* 76*     Recent Labs     04/26/21  1705   PHART 7 271*   [id number] 19 3*   PO2ART 123 4   [id number] 42 9   BEART -7 3           Plan:    DC Jn/Miri/Malka/Spencer  Continue chest tubes, pacing wires  Transfer to floor  Ambulate  Incentive spirometry  Diuresis  PO ASA/Statin/B blocker        Jimy Kaiser MD  DATE: April 27, 2021  TIME: 7:49 AM

## 2021-04-27 NOTE — CASE MANAGEMENT
Pt is S/P CABG x 3 and is recommended to have in-home post-op skilled nursing care via an [facility] agency for his aftercare plan  CM spoke to pt about this recommendation  Pt is agreeable w/ recommendation  CM provided pt w/ freedom of choice for [facility] agencies  Pt requested referral to [facility]  CM made Ecin referral to [facility]  CM to follow

## 2021-04-27 NOTE — PROGRESS NOTES
General Cardiology   Progress Note -  Team One   Lee Franz 63 y o  male MRN: [id number]    Unit/Bed#: [facility] 428-01 Encounter: [id number]    Assessment/ Plan:    1  Unstable Anginal with MV CAD: now s/p CABG x3 LIMA to LAD, SVG to OM1, SVG to D1  POD #1  Pt doing well on POD #1  He is having incisional pain and EKG showing ST elevations likely representing post op pericarditis; no HD instability  Toradol given with relief  Continue ASA, high intensity statin and low dose beta blockers  Maintaining SR on telemetry; oral amiodarone per protocol  Intra-op DERECK showing continued preserved LV function EF 55%  CT and EPW intact  Continue remainder of care per CT surgery    2  Dyslipidemia: Tot 172; ; started on high intensity statin; lipitor 80mg daily  3  Pre-diabetes: Hgb A1c 6 0  4  Carotid Stenosis: 60-69% L ICA stenosis; < 50% stenosis of L ICA; follow up OP with repeat US      Subjective: pt seen for follow up; he underwent CABG surgery yesterday  Surgery uncomplicated per OP reports  No significant events overnight  He is off vasoactive infusions and extubated  Moved out of critical care unit today  At time of exam he reports feeling ok, he is having a lot of pain but has been given Toradol with some recent relief  Able to ambulate in sebast[place] this AM  + poor appetite, + dry mouth  Review of Systems   Constitution: Positive for decreased appetite and malaise/fatigue  Negative for fever  Cardiovascular: Negative for chest pain, dyspnea on exertion, leg swelling, orthopnea, palpitations and syncope         + incisional pain   Respiratory: Negative for cough, shortness of breath and sputum production  Gastrointestinal: Negative for abdominal pain, nausea and vomiting  Genitourinary: Negative for dysuria  Neurological: Positive for light-headedness (mild when getting OOB this AM resolved quickly)  Negative for dizziness  Psychiatric/Behavioral: Negative for altered mental status     All other systems reviewed and are negative  Objective:   Vitals: Blood pressure 98/68, pulse 84, temperature 97 5 °F (36 4 °C), temperature source Oral, resp  rate 22, height 5' 7" (1 702 m), weight 90 5 kg (199 lb 8 3 oz), SpO2 91 % ,     Body mass index is 31 25 kg/m²  ,     Systolic (08UVF), PUC:802 , Min:98 , SOL:421     Diastolic (90OFA), PJJ:37, Min:53, Max:68      Intake/Output Summary (Last 24 hours) at 2021 1233  Last data filed at 2021 1200  Gross per 24 hour   Intake 3390 88 ml   Output 3330 ml   Net 60 88 ml     Weight (last 2 days)     Date/Time   Weight    21 0600   90 5 (199 52)    21 0600   86 4 (190 48)    21 0600   87 2 (192 2)            Telemetry Review:   Sinus rhythm, no significant event    EKG personally reviewed by SHIV Viera    2021  Sinus rhythm with anterolateral ST elevations    Physical Exam  Vitals signs reviewed  Constitutional:       Appearance: Normal appearance  Comments: Pt sitting up in chair in NAD   HENT:      Head: Normocephalic and atraumatic  Mouth/Throat:      Mouth: Mucous membranes are moist    Cardiovascular:      Rate and Rhythm: Normal rate and regular rhythm  Heart sounds: S1 normal and S2 normal       Comments: MS incision dsg c/d/i  CT and EPW intact  Pulmonary:      Breath sounds: Normal breath sounds  Comments: BS decreased at bases; on RA  Abdominal:      Palpations: Abdomen is soft  Musculoskeletal:      Right lower le+ Pitting Edema present  Left lower le+ Pitting Edema present  Neurological:      Mental Status: He is alert and oriented to person, place, and time     Psychiatric:         Mood and Affect: Mood normal        LABORATORY RESULTS  Results from last 7 days   Lab Units 21  1906 21  1538 21  1310   TROPONIN I ng/mL 0 05* 0 07* 0 06*     CBC with diff:   Results from last 7 days   Lab Units 21  0349 21  2335 21  1553 21  1549 04/26/21  1448 04/26/21  1422 04/26/21  1405  04/26/21  1341  04/26/21  0517 04/22/21  0306 04/21/21  0559   WBC Thousand/uL 22 04*  --   --   --   --   --   --   --   --   --  10 89* 10 84* 9 54   HEMOGLOBIN g/dL 13 8 13 7  --  12 2  --   --   --   --   --   --  15 3 13 8 15 6   I STAT HEMOGLOBIN g/dl  --   --  11 9*  --  10 9* 10 5* 10 5*   < >  --    < >  --   --   --    HEMATOCRIT % 42 5 42 4  --  38 0  --   --   --   --   --   --  47 4 43 1 48 2   HEMATOCRIT, ISTAT %  --   --  35*  --  32* 31* 31*   < >  --    < >  --   --   --    MCV fL 88  --   --   --   --   --   --   --   --   --  88 88 88   PLATELETS Thousands/uL 170  --   --  167  --   --   --   --  164  --  275 238 280   MCH pg 28 6  --   --   --   --   --   --   --   --   --  28 4 28 3 28 4   MCHC g/dL 32 5  --   --   --   --   --   --   --   --   --  32 3 32 0 32 4   RDW % 14 8  --   --   --   --   --   --   --   --   --  14 6 14 6 14 6   MPV fL 10 4  --   --  9 9  --   --   --   --  10 0  --  10 5 9 4 9 5   NRBC AUTO /100 WBCs  --   --   --   --   --   --   --   --   --   --  0  --   --     < > = values in this interval not displayed       CMP:  Results from last 7 days   Lab Units 04/27/21  0349 04/26/21  2335 04/26/21  1937 04/26/21  1553 04/26/21  1549 04/26/21  1448 04/26/21  1422 04/26/21  1405 04/26/21  1343 04/26/21  1337 04/26/21  1250  04/26/21  0517 04/24/21  0539 04/23/21  0457 04/22/21  0306 04/21/21  0559   POTASSIUM mmol/L 4 6 4 7 4 8  --  4 6  --   --   --   --   --   --   --  4 0 4 0 4 0 4 0 4 4   CHLORIDE mmol/L 111*  --   --   --  112*  --   --   --   --   --   --   --  107 108 107 109* 105   CO2 mmol/L 20*  --   --   --  18*  --   --   --   --   --   --   --  24 22 25 26 24   CO2, I-STAT mmol/L  --   --   --  22  --  22 26 25 26 27 27   < >  --   --   --   --   --    BUN mg/dL 16  --   --   --  15  --   --   --   --   --   --   --  18 16 14 15 14   CREATININE mg/dL 1 14  --   --   --  1 03  --   --   --   --   --   --   --  1 16 0 98 1  01 0 92 1 03   GLUCOSE, ISTAT mg/dl  --   --   --  139  --  162* 139 125 114 120 124   < >  --   --   --   --   --    CALCIUM mg/dL 7 8*  --   --   --  8 0*  --   --   --   --   --   --   --  9 7 9 5 9 5 8 9 9 9   AST U/L  --   --   --   --   --   --   --   --   --   --   --   --  54*  --   --   --   --    ALT U/L  --   --   --   --   --   --   --   --   --   --   --   --  91*  --   --   --   --    ALK PHOS U/L  --   --   --   --   --   --   --   --   --   --   --   --  62  --   --   --   --    EGFR ml/min/1 73sq m 68  --   --   --  77  --   --   --   --   --   --   --  67 82 79 88 77    < > = values in this interval not displayed  BMP:  Results from last 7 days   Lab Units 04/27/21  0349 04/26/21  2335 04/26/21  1937 04/26/21  1553 04/26/21  1549 04/26/21  1448 04/26/21  1422 04/26/21  1405 04/26/21  1343  04/26/21  0517 04/24/21  0539 04/23/21  0457 04/22/21  0306 04/21/21  0559   POTASSIUM mmol/L 4 6 4 7 4 8  --  4 6  --   --   --   --   --  4 0 4 0 4 0 4 0 4 4   CHLORIDE mmol/L 111*  --   --   --  112*  --   --   --   --   --  107 108 107 109* 105   CO2 mmol/L 20*  --   --   --  18*  --   --   --   --   --  24 22 25 26 24   CO2, I-STAT mmol/L  --   --   --  22  --  22 26 25 26   < >  --   --   --   --   --    BUN mg/dL 16  --   --   --  15  --   --   --   --   --  18 16 14 15 14   CREATININE mg/dL 1 14  --   --   --  1 03  --   --   --   --   --  1 16 0 98 1 01 0 92 1 03   GLUCOSE, ISTAT mg/dl  --   --   --  139  --  162* 139 125 114   < >  --   --   --   --   --    CALCIUM mg/dL 7 8*  --   --   --  8 0*  --   --   --   --   --  9 7 9 5 9 5 8 9 9 9    < > = values in this interval not displayed        Results from last 7 days   Lab Units 04/27/21  0349   MAGNESIUM mg/dL 2 3     Results from last 7 days   Lab Units 04/26/21  1549   INR  1 38*     Lipid Profile:   No results found for: CHOL  Lab Results   Component Value Date    HDL 30 (L) 04/20/2021    HDL 32 (L) 12/23/2020    HDL 31 (L) 09/04/2020     Lab Results   Component Value Date    LDLCALC 125 (H) 2021    LDLCALC 122 (H) 2020    LDLCALC 173 (H) 2020     Lab Results   Component Value Date    TRIG 83 2021    TRIG 75 2020    TRIG 103 2020       Cardiac testing:   Results for orders placed during the hospital encounter of 21   Echo complete with contrast if indicated    Narrative [facility]  [address]  [phone]    Transthoracic Echocardiogram  2D, M-mode, Doppler, and Color Doppler    Study date:  2021    Patient: Praveena Padilla  MR number: [id number]  Account number: [de-identified]  : 1955  Age: 63 years  Gender: Male  Status: Inpatient  Location: Bedside  Height: 67 in  Weight: 194 9 lb  BP: 129/ 73 mmHg    Indications: Coronary Artery Disease    Diagnoses: I25 119 - Atherosclerotic heart disease of native coronary artery with unspecified angina pectoris    Sonographer:  Stephie Nevarez RDCS  Primary Physician:  Mojgan Ryan DO  Referring Physician:  Aaron Erickson MD  Group:  [facility]  Cardiology Fellow: Moe Aviles MD  Interpreting Physician:  Coty Julian MD    SUMMARY    LEFT VENTRICLE:  Systolic function was normal  Ejection fraction was estimated to be 67 %  There were no regional wall motion abnormalities  Left ventricular diastolic function parameters were normal     RIGHT VENTRICLE:  The size was normal   Systolic function was normal     HISTORY: PRIOR HISTORY: Chest Pain, Hypertension, Hyperlipidemia, SOB, Elevated Troponin, CAD, Cath    PROCEDURE: The procedure was performed at the bedside  This was a routine study  The transthoracic approach was used  The study included complete 2D imaging, M-mode, complete spectral Doppler, and color Doppler  The heart rate was 65 bpm,  at the start of the study   Images were obtained from the parasternal, apical, subcostal, and suprasternal notch acoustic windows  Image quality was adequate  LEFT VENTRICLE: Size was normal  Systolic function was normal  Ejection fraction was estimated to be 67 %  There were no regional wall motion abnormalities  Wall thickness was normal  There was no evidence of concentric hypertrophy  DOPPLER: The pulmonary vein flow pattern was normal  Left ventricular diastolic function parameters were normal     RIGHT VENTRICLE: The size was normal  Systolic function was normal     LEFT ATRIUM: Size was at the upper limits of normal     RIGHT ATRIUM: Size was normal     MITRAL VALVE: There was mild annular calcification  Valve structure was normal  There was normal leaflet separation  DOPPLER: The transmitral velocity was within the normal range  There was no evidence for stenosis  There was trace  regurgitation  AORTIC VALVE: The valve was trileaflet  Leaflets exhibited mildly increased thickness and normal cuspal separation  DOPPLER: Transaortic velocity was within the normal range  There was no evidence for stenosis  There was no regurgitation  TRICUSPID VALVE: The valve structure was normal  There was normal leaflet separation  DOPPLER: The transtricuspid velocity was within the normal range  There was no evidence for stenosis  There was trace regurgitation  PULMONIC VALVE: Leaflets exhibited normal thickness, no calcification, and normal cuspal separation  DOPPLER: The transpulmonic velocity was within the normal range  There was no evidence for stenosis  There was no significant  regurgitation  PERICARDIUM: There was no pericardial effusion  The pericardium was normal in appearance  AORTA: The root exhibited normal size  SYSTEMIC VEINS: IVC: The inferior vena cava was normal in size and course   Respirophasic changes were normal     SYSTEM MEASUREMENT TABLES    2D  %FS: 39 58 %  Ao Diam: 2 68 cm  Ao asc: 2 99 cm  EDV(Teich): 111 04 ml  EF(Teich): 69 99 %  ESV(Teich): 33 32 ml  IVSd: 1 03 cm  LA Diam: 3 83 cm  LAAs A4C: 18 73 cm2  LAESV A-L A4C: 47 09 ml  LAESV MOD A4C: 44 14 ml  LALs A4C: 6 33 cm  LVEDV MOD A4C: 72 83 ml  LVEF MOD A4C: 66 88 %  LVESV MOD A4C: 24 12 ml  LVIDd: 4 87 cm  LVIDs: 2 94 cm  LVLd A4C: 7 4 cm  LVLs A4C: 6 7 cm  LVPWd: 1 cm  RAESV A-L: 38 89 ml  RAESV MOD: 34 32 ml  RALs: 4 59 cm  RVIDd: 2 53 cm  SV MOD A4C: 48 71 ml  SV(Teich): 77 72 ml    CW  AV Env  Ti: 314 94 ms  AV VTI: 32 48 cm  AV Vmax: 1 52 m/s  AV Vmean: 1 03 m/s  AV maxP 3 mmHg  AV meanP 92 mmHg    MM  TAPSE: 2 57 cm    PW  E' Sept: 0 07 m/s  E/E' Sept: 10 54  LVOT Env  Ti: 285 44 ms  LVOT VTI: 22 53 cm  LVOT Vmax: 1 13 m/s  LVOT Vmean: 0 79 m/s  LVOT maxP 15 mmHg  LVOT meanP 78 mmHg  MV A Lonnie: 0 67 m/s  MV Dec Duplin: 3 61 m/s2  MV DecT: 205 72 ms  MV E Lonnie: 0 74 m/s  MV E/A Ratio: 1 11  MV PHT: 59 66 ms  MVA By PHT: 3 69 cm2    [address] Accredited Echocardiography Laboratory    Prepared and electronically signed by    Adal Marks MD  Signed 2021 15:16:51       Meds/Allergies   all current active meds have been reviewed  Medications Prior to Admission   Medication    ascorbic acid (VITAMIN C) 500 mg tablet    cholecalciferol (VITAMIN D3) 1,000 units tablet    Multiple Vitamin (multivitamin) tablet    simvastatin (ZOCOR) 20 mg tablet     Assessment:  Principal Problem:    S/P CABG (coronary artery bypass graft)  Active Problems:    Dyslipidemia    Coronary artery disease    Elevated troponin    Counseling / Coordination of Care  Total floor / unit time spent today 20 minutes  Greater than 50% of total time was spent with the patient and / or family counseling and / or coordination of care  ** Please Note: Dragon 360 Dictation voice to text software may have been used in the creation of this document   **

## 2021-04-28 LAB
ANION GAP SERPL CALCULATED.3IONS-SCNC: 3 MMOL/L (ref 4–13)
BUN SERPL-MCNC: 25 MG/DL (ref 5–25)
CALCIUM SERPL-MCNC: 8.4 MG/DL (ref 8.3–10.1)
CHLORIDE SERPL-SCNC: 106 MMOL/L (ref 100–108)
CO2 SERPL-SCNC: 27 MMOL/L (ref 21–32)
CREAT SERPL-MCNC: 1.14 MG/DL (ref 0.6–1.3)
ERYTHROCYTE [DISTWIDTH] IN BLOOD BY AUTOMATED COUNT: 14.6 % (ref 11.6–15.1)
GFR SERPL CREATININE-BSD FRML MDRD: 68 ML/MIN/1.73SQ M
GLUCOSE SERPL-MCNC: 108 MG/DL (ref 65–140)
GLUCOSE SERPL-MCNC: 113 MG/DL (ref 65–140)
GLUCOSE SERPL-MCNC: 114 MG/DL (ref 65–140)
GLUCOSE SERPL-MCNC: 116 MG/DL (ref 65–140)
GLUCOSE SERPL-MCNC: 98 MG/DL (ref 65–140)
HCT VFR BLD AUTO: 32.3 % (ref 36.5–49.3)
HGB BLD-MCNC: 10.6 G/DL (ref 12–17)
MAGNESIUM SERPL-MCNC: 2.6 MG/DL (ref 1.6–2.6)
MCH RBC QN AUTO: 28.8 PG (ref 26.8–34.3)
MCHC RBC AUTO-ENTMCNC: 32.8 G/DL (ref 31.4–37.4)
MCV RBC AUTO: 88 FL (ref 82–98)
PLATELET # BLD AUTO: 158 THOUSANDS/UL (ref 149–390)
PMV BLD AUTO: 10.3 FL (ref 8.9–12.7)
POTASSIUM SERPL-SCNC: 4.2 MMOL/L (ref 3.5–5.3)
RBC # BLD AUTO: 3.68 MILLION/UL (ref 3.88–5.62)
SODIUM SERPL-SCNC: 136 MMOL/L (ref 136–145)
WBC # BLD AUTO: 20.47 THOUSAND/UL (ref 4.31–10.16)

## 2021-04-28 PROCEDURE — 80048 BASIC METABOLIC PNL TOTAL CA: CPT | Performed by: NURSE PRACTITIONER

## 2021-04-28 PROCEDURE — 83735 ASSAY OF MAGNESIUM: CPT | Performed by: NURSE PRACTITIONER

## 2021-04-28 PROCEDURE — 97116 GAIT TRAINING THERAPY: CPT

## 2021-04-28 PROCEDURE — 99024 POSTOP FOLLOW-UP VISIT: CPT | Performed by: THORACIC SURGERY (CARDIOTHORACIC VASCULAR SURGERY)

## 2021-04-28 PROCEDURE — 82948 REAGENT STRIP/BLOOD GLUCOSE: CPT

## 2021-04-28 PROCEDURE — 97110 THERAPEUTIC EXERCISES: CPT

## 2021-04-28 PROCEDURE — 85027 COMPLETE CBC AUTOMATED: CPT | Performed by: NURSE PRACTITIONER

## 2021-04-28 PROCEDURE — 99024 POSTOP FOLLOW-UP VISIT: CPT | Performed by: PHYSICIAN ASSISTANT

## 2021-04-28 RX ADMIN — AMIODARONE HYDROCHLORIDE 200 MG: 200 TABLET ORAL at 21:38

## 2021-04-28 RX ADMIN — ACETAMINOPHEN 975 MG: 325 TABLET ORAL at 18:02

## 2021-04-28 RX ADMIN — POTASSIUM CHLORIDE 20 MEQ: 1500 TABLET, EXTENDED RELEASE ORAL at 08:57

## 2021-04-28 RX ADMIN — DOCUSATE SODIUM 100 MG: 100 CAPSULE, LIQUID FILLED ORAL at 08:57

## 2021-04-28 RX ADMIN — FUROSEMIDE 40 MG: 10 INJECTION, SOLUTION INTRAMUSCULAR; INTRAVENOUS at 08:57

## 2021-04-28 RX ADMIN — ACETAMINOPHEN 975 MG: 325 TABLET ORAL at 08:57

## 2021-04-28 RX ADMIN — AMIODARONE HYDROCHLORIDE 200 MG: 200 TABLET ORAL at 05:12

## 2021-04-28 RX ADMIN — FONDAPARINUX SODIUM 2.5 MG: 2.5 INJECTION, SOLUTION SUBCUTANEOUS at 08:57

## 2021-04-28 RX ADMIN — MUPIROCIN 1 APPLICATION: 20 OINTMENT TOPICAL at 21:38

## 2021-04-28 RX ADMIN — Medication 12.5 MG: at 08:57

## 2021-04-28 RX ADMIN — KETOROLAC TROMETHAMINE 15 MG: 30 INJECTION, SOLUTION INTRAMUSCULAR at 08:56

## 2021-04-28 RX ADMIN — PANTOPRAZOLE SODIUM 40 MG: 40 TABLET, DELAYED RELEASE ORAL at 08:57

## 2021-04-28 RX ADMIN — MUPIROCIN 1 APPLICATION: 20 OINTMENT TOPICAL at 08:57

## 2021-04-28 RX ADMIN — KETOROLAC TROMETHAMINE 15 MG: 30 INJECTION, SOLUTION INTRAMUSCULAR at 13:24

## 2021-04-28 RX ADMIN — ASPIRIN 325 MG ORAL TABLET 325 MG: 325 PILL ORAL at 08:57

## 2021-04-28 RX ADMIN — AMIODARONE HYDROCHLORIDE 200 MG: 200 TABLET ORAL at 13:24

## 2021-04-28 RX ADMIN — KETOROLAC TROMETHAMINE 15 MG: 30 INJECTION, SOLUTION INTRAMUSCULAR at 01:30

## 2021-04-28 RX ADMIN — POLYETHYLENE GLYCOL 3350 17 G: 17 POWDER, FOR SOLUTION ORAL at 08:56

## 2021-04-28 RX ADMIN — KETOROLAC TROMETHAMINE 15 MG: 30 INJECTION, SOLUTION INTRAMUSCULAR at 23:18

## 2021-04-28 RX ADMIN — DOCUSATE SODIUM 100 MG: 100 CAPSULE, LIQUID FILLED ORAL at 18:02

## 2021-04-28 RX ADMIN — ATORVASTATIN CALCIUM 80 MG: 80 TABLET, FILM COATED ORAL at 18:02

## 2021-04-28 RX ADMIN — KETOROLAC TROMETHAMINE 15 MG: 30 INJECTION, SOLUTION INTRAMUSCULAR at 18:02

## 2021-04-28 RX ADMIN — Medication 1000 UNITS: at 08:57

## 2021-04-28 NOTE — PHYSICAL THERAPY NOTE
Physical Therapy Treatment Note    Patient's Name: Karime Orosco  : 21 0847   PT Last Visit   PT Visit Date 21   Note Type   Note Type Treatment   Pain Assessment   Pain Assessment Tool 0-10   Pain Score 5   Pain Location/Orientation Location: Chest;Location: Incision   Hospital Pain Intervention(s) Repositioned; Ambulation/increased activity   Restrictions/Precautions   Weight Bearing Precautions Per Order No   Other Precautions Cardiac/sternal;Multiple lines;Telemetry; Fall Risk;Pain  (CT)   General   Chart Reviewed Yes   Family/Caregiver Present No   Cognition   Overall Cognitive Status WFL   Attention Within functional limits   Orientation Level Oriented X4   Memory Decreased recall of precautions   Following Commands Follows one step commands without difficulty   Comments Pt presents with flat affect, but agreeable to work with therapy  Bed Mobility   Additional Comments OOB upon arrival, not assessed   Transfers   Sit to Stand 5  Supervision   Additional items Increased time required;Verbal cues   Stand to Sit 5  Supervision   Additional items Increased time required;Verbal cues   Additional Comments Transfers with RW  VCs for proper hand placement with descent to chair  Ambulation/Elevation   Gait pattern Excessively slow; Short stride;Decreased foot clearance   Gait Assistance 5  Supervision   Additional items Assist x 1;Verbal cues   Assistive Device Rolling walker   Distance 200ft with VCs for improved posture and proximity to RW   Balance   Static Sitting Good   Dynamic Sitting Fair +   Static Standing Fair   Dynamic Standing Fair -   Ambulatory Fair -   Activity Tolerance   Activity Tolerance Patient limited by pain   Nurse Made Aware ok to see per RN   Exercises   Knee AROM Long Arc Quad Sitting;20 reps;AROM; Bilateral   Ankle Pumps Sitting;20 reps;AROM; Bilateral   Marching Standing;10 reps;AROM; Bilateral  [place] [facility]   Assessment   Prognosis Good   Problem List Decreased strength;Decreased endurance; Impaired balance;Decreased mobility;Pain   Assessment Pt continues to make steady progress towards mobility goals  At this time, pt is able to perform transfers and ambulate community distances with RW at supervision level  Pt is steady during ambulation, but very slow likely due to a combination of pain and decreased endurance  Pt educated on HEP, which he was able to perform with good technique  Pt would benefit from continued acute skilled PT to address strength, endurance, and mobility  Continuing to recommend home with outpatient cardiac rehab and use of RW upon d/c  Goals   Patient Goals to have less pain   STG Expiration Date 05/07/21   PT Treatment Day 2   Plan   Treatment/Interventions Functional transfer training;LE strengthening/ROM; Elevations; Therapeutic exercise; Endurance training;Bed mobility;Gait training;Spoke to nursing;Spoke to case management;OT   Progress Progressing toward goals   PT Frequency Other (Comment)  (4-6x/wk)   Recommendation   PT Discharge Recommendation Home with outpatient rehabilitation  (cardiac rehab when cleared)   Equipment Recommended [employer] Medical walker   Change/add to Virtual DBS?  No   AM-PAC Basic Mobility Inpatient   Turning in Bed Without Bedrails 3   Lying on Back to Sitting on Edge of Flat Bed 3   Moving Bed to Chair 3   Standing Up From Chair 3   Walk in Room 3   Climb 3-5 Stairs 3   Basic Mobility Inpatient Raw Score 18   Basic Mobility Standardized Score 41 05       Radha Murillo, PT, DPT

## 2021-04-28 NOTE — PROCEDURES
04/28/21    Procedure: Chest tube removal    Chest tubes removed in routine fashion without incident  Insertion site dressed with Acticoat  Mateo Franco tolerated the procedure well  Nurse notified  Procedure: Epicardial Pacing Wire removal    Phyllis miguel Franco was returned to bed and informed of mandatory one hour post-procedure bed rest   The assigned nurse was notified  Epicardial pacing wires removed in routine fashion, without incident  The patient tolerated the procedure well  Vital signs ordered  q 15 minutes for one hour, as per protocol      SIGNATURE: Marv Caicedo PA-C  DATE: April 28, 2021  TIME: 9:32 AM

## 2021-04-28 NOTE — PLAN OF CARE
Problem: PHYSICAL THERAPY ADULT  Goal: Performs mobility at highest level of function for planned discharge setting  See evaluation for individualized goals  Description: Treatment/Interventions: Functional transfer training, LE strengthening/ROM, Elevations, Therapeutic exercise, Endurance training, Equipment eval/education, Bed mobility, Gait training, Spoke to nursing, OT  Equipment Recommended: Walker(at this time but will cont to monitor progress)       See flowsheet documentation for full assessment, interventions and recommendations  Outcome: Progressing  Note: Prognosis: Good  Problem List: Decreased strength, Decreased endurance, Impaired balance, Decreased mobility, Pain  Assessment: Pt continues to make steady progress towards mobility goals  At this time, pt is able to perform transfers and ambulate community distances with RW at supervision level  Pt is steady during ambulation, but very slow likely due to a combination of pain and decreased endurance  Pt educated on HEP, which he was able to perform with good technique  Pt would benefit from continued acute skilled PT to address strength, endurance, and mobility  Continuing to recommend home with outpatient cardiac rehab and use of RW upon d/c             PT Discharge Recommendation: Home with outpatient rehabilitation(cardiac rehab when cleared)          See flowsheet documentation for full assessment

## 2021-04-28 NOTE — PROGRESS NOTES
Progress Note - Cardiothoracic Surgery   Catalina Remy 63 y o  male MRN: [id number]  Unit/Bed#: [facility] 428-01 Encounter: [id number]    Coronary artery disease  S/P coronary artery bypass grafting; POD # 2      24 Hour Events: Transferred from ICU to telemetry  Weaned to room air       Medications:   Scheduled Meds:  Current Facility-Administered Medications   Medication Dose Route Frequency Provider Last Rate    acetaminophen  975 mg Oral [address] [place], [place]      amiodarone  200 mg Oral [facility] Laurie Larger [place], [place]      aspirin  325 mg Oral Daily Laurie Larger [place], [place]      atorvastatin  80 mg Oral Daily With AmBeyond the Rack [place], [place]      bisacodyl  10 mg Rectal Daily PRN Sammi Toro PA-C      cholecalciferol  1,000 Units Oral Daily Laurie Larger [place], [place]      docusate sodium  100 mg Oral BID Hero Hem, CRNP      fondaparinux  2 5 mg Subcutaneous Daily Laurie Larger [place], [place]      furosemide  40 mg Intravenous Daily Jacqualin Fragmin Mol, CRNP      insulin lispro  1-6 Units Subcutaneous TID AC Hero Hem, CRNP      insulin lispro  1-6 Units Subcutaneous HS Hero Hem, CRNP      ketorolac  15 mg Intravenous HOSP [facility] Jacqualin Fragmin Mol, CRNP      metoprolol tartrate  12 5 mg Oral Q12H [address] Hero Hem, CRNP      mupirocin  1 application Nasal [id number] [address] Laurie Larger [place], [place]      ondansetron  4 mg Intravenous Q6H PRN Laurie Larger [place], [place]      oxyCODONE  10 mg Oral Q4H PRN Gavin Landry PA-C      oxyCODONE  5 mg Oral Q4H PRN Gavin Landry PA-C      pantoprazole  40 mg Oral Daily [facility]      polyethylene glycol  17 g Oral Daily Laurie Larger [place], [place]      potassium chloride  20 mEq Oral Daily Hero Hem, CRNP      temazepam  15 mg Oral HS PRN Hero Hem, CRNP       Continuous Infusions:   PRN Meds: bisacodyl    ondansetron    oxyCODONE    oxyCODONE    temazepam    Vitals:   Vitals: 04/27/21 2334 04/28/21 0306 04/28/21 0544 04/28/21 0708   BP: 99/61 94/57  123/54   Pulse: 93 87  88   Resp:    16   Temp: 97 8 °F (36 6 °C) 97 8 °F (36 6 °C)  98 °F (36 7 °C)   TempSrc:  Oral     SpO2: 92% 94%  90%   Weight:   89 9 kg (198 lb 3 1 oz)    Height:           Telemetry: NSR; Heart Rate: 84    Respiratory:   SpO2: SpO2: 90 %, SpO2 Activity: SpO2 Activity: At Rest; Room Air    Intake/Output:   I/O       04/26 0701 - 04/27 0700 04/27 0701 - 04/28 0700 04/28 0701 - 04/29 0700    P  O  360      I V  (mL/kg) 2400 9 (26 5)      IV Piggyback 130 50     Cell Saver 500      Total Intake(mL/kg) 3390 9 (37 5) 50 (0 6)     Urine (mL/kg/hr) 1940 (0 9) 1000 (0 5)     Blood 500      Chest Tube 375 260     Total Output 2815 1260     Net +575 9 -1210                  Chest tube Output:    Mediastinal tubes: 80 mL/8 hours  170 mL/24 hours   Pleural tubes: 60 mL/8 hours  90 mL/24 hours     Weights:   Weight (last 2 days)     Date/Time   Weight    04/28/21 0544   89 9 (198 19)    04/27/21 0600   90 5 (199 52)    04/26/21 0600   86 4 (190 48)            Results:   Results from last 7 days   Lab Units 04/27/21  0349 04/26/21  2335 04/26/21  1553 04/26/21  1549  04/26/21  1341  04/26/21  0517 04/22/21  0306   WBC Thousand/uL 22 04*  --   --   --   --   --   --  10 89* 10 84*   HEMOGLOBIN g/dL 13 8 13 7  --  12 2  --   --   --  15 3 13 8   I STAT HEMOGLOBIN g/dl  --   --  11 9*  --    < >  --    < >  --   --    HEMATOCRIT % 42 5 42 4  --  38 0  --   --   --  47 4 43 1   HEMATOCRIT, ISTAT %  --   --  35*  --    < >  --    < >  --   --    PLATELETS Thousands/uL 170  --   --  167  --  164  --  275 238    < > = values in this interval not displayed       Results from last 7 days   Lab Units 04/28/21  0458 04/27/21  0349 04/26/21  2335  04/26/21  1553 04/26/21  1549   SODIUM mmol/L 136 141  --   --   --  142   POTASSIUM mmol/L 4 2 4 6 4 7   < >  --  4 6   CHLORIDE mmol/L 106 111*  --   --   --  112*   CO2 mmol/L 27 20*  --   --   -- 18*   CO2, I-STAT mmol/L  --   --   --   --  22  --    BUN mg/dL 25 16  --   --   --  15   CREATININE mg/dL 1 14 1 14  --   --   --  1 03   GLUCOSE, ISTAT mg/dl  --   --   --   --  139  --    CALCIUM mg/dL 8 4 7 8*  --   --   --  8 0*    < > = values in this interval not displayed  Results from last 7 days   Lab Units 04/26/21  1549 04/26/21  0517 04/25/21  0527   INR  1 38*  --   --    PTT seconds 40* 75* 76*     Point of care glucose: 108-113    Invasive Lines/Tubes:  Invasive Devices     Central Venous Catheter Line            CVC Central Lines 04/26/21 Triple 1 day          Peripheral Intravenous Line            Peripheral IV 04/24/21 Left Antecubital 3 days    Peripheral IV 04/26/21 Right Hand 1 day          Line            Pacer Wires 1 day    Pacer Wires 1 day          Drain            Chest Tube 1 Left Pleural 32 Fr  1 day    Chest Tube 2 Posterior Mediastinal 32 Fr  1 day    Chest Tube 3 Anterior Mediastinal 32 Fr  1 day                Physical Exam:    HEENT/NECK:  Normocephalic  Atraumatic  No jugular venous distention  Cardiac: Regular rate and rhythm and No murmurs/rubs/gallops  Pulmonary:  Breath sounds clear bilaterally  Abdomen:  Non-tender, Non-distended and Normal bowel sounds  Incisions: Sternum is stable  Incision dressed with Acticoat  No erythema or drainage and Saphenectomy incision dressed with Acticoat  No erythema or drainage  Extremities: Extremities warm/dry and 1+ edema B/L  Neuro: Alert and oriented X 3 and Sensation is grossly intact  Skin: Warm/Dry, without rashes or lesions  Assessment:  Principal Problem:    S/P CABG (coronary artery bypass graft)  Active Problems:    Dyslipidemia    Coronary artery disease    Elevated troponin       Coronary artery disease  S/P coronary artery bypass grafting; POD # 2    Plan:    1  Cardiac:   NSR; HR/BP well-controlled  Lopressor, 12 5mg PO BID  Continue ASA and Statin therapy  Epicardial pacing wires no longer required    Remove today  Maintain central IV access today  Continue DVT prophylaxis    2  Pulmonary:   Good Room air oxygen saturation; Continue incentive spirometry/Coughing/Deep breathing exercises  Chest tube drainage diminished; D/C today    3  Renal:   Intake/Output net: -1210  mL/24 hours  Continue diuresis   Lasix 40 mg IV QD  Potassium Chloride 20 mEq PO QD  Post op Creatinine stable; Follow up labs prn    4  Neuro:  Neurologically intact; No active issues  Incisional pain well-controlled  Continue Tylenol, 975 mg PO q 8, standing dose  Continue Oxycodone, 5 to 10 mg PO q 4 hours prn pain  Completed PO toradol    5  GI:  Tolerating TLC 2 3 gm sodium diet  Maintain 1800 mL daily fluid restriction   Continue stool softeners and prn suppository  Continue GI prophylaxis    6  Endo:   Glucose well-controlled with sliding scale coverage    7    Hematology:    Post-operative blood count acceptable; Trend prn    8  Disposition:      Ambulating independently, Anticipate discharge to home 24-48 hours     VTE Pharmacologic Prophylaxis: Sequential compression device (Venodyne)  and Fondaparinux (Arixtra)  VTE Mechanical Prophylaxis: sequential compression device    Collaborative rounds completed with JORJE Loaiza    Plan of care discussed with bedside nurse    SIGNATURE: Drew Conte PA-C  DATE: April 28, 2021  TIME: 7:33 AM

## 2021-04-29 VITALS
DIASTOLIC BLOOD PRESSURE: 68 MMHG | TEMPERATURE: 98.7 F | BODY MASS INDEX: 30.35 KG/M2 | HEIGHT: 67 IN | OXYGEN SATURATION: 96 % | SYSTOLIC BLOOD PRESSURE: 116 MMHG | RESPIRATION RATE: 18 BRPM | WEIGHT: 193.34 LBS | HEART RATE: 78 BPM

## 2021-04-29 LAB
ERYTHROCYTE [DISTWIDTH] IN BLOOD BY AUTOMATED COUNT: 14.8 % (ref 11.6–15.1)
GLUCOSE SERPL-MCNC: 101 MG/DL (ref 65–140)
GLUCOSE SERPL-MCNC: 98 MG/DL (ref 65–140)
HCT VFR BLD AUTO: 31.8 % (ref 36.5–49.3)
HGB BLD-MCNC: 10.2 G/DL (ref 12–17)
MCH RBC QN AUTO: 28.4 PG (ref 26.8–34.3)
MCHC RBC AUTO-ENTMCNC: 32.1 G/DL (ref 31.4–37.4)
MCV RBC AUTO: 89 FL (ref 82–98)
PLATELET # BLD AUTO: 182 THOUSANDS/UL (ref 149–390)
PMV BLD AUTO: 9.9 FL (ref 8.9–12.7)
RBC # BLD AUTO: 3.59 MILLION/UL (ref 3.88–5.62)
WBC # BLD AUTO: 15.23 THOUSAND/UL (ref 4.31–10.16)

## 2021-04-29 PROCEDURE — 99024 POSTOP FOLLOW-UP VISIT: CPT | Performed by: PHYSICIAN ASSISTANT

## 2021-04-29 PROCEDURE — 82948 REAGENT STRIP/BLOOD GLUCOSE: CPT

## 2021-04-29 PROCEDURE — 85027 COMPLETE CBC AUTOMATED: CPT | Performed by: PHYSICIAN ASSISTANT

## 2021-04-29 RX ORDER — POTASSIUM CHLORIDE 20 MEQ/1
20 TABLET, EXTENDED RELEASE ORAL DAILY
Qty: 10 TABLET | Refills: 0 | Status: SHIPPED | OUTPATIENT
Start: 2021-04-29 | End: 2021-05-10 | Stop reason: CLARIF

## 2021-04-29 RX ORDER — POLYETHYLENE GLYCOL 3350 17 G/17G
17 POWDER, FOR SOLUTION ORAL DAILY
Qty: 510 G | Refills: 0 | Status: SHIPPED | OUTPATIENT
Start: 2021-04-29 | End: 2021-05-10 | Stop reason: CLARIF

## 2021-04-29 RX ORDER — OXYCODONE HYDROCHLORIDE 5 MG/1
TABLET ORAL
Qty: 30 TABLET | Refills: 0 | Status: SHIPPED | OUTPATIENT
Start: 2021-04-29 | End: 2021-07-07

## 2021-04-29 RX ORDER — TORSEMIDE 20 MG/1
20 TABLET ORAL DAILY
Qty: 10 TABLET | Refills: 0 | Status: SHIPPED | OUTPATIENT
Start: 2021-04-29 | End: 2021-05-10 | Stop reason: CLARIF

## 2021-04-29 RX ORDER — PANTOPRAZOLE SODIUM 20 MG/1
20 TABLET, DELAYED RELEASE ORAL DAILY
Qty: 30 TABLET | Refills: 0 | Status: SHIPPED | OUTPATIENT
Start: 2021-04-29 | End: 2021-05-03 | Stop reason: ALTCHOICE

## 2021-04-29 RX ORDER — ACETAMINOPHEN 325 MG/1
TABLET ORAL
Qty: 90 TABLET | Refills: 0 | Status: SHIPPED | OUTPATIENT
Start: 2021-04-29 | End: 2022-01-31

## 2021-04-29 RX ORDER — ATORVASTATIN CALCIUM 80 MG/1
80 TABLET, FILM COATED ORAL
Qty: 90 TABLET | Refills: 0 | Status: SHIPPED | OUTPATIENT
Start: 2021-04-29 | End: 2021-05-28 | Stop reason: SDUPTHER

## 2021-04-29 RX ORDER — ASPIRIN 325 MG
325 TABLET ORAL DAILY
Qty: 90 TABLET | Refills: 0 | Status: SHIPPED | OUTPATIENT
Start: 2021-04-30 | End: 2021-05-28 | Stop reason: SDUPTHER

## 2021-04-29 RX ADMIN — MUPIROCIN 1 APPLICATION: 20 OINTMENT TOPICAL at 08:32

## 2021-04-29 RX ADMIN — KETOROLAC TROMETHAMINE 15 MG: 30 INJECTION, SOLUTION INTRAMUSCULAR at 05:28

## 2021-04-29 RX ADMIN — ASPIRIN 325 MG ORAL TABLET 325 MG: 325 PILL ORAL at 08:28

## 2021-04-29 RX ADMIN — DOCUSATE SODIUM 100 MG: 100 CAPSULE, LIQUID FILLED ORAL at 08:28

## 2021-04-29 RX ADMIN — PANTOPRAZOLE SODIUM 40 MG: 40 TABLET, DELAYED RELEASE ORAL at 08:28

## 2021-04-29 RX ADMIN — AMIODARONE HYDROCHLORIDE 200 MG: 200 TABLET ORAL at 05:28

## 2021-04-29 RX ADMIN — Medication 1000 UNITS: at 08:28

## 2021-04-29 RX ADMIN — FONDAPARINUX SODIUM 2.5 MG: 2.5 INJECTION, SOLUTION SUBCUTANEOUS at 08:29

## 2021-04-29 RX ADMIN — POLYETHYLENE GLYCOL 3350 17 G: 17 POWDER, FOR SOLUTION ORAL at 08:29

## 2021-04-29 RX ADMIN — Medication 12.5 MG: at 08:28

## 2021-04-29 RX ADMIN — POTASSIUM CHLORIDE 20 MEQ: 1500 TABLET, EXTENDED RELEASE ORAL at 08:28

## 2021-04-29 RX ADMIN — ACETAMINOPHEN 975 MG: 325 TABLET ORAL at 08:28

## 2021-04-29 RX ADMIN — FUROSEMIDE 40 MG: 10 INJECTION, SOLUTION INTRAMUSCULAR; INTRAVENOUS at 08:28

## 2021-04-29 NOTE — UTILIZATION REVIEW
Notification of Discharge   This is a Notification of Discharge from our facility [address]  Please be advised that this patient has been discharge from our facility  Below you will find the admission and discharge date and time including the patients disposition  UTILIZATION REVIEW CONTACT:  Yeny Obando  Utilization   Network Utilization Review Department  Phone: [phone] x carefully listen to the prompts  All voicemails are confidential   Email: [email or web address]     PHYSICIAN ADVISORY SERVICES:  FOR IKHO-EB-GJSD REVIEW - MEDICAL NECESSITY DENIAL  Phone: [phone]  Fax: [phone]  Email: [email or web address]     PRESENTATION DATE: 4/21/2021  5:48 PM  OBERVATION ADMISSION DATE:   INPATIENT ADMISSION DATE: 4/21/21 1748   DISCHARGE DATE: 4/29/2021  2:38 PM  DISPOSITION: Home with [place] with [address] [place] [address] Road INFORMATION:  Send all requests for admission clinical reviews, approved or denied determinations and any other requests to dedicated fax number below belonging to the campus where the patient is receiving treatment   List of dedicated fax numbers:  [address] [place] [address] DENIALS (Administrative/Medical Necessity) [phone]   [address]  (Maternity/NICU/Pediatrics) [phone]   Cesar Saxena [phone]   Particia Notice [phone]   [facility] [phone]   Randy Andrea [address] [phone]   [facility]  [phone]   [address] [place] [address], [address]  [address] [facility] [address] [facility] [phone]

## 2021-04-29 NOTE — PROGRESS NOTES
Progress Note - Cardiothoracic Surgery   Vivica Hands 63 y o  male MRN: [id number]  Unit/Bed#: [facility] 428-01 Encounter: [id number]    Coronary artery disease  S/P coronary artery bypass grafting; POD # 3      24 Hour Events: Pain control improved with chest tubes removed  No events overnight       Medications:   Scheduled Meds:  Current Facility-Administered Medications   Medication Dose Route Frequency Provider Last Rate    acetaminophen  975 mg Oral [address] [place], [place]      amiodarone  200 mg Oral [facility] [address] [place], [place]      aspirin  325 mg Oral Daily [address] [place], [place]      atorvastatin  80 mg Oral Daily With AmBenkyo Player [place], [place]      bisacodyl  10 mg Rectal Daily PRN Halima Richards PA-C      cholecalciferol  1,000 Units Oral Daily [address] [place], [place]      docusate sodium  100 mg Oral BID SHIV Loza      fondaparinux  2 5 mg Subcutaneous Daily [address] [place], [place]      furosemide  40 mg Intravenous Daily SHIV Pitts      insulin lispro  1-6 Units Subcutaneous TID AC SHIV Loza      insulin lispro  1-6 Units Subcutaneous HS SHIV Loza      ketorolac  15 mg Intravenous HOSP [facility] SHIV Pitts      metoprolol tartrate  12 5 mg Oral Q12H [facility] SHIV Loza      mupirocin  1 application Nasal O72K [facility] [address] [place], [place]      ondansetron  4 mg Intravenous Q6H PRN [address] [place], [place]      oxyCODONE  10 mg Oral Q4H PRN Ileene CanMONIQUE solis      oxyCODONE  5 mg Oral Q4H PRN Ileene CancelMONIQUE      pantoprazole  40 mg Oral Daily 30 [facility]      polyethylene glycol  17 g Oral Daily [address] [place], [place]      potassium chloride  20 mEq Oral Daily SHIV Loza      temazepam  15 mg Oral HS PRN SHIV Loza       Continuous Infusions:   PRN Meds: bisacodyl    ondansetron    oxyCODONE    oxyCODONE    temazepam    Vitals: Vitals:    04/28/21 2138 04/28/21 2300 04/29/21 0234 04/29/21 0600   BP: 100/55 101/54 104/54    BP Location:  Left arm     Pulse: 72 69 75    Resp:       Temp:  98 4 °F (36 9 °C) (!) 97 1 °F (36 2 °C)    TempSrc:  Oral     SpO2:  91% 91%    Weight:    87 7 kg (193 lb 5 5 oz)   Height:           Telemetry: NSR; Heart Rate: 80    Respiratory:   SpO2: SpO2: 91 %, SpO2 Activity: SpO2 Activity: At Rest; Room Air    Intake/Output:   I/O       04/26 0701 - 04/27 0700 04/27 0701 - 04/28 0700 04/28 0701 - 04/29 0700    P  O  360      I V  (mL/kg) 2400 9 (26 5)      IV Piggyback 130 50     Cell Saver 500      Total Intake(mL/kg) 3390 9 (37 5) 50 (0 6)     Urine (mL/kg/hr) 1940 (0 9) 1000 (0 5)     Blood 500      Chest Tube 375 260     Total Output 2815 1260     Net +575 9 -1210                I/O: - 1600 mL/24 hours      Weights:   Weight (last 2 days)     Date/Time   Weight    04/29/21 0600   87 7 (193 34)    04/28/21 0544   89 9 (198 19)    04/27/21 0600   90 5 (199 52)            Results:   Results from last 7 days   Lab Units 04/28/21 0458 04/27/21 0349 04/26/21  2335 04/26/21  1549  04/26/21  0517   WBC Thousand/uL 20 47* 22 04*  --   --   --   --  10 89*   HEMOGLOBIN g/dL 10 6* 13 8 13 7  --  12 2  --  15 3   I STAT HEMOGLOBIN   --   --   --    < >  --    < >  --    HEMATOCRIT % 32 3* 42 5 42 4  --  38 0  --  47 4   HEMATOCRIT, ISTAT   --   --   --    < >  --    < >  --    PLATELETS Thousands/uL 158 170  --   --  167   < > 275    < > = values in this interval not displayed       Results from last 7 days   Lab Units 04/28/21 0458 04/27/21 0349 04/26/21  2335  04/26/21  1553 04/26/21  1549   SODIUM mmol/L 136 141  --   --   --  142   POTASSIUM mmol/L 4 2 4 6 4 7   < >  --  4 6   CHLORIDE mmol/L 106 111*  --   --   --  112*   CO2 mmol/L 27 20*  --   --   --  18*   CO2, I-STAT mmol/L  --   --   --   --  22  --    BUN mg/dL 25 16  --   --   --  15   CREATININE mg/dL 1 14 1 14  --   --   --  1 03   GLUCOSE, ISTAT mg/dl --   --   --   --  139  --    CALCIUM mg/dL 8 4 7 8*  --   --   --  8 0*    < > = values in this interval not displayed  Results from last 7 days   Lab Units 04/26/21  1549 04/26/21  0517 04/25/21  0527   INR  1 38*  --   --    PTT seconds 40* 75* 76*     Point of care glucose:     Invasive Lines/Tubes:  Invasive Devices     Central Venous Catheter Line            CVC Central Lines 04/26/21 Triple 2 days              Physical Exam:    HEENT/NECK:  Normocephalic  Atraumatic  No jugular venous distention  Cardiac: Regular rate and rhythm and No murmurs/rubs/gallops  Pulmonary:  Breath sounds clear bilaterally and No rales/rhonchi/wheezes  Abdomen:  Non-tender, Non-distended and Normal bowel sounds  Incisions: Sternum is stable  Incision dressed with Acticoat  No erythema or drainage and Saphenectomy incision dressed with Acticoat  No erythema or drainage  Extremities: Extremities warm/dry and 1+ edema B/L  Neuro: Alert and oriented X 3 and Sensation is grossly intact  Skin: Warm/Dry, without rashes or lesions  Assessment:  Principal Problem:    S/P CABG (coronary artery bypass graft)  Active Problems:    Dyslipidemia    Coronary artery disease    Elevated troponin       Coronary artery disease  S/P coronary artery bypass grafting; POD # 3    Plan:    1  Cardiac:   NSR; HR/BP well-controlled  Lopressor, 12 5mg PO BID  Continue ASA and Statin therapy  D/C central IV access today  Continue DVT prophylaxis    2  Pulmonary:   Good Room air oxygen saturation; Continue incentive spirometry/Coughing/Deep breathing exercises    3  Renal:   Intake/Output net: -1600 L/24 hours  Continue diuresis   Lasix 40 mg IV QD  Potassium Chloride 20 mEq PO QD  Post op Creatinine stable; Follow up labs prn    4  Neuro:  Neurologically intact;  No active issues  Incisional pain well-controlled  Continue Tylenol, 975 mg PO q 8, standing dose  Continue Oxycodone, 5 to 10 mg PO q 4 hours prn pain  Completed PO toradol    5  GI:  Tolerating TLC 2 3 gm sodium diet  Maintain 1800 mL daily fluid restriction   Continue stool softeners and prn suppository  Continue GI prophylaxis    6  Endo:   Glucose well-controlled with sliding scale coverage    7    Hematology:    Post-operative blood count acceptable; Trend prn    Leukocytosis; check CBC today (22 yesterday)     8     Disposition:      Ambulating independently, Anticipate discharge to home today    VTE Pharmacologic Prophylaxis: Sequential compression device (Venodyne)  and Fondaparinux (Arixtra)  VTE Mechanical Prophylaxis: sequential compression device    Collaborative rounds completed with Dr Mikel Lima of care discussed with bedside nurse    SIGNATURE: Bakari Green PA-C  DATE: April 29, 2021  TIME: 7:28 AM

## 2021-04-29 NOTE — CASE MANAGEMENT
Pt is cleared for d/c by Cardiothoracic Surgery MONIQUE Starkey  RN Maya Tinajero was notified of pt's d/c order  Pt is accepted for services by [facility] for his aftercare plan  The pt and his family were informed of d/c  Family will transport pt home later this day, pickup time TBD  No IMM required  No chart copy required  CM to follow

## 2021-04-29 NOTE — RESTORATIVE TECHNICIAN NOTE
Restorative Specialist Mobility Note       Activity: Ambulate in sebastian, Chair     Assistive Device: Front wheel walker

## 2021-04-29 NOTE — DISCHARGE INSTRUCTIONS
Sternal Precautions   AMBULATORY CARE:   Sternal precautions  are used to help protect your sternum (breastbone) after open chest surgery  Wires are placed during surgery to hold the sternum together as it heals  Sternal precautions help prevent the wires from cutting through the sternum  The precautions also help prevent the sternum from coming apart from an injury, and prevent pain and bleeding  You may need to use the precautions for up to 12 weeks after surgery  Your surgeon will give you specific instructions based on the type of surgery you had  It is important to follow the instructions carefully  An injury to the healing sternum can be life-threatening  General sternal precautions:  Start slowly and do more as you get stronger  Pain medicine might make it harder for you to know when to slow down or be careful  Stop immediately if you hear a crunch or pop in your sternum  · Protect your sternum  Hug a pillow to your chest or cross your arms over your chest when you laugh, sneeze, or cough  · Be careful when you get into or out of a chair or bed  Hug a pillow or cross your arms when you stand or sit  Do not twist as you move  Use only your legs to sit and stand  You may need to use a raised toilet seat if you have trouble standing up without using your arms  Your healthcare provider may teach you to use your elbow for support as you move from lying to sitting  · Ask when you may take a bath or shower  You may need to use a bath chair if you have trouble getting into or out of the tub  Do not use a grab bar  · Do not lift or carry anything heavier than 5 pounds  For example, a gallon of milk weighs 8 pounds  · Keep your arms down as much as possible  Do not put your arms out to the side, behind you, or over your head  Do not let anyone pull your arms to help you move or dress  Do not reach for items  · Do not push or pull anything  Examples include a car door or a vacuum   · Do not drive while you are healing  Your surgeon will tell you when it is safe for you to start driving again  Call 911 for any of the following:   · You have sudden pain in your sternum and hear a crunch or pop  · You have bleeding that does not stop even after you apply pressure for 5 minutes  Seek care immediately if:   · You hear crunching or grinding in your sternum  · You have signs of an infection, such as a fever, red or warm skin, or pus in the surgery wound  Contact your healthcare provider if:   · You continue to feel pain, even after you take your pain medicine  · You have new or worsening pain, or any pain with movement  · You have questions or concerns about your condition or care  Care for your surgery wound:  Always wash your hands before you care for your wound  Wash your wound as directed  Do not rub the wound as you wash or dry the area  Pat the area dry with a clean towel  Change the bandages as directed and when they get wet or dirty  Do not smoke:  Nicotine can damage blood vessels and make it more difficult to heal  Do not use e-cigarettes or smokeless tobacco in place of cigarettes or to help you quit  They still contain nicotine  Ask your healthcare provider for information if you currently smoke and need help quitting  Follow up with your healthcare provider as directed:  Write down your questions so you remember to ask them during your visits  © 2017 [employer] Information is for End User's use only and may not be sold, redistributed or otherwise used for commercial purposes  All illustrations and images included in CareNotes® are the copyrighted property of [employer]  or [employer] Hilton  The above information is an  only  It is not intended as medical advice for individual conditions or treatments   Talk to your doctor, nurse or pharmacist before following any medical regimen to see if it is safe and effective for you       Heart Healthy Diet   WHAT YOU NEED TO KNOW:   A heart healthy diet is an eating plan low in total fat, unhealthy fats, and sodium (salt)  A heart healthy diet helps decrease your risk for heart disease and stroke  Limit the amount of fat you eat to 25% to 35% of your total daily calories  Limit sodium to less than 2,300 mg each day  DISCHARGE INSTRUCTIONS:   Healthy fats:  Healthy fats can help improve cholesterol levels  The risk for heart disease is decreased when cholesterol levels are normal  Choose healthy fats, such as the following:  · Unsaturated fat  is found in foods such as soybean, canola, olive, corn, and safflower oils  It is also found in soft tub margarine that is made with liquid vegetable oil  · Omega-3 fat  is found in certain fish, such as salmon, tuna, and trout, and in walnuts and flaxseed  Unhealthy fats:  Unhealthy fats can cause unhealthy cholesterol levels in your blood and increase your risk of heart disease  Limit unhealthy fats, such as the following:  · Cholesterol  is found in animal foods, such as eggs and lobster, and in dairy products made from whole milk  Limit cholesterol to less than 300 milligrams (mg) each day  You may need to limit cholesterol to 200 mg each day if you have heart disease  · Saturated fat  is found in meats, such as maldonado and hamburger  It is also found in chicken or turkey skin, whole milk, and butter  Limit saturated fat to less than 7% of your total daily calories  Limit saturated fat to less than 6% if you have heart disease or are at increased risk for it  · Trans fat  is found in packaged foods, such as potato chips and cookies  It is also in hard margarine, some fried foods, and shortening  Avoid trans fats as much as possible    Heart healthy foods and drinks to include:  Ask your dietitian or healthcare provider how many servings to have from each of the following food groups:  · Grains:      ¨ Whole-wheat breads, cereals, and pastas, and brown rice    ¨ Low-fat, low-sodium crackers and chips    · Vegetables:      ¨ Broccoli, green beans, green peas, and spinach    ¨ Collards, kale, and lima beans    ¨ Carrots, sweet potatoes, tomatoes, and peppers    ¨ Canned vegetables with no salt added    · Fruits:      ¨ Bananas, peaches, pears, and pineapple    ¨ Grapes, raisins, and dates    ¨ Oranges, tangerines, grapefruit, orange juice, and grapefruit juice    ¨ Apricots, mangoes, melons, and papaya    ¨ Raspberries and strawberries    ¨ Canned fruit with no added sugar    · Low-fat dairy products:      ¨ Nonfat (skim) milk, 1% milk, and low-fat almond, cashew, or soy milks fortified with calcium    ¨ Low-fat cheese, regular or frozen yogurt, and cottage cheese    · Meats and proteins , such as lean cuts of beef and pork (loin, leg, round), skinless chicken and turkey, legumes, soy products, egg whites, and nuts  Foods and drinks to limit or avoid:  Ask your dietitian or healthcare provider about these and other foods that are high in unhealthy fat, sodium, and sugar:  · Snack or packaged foods , such as frozen dinners, cookies, macaroni and cheese, and cereals with more than 300 mg of sodium per serving    · Canned or dry mixes  for cakes, soups, sauces, or gravies    · Vegetables with added sodium , such as instant potatoes, vegetables with added sauces, or regular canned vegetables    · Other foods high in sodium , such as ketchup, barbecue sauce, salad dressing, pickles, olives, soy sauce, and miso    · High-fat dairy foods  such as whole or 2% milk, cream cheese, or sour cream, and cheeses     · High-fat protein foods  such as high-fat cuts of beef (T-bone steaks, ribs), chicken or turkey with skin, and organ meats, such as liver    · Cured or smoked meats , such as hot dogs, maldonado, and sausage    · Unhealthy fats and oils , such as butter, stick margarine, shortening, and cooking oils such as coconut or palm oil    · Food and drinks high in sugar , such as soft drinks (soda), sports drinks, sweetened tea, candy, cake, cookies, pies, and doughnuts  Other diet guidelines to follow:   · Eat more foods containing omega-3 fats  Eat fish high in omega-3 fats at least 2 times a week  · Limit alcohol  Too much alcohol can damage your heart and raise your blood pressure  Women should limit alcohol to 1 drink a day  Men should limit alcohol to 2 drinks a day  A drink of alcohol is 12 ounces of beer, 5 ounces of wine, or 1½ ounces of liquor  · Choose low-sodium foods  High-sodium foods can lead to high blood pressure  Add little or no salt to food you prepare  Use herbs and spices in place of salt  · Eat more fiber  to help lower cholesterol levels  Eat at least 5 servings of fruits and vegetables each day  Eat 3 ounces of whole-grain foods each day  Legumes (beans) are also a good source of fiber  Lifestyle guidelines:   · Do not smoke  Nicotine and other chemicals in cigarettes and cigars can cause lung and heart damage  Ask your healthcare provider for information if you currently smoke and need help to quit  E-cigarettes or smokeless tobacco still contain nicotine  Talk to your healthcare provider before you use these products  · Exercise regularly  to help you maintain a healthy weight and improve your blood pressure and cholesterol levels  Ask your healthcare provider about the best exercise plan for you  Do not start an exercise program without asking your healthcare provider  Follow up with your healthcare provider as directed:  Write down your questions so you remember to ask them during your visits  © 2017 [employer] Information is for End User's use only and may not be sold, redistributed or otherwise used for commercial purposes  All illustrations and images included in CareNotes® are the copyrighted property of [employer] , [employer]  or Marc Hilton  The above information is an  only   It is not intended as medical advice for individual conditions or treatments  Talk to your doctor, nurse or pharmacist before following any medical regimen to see if it is safe and effective for you  Narcotic Pain Management   WHAT YOU NEED TO KNOW:   It is important to manage your pain so you can rest and heal  It will also help you return to your normal activities  DISCHARGE INSTRUCTIONS:   Call 911 or have someone call 911 for any of the following:   · You are breathing slower than normal, or you have trouble breathing  · You cannot be woken  · You have a seizure  Return to the emergency department if:   · Your heart is beating slower than usual     · Your heart feels like it is jumping or fluttering  · You have trouble staying awake  · You have severe muscle pain or weakness  · You see or hear things that are not real   Contact your healthcare provider or pain specialist if:   · You are too dizzy to stand up  · Your pain gets worse or you have new pain  · Your pain does not get better after you use your narcotic medicine  · You cannot do your usual activities because of side effects from the narcotic  · You are constipated or have abdominal pain  · You have questions or concerns about your condition or care  Narcotic safety:   · Take your medicine as directed  Ask if you need more information on how to take your medicine correctly  Follow up with your healthcare provider regularly  You may need to have your dose adjusted  Do not use narcotic medicine if you are pregnant or breastfeeding  Narcotic medicines can be transferred to your baby through your blood and breast milk  · Give your healthcare provider a list of all your medicines  Include any over-the-counter medicines, vitamins, and herbs  It can be dangerous to take narcotics with certain other medicines, such as antihistamines  · Keep your medicine in a safe place    Store your narcotic medicine in a locked cabinet to keep it away from children and others  · Do not drink alcohol while you use narcotics  Alcohol use with a narcotic medicine can make you sleepy and slow your breathing rate  You may stop breathing completely  · Do not drive or operate heavy machinery after you take narcotic medicine  Narcotic medicine can make you drowsy and make it hard to concentrate  You may injure yourself or others if you drive or operate heavy machinery while taking your medicine  Drink more liquids and eat high-fiber foods:  Liquids and fiber will help prevent constipation  Ask your healthcare provider what liquids are right for you and how much you should drink  Also ask for a list of foods that contain fiber  Follow up with your healthcare provider as directed: You may be referred to a pain specialist for more tests and treatment  Write down your questions so you remember to ask them during your visits  © 2017 [employer] Information is for End User's use only and may not be sold, redistributed or otherwise used for commercial purposes  All illustrations and images included in CareNotes® are the copyrighted property of [employer] [facility] , [employer]  or Marc Hilton  The above information is an  only  It is not intended as medical advice for individual conditions or treatments  Talk to your doctor, nurse or pharmacist before following any medical regimen to see if it is safe and effective for you

## 2021-04-29 NOTE — DISCHARGE SUMMARY
Discharge Summary - Cardiothoracic Surgery   Marychuy Garcia 63 y o  male MRN: [id number]  Unit/Bed#: [facility] 428-01 Encounter: [id number]    Admission Date: 4/21/2021     Discharge Date: 04/29/21    Admitting Diagnosis: Triple vessel disease of the heart [I25 10]    Primary Discharge Diagnosis:   Coronary artery disease  S/P coronary artery bypass grafting;    Secondary Discharge Diagnosis:   CAD, HL, h/o esophageal spasms, H/O resolved dysphagia, Remote tobacco use, Pre diabetes (A1C 6 0)     Attending: ANTHONY Carmona  Consulting Physician(s):   Cardiology  Medical/Critical Care      Procedures Performed:   4/26/21: CORONARY ARTERY BYPASS GRAFT (CABG) x 3; LIMA to LAD; Left EVH/SVG to OM1 and D1  TRANSESOPHAGEAL ECHOCARDIOGRAM (DERECK)     Hospital Course:   4/21: 62 y/o CM presents to [facility] for new onset left-sided chest pressure after walking  (+) troponin, EKG (-)  Dx NSTEMI & admitted for w/u  Cardiac cath w/ MV CAD  Awaiting transfer for cardiac sx consultation  (Brilinta 180mg x1 ordered for 4/20 but not given)  4/22: Transferred to [facility] last evening  Seen in consultation  On Heparin gtt  Preop orders placed  4/23: No events  Incidental LICA 24-51%  Vein mapping adequate  Surgical consent signed  Type and screen ordered for 4/25  Arrangements made for transfer to        4/25: no acute issues, preop orders placed  4/26: CABG x3  Transferred to ICU hemodynamically stable on no gtts  No significant postoperative bleeding  Wean towards extubation  NSR      4/27: Delined, last CI 2 9, in NSR on 2LNC  Oxycodone increased to 5 mg and 10 mg for better pain control  Toradol x 3 doses  Lopressor 12 5 mg bid  Lasix daily  SSI coverage  Transfer to telemetry  4/28: Transferred from ICU to telemetry  Weaned to room air  Remains in NSR  Chest tubes and pacing wires removed  4/29: Pain control improved with chest tubes removed  No events overnight  Ambulating independetnly   Home today            Condition at Discharge:   good     Discharge Physical Exam:    Please see the documented physical exam from this morning's progress note for details  Discharge Data:  Results from last 7 days   Lab Units 04/29/21  0741 04/28/21  0458 04/27/21  0349   WBC Thousand/uL 15 23* 20 47* 22 04*   HEMOGLOBIN g/dL 10 2* 10 6* 13 8   HEMATOCRIT % 31 8* 32 3* 42 5   PLATELETS Thousands/uL 182 158 170     Results from last 7 days   Lab Units 04/28/21  0458 04/27/21  0349 04/26/21  2335  04/26/21  1553 04/26/21  1549   POTASSIUM mmol/L 4 2 4 6 4 7   < >  --  4 6   CHLORIDE mmol/L 106 111*  --   --   --  112*   CO2 mmol/L 27 20*  --   --   --  18*   CO2, I-STAT mmol/L  --   --   --   --  22  --    BUN mg/dL 25 16  --   --   --  15   CREATININE mg/dL 1 14 1 14  --   --   --  1 03   GLUCOSE, ISTAT mg/dl  --   --   --   --  139  --    CALCIUM mg/dL 8 4 7 8*  --   --   --  8 0*    < > = values in this interval not displayed  Results from last 7 days   Lab Units 04/26/21  1549 04/26/21  0517 04/25/21  0527   INR  1 38*  --   --    PTT seconds 40* 75* 76*       Discharge instructions/Information to patient and family:   See after visit summary for information provided to patient and family  Johnson Napoles was educated on restrictions regarding driving and lifting, and techniques of proper incisional care  They were specifically counselled on signs and symptoms of an incisional infection, and advised to contact our service immediately should they develop fevers, sweats, chill, redness or drainage at the site of any incisions  Provisions for Follow-Up Care:  See after visit summary for information related to follow-up care and any pertinent home health orders  Disposition:  Home    Planned Readmission:   No    Discharge Medications:  See after visit summary for reconciled discharge medications provided to patient and family        Johnson Napoles was provided contact information and scheduled a follow up appointment with ANTHONY Avendano  Additionally, follow up appointments have been scheduled for their primary care physician and primary cardiologist   Contact information was provided  Upon admission, troponins were Positive for NSTEMI, with level > 0 02  Jillian Love was counseled on the importance of avoiding tobacco products  As with all patients whom have undergone open heart surgery, tobacco cessation medication was contraindicated at the time of discharge  ACE/ARB was Contraindicated secondary to hypotension    Beta Blocker was Prescribed at discharge      The patient was discharged on ongoing diuretic therapy with Torsemide 20 mg, PO QD and Potassium Chloride 20 mEq, PO QD  They were advised to continue these medications for 10 days, unless otherwise directed  Narcotic pain medication was prescribed in the form of Oxycodone  Prior to prescribing, their prescription profile was reviewed on the [facility] prescription drug monitoring program     The patient was informed that following their postoperative surgical evaluation, they will be referred to outpatient cardiac rehabilitation  They were counseled that this program is run by specialists who will help them safely strengthen their heart and prevent more heart disease  Cardiac rehabilitation will include exercise, relaxation, stress management, and heart-healthy nutrition  Caregivers will also check to make sure their medication regimen is working  During this admission, the patient was questioned on their use of tobacco, alcohol, and illicit/non-prescription drug use in the  previous 24 months  Jillian Love states that they have not used any of these substances in this time frame  I spent 30 minutes discharging the patient  This time was spent on the day of discharge  I had direct contact with the patient on the day of discharge   Additional documentation is required if more than 30 minutes were spent on discharge       SIGNATURE: Yesi Caceres PA-C  DATE: April 29, 2021  TIME: 12:38 PM

## 2021-05-03 ENCOUNTER — OFFICE VISIT (OUTPATIENT)
Dept: FAMILY MEDICINE CLINIC | Facility: CLINIC | Age: 66
End: 2021-05-03
Payer: COMMERCIAL

## 2021-05-03 ENCOUNTER — TRANSITIONAL CARE MANAGEMENT (OUTPATIENT)
Dept: FAMILY MEDICINE CLINIC | Facility: CLINIC | Age: 66
End: 2021-05-03

## 2021-05-03 VITALS
WEIGHT: 184.8 LBS | RESPIRATION RATE: 16 BRPM | TEMPERATURE: 97.4 F | HEART RATE: 72 BPM | HEIGHT: 67 IN | BODY MASS INDEX: 29 KG/M2 | DIASTOLIC BLOOD PRESSURE: 70 MMHG | SYSTOLIC BLOOD PRESSURE: 104 MMHG | OXYGEN SATURATION: 97 %

## 2021-05-03 DIAGNOSIS — I25.10 CORONARY ARTERY DISEASE INVOLVING NATIVE CORONARY ARTERY OF NATIVE HEART, UNSPECIFIED WHETHER ANGINA PRESENT: Primary | ICD-10-CM

## 2021-05-03 DIAGNOSIS — Z95.1 S/P CABG (CORONARY ARTERY BYPASS GRAFT): ICD-10-CM

## 2021-05-03 DIAGNOSIS — E78.5 DYSLIPIDEMIA: ICD-10-CM

## 2021-05-03 PROCEDURE — 1111F DSCHRG MED/CURRENT MED MERGE: CPT | Performed by: FAMILY MEDICINE

## 2021-05-03 PROCEDURE — 99496 TRANSJ CARE MGMT HIGH F2F 7D: CPT | Performed by: FAMILY MEDICINE

## 2021-05-03 NOTE — PROGRESS NOTES
Assessment/Plan:   patient to continue present treatment  Patient instructed to follow a low-fat and a low-salt diet  Patient to contact visiting nurses and possible home PT  Patient to follow-up with cardiology on 05/10/2021 and cardiothoracic surgery on 05/28/2021 as scheduled  Return to the office in 2 months  TCM Call (since 4/2/2021)     Date and time call was made  5/3/2021  9:16 AM    Patient was hospitialized at  [facility]        Date of Admission  04/20/21    Date of discharge  04/29/21    Disposition  Home      TCM Call (since 4/2/2021)     I have advised the patient to call PCP with any new or worsening symptoms  Varun Medrano CMA           Diagnoses and all orders for this visit:    Coronary artery disease involving native coronary artery of native heart, unspecified whether angina present    S/P CABG (coronary artery bypass graft)    Dyslipidemia          Subjective:      Patient ID: Karime Orosco is a 63 y o  male  Patient is here with his wife for follow-up appointment after recent hospitalization at [facility] from 04/21/2021 until 04/29/2021 status post CABG x3 on 04/26/2021 with Dr José Hastings, cardiothoracic surgeon  Patient has been feeling fairly well overall and seems to progressing well  Appetite is improving and fatigue is improving  He admits to chest wall incision pain but no other chest discomfort or shortness of breath  Patient has been taking Tylenol p r n  and has not taking oxycodone since 04/30/2021  Patient has a follow-up appoint with [facility] on 05/10/2021 and follow-up appoint with Dr José Hastings on 05/28/2021  Cardiac rehab will be discussed and initiated  Patient for visiting nurses and possible home PT        The following portions of the patient's history were reviewed and updated as appropriate: allergies, current medications, past family history, past medical history, past social history, past surgical history and problem list     Review of Systems   Constitutional: Positive for activity change and fatigue  Negative for appetite change, chills, diaphoresis, fever and unexpected weight change  HENT: Negative  Eyes: Negative  Respiratory: Positive for cough  Negative for chest tightness, shortness of breath and wheezing  Cardiovascular: Positive for chest pain  Negative for palpitations and leg swelling  Chest wall     Gastrointestinal: Negative for abdominal pain, blood in stool, constipation, diarrhea, nausea and vomiting  Endocrine: Negative for cold intolerance and heat intolerance  Genitourinary: Negative for difficulty urinating, dysuria, frequency and hematuria  Musculoskeletal: Negative  Skin: Negative  Neurological: Negative for dizziness, syncope, weakness, light-headedness and headaches  Hematological: Negative for adenopathy  Does not bruise/bleed easily  Psychiatric/Behavioral: Positive for sleep disturbance  Negative for confusion, decreased concentration and dysphoric mood  The patient is not nervous/anxious  Objective:      /70   Pulse 72   Temp (!) 97 4 °F (36 3 °C) (Temporal)   Resp 16   Ht 5' 7" (1 702 m)   Wt 83 8 kg (184 lb 12 8 oz)   SpO2 97%   BMI 28 94 kg/m²          Physical Exam  Constitutional:       General: He is not in acute distress  Appearance: Normal appearance  He is not ill-appearing, toxic-appearing or diaphoretic  HENT:      Head: Normocephalic  Mouth/Throat:      Mouth: Mucous membranes are moist    Eyes:      General: No scleral icterus  Conjunctiva/sclera: Conjunctivae normal    Neck:      Musculoskeletal: Neck supple  Vascular: No carotid bruit  Cardiovascular:      Rate and Rhythm: Normal rate and regular rhythm  Pulmonary:      Effort: Pulmonary effort is normal       Breath sounds: Normal breath sounds  Abdominal:      Palpations: Abdomen is soft  Tenderness: There is no abdominal tenderness  Musculoskeletal:      Right lower leg: No edema  Left lower leg: No edema  Lymphadenopathy:      Cervical: No cervical adenopathy  Skin:     General: Skin is warm and dry  Neurological:      General: No focal deficit present  Mental Status: He is alert and oriented to person, place, and time  Psychiatric:         Mood and Affect: Mood normal          Behavior: Behavior normal          Thought Content:  Thought content normal          Judgment: Judgment normal

## 2021-05-04 ENCOUNTER — TELEPHONE (OUTPATIENT)
Dept: CARDIAC SURGERY | Facility: CLINIC | Age: 66
End: 2021-05-04

## 2021-05-04 NOTE — TELEPHONE ENCOUNTER
POST OP PHONE CALL    PROCEDURE: cabg x3  DISCHARGE DATE: 4/29    Recent bp at home: 118/79    CURRENT WT: 192 lb  PRE OP WT: 192lb  DISCHARGED WT:193lb    Spoke with Venessa Rodriguez to f/u after his post op, pt stated he's being active, walking around the house and going outside for walks  Has not been feeling shortness of breath  Stated is using his incentive spirometer  No chest pain, just pain when sneezing or coughing  No Fever/chills, no lightheadedness     Incision looks good, stated the Visiting nurse, check the incision, it's healing properly, no swelling or gi issue  No questions about his medication  Is aware of his f/u appt with [facility] 5/3 and post surgery f/u with cardiac surgery 5/28

## 2021-05-10 ENCOUNTER — OFFICE VISIT (OUTPATIENT)
Dept: CARDIOLOGY CLINIC | Facility: CLINIC | Age: 66
End: 2021-05-10
Payer: COMMERCIAL

## 2021-05-10 VITALS
HEIGHT: 67 IN | OXYGEN SATURATION: 96 % | SYSTOLIC BLOOD PRESSURE: 118 MMHG | HEART RATE: 64 BPM | DIASTOLIC BLOOD PRESSURE: 72 MMHG | WEIGHT: 181 LBS | BODY MASS INDEX: 28.41 KG/M2

## 2021-05-10 DIAGNOSIS — E78.5 DYSLIPIDEMIA: ICD-10-CM

## 2021-05-10 DIAGNOSIS — Z95.1 S/P CABG X 3: Primary | ICD-10-CM

## 2021-05-10 PROCEDURE — 99215 OFFICE O/P EST HI 40 MIN: CPT | Performed by: NURSE PRACTITIONER

## 2021-05-10 PROCEDURE — 1036F TOBACCO NON-USER: CPT | Performed by: NURSE PRACTITIONER

## 2021-05-10 NOTE — PROGRESS NOTES
Cardiology Follow Up    Johnson Napoles  1955  [id number]  [facility] Mauricio Drain  [address] [address] [phone]    CABG    Interval History:   Mr Dariela Umaña  Presented to [address] [place] [address] on 4/20 -4/21/21 with chest pain  Troponin elevated  He was found to have a NSTEMI   4/21/21 LHC showed MV CAD:  Mid LAD 80% stenosis just after D2, 1st diagonal 70% stenosis, ostial circumflex 80% stenosis, 1st obtuse marginal 80% stenosis at the ostium of the vessel segment  Right PDA 50% stenosis of middle 3rd of the vessel segment  TTE showed LV  Systolic function normal, LVEF 67% no regional wall motion abnormality,  No significant valvular disease  He was Transferred to [facility] for cardiac surgery consultation  Mr Regina Hess was admitted to [facility] on 4/21 - 4/29/21 with CAD, sp CABG  On 4/26/21 CABG x 3 LIMA to LAD, SVG to OM1 and SVG to D1 by Dr Alice Wilkerson  Uneventful postoperative course  He was discharged home on postop day 3  Mr Dariela Umaña presents to our office for a recent hospitalization follow up visit  He is accompanied by his daughter  Bob Mcdermott  Denies  Dyspnea with minimal or minor exertion, chest pain, palpitations, he admits to occasional lightheadedness with standing quickly           HPI:  Dyslipidemia 4/20/21 , TG 83, HDL 30,    Carotid stenosis:  L ICA 50-69%, TANIA stenosis <50%  Family hx of CAD  Patient Active Problem List   Diagnosis    Special screening for malignant neoplasms, colon    Polyp of colon    Dyslipidemia    Vitamin D deficiency    Hiatal hernia    Coronary artery disease    Elevated troponin    S/P CABG (coronary artery bypass graft)     Past Medical History:   Diagnosis Date    CAD (coronary artery disease)     Former tobacco use     History of dysphagia     resolved    Hyperlipidemia     Pre-diabetes Social History     Socioeconomic History    Marital status: Single     Spouse name: Not on file    Number of children: Not on file    Years of education: Not on file    Highest education level: Not on file   Occupational History    Not on file   Social Needs    Financial resource strain: Not on file    Food insecurity     Worry: Not on file     Inability: Not on file    Transportation needs     Medical: Not on file     Non-medical: Not on file   Tobacco Use    Smoking status: Former Smoker     Types: Cigarettes     Quit date:      Years since quittin 3    Smokeless tobacco: Never Used    Tobacco comment: quit    Substance and Sexual Activity    Alcohol use: Not Currently     Comment: Rare    Drug use: No    Sexual activity: Not on file   Lifestyle    Physical activity     Days per week: Not on file     Minutes per session: Not on file    Stress: Not on file   Relationships    Social connections     Talks on phone: Not on file     Gets together: Not on file     Attends Holiness service: Not on file     Active member of club or organization: Not on file     Attends meetings of clubs or organizations: Not on file     Relationship status: Not on file    Intimate partner violence     Fear of current or ex partner: Not on file     Emotionally abused: Not on file     Physically abused: Not on file     Forced sexual activity: Not on file   Other Topics Concern    Not on file   Social History Narrative    Caffeine - Iced Tea 2cups/d      Family History   Problem Relation Age of Onset    Heart attack Father 62    No Known Problems Mother     Prostate cancer Neg Hx     Colon cancer Neg Hx     Diabetes Neg Hx     Stroke Neg Hx      Past Surgical History:   Procedure Laterality Date    CARDIAC CATHETERIZATION      COLONOSCOPY N/A 2018    Procedure: COLONOSCOPY;  Surgeon: Claudia Oliveros MD;  Location: [facility];   Service: Gastroenterology    EGD  2019    FINGER AMPUTATION Left     partial 3rd finger    NM CABG, ARTERY-VEIN, THREE N/A 4/26/2021    Procedure: CORONARY ARTERY BYPASS GRAFT (CABG) x 3; LIMA to LAD; Left EVH/SVG to OM1 and D1;  Surgeon: Keisha Ann MD;  Location: [facility];  Service: Cardiac Surgery       Current Outpatient Medications:     acetaminophen (TYLENOL) 325 mg tablet, Take 1-2 tabs q6h PRN mild fever/pain, Disp: 90 tablet, Rfl: 0    ascorbic acid (VITAMIN C) 500 mg tablet, Take 500 mg by mouth daily, Disp: , Rfl:     aspirin 325 mg tablet, Take 1 tablet (325 mg total) by mouth daily, Disp: 90 tablet, Rfl: 0    atorvastatin (LIPITOR) 80 mg tablet, Take 1 tablet (80 mg total) by mouth daily with dinner, Disp: 90 tablet, Rfl: 0    bisacodyl (DULCOLAX) 5 mg EC tablet, Take 2 tablets (10 mg total) by mouth daily as needed for constipation, Disp: 60 tablet, Rfl: 0    cholecalciferol (VITAMIN D3) 1,000 units tablet, Take 1,000 Units by mouth daily, Disp: , Rfl:     metoprolol tartrate (LOPRESSOR) 25 mg tablet, Take 0 5 tablets (12 5 mg total) by mouth every 12 (twelve) hours, Disp: 90 tablet, Rfl: 0    Multiple Vitamin (multivitamin) tablet, Take 1 tablet by mouth daily, Disp: , Rfl:     oxyCODONE (ROXICODONE) 5 mg immediate release tablet, Take 1-2 pills PO q6h as needed for mild to moderate pain  Ongoing therapy for postoperative pain , Disp: 30 tablet, Rfl: 0    polyethylene glycol (MIRALAX) 17 g packet, Take 17 g by mouth daily IF PACKETS UNAVAILABLE, MAY TAKE OTC EQUIVALENT  USED AS DIRECTED  (Patient not taking: Reported on 5/3/2021), Disp: 510 g, Rfl: 0    potassium chloride (K-DUR,KLOR-CON) 20 mEq tablet, Take 1 tablet (20 mEq total) by mouth daily for 10 days, Disp: 10 tablet, Rfl: 0    torsemide (DEMADEX) 20 mg tablet, Take 1 tablet (20 mg total) by mouth daily for 10 days, Disp: 10 tablet, Rfl: 0  No Known Allergies    Labs:  No results displayed because visit has over 200 results        Admission on 04/20/2021, Discharged on 04/21/2021   Component Date Value    WBC 04/20/2021 7 69     RBC 04/20/2021 5 42     Hemoglobin 04/20/2021 15 0     Hematocrit 04/20/2021 48 1     MCV 04/20/2021 89     MCH 04/20/2021 27 7     MCHC 04/20/2021 31 2*    RDW 04/20/2021 14 6     MPV 04/20/2021 9 6     Platelets 98/70/0324 285     nRBC 04/20/2021 0     Neutrophils Relative 04/20/2021 59     Immat GRANS % 04/20/2021 0     Lymphocytes Relative 04/20/2021 31     Monocytes Relative 04/20/2021 8     Eosinophils Relative 04/20/2021 1     Basophils Relative 04/20/2021 1     Neutrophils Absolute 04/20/2021 4 55     Immature Grans Absolute 04/20/2021 0 03     Lymphocytes Absolute 04/20/2021 2 37     Monocytes Absolute 04/20/2021 0 61     Eosinophils Absolute 04/20/2021 0 09     Basophils Absolute 04/20/2021 0 04     Protime 04/20/2021 12 7     INR 04/20/2021 0 94     PTT 04/20/2021 28     Sodium 04/20/2021 139     Potassium 04/20/2021 4 3     Chloride 04/20/2021 102     CO2 04/20/2021 30     ANION GAP 04/20/2021 7     BUN 04/20/2021 12     Creatinine 04/20/2021 1 18     Glucose 04/20/2021 94     Calcium 04/20/2021 9 2     AST 04/20/2021 26     ALT 04/20/2021 33     Alkaline Phosphatase 04/20/2021 58     Total Protein 04/20/2021 7 5     Albumin 04/20/2021 4 1     Total Bilirubin 04/20/2021 0 50     eGFR 04/20/2021 65     Lipase 04/20/2021 87     Troponin I 04/20/2021 0 05*    SARS-CoV-2 04/20/2021 Negative     INFLUENZA A PCR 04/20/2021 Negative     INFLUENZA B PCR 04/20/2021 Negative     RSV PCR 04/20/2021 Negative     Troponin I 04/20/2021 0 05*    Platelets 86/25/3970 255     MPV 04/20/2021 9 4     Troponin I 04/20/2021 0 06*    Hemoglobin A1C 04/20/2021 6 0*    EAG 04/20/2021 126     TSH 3RD GENERATON 04/20/2021 1 882     Ventricular Rate 04/20/2021 58     Atrial Rate 04/20/2021 58     OK Interval 04/20/2021 162     QRSD Interval 04/20/2021 80     QT Interval 04/20/2021 418     QTC Interval 04/20/2021 410     P Axis 04/20/2021 41     QRS Axis 04/20/2021 46     T Wave Axis 04/20/2021 61     Ventricular Rate 04/20/2021 53     Atrial Rate 04/20/2021 53     NE Interval 04/20/2021 158     QRSD Interval 04/20/2021 86     QT Interval 04/20/2021 426     QTC Interval 04/20/2021 399     P Axis 04/20/2021 37     QRS Axis 04/20/2021 38     T Wave Axis 04/20/2021 67     Cholesterol 04/20/2021 172     Triglycerides 04/20/2021 83     HDL, Direct 04/20/2021 30*    LDL Calculated 04/20/2021 125*    PTT 04/20/2021 73*    Troponin I 04/20/2021 0 07*    Troponin I 04/20/2021 0 05*    PTT 04/21/2021 81*    Sodium 04/21/2021 140     Potassium 04/21/2021 4 4     Chloride 04/21/2021 105     CO2 04/21/2021 24     ANION GAP 04/21/2021 11     BUN 04/21/2021 14     Creatinine 04/21/2021 1 03     Glucose 04/21/2021 103     Glucose, Fasting 04/21/2021 103*    Calcium 04/21/2021 9 9     eGFR 04/21/2021 77     WBC 04/21/2021 9 54     RBC 04/21/2021 5 50     Hemoglobin 04/21/2021 15 6     Hematocrit 04/21/2021 48 2     MCV 04/21/2021 88     MCH 04/21/2021 28 4     MCHC 04/21/2021 32 4     RDW 04/21/2021 14 6     Platelets 98/72/1169 280     MPV 04/21/2021 9 5     PTT 04/21/2021 56*     Imaging: Xr Chest Portable    Result Date: 4/28/2021  Narrative: CHEST INDICATION:   Post Open Heart Surgey  COMPARISON:  AP chest 4/26/2021 EXAM PERFORMED/VIEWS:  XR CHEST PORTABLE FINDINGS:  Right sided IJ catheter and mediastinal/pleural drains remain in place  Cardiomediastinal silhouette appears unremarkable  The lungs are clear  No pneumothorax or pleural effusion  Osseous structures appear within normal limits for patient age  Impression: No acute cardiopulmonary disease  Workstation performed: [id number]     Xr Chest 1 View Portable    Result Date: 4/20/2021  Narrative: CHEST INDICATION:   left sided chest pain  Patient has suspected COVID-19   COMPARISON:  None EXAM PERFORMED/VIEWS:  XR CHEST PORTABLE FINDINGS: Cardiomediastinal silhouette appears unremarkable  No focal airspace consolidation  No pneumothorax or pleural effusion  Osseous structures appear within normal limits for patient age  Impression: No acute cardiopulmonary disease  In the setting of clinically suspected/proven COVID-19, this plain film appearance does not contain findings that raise concern for viral pneumonia such as COVID-19, but does not rule out this diagnosis  Workstation performed: [id number]     Vas Carotid Complete Study    Result Date: 4/22/2021  Narrative:  [facility] REPORT CLINICAL: Indications: Patient presents for a general health evaluation secondary to future open heart surgery  Patient is asymptomatic at this time  Operative History: Patient denies any cardiovascular surgeries Risk Factors The patient has history of HLD and CAD  Clinical Right Pressure:  130/80 mm Hg, Left Pressure:  126/82 mm Hg  FINDINGS:  Right        Impression  PSV  EDV (cm/s)  Direction of Flow  Ratio  Dist  ICA                 80          23                      0 82  Mid  ICA                  90          33                      0 92  Prox  ICA    1 - 49%     102          24                      1 04  Dist CCA                 112          23                            Mid CCA                   98          22                      0 81  Prox CCA                 120          18                      1 10  Ext Carotid              118           8                      1 21  Prox Vert                 27           3  Antegrade                 Subclavian               165           0                            Innominate               109           9                             Left         Impression  PSV  EDV (cm/s)  Direction of Flow  Ratio  Dist  ICA                 90          36                      0 97  Mid  ICA                  69          27                      0 74  Prox   ICA    50 - 69%    175          69 1 88  Dist CCA                  86          25                            Mid CCA                   93          24                      1 02  Prox CCA                  91          20                            Ext Carotid               90           8                      0 96  Prox Vert                 63          22  Antegrade                 Subclavian               151           2                               CONCLUSION: Impression RIGHT: There is <50% stenosis noted in the internal carotid artery  Plaque is homogenous and smooth  Vertebral artery flow is antegrade  There is no significant subclavian artery disease  LEFT: There is 50-69% stenosis noted in the internal carotid artery  Plaque is homogenous and smooth  Vertebral artery flow is antegrade  There is no significant subclavian artery disease  SIGNATURE: Electronically Signed by: Mariya Garcia MD, RPVI on 2021-04-22 10:24:57 PM    Vas Lower Limb Vein Mapping Bypass Graft    Result Date: 4/22/2021  Narrative:  [facility] REPORT CLINICAL: Indications: Pre-op Vein Mapping for Future Open Heart Surgery Operative History: Patient denies any cardiovascular surgeries Risk Factors The patient has history of HLD and CAD    FINDINGS:  Unilateral       Diameter AP  Lower Limb Vein          5 2   Segment         Right        Left                            Diameter AP  Diameter AP  GSV Inguinal            8 2          9 1  GSV Prox Thigh          5 2          3 9  GSV Mid Thigh           4 7          2 2  GSV Dist Thigh          5 1          3 0  GSV Knee                4 5          2 8  GSV Prox Calf           2 6          2 5  GSV Mid Calf            2 9          2 8  GSV Dist Calf           3 3          3 7  GSV Ankle                            3 6     CONCLUSION: Impression: RIGHT LOWER LIMB: The great saphenous vein is small throughout the thigh; however, a large anterior accessory is patent from the groin to the ankle and measures >2mm in caliber throughout the leg  LEFT LOWER LIMB: The great saphenous vein is patent  from the groin to the ankle  The vein measures >2mm in caliber from the groin to the ankle  SIGNATURE: Electronically Signed by: Macie Prather MD, [address] on 2021-04-22 10:24:38 PM    Xr Chest Portable Icu    Result Date: 4/26/2021  Narrative: CHEST INDICATION:   Status post open heart surgery  COMPARISON:  4/20/2021 EXAM PERFORMED/VIEWS:  XR CHEST PORTABLE ICU FINDINGS:  There are median sternotomy wires indicating prior cardiac surgery  Endotracheal tube tip is approximately 3 5 cm above the dae  Wadley-Fatimah catheter tip in region of main pulmonary artery with nasogastric tube seen coursing below the left hemidiaphragm  Central line tip at cavoatrial junction  Chest tubes are present  The lungs are clear  No pneumothorax or pleural effusion  Osseous structures appear within normal limits for patient age  Impression: Status post open heart surgery, no evidence of pneumothorax  Appropriately positioned endotracheal tube Workstation performed: [id number]       Review of Systems:  Review of Systems   Musculoskeletal: Positive for arthralgias and myalgias  Neurological: Positive for light-headedness  Per HPI   All other systems reviewed and are negative  Physical Exam:  Physical Exam  Vitals signs reviewed  Constitutional:       Appearance: He is well-developed  HENT:      Head: Normocephalic  Cardiovascular:      Rate and Rhythm: Normal rate and regular rhythm  Heart sounds: Normal heart sounds  Pulmonary:      Effort: Pulmonary effort is normal       Breath sounds: Normal breath sounds  Abdominal:      General: Bowel sounds are normal       Palpations: Abdomen is soft  Musculoskeletal: Normal range of motion  Right lower leg: No edema  Left lower leg: No edema  Skin:     General: Skin is warm and dry  Capillary Refill: Capillary refill takes less than 2 seconds        Comments: Sternal incision appears clean dry intact approximated without erythema or ecchymosis, left knee inner aspect SVG site appears clean dry intact without erythema or ecchymosis   Neurological:      General: No focal deficit present  Mental Status: He is alert and oriented to person, place, and time  Psychiatric:         Mood and Affect: Mood normal          Discussion/Summary:  1  4/26/21 CABG x 3 LIMA to LAD, SVG to OM1 and SVG to D1 by Dr Bessy Denis  Continue on aspirin 325 mg daily, Lipitor 80 mg daily, metoprolol tartrate 12 5 mg q 12 hours continue a 2 g sodium low-fat low-cholesterol diet  Remove fluid restrictions  Blood pressure low in the office today  Most likely intravascularly dry due to fluid restriction  He admits to occasional lightheaded with standing         2  Dyslipidemia 4/20/21 , TG 83, HDL 30, - continue on Lipitor 80mg daily

## 2021-05-28 ENCOUNTER — OFFICE VISIT (OUTPATIENT)
Dept: CARDIAC SURGERY | Facility: CLINIC | Age: 66
End: 2021-05-28

## 2021-05-28 VITALS
SYSTOLIC BLOOD PRESSURE: 110 MMHG | BODY MASS INDEX: 28.9 KG/M2 | HEART RATE: 64 BPM | WEIGHT: 184.1 LBS | TEMPERATURE: 97.6 F | DIASTOLIC BLOOD PRESSURE: 68 MMHG | RESPIRATION RATE: 18 BRPM | OXYGEN SATURATION: 97 % | HEIGHT: 67 IN

## 2021-05-28 DIAGNOSIS — I25.10 CORONARY ARTERY DISEASE: ICD-10-CM

## 2021-05-28 DIAGNOSIS — Z95.1 S/P CABG (CORONARY ARTERY BYPASS GRAFT): ICD-10-CM

## 2021-05-28 DIAGNOSIS — E78.5 DYSLIPIDEMIA: ICD-10-CM

## 2021-05-28 PROCEDURE — 99024 POSTOP FOLLOW-UP VISIT: CPT | Performed by: PHYSICIAN ASSISTANT

## 2021-05-28 PROCEDURE — 3008F BODY MASS INDEX DOCD: CPT | Performed by: NURSE PRACTITIONER

## 2021-05-28 RX ORDER — ATORVASTATIN CALCIUM 80 MG/1
80 TABLET, FILM COATED ORAL
Qty: 90 TABLET | Refills: 0 | Status: SHIPPED | OUTPATIENT
Start: 2021-05-28 | End: 2021-10-12 | Stop reason: SDUPTHER

## 2021-05-28 RX ORDER — ASPIRIN 325 MG
325 TABLET ORAL DAILY
Qty: 90 TABLET | Refills: 1 | Status: SHIPPED | OUTPATIENT
Start: 2021-05-28 | End: 2022-02-01 | Stop reason: SDUPTHER

## 2021-05-28 NOTE — PROGRESS NOTES
Procedure: S/P Coronary artery bypass grafting x 3 with left internal mammary artery to left anterior descending, saphenous vein graft to obtuse marginal 1, saphenous vein graft to first diagonal performed on 4/26/2021    History: Filipe Villaseñor is a 63y o  year old male who presents to our office today for routine follow up care from coronary artery bypass grafting  His postoperative course was uneventful and he was discharged to home in stable condition  The patient returns to the office today for his postoperative visit  He has since seen his PCP and Cardiologist since his discharge home  He has returned to all of his ADLs independently  He is tolerating a cardiac diet and staying on a low fat diet, he has lost 25 pounds since his discharge  He has been making healthy food choices and kept salt to a minimum  Vital Signs:   Vitals:    05/28/21 1448 05/28/21 1449   BP: 102/60 110/68   BP Location: Left arm Right arm   Patient Position: Sitting    Cuff Size: Adult    Pulse: 64    Resp: 18    Temp: 97 6 °F (36 4 °C)    TempSrc: Tympanic    SpO2: 97%    Weight: 83 5 kg (184 lb 1 6 oz)    Height: 5' 7" (1 702 m)        Home Medications:   Prior to Admission medications    Medication Sig Start Date End Date Taking?  Authorizing Provider   ascorbic acid (VITAMIN C) 500 mg tablet Take 500 mg by mouth daily   Yes Historical Provider, MD   aspirin 325 mg tablet Take 1 tablet (325 mg total) by mouth daily 5/28/21  Yes Caitlyn Underwood PA-C   atorvastatin (LIPITOR) 80 mg tablet Take 1 tablet (80 mg total) by mouth daily with dinner 5/28/21  Yes Caitlyn Underwood PA-C   bisacodyl (DULCOLAX) 5 mg EC tablet Take 2 tablets (10 mg total) by mouth daily as needed for constipation 4/29/21 5/29/21 Yes Mercy Macias PA-C   cholecalciferol (VITAMIN D3) 1,000 units tablet Take 1,000 Units by mouth daily   Yes Historical Provider, MD   metoprolol tartrate (LOPRESSOR) 25 mg tablet Take 0 5 tablets (12 5 mg total) by mouth every 12 (twelve) hours 5/28/21  Yes Maya Steven PA-C   Multiple Vitamin (multivitamin) tablet Take 1 tablet by mouth daily   Yes Historical Provider, MD   aspirin 325 mg tablet Take 1 tablet (325 mg total) by mouth daily 4/30/21 5/28/21 Yes Eb Ludwig PA-C   atorvastatin (LIPITOR) 80 mg tablet Take 1 tablet (80 mg total) by mouth daily with dinner 4/29/21 5/28/21 Yes Eb Ludwig PA-C   metoprolol tartrate (LOPRESSOR) 25 mg tablet Take 0 5 tablets (12 5 mg total) by mouth every 12 (twelve) hours 4/29/21 5/28/21 Yes Eb Ludwig PA-C   acetaminophen (TYLENOL) 325 mg tablet Take 1-2 tabs q6h PRN mild fever/pain  Patient not taking: Reported on 5/28/2021 4/29/21   Eb Ludwig PA-C   oxyCODONE (ROXICODONE) 5 mg immediate release tablet Take 1-2 pills PO q6h as needed for mild to moderate pain  Ongoing therapy for postoperative pain  Patient not taking: Reported on 5/28/2021 4/29/21   Eb Ludwig PA-C       Physical Exam:    HEENT/NECK:  Normocephalic  Atraumatic  No jugular venous distention  Cardiac: Regular rate and rhythm  Pulmonary:  Breath sounds clear bilaterally  Abdomen:  Non-tender, Non-distended and Normal bowel sounds  Incisions: Sternum is stable  Incision is clean, dry, and intact  and Saphenectomy incison is clean, dry, and intact  Extremities: Extremities warm/dry and No edema B/L  Neuro: Alert and oriented X 3  Sensation is grossly intact  No focal deficits  Skin: Warm/Dry, without rashes or lesions  Assessment: Coronary artery disease  S/P coronary artery bypass grafting;    Plan:     Joi Galeas continues to recover well following coronary artery bypass grafting  To date they have made progressive improvements physical rehabilitation  At this point I have cleared them to begin outpatient cardiac rehabilitation and have encouraged them to to do so  Joi Galeas has also been cleared to resume driving at this point   I asked that them do so in progressive increments  I did remind them of their ongoing lifting restrictions of 25 pounds for an additional 2 months  Johnson Napoles has already been evaluated by their primary care physician cardiologist for ongoing medical care  At this point I have not scheduled them for routine followup care with our office  I have advised them to call with any new concerns that may arise  Johnson Napoles was comfortable with our recommendations and their questions were answered to their satisfaction  Falguni Aguilar PA-C  05/28/21    * This note was completed in part utilizing [employer] direct voice recognition software  Grammatical errors, random word insertion, spelling mistakes, and incomplete sentences may be an occasional consequence of the system secondary to software limitations, ambient noise and hardware issues  At the time of dictation, efforts were made to edit, clarify and /or correct errors  Please read the chart carefully and recognize, using context, where substitutions have occurred  If you have any questions or concerns about the context, text or information contained within the body of this dictation, please contact myself, the provider, for further clarification

## 2021-06-07 ENCOUNTER — DOCUMENTATION (OUTPATIENT)
Dept: CARDIAC SURGERY | Facility: CLINIC | Age: 66
End: 2021-06-07

## 2021-06-09 ENCOUNTER — CLINICAL SUPPORT (OUTPATIENT)
Dept: CARDIAC REHAB | Facility: CLINIC | Age: 66
End: 2021-06-09
Payer: COMMERCIAL

## 2021-06-09 DIAGNOSIS — E78.5 DYSLIPIDEMIA: ICD-10-CM

## 2021-06-09 DIAGNOSIS — I25.10 CORONARY ARTERY DISEASE: ICD-10-CM

## 2021-06-09 DIAGNOSIS — Z95.1 S/P CABG (CORONARY ARTERY BYPASS GRAFT): ICD-10-CM

## 2021-06-09 PROCEDURE — 93797 PHYS/QHP OP CAR RHAB WO ECG: CPT

## 2021-06-09 NOTE — PROGRESS NOTES
CARDIAC REHAB ASSESSMENT    Today's date: 2021  Patient name: Petra Cardona     : 1955       MRN: [id number]  PCP: Leland Riggs DO  Referring Physician: Candi Ferrari PA-C  Cardiologist: Ranulfo Connors MD  Surgeon:   Dx:   Encounter Diagnoses   Name Primary?  Coronary artery disease     S/P CABG (coronary artery bypass graft)     Dyslipidemia        Date of onset: 21  Cultural needs:     Height:    Wt Readings from Last 1 Encounters:   21 83 5 kg (184 lb 1 6 oz)      Weight:   Ht Readings from Last 1 Encounters:   21 5' 7" (1 702 m)     Medical History:   Past Medical History:   Diagnosis Date    CAD (coronary artery disease)     Former tobacco use     History of dysphagia     resolved    Hyperlipidemia     Pre-diabetes          Physical Limitations: N/A    Fall Risk: Low   Comments: Ambulates with a steady gait with no assist device    Anginal Equivalent: None/denies angina   NTG use: No prescription    Risk Factors   Cholesterol: Yes  Smoking: Former user  HTN: Yes  DM: Pre-diabetes  Obesity: No   Inactivity: No  Stress:  perceived  stress: 1/10   Stressors:no major life stressors    Goals for Stress Management:Practice Relaxation Techniques and Exercise    Family History:  Family History   Problem Relation Age of Onset    Heart attack Father 62    No Known Problems Mother     Prostate cancer Neg Hx     Colon cancer Neg Hx     Diabetes Neg Hx     Stroke Neg Hx        Allergies: Patient has no known allergies    ETOH:   Social History     Substance and Sexual Activity   Alcohol Use Not Currently    Comment: Rare         Current Medications:   Current Outpatient Medications   Medication Sig Dispense Refill    acetaminophen (TYLENOL) 325 mg tablet Take 1-2 tabs q6h PRN mild fever/pain (Patient not taking: Reported on 2021) 90 tablet 0    ascorbic acid (VITAMIN C) 500 mg tablet Take 500 mg by mouth daily      aspirin 325 mg tablet Take 1 tablet (325 mg total) by mouth daily 90 tablet 1    atorvastatin (LIPITOR) 80 mg tablet Take 1 tablet (80 mg total) by mouth daily with dinner 90 tablet 0    bisacodyl (DULCOLAX) 5 mg EC tablet Take 2 tablets (10 mg total) by mouth daily as needed for constipation 60 tablet 0    cholecalciferol (VITAMIN D3) 1,000 units tablet Take 1,000 Units by mouth daily      metoprolol tartrate (LOPRESSOR) 25 mg tablet Take 0 5 tablets (12 5 mg total) by mouth every 12 (twelve) hours 30 tablet 1    Multiple Vitamin (multivitamin) tablet Take 1 tablet by mouth daily      oxyCODONE (ROXICODONE) 5 mg immediate release tablet Take 1-2 pills PO q6h as needed for mild to moderate pain  Ongoing therapy for postoperative pain  (Patient not taking: Reported on 5/28/2021) 30 tablet 0     No current facility-administered medications for this visit            Functional Status Prior to Diagnosis for Treatment   Occupation: full time job planner  Recreation: walking  ADLs: resumed all ADLs  Edwards: No limitations  Exercise: walking 2 miles 5x/wk 30-40 minutes  Other:     Current Functional Status  Occupation: plans to return to work mid of July  Recreation: walking  ADLs:resumed all ADLs within sternal precautions  Edwards: No limitations  Exercise: walking 1 mile   Other:     Patient Specific Goals:  Get back to normal     Short Term Program Goals: dietary modifications increased strength improved energy/stamina with ADLs exercise 120-150 mins/wk return to work    Long Term Goals: increased maximal walking duration  increased intial training workload  Improved [facility] Activity Status score  Improved functional capacity  Improved Quality of Life - [facility] score reduced  Improved lipid profile  Reduced body fat%  Reduced waist circumference  improved Rate Your Plate Score    Ability to reach goals/rehabilitation potential:  Very Good     Projected return to function: 12 weeks  Objective tests: sub-max TM ETT      Nutritional   Reviewed details of Rate your Plate  Discussed key elements of heart healthy eating  Reviewed patient goals for dietary modifications and their clinical implications  Reviewed most recent lipid profile  Goals for dietary modification: choose lean cuts of meat  poultry without the skin  low fat ground meat and poultry  eliminate processed meats  reduce portions of meat to 3 oz  increase fish intake  more meatless meals  low fat dairy   reduced fat cheese  increase whole grains  increase fruits and vegetables  eliminate butter  low sodium  improved snack choices  more nuts/seeds  reduce sweets/frozen desserts  heathier choices while dining out      Emotional/Social  Patient reports he/she is coping well with good social support and denies depression or anxiety    Marital status:     Domestic Violence Screening: No    Comments: Pt had CABG x3 4/26/21  He reports that he was having chest pain and presented to the ER  No major medical history and no previous heart history  Pt reports that he is still sore in his chest and is hard to breath for him

## 2021-06-09 NOTE — PROGRESS NOTES
Cardiac Rehabilitation Plan of Care   Initial Care Plan          Today's date: 2021   # of Exercise Sessions Completed: 1  Patient name: Karime Orosco      : 1955  Age: 63 y o  MRN: [id number]  Referring Physician: Jaimie Winn PA-C  Cardiologist: Vidal Hicks MD  Provider: Daniel Antony  Clinician: Ajith Rouse MS, CEP       Dx:   Encounter Diagnoses   Name Primary?  Coronary artery disease     S/P CABG (coronary artery bypass graft)     Dyslipidemia      Date of onset: 21      SUMMARY OF PROGRESS:  Today is Mateo's initial evaluation to begin Cardiac Rehab now 6 wks post CABG x3  Chago Courtney reports that he presented to the ER with chest pain  He states that he is feeling better since the surgery but still have some chest soreness  He also states that he is more emotional since the operation  Chago Courtney denies any previous history  The patient does currently follow a formal exercise program at home, including walking 1 mile  He has resumed all ADLs following sternal restrictions  Depression screening using the PHQ-9 interprets the patient's score 0 =No Depression  KAYLEN-7 screening tool for anxiety suggests 0-4  = Not anxious  When addressed, the patient denies having depression/anxiety  Patient reports excellent social/emotional support  Information to begin using [employer] was provided as well as contact information for counseling through [employer]  The patient is a former smoker (quit 30 years ago)  He has abstained since quitting  Patient admits to 100% medication compliance  Patient reports to have any following physical limitations to exercise  The patient completed an initial submaximal TM ETT  The patient completed 12 minutes of stage IV (5 8METs) with test termination of RHR +30  Resting  BP 82/60 with appropriate hemodynamic response to exercise reaching 120/60  Jaspal reports that his BP is always low and denied any symptoms    Patient denied symptoms during exercise  Telemetry revealed NSR w/ occ PVCs  Patient was counseled on exercise guidelines to achieve a minimum of 150 mins/wk of moderate intensity (RPE 4-6) exercise and encouraged to add 1-2 days of exercise on opposite days of cardiac rehab as tolerated  We discussed current dietary habits and goals of heart healthy eating for lipid management, diabetes management and weight loss Patient's goals include: getting back to normal    The patient's CAD risk factors include:  obesity/overweight, hyperlipidemia and diabetes  His education will focus on lifestyle modification/education specific to His needs  Patient will attend group education classes on heart healthy eating, reading food labels, stress management, risk factor reduction, understanding heart disease and common heart medications  Patient will attend 35 monitored exercise sessions, 3x/wk for 12-18 weeks beginning          Medication compliance: Yes   Comments: Pt reports to be compliant with medications  Fall Risk: Low   Comments: Ambulates with a steady gait with no assist device    EKG Interpretation: NSR w/ occ PVCs      EXERCISE ASSESSMENT and PLAN    Current Exercise Program in Rehab:       Frequency: 3 days/week Supplement with home exercise 2+ days/wk as tolerated       Minutes: 30-40         METS: 5-7            HR:    RPE: 4-6         Modalities: Treadmill, Airdyne bike, UBE and Lifecycle      Exercise Progression 30 Day Goals :    Frequency: 4 days/week of cardiac rehab     Supplement with home exercise 2+ days/wk as tolerated    Minutes: 35-40                            >150 mins/wk of moderate intensity exercise   METS: 5 5-7   HR:     RPE: 4-6   Modalities: Treadmill, Airdyne bike, Lifecycle and Elliptical    Strength trainin-3 days / week  12-15 repetitions  1-2 sets per modality   Will be added following at least 8 weeks post surgery and 8-10 monitored sessions   Modalities: Leg Press, Chest Press, Pull Mckoy, Arm Curl and Seated Row    Home Exercise: Type: walking, Frequency: 5 days/week, Duration: 20 mins, Distance 1 mile    Goals: 10% improvement in functional capacity - based on max METs achieved in fitness assessment, improved DASI score by 10%, Increase in exercise capacity by 40% - based on peak METs tolerated in cardiac rehab exercise session, Exercise 5 days/wk, >150mins/wk of moderate intensity exercise, Return to work unrestricted, Attend Rehab regularly, Decrease sitting time and return to regular exercise at the gym    Progression Toward Goals:  Reviewed Pt goals and determined plan of care, Will continue to educate and progress as tolerated  Education: benefit of exercise for CAD risk factors, AHA guidelines to achieve >150 mins/wk of moderate exercise and RPE scale   Plan:education on home exercise guidelines, home exercise 30+ mins 2 days opposite CR and Education class: Risk Factors for Heart Disease  Readiness to change: Preparation:  (Getting ready to change)       NUTRITION ASSESSMENT AND PLAN    Weight control:    Starting weight: 181 8   Current weight:     Waist circumference:    Startin in   Current:      Diabetes: Pre-diabetes  A1c: 6 0    last measured: 21    Lipid management: Discussed diet and lipid management, Last lipid profile 2021  Chol 172  TRG 83  HDL 30   and Pt has a script for repeat lipid profile      Goals:reduced BMI to < 25, LDL <100, HDL >40, decreased body fat% <25%   (M), 2 5-5%  wt loss, reduce portion sizes of meat to 3oz or less, increase intake of fish, shellfish, increase intake of meatless meals, eat 3 or more servings of whole grains a day, Eat 4-5 cups of fruits and vegetables daily, eliminate butter, choose healthy snacks: light popcorn, plain pretzels, seldom eat or choose low fat ice-cream, fruit juice bars or frozen yogurt  and daily saturated fat intake <7%/13g    Progression Toward Goals: Reviewed Pt goals and determined plan of care, Will continue to educate and progress as tolerated  Education: heart healthy eating  low sodium diet  hydration  nutrition for  lipid management  wt  loss   healthy choices while dining out  portion control  Plan: Education class: Reading Food Labels, Education Class: Heart Healthy Eating, replace butter with soft spreads made with olive oil, canola or yogurt, replace refined grain bread with whole grain bread, reduce portion sizes, avoid processed foods, will try new grains like brown rice, quinoa, farro, will replace sugar sweetened cereals with whole grain or oatmeal, drink more water, learn how to read food labels, replace sugar with stevia or truvia, keep added daily sugar <25g/day and decrease carbonated beverages  Readiness to change: Preparation:  (Getting ready to change)       PSYCHOSOCIAL ASSESSMENT AND PLAN    Emotional:  Depression assessment:  PHQ-9 = 0 =No Depression            Anxiety measure:  KAYLEN-7 = 0-4  = Not anxious  Self-reported stress level:  1  Social support: Excellent    Goals:  Physical Fitness in [place] Score < 3, Daily Activity in [place] Score < 3, Pain in [place] Score < 3 and Overall Health in [place] Score < 3    Progression Toward Goals: Reviewed Pt goals and determined plan of care, Will continue to educate and progress as tolerated      Education: signs/sxs of depression, benefits of a positive support system, stress management techniques and depression and CAD  Plan: Class: Stress and Your Health, Class: Relaxation, Practice relaxation techniques, Exercise and Return to previous social activity  Readiness to change: Preparation:  (Getting ready to change)       OTHER CORE COMPONENTS     Tobacco:   Social History     Tobacco Use   Smoking Status Former Smoker    Types: Cigarettes    Quit date: 0    Years since quittin 4   Smokeless Tobacco Never Used   Tobacco Comment    quit        Tobacco Use Intervention:   Pt quit 30 years ago   and has abstained    Anginal Symptoms:  None   NTG use: No prescription    Blood pressure:    Restin/60   Exercise: 120/60    Goals: consistent BP < 130/80, reduced dietary sodium <2300mg, moderate intensity exercise >150 mins/wk, medication compliance and reduce number of medications  needed for BP control    Progression Toward Goals: Reviewed Pt goals and determined plan of care, Will continue to educate and progress as tolerated      Education:  understanding high blood pressure and it's relationship to CAD and low sodium diet and HTN  Plan: Class: Understanding Heart Disease, Class: Common Heart Medications, Avoid Processed foods, engage in regular exercise, eliminate salt shaker at the table, use salt substitutes, check labels for sodium content and monitor home BP  Readiness to change: Preparation:  (Getting ready to change)

## 2021-06-11 ENCOUNTER — CLINICAL SUPPORT (OUTPATIENT)
Dept: CARDIAC REHAB | Facility: CLINIC | Age: 66
End: 2021-06-11
Payer: COMMERCIAL

## 2021-06-11 DIAGNOSIS — Z95.1 S/P CABG (CORONARY ARTERY BYPASS GRAFT): ICD-10-CM

## 2021-06-11 PROCEDURE — 93798 PHYS/QHP OP CAR RHAB W/ECG: CPT

## 2021-06-15 ENCOUNTER — CLINICAL SUPPORT (OUTPATIENT)
Dept: CARDIAC REHAB | Facility: CLINIC | Age: 66
End: 2021-06-15
Payer: COMMERCIAL

## 2021-06-15 DIAGNOSIS — Z95.1 S/P CABG (CORONARY ARTERY BYPASS GRAFT): ICD-10-CM

## 2021-06-15 PROCEDURE — 93798 PHYS/QHP OP CAR RHAB W/ECG: CPT

## 2021-06-17 ENCOUNTER — CLINICAL SUPPORT (OUTPATIENT)
Dept: CARDIAC REHAB | Facility: CLINIC | Age: 66
End: 2021-06-17
Payer: COMMERCIAL

## 2021-06-17 DIAGNOSIS — Z95.1 S/P CABG (CORONARY ARTERY BYPASS GRAFT): ICD-10-CM

## 2021-06-17 PROCEDURE — 93798 PHYS/QHP OP CAR RHAB W/ECG: CPT

## 2021-06-18 ENCOUNTER — CLINICAL SUPPORT (OUTPATIENT)
Dept: CARDIAC REHAB | Facility: CLINIC | Age: 66
End: 2021-06-18
Payer: COMMERCIAL

## 2021-06-18 DIAGNOSIS — Z95.1 S/P CABG (CORONARY ARTERY BYPASS GRAFT): ICD-10-CM

## 2021-06-18 PROCEDURE — 93798 PHYS/QHP OP CAR RHAB W/ECG: CPT

## 2021-06-22 ENCOUNTER — CLINICAL SUPPORT (OUTPATIENT)
Dept: CARDIAC REHAB | Facility: CLINIC | Age: 66
End: 2021-06-22
Payer: COMMERCIAL

## 2021-06-22 ENCOUNTER — OFFICE VISIT (OUTPATIENT)
Dept: CARDIOLOGY CLINIC | Facility: CLINIC | Age: 66
End: 2021-06-22
Payer: COMMERCIAL

## 2021-06-22 VITALS
HEART RATE: 61 BPM | HEIGHT: 67 IN | WEIGHT: 178 LBS | BODY MASS INDEX: 27.94 KG/M2 | OXYGEN SATURATION: 98 % | DIASTOLIC BLOOD PRESSURE: 62 MMHG | SYSTOLIC BLOOD PRESSURE: 108 MMHG

## 2021-06-22 DIAGNOSIS — Z95.1 S/P CABG (CORONARY ARTERY BYPASS GRAFT): ICD-10-CM

## 2021-06-22 DIAGNOSIS — I25.10 CORONARY ARTERY DISEASE INVOLVING NATIVE CORONARY ARTERY OF NATIVE HEART, UNSPECIFIED WHETHER ANGINA PRESENT: Primary | ICD-10-CM

## 2021-06-22 DIAGNOSIS — E78.5 DYSLIPIDEMIA: ICD-10-CM

## 2021-06-22 PROCEDURE — 93798 PHYS/QHP OP CAR RHAB W/ECG: CPT

## 2021-06-22 PROCEDURE — 99214 OFFICE O/P EST MOD 30 MIN: CPT | Performed by: INTERNAL MEDICINE

## 2021-06-22 NOTE — PROGRESS NOTES
Cardiology Follow Up    Jillian Love  1955  [id number]  [facility] [place]  [address]  [address]  [place] [id number] [phone]    No diagnosis found  There are no diagnoses linked to this encounter  I had the pleasure of seeing Jillian Love for a follow up visit  INTERVAL HISTORY:  None, doing well    History of the presenting illness, Discussion/Summary and My Plan are as follows:::    Tha Goodwin is a 79-year-old gentleman with dyslipidemia-was on simvastatin, prior history of tobacco use-about 30 years ago, family history of premature vascular disease-fatal MI in his father in his 46s who was also smoker, was admitted with unstable angina with minimal troponin elevation to [facility] with coronary angiography showing triple-vessel coronary artery disease, transferred to [facility] very underwent CABG x3-left internal mammary artery to left anterior descending, saphenous vein graft to obtuse marginal 1, saphenous vein graft to first diagonal performed on 4/26/2021    Uneventful postoperative course, there were some ECG changes of pericarditis  He has had his postoperative cardiac surgical visit and is now undergoing cardiac rehabilitation with improvement in physical capacity  He denies any cardiac symptoms at this time  Occasionally he has dizziness, blood pressures have been stable running in the low 526 systolic  Plan:    Coronary artery disease:  Recent unstable angina, status post CABG x3-as above-April 2021  Continue aspirin, statin, beta-blocker    Occasional dizziness:  Continue to follow for now, told him to check his blood pressures during these episodes  He is only on 12 5 b i d  Of metoprolol alone  Dyslipidemia:  LDL was in the 170s in 2020 and was initiated on a statin, prior to that was much better controlled, will recheck lipids      Plans to go back to work around July 2021, if he does not feel up to it, will call    Follow-up in 6 months with Dr Adam Bynum    Unstable angina-CORONARY CIRCULATION:  Mid LAD: There was a 80 % stenosis just after D2   1st diagonal: There was a 70 % stenosis  Ostial circumflex: There was a 80 % stenosis  1st obtuse marginal: There was a 80 % stenosis at the ostium of the vessel segment  Right PDA: There was a 50 % stenosis in the middle third of the vessel segment  Results for Demetrius WORTHY (MRN [id number]) as of 6/22/2021 09:01   Ref  Range 12/23/2020 08:28 4/20/2021 06:56   Cholesterol Latest Ref Range: 50 - 200 mg/dL 169 172   Triglycerides Latest Ref Range: <=150 mg/dL 75 83   HDL Latest Ref Range: >=40 mg/dL 32 (L) 30 (L)   Non-HDL Cholesterol Latest Units: mg/dl 137    LDL Calculated Latest Ref Range: 0 - 100 mg/dL 122 (H) 125 (H)     Results for ESSENCE WORTHY (MRN [id number]) as of 6/22/2021 09:01   Ref  Range 4/27/2021 03:49 4/28/2021 04:58   BUN Latest Ref Range: 5 - 25 mg/dL 16 25   Creatinine Latest Ref Range: 0 60 - 1 30 mg/dL 1 14 1 14     Results for ESSENCE WORTHY (MRN [id number]) as of 6/22/2021 09:01   Ref   Range 4/20/2021 09:46   Hemoglobin A1C  6 0 (H)     Patient Active Problem List   Diagnosis    Special screening for malignant neoplasms, colon    Polyp of colon    Dyslipidemia    Vitamin D deficiency    Hiatal hernia    Coronary artery disease    Elevated troponin    S/P CABG (coronary artery bypass graft)     Past Medical History:   Diagnosis Date    CAD (coronary artery disease)     Former tobacco use     History of dysphagia     resolved    Hyperlipidemia     Pre-diabetes      Social History     Socioeconomic History    Marital status: Single     Spouse name: Not on file    Number of children: Not on file    Years of education: Not on file    Highest education level: Not on file   Occupational History    Not on file   Tobacco Use    Smoking status: Former Smoker     Types: Cigarettes     Quit date:      Years since quittin 4    Smokeless tobacco: Never Used    Tobacco comment: quit    Vaping Use    Vaping Use: Never used   Substance and Sexual Activity    Alcohol use: Not Currently     Comment: Rare    Drug use: No    Sexual activity: Not on file   Other Topics Concern    Not on file   Social History Narrative    Caffeine - Iced Tea 2cups/d     Social Determinants of Health     Financial Resource Strain:     Difficulty of Paying Living Expenses:    Food Insecurity:     Worried About Running Out of Food in the Last Year:     Ran Out of Food in the Last Year:    Transportation Needs:     Lack of Transportation (Medical):  Lack of Transportation (Non-Medical):    Physical Activity:     Days of Exercise per Week:     Minutes of Exercise per Session:    Stress:     Feeling of Stress :    Social Connections:     Frequency of Communication with Friends and Family:     Frequency of Social Gatherings with Friends and Family:     Attends [employer] Services:     Active Member of Clubs or Organizations:     Attends Club or Organization Meetings:     Marital Status:    Intimate Partner Violence:     Fear of Current or Ex-Partner:     Emotionally Abused:     Physically Abused:     Sexually Abused:       Family History   Problem Relation Age of Onset    Heart attack Father 62    No Known Problems Mother     Prostate cancer Neg Hx     Colon cancer Neg Hx     Diabetes Neg Hx     Stroke Neg Hx      Past Surgical History:   Procedure Laterality Date    CARDIAC CATHETERIZATION      COLONOSCOPY N/A 2018    Procedure: COLONOSCOPY;  Surgeon: Amberly Chauhan MD;  Location: [facility]; Service: Gastroenterology    EGD  2019    FINGER AMPUTATION Left     partial 3rd finger    MT CABG, ARTERY-VEIN, THREE N/A 2021    Procedure: CORONARY ARTERY BYPASS GRAFT (CABG) x 3; LIMA to LAD;  Left EVH/SVG to OM1 and D1;  Surgeon: Jac Trivedi MD;  Location:  MAIN OR; Service: Cardiac Surgery       Current Outpatient Medications:     ascorbic acid (VITAMIN C) 500 mg tablet, Take 500 mg by mouth daily, Disp: , Rfl:     aspirin 325 mg tablet, Take 1 tablet (325 mg total) by mouth daily, Disp: 90 tablet, Rfl: 1    atorvastatin (LIPITOR) 80 mg tablet, Take 1 tablet (80 mg total) by mouth daily with dinner, Disp: 90 tablet, Rfl: 0    cholecalciferol (VITAMIN D3) 1,000 units tablet, Take 1,000 Units by mouth daily, Disp: , Rfl:     co-enzyme Q-10 30 MG capsule, Take 200 mg by mouth daily, Disp: , Rfl:     metoprolol tartrate (LOPRESSOR) 25 mg tablet, Take 0 5 tablets (12 5 mg total) by mouth every 12 (twelve) hours, Disp: 30 tablet, Rfl: 1    Multiple Vitamin (multivitamin) tablet, Take 1 tablet by mouth daily, Disp: , Rfl:     acetaminophen (TYLENOL) 325 mg tablet, Take 1-2 tabs q6h PRN mild fever/pain (Patient not taking: Reported on 5/28/2021), Disp: 90 tablet, Rfl: 0    bisacodyl (DULCOLAX) 5 mg EC tablet, Take 2 tablets (10 mg total) by mouth daily as needed for constipation, Disp: 60 tablet, Rfl: 0    oxyCODONE (ROXICODONE) 5 mg immediate release tablet, Take 1-2 pills PO q6h as needed for mild to moderate pain  Ongoing therapy for postoperative pain  (Patient not taking: Reported on 5/28/2021), Disp: 30 tablet, Rfl: 0  No Known Allergies    Imaging: No results found  Review of Systems:  Review of Systems   Constitutional: Negative  HENT: Negative  Eyes: Negative  Respiratory: Negative  Cardiovascular: Negative  Musculoskeletal: Negative  Neurological: Positive for dizziness  Negative for seizures, facial asymmetry, speech difficulty, light-headedness, numbness and headaches  Physical Exam:  /62 (BP Location: Left arm, Patient Position: Sitting, Cuff Size: Standard)   Pulse 61   Ht 5' 7" (1 702 m)   Wt 80 7 kg (178 lb)   SpO2 98%   BMI 27 88 kg/m²   Physical Exam  Constitutional:       General: He is not in acute distress  Appearance: Normal appearance  He is not ill-appearing  HENT:      Head: Normocephalic  Nose: Nose normal  No congestion or rhinorrhea  Mouth/Throat:      Mouth: Mucous membranes are moist       Pharynx: No posterior oropharyngeal erythema  Eyes:      General: No scleral icterus  Left eye: No discharge  Pupils: Pupils are equal, round, and reactive to light  Neck:      Vascular: No carotid bruit  Cardiovascular:      Rate and Rhythm: Normal rate and regular rhythm  Heart sounds: No murmur heard  No friction rub  No gallop  Pulmonary:      Effort: Pulmonary effort is normal  No respiratory distress  Breath sounds: No stridor  No wheezing or rhonchi  Abdominal:      General: Abdomen is flat  Bowel sounds are normal  There is no distension  Palpations: There is no mass  Tenderness: There is no abdominal tenderness  Hernia: No hernia is present  Musculoskeletal:         General: No swelling, tenderness, deformity or signs of injury  Normal range of motion  Cervical back: Normal range of motion  No rigidity  Lymphadenopathy:      Cervical: No cervical adenopathy  Neurological:      Mental Status: He is alert  This note was completed in part utilizing [employer] direct voice recognition software  Grammatical errors, random word insertion, spelling mistakes, occasional wrong word or "sound-alike" substitutions and incomplete sentences may be an occasional consequence of the system secondary to software limitations, ambient noise and hardware issues  At the time of dictation, efforts were made to edit, clarify and /or correct errors  Please read the chart carefully and recognize, using context, where substitutions have occurred  If you have any questions or concerns about the context, text or information contained within the body of this dictation, please contact myself, the provider, for further clarification

## 2021-06-24 ENCOUNTER — CLINICAL SUPPORT (OUTPATIENT)
Dept: CARDIAC REHAB | Facility: CLINIC | Age: 66
End: 2021-06-24
Payer: COMMERCIAL

## 2021-06-24 DIAGNOSIS — Z95.1 S/P CABG (CORONARY ARTERY BYPASS GRAFT): ICD-10-CM

## 2021-06-24 PROCEDURE — 93798 PHYS/QHP OP CAR RHAB W/ECG: CPT

## 2021-06-25 ENCOUNTER — CLINICAL SUPPORT (OUTPATIENT)
Dept: CARDIAC REHAB | Facility: CLINIC | Age: 66
End: 2021-06-25
Payer: COMMERCIAL

## 2021-06-25 DIAGNOSIS — Z95.1 S/P CABG (CORONARY ARTERY BYPASS GRAFT): ICD-10-CM

## 2021-06-25 PROCEDURE — 93798 PHYS/QHP OP CAR RHAB W/ECG: CPT

## 2021-06-29 ENCOUNTER — CLINICAL SUPPORT (OUTPATIENT)
Dept: CARDIAC REHAB | Facility: CLINIC | Age: 66
End: 2021-06-29
Payer: COMMERCIAL

## 2021-06-29 DIAGNOSIS — Z95.1 S/P CABG (CORONARY ARTERY BYPASS GRAFT): ICD-10-CM

## 2021-06-29 PROCEDURE — 93798 PHYS/QHP OP CAR RHAB W/ECG: CPT

## 2021-07-01 ENCOUNTER — CLINICAL SUPPORT (OUTPATIENT)
Dept: CARDIAC REHAB | Facility: CLINIC | Age: 66
End: 2021-07-01
Payer: COMMERCIAL

## 2021-07-01 DIAGNOSIS — Z95.1 S/P CABG (CORONARY ARTERY BYPASS GRAFT): ICD-10-CM

## 2021-07-01 PROCEDURE — 93798 PHYS/QHP OP CAR RHAB W/ECG: CPT

## 2021-07-02 ENCOUNTER — CLINICAL SUPPORT (OUTPATIENT)
Dept: CARDIAC REHAB | Facility: CLINIC | Age: 66
End: 2021-07-02
Payer: COMMERCIAL

## 2021-07-02 ENCOUNTER — APPOINTMENT (OUTPATIENT)
Dept: CARDIAC REHAB | Facility: CLINIC | Age: 66
End: 2021-07-02
Payer: COMMERCIAL

## 2021-07-02 DIAGNOSIS — Z95.1 S/P CABG (CORONARY ARTERY BYPASS GRAFT): ICD-10-CM

## 2021-07-02 PROCEDURE — 93798 PHYS/QHP OP CAR RHAB W/ECG: CPT

## 2021-07-06 ENCOUNTER — CLINICAL SUPPORT (OUTPATIENT)
Dept: CARDIAC REHAB | Facility: CLINIC | Age: 66
End: 2021-07-06
Payer: COMMERCIAL

## 2021-07-06 DIAGNOSIS — Z95.1 S/P CABG (CORONARY ARTERY BYPASS GRAFT): ICD-10-CM

## 2021-07-06 PROCEDURE — 93798 PHYS/QHP OP CAR RHAB W/ECG: CPT

## 2021-07-07 ENCOUNTER — OFFICE VISIT (OUTPATIENT)
Dept: FAMILY MEDICINE CLINIC | Facility: CLINIC | Age: 66
End: 2021-07-07
Payer: COMMERCIAL

## 2021-07-07 ENCOUNTER — TELEPHONE (OUTPATIENT)
Dept: FAMILY MEDICINE CLINIC | Facility: CLINIC | Age: 66
End: 2021-07-07

## 2021-07-07 VITALS
OXYGEN SATURATION: 97 % | TEMPERATURE: 98.2 F | HEART RATE: 76 BPM | DIASTOLIC BLOOD PRESSURE: 72 MMHG | BODY MASS INDEX: 28 KG/M2 | WEIGHT: 178.4 LBS | RESPIRATION RATE: 16 BRPM | SYSTOLIC BLOOD PRESSURE: 110 MMHG | HEIGHT: 67 IN

## 2021-07-07 DIAGNOSIS — E78.5 DYSLIPIDEMIA: ICD-10-CM

## 2021-07-07 DIAGNOSIS — Z95.1 S/P CABG (CORONARY ARTERY BYPASS GRAFT): ICD-10-CM

## 2021-07-07 DIAGNOSIS — E55.9 VITAMIN D DEFICIENCY: ICD-10-CM

## 2021-07-07 DIAGNOSIS — Z12.5 SCREENING PSA (PROSTATE SPECIFIC ANTIGEN): ICD-10-CM

## 2021-07-07 DIAGNOSIS — I25.10 CORONARY ARTERY DISEASE INVOLVING NATIVE CORONARY ARTERY OF NATIVE HEART, UNSPECIFIED WHETHER ANGINA PRESENT: Primary | ICD-10-CM

## 2021-07-07 PROCEDURE — 99213 OFFICE O/P EST LOW 20 MIN: CPT | Performed by: FAMILY MEDICINE

## 2021-07-07 PROCEDURE — 3008F BODY MASS INDEX DOCD: CPT | Performed by: FAMILY MEDICINE

## 2021-07-07 PROCEDURE — 3725F SCREEN DEPRESSION PERFORMED: CPT | Performed by: FAMILY MEDICINE

## 2021-07-07 PROCEDURE — 1036F TOBACCO NON-USER: CPT | Performed by: FAMILY MEDICINE

## 2021-07-07 NOTE — TELEPHONE ENCOUNTER
Pt made nurse visit for blood work on 9/23/21  Please place orders for labs  Pt has physical scheduled for 9/30/21

## 2021-07-07 NOTE — PROGRESS NOTES
Assessment/Plan:    Patient to continue present treatment and continue with cardiac rehab program   Patient instructed to follow a low-fat and a low-cholesterol diet and get regular aerobic exercise 150 minutes per week  Follow up with specialists as scheduled and return to the office in 3 months  Diagnoses and all orders for this visit:    Coronary artery disease involving native coronary artery of native heart, unspecified whether angina present    Dyslipidemia    S/P CABG (coronary artery bypass graft)    Vitamin D deficiency          Subjective:      Patient ID: Martín Quiroz is a 63 y o  male  Patient is being seen in follow-up for chronic conditions  Patient was discharged by Dr Vincent Alonzo cardiothoracic surgeon and saw Dr Prasanth Corrales, cardiologist 2 weeks ago and he ordered a lipid panel in 3 months  And follow-up with Dr Liliam Riley, cardiologist in 6 months  Patient is attending cardiac rehab 3 times a week for the past month for total of 12 weeks  Patient has been feeling well overall  Hyperlipidemia  This is a chronic problem  He has no history of diabetes, hypothyroidism or obesity  Pertinent negatives include no chest pain, focal sensory loss, focal weakness, leg pain, myalgias or shortness of breath  Current antihyperlipidemic treatment includes statins  There are no compliance problems  Risk factors for coronary artery disease include dyslipidemia, family history and male sex  The following portions of the patient's history were reviewed and updated as appropriate: allergies, current medications, past family history, past medical history, past social history, past surgical history and problem list     Review of Systems   Respiratory: Negative for shortness of breath  Cardiovascular: Negative for chest pain  Musculoskeletal: Negative for myalgias  Neurological: Negative for focal weakness           Objective:      /72 (BP Location: Left arm, Patient Position: Sitting, Cuff Size: Adult)   Pulse 76   Temp 98 2 °F (36 8 °C) (Temporal)   Resp 16   Ht 5' 7" (1 702 m)   Wt 80 9 kg (178 lb 6 4 oz)   SpO2 97%   BMI 27 94 kg/m²          Physical Exam  Constitutional:       General: He is not in acute distress  Appearance: Normal appearance  HENT:      Head: Normocephalic  Mouth/Throat:      Mouth: Mucous membranes are moist    Eyes:      General: No scleral icterus  Conjunctiva/sclera: Conjunctivae normal    Neck:      Vascular: No carotid bruit  Cardiovascular:      Rate and Rhythm: Normal rate and regular rhythm  Pulmonary:      Effort: Pulmonary effort is normal       Breath sounds: Normal breath sounds  Abdominal:      Palpations: Abdomen is soft  Tenderness: There is no abdominal tenderness  Musculoskeletal:      Cervical back: Neck supple  Right lower leg: No edema  Left lower leg: No edema  Lymphadenopathy:      Cervical: No cervical adenopathy  Skin:     General: Skin is warm and dry  Neurological:      General: No focal deficit present  Mental Status: He is alert and oriented to person, place, and time  Psychiatric:         Mood and Affect: Mood normal          Behavior: Behavior normal          Thought Content: Thought content normal          Judgment: Judgment normal          BMI Counseling: Body mass index is 27 94 kg/m²  The BMI is above normal  Nutrition recommendations include reducing portion sizes, decreasing overall calorie intake, 3-5 servings of fruits/vegetables daily, reducing fast food intake, consuming healthier snacks, decreasing soda and/or juice intake, moderation in carbohydrate intake, increasing intake of lean protein, reducing intake of saturated fat and trans fat and reducing intake of cholesterol  Exercise recommendations include moderate aerobic physical activity for 150 minutes/week

## 2021-07-08 ENCOUNTER — CLINICAL SUPPORT (OUTPATIENT)
Dept: CARDIAC REHAB | Facility: CLINIC | Age: 66
End: 2021-07-08
Payer: COMMERCIAL

## 2021-07-08 DIAGNOSIS — Z95.1 S/P CABG (CORONARY ARTERY BYPASS GRAFT): ICD-10-CM

## 2021-07-08 PROCEDURE — 93798 PHYS/QHP OP CAR RHAB W/ECG: CPT

## 2021-07-09 ENCOUNTER — CLINICAL SUPPORT (OUTPATIENT)
Dept: CARDIAC REHAB | Facility: CLINIC | Age: 66
End: 2021-07-09
Payer: COMMERCIAL

## 2021-07-09 DIAGNOSIS — Z95.1 S/P CABG (CORONARY ARTERY BYPASS GRAFT): ICD-10-CM

## 2021-07-09 PROCEDURE — 93798 PHYS/QHP OP CAR RHAB W/ECG: CPT

## 2021-07-09 NOTE — PROGRESS NOTES
Cardiac Rehabilitation Plan of Care   30 Day Reassessment          Today's date: 2021   # of Exercise Sessions Completed: 14  Patient name: Shirin Winkler      : 1955  Age: 61 y o  MRN: [id number]  Referring Physician: Joey Braga PA-C  Cardiologist: Malia Potter MD  Provider: Timothy Lovett  Clinician: Evelia Cole MS      Dx:   Encounter Diagnosis   Name Primary?  S/P CABG (coronary artery bypass graft)      Date of onset: 21    SUMMARY OF PROGRESS:  Tita Mandujano is compliant attending cardiac rehab exercise sessions 3x/wk  He tolerates 40 mins at 3 9 - 4 3 METs plus wt training  Resting BP always well controlled, 108 - 68 with appropriate response to exercise reaching 140- 72  NSR on tele with no ectopy observed  RHR 60 - 70 ExHR 95 - 105  He is tolerating progression of intensity levels to maintain RPE 4-6  No cardiac complaints  He is progressing toward wt loss goals with a loss of 4 pounds  Patient has been working on  dietary modifications with the goal of rare red/processed meats, low fat dairy, reduced added sugars and refined flours  The patient is a former smoker (quit 30 years ago)  He has abstained since quitting  Depression screening using the PHQ-9 was not reassessed  KAYLEN-7 was not reassessed  When addressed, the patient denies  feelings of depression/anxiety  Patient reports excellent social/emotional support  Patient attends group educational classes on cardiac risk factor modification  He has added home exercise 7 days/wk, that includes walking 20-25 mins  His exercise program will be progressed as tolerated to maintain RPE 4-6  The patient has the following personal goals he hopes to achieved by discharge: continue to lose weight     Pt will continue to be educated on lifestyle modifications and encouraged to supplement with a home exercise program to reach the following goals: improve sleep and continue to increase energy levels in the next 30 days       Medication compliance: Yes   Comments: Pt reports to be compliant with medications  Fall Risk: Low   Comments: Ambulates with a steady gait with no assist device    EKG Interpretation: NSR w/ occ PVCs      EXERCISE ASSESSMENT and PLAN    Current Exercise Program in Rehab:       Frequency: 3 days/week Supplement with home exercise 2+ days/wk as tolerated       Minutes: 40        METS: 3 9-4 3            HR:    RPE: 4-6         Modalities: Treadmill and Lifecycle      Exercise Progression 30 Day Goals :    Frequency: 4 days/week of cardiac rehab     Supplement with home exercise 2+ days/wk as tolerated    Minutes: 45                          >150 mins/wk of moderate intensity exercise   METS: 4 5-5 0   HR:     RPE: 4-6   Modalities: Treadmill, Airdyne bike, Lifecycle and Elliptical    Strength trainin-3 days / week  12-15 repetitions  1-2 sets per modality    Modalities: Leg Press, Chest Press, Pull Downs, Arm Curl and Seated Row    Home Exercise: Type: walking, Frequency: 5 days/week, Duration: 20 mins, Distance 1 mile    Goals: 10% improvement in functional capacity - based on max METs achieved in fitness assessment, improved DASI score by 10%, Increase in exercise capacity by 40% - based on peak METs tolerated in cardiac rehab exercise session, Exercise 5 days/wk, >150mins/wk of moderate intensity exercise, Return to work unrestricted, Attend Rehab regularly, Decrease sitting time and return to regular exercise at the gym    Progression Toward Goals: Will continue to educate and progress as tolerated , Goals met: returning to work next week      Education: benefit of exercise for CAD risk factors, AHA guidelines to achieve >150 mins/wk of moderate exercise, RPE scale and class: Risk Factors for Heart Disease   Plan:education on home exercise guidelines and home exercise 30+ mins 2 days opposite CR  Readiness to change: Action:  (Changing behavior)      NUTRITION ASSESSMENT AND PLAN    Weight control:    Starting weight: 181 8   Current weight: 177 6  Waist circumference:    Startin in   Current:      Diabetes: Pre-diabetes  A1c: 6 0    last measured: 21    Lipid management: Discussed diet and lipid management, Last lipid profile 2021  Chol 172  TRG 83  HDL 30   and Pt has a script for repeat lipid profile  Goals:reduced BMI to < 25, LDL <100, HDL >40, decreased body fat% <25%   (M), 2 5-5%  wt loss, reduce portion sizes of meat to 3oz or less, increase intake of fish, shellfish, increase intake of meatless meals, eat 3 or more servings of whole grains a day, Eat 4-5 cups of fruits and vegetables daily, eliminate butter, choose healthy snacks: light popcorn, plain pretzels, seldom eat or choose low fat ice-cream, fruit juice bars or frozen yogurt  and daily saturated fat intake <7%/13g    Progression Toward Goals: Will continue to educate and progress as tolerated      Education: heart healthy eating  low sodium diet  hydration  nutrition for  lipid management  wt  loss   healthy choices while dining out  portion control  Plan: Education class: Reading Food Labels, Education Class: Heart Healthy Eating, replace butter with soft spreads made with olive oil, canola or yogurt, replace refined grain bread with whole grain bread, reduce portion sizes, avoid processed foods, will try new grains like brown rice, quinoa, farro, will replace sugar sweetened cereals with whole grain or oatmeal, drink more water, learn how to read food labels, replace sugar with stevia or truvia, keep added daily sugar <25g/day and decrease carbonated beverages  Readiness to change: Action:  (Changing behavior)      PSYCHOSOCIAL ASSESSMENT AND PLAN    Emotional:  Depression assessment:  PHQ-9 = 0 =No Depression            Anxiety measure:  KAYLEN-7 = 0-4  = Not anxious  Self-reported stress level:  1  Social support: Excellent    Goals:  Physical Fitness in [facility] Score < 3, Daily Activity in [facility] Score < 3, Pain in [facility] Score < 3 and Overall Health in [facility] Score < 3    Progression Toward Goals: Reviewed Pt goals and determined plan of care, Will continue to educate and progress as tolerated  Education: signs/sxs of depression, benefits of a positive support system, stress management techniques and depression and CAD  Plan: Class: Stress and Your Health, Class: Relaxation, Practice relaxation techniques, Exercise and Return to previous social activity  Readiness to change: Action:  (Changing behavior)      OTHER CORE COMPONENTS     Tobacco:   Social History     Tobacco Use   Smoking Status Former Smoker    Packs/day:     Years: 10 00    Pack years: 10 00    Types: Cigarettes    Quit date: 200    Years since quittin 5   Smokeless Tobacco Never Used   Tobacco Comment    quit        Tobacco Use Intervention:   Pt quit 30 years ago   and has abstained    Anginal Symptoms:  None   NTG use: No prescription    Blood pressure:    Restin/68   Exercise: 140/72    Goals: consistent BP < 130/80, reduced dietary sodium <2300mg, moderate intensity exercise >150 mins/wk, medication compliance and reduce number of medications  needed for BP control    Progression Toward Goals: Will continue to educate and progress as tolerated      Education:  understanding high blood pressure and it's relationship to CAD and low sodium diet and HTN  Plan: Class: Understanding Heart Disease, Class: Common Heart Medications, Avoid Processed foods, engage in regular exercise, eliminate salt shaker at the table, use salt substitutes, check labels for sodium content and monitor home BP  Readiness to change: Action:  (Changing behavior)

## 2021-07-13 ENCOUNTER — CLINICAL SUPPORT (OUTPATIENT)
Dept: CARDIAC REHAB | Facility: CLINIC | Age: 66
End: 2021-07-13
Payer: COMMERCIAL

## 2021-07-13 DIAGNOSIS — Z95.1 S/P CABG (CORONARY ARTERY BYPASS GRAFT): ICD-10-CM

## 2021-07-13 PROCEDURE — 93798 PHYS/QHP OP CAR RHAB W/ECG: CPT

## 2021-07-15 ENCOUNTER — CLINICAL SUPPORT (OUTPATIENT)
Dept: CARDIAC REHAB | Facility: CLINIC | Age: 66
End: 2021-07-15
Payer: COMMERCIAL

## 2021-07-15 DIAGNOSIS — Z95.1 S/P CABG (CORONARY ARTERY BYPASS GRAFT): ICD-10-CM

## 2021-07-15 PROCEDURE — 93798 PHYS/QHP OP CAR RHAB W/ECG: CPT

## 2021-07-16 ENCOUNTER — CLINICAL SUPPORT (OUTPATIENT)
Dept: CARDIAC REHAB | Facility: CLINIC | Age: 66
End: 2021-07-16
Payer: COMMERCIAL

## 2021-07-16 DIAGNOSIS — Z95.1 S/P CABG (CORONARY ARTERY BYPASS GRAFT): ICD-10-CM

## 2021-07-16 PROCEDURE — 93798 PHYS/QHP OP CAR RHAB W/ECG: CPT

## 2021-07-19 ENCOUNTER — CLINICAL SUPPORT (OUTPATIENT)
Dept: CARDIAC REHAB | Facility: CLINIC | Age: 66
End: 2021-07-19
Payer: COMMERCIAL

## 2021-07-19 DIAGNOSIS — Z95.1 S/P CABG (CORONARY ARTERY BYPASS GRAFT): ICD-10-CM

## 2021-07-19 PROCEDURE — 93798 PHYS/QHP OP CAR RHAB W/ECG: CPT

## 2021-07-20 ENCOUNTER — CLINICAL SUPPORT (OUTPATIENT)
Dept: CARDIAC REHAB | Facility: CLINIC | Age: 66
End: 2021-07-20
Payer: COMMERCIAL

## 2021-07-20 DIAGNOSIS — Z95.1 S/P CABG (CORONARY ARTERY BYPASS GRAFT): ICD-10-CM

## 2021-07-20 PROCEDURE — 93798 PHYS/QHP OP CAR RHAB W/ECG: CPT

## 2021-07-22 ENCOUNTER — CLINICAL SUPPORT (OUTPATIENT)
Dept: CARDIAC REHAB | Facility: CLINIC | Age: 66
End: 2021-07-22
Payer: COMMERCIAL

## 2021-07-22 DIAGNOSIS — Z95.1 S/P CABG (CORONARY ARTERY BYPASS GRAFT): ICD-10-CM

## 2021-07-22 PROCEDURE — 93798 PHYS/QHP OP CAR RHAB W/ECG: CPT

## 2021-07-23 ENCOUNTER — APPOINTMENT (OUTPATIENT)
Dept: CARDIAC REHAB | Facility: CLINIC | Age: 66
End: 2021-07-23
Payer: COMMERCIAL

## 2021-07-27 ENCOUNTER — CLINICAL SUPPORT (OUTPATIENT)
Dept: CARDIAC REHAB | Facility: CLINIC | Age: 66
End: 2021-07-27
Payer: COMMERCIAL

## 2021-07-27 DIAGNOSIS — Z95.1 S/P CABG (CORONARY ARTERY BYPASS GRAFT): ICD-10-CM

## 2021-07-27 PROCEDURE — 93798 PHYS/QHP OP CAR RHAB W/ECG: CPT

## 2021-07-29 ENCOUNTER — CLINICAL SUPPORT (OUTPATIENT)
Dept: CARDIAC REHAB | Facility: CLINIC | Age: 66
End: 2021-07-29
Payer: COMMERCIAL

## 2021-07-29 DIAGNOSIS — Z95.1 S/P CABG (CORONARY ARTERY BYPASS GRAFT): ICD-10-CM

## 2021-07-29 PROCEDURE — 93798 PHYS/QHP OP CAR RHAB W/ECG: CPT

## 2021-07-30 ENCOUNTER — CLINICAL SUPPORT (OUTPATIENT)
Dept: CARDIAC REHAB | Facility: CLINIC | Age: 66
End: 2021-07-30
Payer: COMMERCIAL

## 2021-07-30 DIAGNOSIS — Z95.1 S/P CABG (CORONARY ARTERY BYPASS GRAFT): ICD-10-CM

## 2021-07-30 PROCEDURE — 93798 PHYS/QHP OP CAR RHAB W/ECG: CPT

## 2021-08-02 ENCOUNTER — TELEPHONE (OUTPATIENT)
Dept: CARDIOLOGY CLINIC | Facility: CLINIC | Age: 66
End: 2021-08-02

## 2021-08-02 NOTE — TELEPHONE ENCOUNTER
P/c'd , states he has been dizzy and lightheaded 2 x's in the last 2 weeks  SBP 80-90's on those days not sure want HR runs  It lasts all day  Feels he drinks plenlty of fluid- about 80 oz a day or more        Taking: lopressor 12 5 mg bid      Please advise

## 2021-08-03 ENCOUNTER — CLINICAL SUPPORT (OUTPATIENT)
Dept: CARDIAC REHAB | Facility: CLINIC | Age: 66
End: 2021-08-03
Payer: COMMERCIAL

## 2021-08-03 DIAGNOSIS — Z95.1 S/P CABG (CORONARY ARTERY BYPASS GRAFT): ICD-10-CM

## 2021-08-03 PROCEDURE — 93798 PHYS/QHP OP CAR RHAB W/ECG: CPT

## 2021-08-05 ENCOUNTER — CLINICAL SUPPORT (OUTPATIENT)
Dept: CARDIAC REHAB | Facility: CLINIC | Age: 66
End: 2021-08-05
Payer: COMMERCIAL

## 2021-08-05 DIAGNOSIS — Z95.1 S/P CABG (CORONARY ARTERY BYPASS GRAFT): ICD-10-CM

## 2021-08-05 PROCEDURE — 93798 PHYS/QHP OP CAR RHAB W/ECG: CPT

## 2021-08-06 ENCOUNTER — CLINICAL SUPPORT (OUTPATIENT)
Dept: CARDIAC REHAB | Facility: CLINIC | Age: 66
End: 2021-08-06
Payer: COMMERCIAL

## 2021-08-06 DIAGNOSIS — Z95.1 S/P CABG (CORONARY ARTERY BYPASS GRAFT): ICD-10-CM

## 2021-08-06 PROCEDURE — 93798 PHYS/QHP OP CAR RHAB W/ECG: CPT

## 2021-08-06 NOTE — PROGRESS NOTES
Cardiac Rehabilitation Plan of Care   60 Day Reassessment          Today's date: 2021   # of Exercise Sessions Completed: 26  Patient name: Olivia Diamond      : 1955  Age: 61 y o  MRN: [id number]  Referring Physician: Ramandeep Tran PA-C  Cardiologist: Oscar Cruz MD  Provider: Fortino Tipton  Clinician: Ramandeep Joe, MS, CEP, EPC      Dx:   No diagnosis found  Date of onset: 21    SUMMARY OF PROGRESS:  Fab Oconnell is compliant attending cardiac rehab exercise sessions 3x/wk  He tolerates 40 mins at 3 9 - 4 5 METs plus wt training  Resting BP always well controlled, 92/60- 124/74 with appropriate response to exercise reaching 120/60- 146/80  NSR on tele with occ PVCs observed  RHR 62 - 68 ExHR 97 - 111  He is tolerating progression of intensity levels to maintain RPE 4-6  No cardiac complaints  He reports having some lightheadedness that he has reported to his doctor  He is progressing toward wt loss goals with a loss of 4 pounds  Patient has been working on  dietary modifications with the goal of rare red/processed meats, low fat dairy, reduced added sugars and refined flours  The patient is a former smoker (quit 30 years ago)  He has abstained since quitting  Depression screening using the PHQ-9 was not reassessed  KAYLEN-7 was not reassessed  When addressed, the patient denies  feelings of depression/anxiety  Patient reports excellent social/emotional support  Patient attends group educational classes on cardiac risk factor modification and heart medications  He has added home exercise 5 days/wk, that includes walking 25 mins  His exercise program will be progressed as tolerated to maintain RPE 4-6  The patient has the following personal goals he hopes to achieved by discharge: continue to lose weight     Pt will continue to be educated on lifestyle modifications and encouraged to supplement with a home exercise program to reach the following goals: continue progress exercises in cardiac rehab and to increase energy levels in the next 30 days  Medication compliance: Yes   Comments: Pt reports to be compliant with medications  Fall Risk: Low   Comments: Ambulates with a steady gait with no assist device    EKG Interpretation: NSR w/ occ PVCs      EXERCISE ASSESSMENT and PLAN    Current Exercise Program in Rehab:       Frequency: 3 days/week Supplement with home exercise 2+ days/wk as tolerated       Minutes: 40        METS: 3 9-4 5           HR:    RPE: 4-6         Modalities: Treadmill and Lifecycle      Exercise Progression 30 Day Goals :    Frequency: 4 days/week of cardiac rehab     Supplement with home exercise 2+ days/wk as tolerated    Minutes: 45                          >150 mins/wk of moderate intensity exercise   METS: 4 0- 5 5   HR:     RPE: 4-6   Modalities: Treadmill, Airdyne bike, Lifecycle and Elliptical    Strength trainin-3 days / week  12-15 repetitions  1-2 sets per modality    Modalities: Leg Press, Chest Press, Pull Downs, Arm Curl and Seated Row    Home Exercise: Type: walking, Frequency: 5 days/week, Duration: 20 mins, Distance 1 mile    Goals: 10% improvement in functional capacity - based on max METs achieved in fitness assessment, improved DASI score by 10%, Increase in exercise capacity by 40% - based on peak METs tolerated in cardiac rehab exercise session, Exercise 5 days/wk, >150mins/wk of moderate intensity exercise, Return to work unrestricted, Attend Rehab regularly, Decrease sitting time and return to regular exercise at the gym    Progression Toward Goals: Will continue to educate and progress as tolerated , Goals met: returning to work and home exercise      Education: benefit of exercise for CAD risk factors, AHA guidelines to achieve >150 mins/wk of moderate exercise, RPE scale and class: Risk Factors for Heart Disease   Plan:education on home exercise guidelines and home exercise 30+ mins 2 days opposite CR  Readiness to change: Action: (Changing behavior)      NUTRITION ASSESSMENT AND PLAN    Weight control:    Starting weight: 181 8   Current weight: 177 6  Waist circumference:    Startin in   Current:      Diabetes: Pre-diabetes  A1c: 6 0    last measured: 21    Lipid management: Discussed diet and lipid management, Last lipid profile 2021  Chol 172  TRG 83  HDL 30   and Pt has a script for repeat lipid profile  Goals:reduced BMI to < 25, LDL <100, HDL >40, decreased body fat% <25%   (M), 2 5-5%  wt loss, reduce portion sizes of meat to 3oz or less, increase intake of fish, shellfish, increase intake of meatless meals, eat 3 or more servings of whole grains a day, Eat 4-5 cups of fruits and vegetables daily, eliminate butter, choose healthy snacks: light popcorn, plain pretzels, seldom eat or choose low fat ice-cream, fruit juice bars or frozen yogurt  and daily saturated fat intake <7%/13g    Progression Toward Goals: Will continue to educate and progress as tolerated      Education: heart healthy eating  low sodium diet  hydration  nutrition for  lipid management  wt  loss   healthy choices while dining out  portion control  Plan: Education class: Reading Food Labels, Education Class: Heart Healthy Eating, replace butter with soft spreads made with olive oil, canola or yogurt, replace refined grain bread with whole grain bread, reduce portion sizes, avoid processed foods, will try new grains like brown rice, quinoa, farro, will replace sugar sweetened cereals with whole grain or oatmeal, drink more water, learn how to read food labels, replace sugar with stevia or truvia, keep added daily sugar <25g/day and decrease carbonated beverages  Readiness to change: Action:  (Changing behavior)      PSYCHOSOCIAL ASSESSMENT AND PLAN    Emotional:  Depression assessment:  PHQ-9 = 0 =No Depression            Anxiety measure:  KAYLEN-7 = 0-4  = Not anxious  Self-reported stress level:  1  Social support: Excellent    Goals:  Physical Fitness in [facility] Score < 3, Daily Activity in [facility] Score < 3, Pain in [facility] Score < 3 and Overall Health in [facility] Score < 3    Progression Toward Goals: Will continue to educate and progress as tolerated  Education: signs/sxs of depression, benefits of a positive support system, stress management techniques and depression and CAD  Plan: Class: Stress and Your Health, Class: Relaxation, Practice relaxation techniques, Exercise and Return to previous social activity  Readiness to change: Action:  (Changing behavior)      OTHER CORE COMPONENTS     Tobacco:   Social History     Tobacco Use   Smoking Status Former Smoker    Packs/day:     Years: 10 00    Pack years: 10 00    Types: Cigarettes    Quit date: 200    Years since quittin 6   Smokeless Tobacco Never Used   Tobacco Comment    quit        Tobacco Use Intervention:   Pt quit 30 years ago   and has abstained    Anginal Symptoms:  None   NTG use: No prescription    Blood pressure:    Restin/60- 124/74   Exercise: 120/60- 146/80    Goals: consistent BP < 130/80, reduced dietary sodium <2300mg, moderate intensity exercise >150 mins/wk, medication compliance and reduce number of medications  needed for BP control    Progression Toward Goals: Will continue to educate and progress as tolerated , Goals met: BP remains stable and within normal limits      Education:  understanding high blood pressure and it's relationship to CAD and low sodium diet and HTN  Plan: Class: Understanding Heart Disease, Class: Common Heart Medications, Avoid Processed foods, engage in regular exercise, eliminate salt shaker at the table, use salt substitutes, check labels for sodium content and monitor home BP  Readiness to change: Action:  (Changing behavior)

## 2021-08-10 ENCOUNTER — CLINICAL SUPPORT (OUTPATIENT)
Dept: CARDIAC REHAB | Facility: CLINIC | Age: 66
End: 2021-08-10
Payer: COMMERCIAL

## 2021-08-10 DIAGNOSIS — Z95.1 S/P CABG (CORONARY ARTERY BYPASS GRAFT): ICD-10-CM

## 2021-08-10 PROCEDURE — 93798 PHYS/QHP OP CAR RHAB W/ECG: CPT

## 2021-08-12 ENCOUNTER — CLINICAL SUPPORT (OUTPATIENT)
Dept: CARDIAC REHAB | Facility: CLINIC | Age: 66
End: 2021-08-12
Payer: COMMERCIAL

## 2021-08-12 DIAGNOSIS — Z95.1 S/P CABG (CORONARY ARTERY BYPASS GRAFT): ICD-10-CM

## 2021-08-12 PROCEDURE — 93798 PHYS/QHP OP CAR RHAB W/ECG: CPT

## 2021-08-13 ENCOUNTER — CLINICAL SUPPORT (OUTPATIENT)
Dept: CARDIAC REHAB | Facility: CLINIC | Age: 66
End: 2021-08-13
Payer: COMMERCIAL

## 2021-08-13 DIAGNOSIS — Z95.1 S/P CABG (CORONARY ARTERY BYPASS GRAFT): ICD-10-CM

## 2021-08-13 PROCEDURE — 93798 PHYS/QHP OP CAR RHAB W/ECG: CPT

## 2021-08-17 ENCOUNTER — CLINICAL SUPPORT (OUTPATIENT)
Dept: CARDIAC REHAB | Facility: CLINIC | Age: 66
End: 2021-08-17
Payer: COMMERCIAL

## 2021-08-17 DIAGNOSIS — Z95.1 S/P CABG (CORONARY ARTERY BYPASS GRAFT): ICD-10-CM

## 2021-08-17 PROCEDURE — 93798 PHYS/QHP OP CAR RHAB W/ECG: CPT

## 2021-08-19 ENCOUNTER — CLINICAL SUPPORT (OUTPATIENT)
Dept: CARDIAC REHAB | Facility: CLINIC | Age: 66
End: 2021-08-19
Payer: COMMERCIAL

## 2021-08-19 DIAGNOSIS — Z95.1 S/P CABG (CORONARY ARTERY BYPASS GRAFT): ICD-10-CM

## 2021-08-19 PROCEDURE — 93798 PHYS/QHP OP CAR RHAB W/ECG: CPT

## 2021-08-20 ENCOUNTER — APPOINTMENT (OUTPATIENT)
Dept: CARDIAC REHAB | Facility: CLINIC | Age: 66
End: 2021-08-20
Payer: COMMERCIAL

## 2021-08-24 ENCOUNTER — CLINICAL SUPPORT (OUTPATIENT)
Dept: CARDIAC REHAB | Facility: CLINIC | Age: 66
End: 2021-08-24
Payer: COMMERCIAL

## 2021-08-24 DIAGNOSIS — Z95.1 S/P CABG (CORONARY ARTERY BYPASS GRAFT): ICD-10-CM

## 2021-08-24 PROCEDURE — 93798 PHYS/QHP OP CAR RHAB W/ECG: CPT

## 2021-08-26 ENCOUNTER — CLINICAL SUPPORT (OUTPATIENT)
Dept: CARDIAC REHAB | Facility: CLINIC | Age: 66
End: 2021-08-26
Payer: COMMERCIAL

## 2021-08-26 DIAGNOSIS — Z95.1 S/P CABG (CORONARY ARTERY BYPASS GRAFT): ICD-10-CM

## 2021-08-26 PROCEDURE — 93798 PHYS/QHP OP CAR RHAB W/ECG: CPT

## 2021-08-27 ENCOUNTER — CLINICAL SUPPORT (OUTPATIENT)
Dept: CARDIAC REHAB | Facility: CLINIC | Age: 66
End: 2021-08-27
Payer: COMMERCIAL

## 2021-08-27 DIAGNOSIS — Z95.1 S/P CABG (CORONARY ARTERY BYPASS GRAFT): ICD-10-CM

## 2021-08-27 PROCEDURE — 93798 PHYS/QHP OP CAR RHAB W/ECG: CPT

## 2021-08-31 ENCOUNTER — CLINICAL SUPPORT (OUTPATIENT)
Dept: CARDIAC REHAB | Facility: CLINIC | Age: 66
End: 2021-08-31
Payer: COMMERCIAL

## 2021-08-31 DIAGNOSIS — Z95.1 S/P CABG (CORONARY ARTERY BYPASS GRAFT): ICD-10-CM

## 2021-08-31 PROCEDURE — 93798 PHYS/QHP OP CAR RHAB W/ECG: CPT

## 2021-09-02 ENCOUNTER — CLINICAL SUPPORT (OUTPATIENT)
Dept: CARDIAC REHAB | Facility: CLINIC | Age: 66
End: 2021-09-02
Payer: COMMERCIAL

## 2021-09-02 DIAGNOSIS — Z95.1 S/P CABG (CORONARY ARTERY BYPASS GRAFT): ICD-10-CM

## 2021-09-02 PROCEDURE — 93798 PHYS/QHP OP CAR RHAB W/ECG: CPT

## 2021-09-02 NOTE — PROGRESS NOTES
Cardiac Rehabilitation Plan of Care   90 Day Reassessment and Discharge          Today's date: 2021   # of Exercise Sessions Completed: 36  Patient name: Nakia Richards      : 1955  Age: 61 y o  MRN: [id number]  Referring Physician: Israel Fuller PA-C  Cardiologist: Greg Fischer MD  Provider: [facility]  Clinician: Sixto Newberry MS, CEP      Dx:   Encounter Diagnosis   Name Primary?  S/P CABG (coronary artery bypass graft)      Date of onset: 21    SUMMARY OF PROGRESS:  Discharge note for Norah Syed  He had no change in his submaximal exercise test reaching 5 8 METs with test termination of RHR +30  Norah Syed was taken off of his metoprolol due to low BP and HR that may have limited the outcomes of his testing  His perceived exertion was improved per individual stage that can be observed on the Submax ETT doc  His exercise tolerance (max METs in tolerated in cardiac rehab) increased by 12 5%  He had a 93 3% improvement in the DUKE activity estimated MET level with ADLs and physical activity  His [facility] QOL improved by 36 4%  PHQ-9 score was unchanged at zero  KAYLEN-7 decreased from 2 to 0  His weight decreased by 8 pounds  Waist circumference did not change  Rate Your Plate score improved from 59 to 71  Norah Syed has been supplementing CR sessions with home exercise which includes walking  He reports increased stamina, strength and reduced SOB with activity  Don tolerates 40-45 mins at 4 - 4 6 METs plus wt training  NSR on telemetry with increased HR during exercise  RHR 62 - 81, ExHR 114 - 123  Resting /60 - 126/62 with appropriate response to exercise reaching 124/50 - 152/74  All group education classes on cardiac risk factor modification were attended by the patient  Discharge plans include joining [place] [address] fitness center and continuing to follow a heart healthy diet  Encouraged Pt to continue exercise   Frequency: 4-6 days/wk, Intensity: RPE 4-5, Time: 40-50 mins daily, 150-200 mins/wk  Pt was encouraged to continue eating heart healthy  Pt was encouraged to remain compliant with medications and f/u with cardiologist with any cardiac symptoms, medication management and updated lipid profile  Medication compliance: Yes   Comments: Pt reports to be compliant with medications  Fall Risk: Low   Comments: Ambulates with a steady gait with no assist device    EKG Interpretation: NSR w/ occ PVCs      EXERCISE ASSESSMENT and PLAN    Current Exercise Program in Rehab:       Frequency: 3 days/week Supplement with home exercise 2+ days/wk as tolerated      Minutes: 40-45        METS: 4-4 6          HR:    RPE: 4-6         Modalities: Treadmill and Lifecycle      Strength trainin-3 days / week  12-15 repetitions  1-2 sets per modality    Modalities: Leg Press, Chest Press, Pull Downs, Arm Curl and Seated Row    Home Exercise: Type: walking, Frequency: 5 days/week, Duration: 20 mins, Distance 1 mile    Goals: 10% improvement in functional capacity - based on max METs achieved in fitness assessment, improved DASI score by 10%, Increase in exercise capacity by 40% - based on peak METs tolerated in cardiac rehab exercise session, Exercise 5 days/wk, >150mins/wk of moderate intensity exercise, Return to work unrestricted, Attend Rehab regularly, Decrease sitting time and return to regular exercise at the gym    Progression Toward Goals:  Goals met: returning to work and home exercise , Patient will be encouraged to focus on lifestyle modifications following discharge      Education: benefit of exercise for CAD risk factors, home exercise guidelines, AHA guidelines to achieve >150 mins/wk of moderate exercise, RPE scale, class: Risk Factors for Heart Disease and exercise instructions/guidelines for discharge    Plan:continue to exercise   Readiness to change: Maintenance: (Maintaining the behavior change)      NUTRITION ASSESSMENT AND PLAN    Weight control:    Starting weight: 181 8   Current weight: 177 6  Waist circumference:    Startin in   Current:  35 in    Diabetes: Pre-diabetes  A1c: 6 0    last measured: 21    Lipid management: Discussed diet and lipid management, Last lipid profile 2021  Chol 172  TRG 83  HDL 30   and Pt has a script for repeat lipid profile  Goals:reduced BMI to < 25, LDL <100, HDL >40, decreased body fat% <25%   (M), 2 5-5%  wt loss, reduce portion sizes of meat to 3oz or less, increase intake of fish, shellfish, increase intake of meatless meals, eat 3 or more servings of whole grains a day, Eat 4-5 cups of fruits and vegetables daily, eliminate butter, choose healthy snacks: light popcorn, plain pretzels, seldom eat or choose low fat ice-cream, fruit juice bars or frozen yogurt  and daily saturated fat intake <7%/13g    Progression Toward Goals: Will continue to educate and progress as tolerated  Education: heart healthy eating  low sodium diet  hydration  nutrition for  lipid management  wt  loss   healthy choices while dining out  portion control  education class: Heart Healthy Eating  education class:  Label Reading  Plan: reduce portion sizes, avoid processed foods and drink more water  Readiness to change: Action:  (Changing behavior)      PSYCHOSOCIAL ASSESSMENT AND PLAN    Emotional:  Depression assessment:  PHQ-9 = 0 =No Depression            Anxiety measure:  KAYLEN-7 = 0-4  = Not anxious  Self-reported stress level:  1  Social support: Excellent    Goals:  Physical Fitness in [place] Score < 3, Daily Activity in [place] Score < 3, Pain in [place] Score < 3 and Overall Health in [place] Score < 3    Progression Toward Goals: Patient will be encouraged to focus on lifestyle modifications following discharge      Education: signs/sxs of depression, benefits of a positive support system, stress management techniques, depression and CAD, class:  Stress and Your Health  and class:  Relaxation  Plan: Practice relaxation techniques, Exercise and Return to previous social activity  Readiness to change: Action:  (Changing behavior)      OTHER CORE COMPONENTS     Tobacco:   Social History     Tobacco Use   Smoking Status Former Smoker    Packs/day:  00    Years: 10 00    Pack years: 10 00    Types: Cigarettes    Quit date: 200    Years since quittin 6   Smokeless Tobacco Never Used   Tobacco Comment    quit        Tobacco Use Intervention:   Pt quit 30 years ago   and has abstained    Anginal Symptoms:  None   NTG use: No prescription    Blood pressure:    Restin/60- 126/62   Exercise: 124/50- 152/70    Goals: consistent BP < 130/80, reduced dietary sodium <2300mg, moderate intensity exercise >150 mins/wk, medication compliance and reduce number of medications  needed for BP control    Progression Toward Goals: Goals met: BP remains stable and within normal limits  , Patient will be encouraged to focus on lifestyle modifications following discharge      Education:  understanding high blood pressure and it's relationship to CAD, low sodium diet and HTN, Education class:  Common Heart Medications and Education class: Understanding Heart Disease  Plan: Avoid Processed foods, engage in regular exercise, eliminate salt shaker at the table, use salt substitutes, check labels for sodium content and monitor home BP  Readiness to change: Maintenance: (Maintaining the behavior change)

## 2021-09-03 ENCOUNTER — APPOINTMENT (OUTPATIENT)
Dept: CARDIAC REHAB | Facility: CLINIC | Age: 66
End: 2021-09-03
Payer: COMMERCIAL

## 2021-09-07 ENCOUNTER — APPOINTMENT (OUTPATIENT)
Dept: CARDIAC REHAB | Facility: CLINIC | Age: 66
End: 2021-09-07
Payer: COMMERCIAL

## 2021-09-09 ENCOUNTER — APPOINTMENT (OUTPATIENT)
Dept: CARDIAC REHAB | Facility: CLINIC | Age: 66
End: 2021-09-09
Payer: COMMERCIAL

## 2021-09-10 ENCOUNTER — APPOINTMENT (OUTPATIENT)
Dept: CARDIAC REHAB | Facility: CLINIC | Age: 66
End: 2021-09-10
Payer: COMMERCIAL

## 2021-09-23 ENCOUNTER — CLINICAL SUPPORT (OUTPATIENT)
Dept: FAMILY MEDICINE CLINIC | Facility: CLINIC | Age: 66
End: 2021-09-23
Payer: COMMERCIAL

## 2021-09-23 DIAGNOSIS — E55.9 VITAMIN D DEFICIENCY: ICD-10-CM

## 2021-09-23 DIAGNOSIS — E78.5 DYSLIPIDEMIA: ICD-10-CM

## 2021-09-23 DIAGNOSIS — I25.10 CORONARY ARTERY DISEASE INVOLVING NATIVE CORONARY ARTERY OF NATIVE HEART, UNSPECIFIED WHETHER ANGINA PRESENT: ICD-10-CM

## 2021-09-23 DIAGNOSIS — Z12.5 SCREENING PSA (PROSTATE SPECIFIC ANTIGEN): ICD-10-CM

## 2021-09-23 LAB
25(OH)D3 SERPL-MCNC: 36.2 NG/ML (ref 30–100)
ALBUMIN SERPL BCP-MCNC: 3.8 G/DL (ref 3.5–5)
ALP SERPL-CCNC: 68 U/L (ref 46–116)
ALT SERPL W P-5'-P-CCNC: 37 U/L (ref 12–78)
ANION GAP SERPL CALCULATED.3IONS-SCNC: 3 MMOL/L (ref 4–13)
AST SERPL W P-5'-P-CCNC: 26 U/L (ref 5–45)
BILIRUB SERPL-MCNC: 0.54 MG/DL (ref 0.2–1)
BILIRUB UR QL STRIP: NEGATIVE
BUN SERPL-MCNC: 13 MG/DL (ref 5–25)
CALCIUM SERPL-MCNC: 9.5 MG/DL (ref 8.3–10.1)
CHLORIDE SERPL-SCNC: 108 MMOL/L (ref 100–108)
CHOLEST SERPL-MCNC: 85 MG/DL (ref 50–200)
CLARITY UR: CLEAR
CO2 SERPL-SCNC: 30 MMOL/L (ref 21–32)
COLOR UR: YELLOW
CREAT SERPL-MCNC: 0.97 MG/DL (ref 0.6–1.3)
ERYTHROCYTE [DISTWIDTH] IN BLOOD BY AUTOMATED COUNT: 17.1 % (ref 11.6–15.1)
GFR SERPL CREATININE-BSD FRML MDRD: 82 ML/MIN/1.73SQ M
GLUCOSE P FAST SERPL-MCNC: 96 MG/DL (ref 65–99)
GLUCOSE UR STRIP-MCNC: NEGATIVE MG/DL
HCT VFR BLD AUTO: 43.9 % (ref 36.5–49.3)
HDLC SERPL-MCNC: 24 MG/DL
HGB BLD-MCNC: 13.7 G/DL (ref 12–17)
HGB UR QL STRIP.AUTO: NEGATIVE
KETONES UR STRIP-MCNC: NEGATIVE MG/DL
LDLC SERPL CALC-MCNC: 53 MG/DL (ref 0–100)
LEUKOCYTE ESTERASE UR QL STRIP: NEGATIVE
MCH RBC QN AUTO: 26.4 PG (ref 26.8–34.3)
MCHC RBC AUTO-ENTMCNC: 31.2 G/DL (ref 31.4–37.4)
MCV RBC AUTO: 85 FL (ref 82–98)
NITRITE UR QL STRIP: NEGATIVE
NONHDLC SERPL-MCNC: 61 MG/DL
PH UR STRIP.AUTO: 6 [PH]
PLATELET # BLD AUTO: 237 THOUSANDS/UL (ref 149–390)
PMV BLD AUTO: 11.8 FL (ref 8.9–12.7)
POTASSIUM SERPL-SCNC: 4.5 MMOL/L (ref 3.5–5.3)
PROT SERPL-MCNC: 7.2 G/DL (ref 6.4–8.2)
PROT UR STRIP-MCNC: NEGATIVE MG/DL
PSA SERPL-MCNC: 0.9 NG/ML (ref 0–4)
RBC # BLD AUTO: 5.19 MILLION/UL (ref 3.88–5.62)
SODIUM SERPL-SCNC: 141 MMOL/L (ref 136–145)
SP GR UR STRIP.AUTO: 1.02 (ref 1–1.03)
TRIGL SERPL-MCNC: 42 MG/DL
TSH SERPL DL<=0.05 MIU/L-ACNC: 1.2 UIU/ML (ref 0.36–3.74)
UROBILINOGEN UR QL STRIP.AUTO: 0.2 E.U./DL
WBC # BLD AUTO: 7.7 THOUSAND/UL (ref 4.31–10.16)

## 2021-09-23 PROCEDURE — 81003 URINALYSIS AUTO W/O SCOPE: CPT | Performed by: FAMILY MEDICINE

## 2021-09-23 PROCEDURE — 84443 ASSAY THYROID STIM HORMONE: CPT | Performed by: FAMILY MEDICINE

## 2021-09-23 PROCEDURE — 80061 LIPID PANEL: CPT | Performed by: FAMILY MEDICINE

## 2021-09-23 PROCEDURE — 82306 VITAMIN D 25 HYDROXY: CPT | Performed by: FAMILY MEDICINE

## 2021-09-23 PROCEDURE — 85027 COMPLETE CBC AUTOMATED: CPT | Performed by: FAMILY MEDICINE

## 2021-09-23 PROCEDURE — 36415 COLL VENOUS BLD VENIPUNCTURE: CPT

## 2021-09-23 PROCEDURE — 80053 COMPREHEN METABOLIC PANEL: CPT | Performed by: FAMILY MEDICINE

## 2021-09-23 PROCEDURE — G0103 PSA SCREENING: HCPCS | Performed by: FAMILY MEDICINE

## 2021-10-06 ENCOUNTER — OFFICE VISIT (OUTPATIENT)
Dept: FAMILY MEDICINE CLINIC | Facility: CLINIC | Age: 66
End: 2021-10-06
Payer: COMMERCIAL

## 2021-10-06 VITALS
HEIGHT: 67 IN | SYSTOLIC BLOOD PRESSURE: 110 MMHG | BODY MASS INDEX: 27.15 KG/M2 | HEART RATE: 76 BPM | WEIGHT: 173 LBS | TEMPERATURE: 97.8 F | OXYGEN SATURATION: 98 % | RESPIRATION RATE: 16 BRPM | DIASTOLIC BLOOD PRESSURE: 70 MMHG

## 2021-10-06 DIAGNOSIS — H91.93 BILATERAL HEARING LOSS, UNSPECIFIED HEARING LOSS TYPE: ICD-10-CM

## 2021-10-06 DIAGNOSIS — E55.9 VITAMIN D DEFICIENCY: ICD-10-CM

## 2021-10-06 DIAGNOSIS — Z00.00 ANNUAL PHYSICAL EXAM: Primary | ICD-10-CM

## 2021-10-06 DIAGNOSIS — I25.10 CORONARY ARTERY DISEASE INVOLVING NATIVE CORONARY ARTERY OF NATIVE HEART, UNSPECIFIED WHETHER ANGINA PRESENT: ICD-10-CM

## 2021-10-06 DIAGNOSIS — E78.5 DYSLIPIDEMIA: ICD-10-CM

## 2021-10-06 PROBLEM — I65.22 STENOSIS OF LEFT CAROTID ARTERY: Status: ACTIVE | Noted: 2021-10-06

## 2021-10-06 PROCEDURE — 3008F BODY MASS INDEX DOCD: CPT | Performed by: FAMILY MEDICINE

## 2021-10-06 PROCEDURE — 99396 PREV VISIT EST AGE 40-64: CPT | Performed by: FAMILY MEDICINE

## 2021-10-06 PROCEDURE — 1036F TOBACCO NON-USER: CPT | Performed by: FAMILY MEDICINE

## 2021-10-12 DIAGNOSIS — Z95.1 S/P CABG (CORONARY ARTERY BYPASS GRAFT): ICD-10-CM

## 2021-10-12 DIAGNOSIS — E78.5 DYSLIPIDEMIA: ICD-10-CM

## 2021-10-12 DIAGNOSIS — I25.10 CORONARY ARTERY DISEASE: ICD-10-CM

## 2021-10-12 RX ORDER — ATORVASTATIN CALCIUM 80 MG/1
80 TABLET, FILM COATED ORAL
Qty: 90 TABLET | Refills: 0 | Status: SHIPPED | OUTPATIENT
Start: 2021-10-12 | End: 2022-01-31 | Stop reason: SDUPTHER

## 2021-10-12 NOTE — PROGRESS NOTES
Progress Note - Critical Care   Velasquezizt Vásquez 63 y o  male MRN: [id number]  Unit/Bed#: [facility] 415-01 Encounter: [id number]      Attending Physician: Minerva Vigil MD    24 Hour Events/HPI: POD # 1 s/p CABG x 3  Arrived to the ICU AV paced  Extubated without issues  Tyrel weaned to off  No longer pacing  CI 2 9 overnight, de'lined this morning  Oxycodone increased to 5mg/10mg overnight due to pain  ROS: Review of Systems  ---------------------------------------------------------------------------------------------------------------------------------------------------------------------  Impressions:  1  MVCAD s/p CABG x3  2  Post-operative respiratory insufficiency   3  HLD  4  Remote Tobacco abuse    Plan:    Neuro:   · Pain controlled with: ATC tylenol, PRN oxycodone (5mg/10mg)  · Consider toradol x 3 doses today   · Regulate sleep/wake cycle  · Delirium precautions  · CAM-ICU daily  · Trend neuro exam    CV:   · Cardiac infusions: None  · MAP goal > 65   · D/C Percival Fatimah catheter  · D/C Arterial line  · Rhythm: NSR  · Follow rhythm on telemetry  · Lopressor 12 5 mg PO BID  · Maintain epicardial pacing wires for POD 1 pacing needs  · Continue ASA/amio/statin     Lung:   · Acute post-op pulmonary insufficiency; Requiring 2 Liters via nasal cannula, secondary to poor inspiratory effort secondary to pain  Continue incentive spirometry/coughing/deep breathing exercises    Wean supplemental oxygen as tolerated for saturation > 90%  · Chest tube output remains persistently high; Continue chest tubes to suction today    GI:   · Continue PPI for stress ulcer prophylaxis  · Continue bowel regimen  · Trend abdominal exam and bowel function    FEN:   · Diuretic plan: Lasix 40 mg IV q QD  · K-dur 20 mEQ PO q QD  · Nutrition/diet plan: cardiac diet, 1800 fluid restriction   · Replenish electrolytes with goals: K >4 0, Mag >2 0, and Phos >3 0    :   · Indwelling Palmer present: yes   · D/C Palmer  · Trend UOP and 10-Dec-2018 BUN/creat  · Strict I and O    ID:   · Source of infection: leukocytosis likely reactive   · Trend temps and WBC count  · Maintain normothermia        Heme:   · Trend hgb and plts    Endo:   · Glycemic control plan: Glucose well-controlled   Discontinue continuous insulin infusion and add Insulin sliding scale coverage    Results from last 6 Months   Lab Units 04/20/21  0946   HEMOGLOBIN A1C % 6 0*     MSK/Skin:  · Mobility goal:   · PT consult: yes  · OT consult: yes  · Frequent turning and pressure off-loading  · Local wound care as needed    Disposition:  Transfer to telemetry  ---------------------------------------------------------------------------------------------------------------------------------------------------------------------  Allergies: No Known Allergies  Medications:   Scheduled Meds:  Current Facility-Administered Medications   Medication Dose Route Frequency Provider Last Rate    acetaminophen  975 mg Oral [address] MONIQUE Corea      amiodarone  200 mg Oral [facility] [address] [place], [place]      aspirin  325 mg Oral Daily [address] [place], [place]      atorvastatin  80 mg Oral Daily With Accellion MONIQUE Yepez      bisacodyl  10 mg Rectal Daily PRN 30 [facility] MONIQUE Corea      cefazolin  2,000 mg Intravenous Q8H 30 [facility] MONIQUE Corea 2,000 mg (04/27/21 9551)    cholecalciferol  1,000 Units Oral Daily 30 [facility] MONIQUE Corea      fondaparinux  2 5 mg Subcutaneous Daily [address] [place], [place]      furosemide  40 mg Intravenous Q6H PRN 30 [place] Sunday Corea PA-C      HYDROmorphone  0 5 mg Intravenous Q2H PRN 30 [place] Sunday Corea PA-C      insulin regular (HumuLIN R,NovoLIN R) infusion  0 3-21 Units/hr Intravenous Titrated 30 [place] Sunday Yepez PA-C      lactated ringers  500 mL Intravenous Q30 Min PRN 30 [place] Sunday Corea PA-C      lidocaine (cardiac)  100 mg Intravenous Q30 Min PRN 30 [place] Sunday Corea PA-C      mupirocin  1 application Nasal M30T [address] MONIQUE Corea      ondansetron  4 mg Intravenous Q6H PRN [place], [place]      oxyCODONE  10 mg Oral Q4H PRN Garo Johnson PA-C      oxyCODONE  5 mg Oral Q4H PRN Garo Johnson PA-C      pantoprazole  40 mg Oral Daily [place], [place]      phenylephine   mcg/min Intravenous Titrated Anice PallMONIQUE Stopped (04/26/21 2252)    polyethylene glycol  17 g Oral Daily [place], [place]      potassium chloride  20 mEq Intravenous Once PRN Diomedes Render, MONIQUE      potassium chloride  20 mEq Intravenous Q1H PRN Diomedes Render, MONIQUE      potassium chloride  20 mEq Intravenous Q30 Min PRN [facility]      sodium chloride  20 mL/hr Intravenous Continuous Diomedes RenderMONIQUE Stopped (04/26/21 2155)     VTE Pharmacologic Prophylaxis: Fondaparinux (Arixtra)  VTE Mechanical Prophylaxis: sequential compression device    Continuous Infusions:insulin regular (HumuLIN R,NovoLIN R) infusion, 0 3-21 Units/hr  phenylephine,  mcg/min, Last Rate: Stopped (04/26/21 2252)  sodium chloride, 20 mL/hr, Last Rate: Stopped (04/26/21 2155)      PRN Meds:  bisacodyl, 10 mg, Daily PRN  furosemide, 40 mg, Q6H PRN  HYDROmorphone, 0 5 mg, Q2H PRN  lactated ringers, 500 mL, Q30 Min PRN  lidocaine (cardiac), 100 mg, Q30 Min PRN  ondansetron, 4 mg, Q6H PRN  oxyCODONE, 10 mg, Q4H PRN  oxyCODONE, 5 mg, Q4H PRN  potassium chloride, 20 mEq, Once PRN  potassium chloride, 20 mEq, Q1H PRN  potassium chloride, 20 mEq, Q30 Min PRN      Home Medications:   Prior to Admission medications    Medication Sig Start Date End Date Taking?  Authorizing Provider   ascorbic acid (VITAMIN C) 500 mg tablet Take 500 mg by mouth daily   Yes Historical Provider, MD   cholecalciferol (VITAMIN D3) 1,000 units tablet Take 1,000 Units by mouth daily   Yes Historical Provider, MD   Multiple Vitamin (multivitamin) tablet Take 1 tablet by mouth daily   Yes Historical Provider, MD   simvastatin (ZOCOR) 20 mg tablet TAKE ONE TABLET BY MOUTH EVERY NIGHT AT BEDTIME 3/9/21  Yes Juan Carlos Rodriguez, DO     ---------------------------------------------------------------------------------------------------------------------------------------------------------------------  Vitals:   Vitals:    21 0300 21 0400 21 0500 21 0600   BP:  109/53 113/62 117/64   BP Location:       Pulse: 96 96 96 98   Resp:  (!)  (!) 28    Temp:  99 1 °F (37 3 °C)     TempSrc:  Probe     SpO2: 95% 95% 97% 97%   Weight:    90 5 kg (199 lb 8 3 oz)   Height:         Arterial Line:  Arterial Line BP: 118/62  Arterial Line MAP (mmHg): 82 mmHg    Tele Rhythm: NSR This was personally reviewed by myself  Respiratory:  SpO2: SpO2: 97 %, SpO2 Activity: SpO2 Activity: At Rest, SpO2 Device: O2 Device: Nasal cannula  Nasal Cannula O2 Flow Rate (L/min): 2 L/min    Ventilator:  Respiratory    Lab Data (Last 4 hours)    None         O2/Vent Data (Last 4 hours)    None              Temperature: Temp (24hrs), Av 8 °F (37 1 °C), Min:97 3 °F (36 3 °C), Max:99 5 °F (37 5 °C)  Current: Temperature: 99 1 °F (37 3 °C)    Weights:   Weight (last 2 days)     Date/Time   Weight    21 0600   90 5 (199 52)    21 0600   86 4 (190 48)    21 0600   87 2 (192 2)            Body mass index is 31 25 kg/m²  Hemodynamic Monitoring:  PAP: PAP: , CVP: CVP (mean): 4 mmHg, CO: CO (L/min): 5 8 L/min, CI: CI (L/min/m2): 2 9 L/min/m2, SVR: SVR (dyne*sec)/cm5: 1006 (dyne*sec)/cm5  PAP: (21-39)/(9-21)   CO:  [3 7 L/min-7 6 L/min] 5 8 L/min  CI:  [1 9 L/min/m2-3 8 L/min/m2] 2 9 L/min/m2    Intake and Outputs:    Intake/Output Summary (Last 24 hours) at 2021 0659  Last data filed at 2021 0600  Gross per 24 hour   Intake 3390 88 ml   Output 2815 ml   Net 575 88 ml     I/O last 24 hours:   In: 3505 6 [P O :360; I V :2515 6; IV Piggyback:130]  Out: 4549 [Urine:2215; Blood:500; Chest Tube:375]      Chest tube Output:  Mediastinal tubes: 80 mL/8 hours  225 mL/24 hours   Pleural tubes: 80 mL/8 hours  150 mL/24 hours     Labs:  Results from last 7 days   Lab Units 04/27/21  0349 04/26/21  2335 04/26/21  1553 04/26/21  1549  04/26/21  1341  04/26/21  0517 04/22/21  0306   WBC Thousand/uL 22 04*  --   --   --   --   --   --  10 89* 10 84*   HEMOGLOBIN g/dL 13 8 13 7  --  12 2  --   --   --  15 3 13 8   I STAT HEMOGLOBIN g/dl  --   --  11 9*  --    < >  --    < >  --   --    HEMATOCRIT % 42 5 42 4  --  38 0  --   --   --  47 4 43 1   HEMATOCRIT, ISTAT %  --   --  35*  --    < >  --    < >  --   --    PLATELETS Thousands/uL 170  --   --  167  --  164  --  275 238   NEUTROS PCT %  --   --   --   --   --   --   --  64  --    MONOS PCT %  --   --   --   --   --   --   --  7  --     < > = values in this interval not displayed  Results from last 7 days   Lab Units 04/27/21  0349 04/26/21  2335 04/26/21  1937 04/26/21  1553 04/26/21  1549 04/26/21  1448 04/26/21  1422  04/26/21  0517   SODIUM mmol/L 141  --   --   --  142  --   --   --  138   POTASSIUM mmol/L 4 6 4 7 4 8  --  4 6  --   --   --  4 0   CHLORIDE mmol/L 111*  --   --   --  112*  --   --   --  107   CO2 mmol/L 20*  --   --   --  18*  --   --   --  24   CO2, I-STAT mmol/L  --   --   --  22  --  22 26   < >  --    BUN mg/dL 16  --   --   --  15  --   --   --  18   CREATININE mg/dL 1 14  --   --   --  1 03  --   --   --  1 16   CALCIUM mg/dL 7 8*  --   --   --  8 0*  --   --   --  9 7   ALK PHOS U/L  --   --   --   --   --   --   --   --  62   ALT U/L  --   --   --   --   --   --   --   --  91*   AST U/L  --   --   --   --   --   --   --   --  54*   GLUCOSE, ISTAT mg/dl  --   --   --  139  --  162* 139   < >  --     < > = values in this interval not displayed       Baseline Creat: 1 03    Results from last 7 days   Lab Units 04/27/21  0349   MAGNESIUM mg/dL 2 3          Results from last 7 days   Lab Units 04/26/21  1549 04/26/21  0517 04/25/21  0527   INR  1 38*  --   -- PTT seconds 40* 75* 76*                  Results from last 7 days   Lab Units 04/26/21  1705   PH ART  7 271*   PCO2 ART mm Hg 42 9   PO2 ART mm Hg 123 4   HCO3 ART mmol/L 19 3*   BASE EXC ART mmol/L -7 3   ABG SOURCE  Line, Arterial             Micro:   Blood Culture: No results found for: BLOODCX  Urine Culture: No results found for: URINECX  Sputum Culture: No components found for: SPUTUMCX  Wound Culure: No results found for: WOUNDCULT    Diagnositic Studies:  04/27/21 CXR: Poor inspiratory effort, no pneumothorax  Lines and tubes in adequate position  This was personally reviewed by myself in PACS   04/27/21 EKG: normal sinus rhythm with evidence of generalized ST elevation, likely pericarditis  This was personally reviewed by myself  Nutrition:        Diet Orders   (From admission, onward)             Start     Ordered    04/27/21 0000  Diet Cardiovascular; Cardiac; Fluid Restriction 1800 ML  Diet effective 0500     Question Answer Comment   Diet Type Cardiovascular    Cardiac Cardiac    Other Restriction(s): Fluid Restriction 1800 ML    RD to adjust diet per protocol? Yes        04/26/21 1534              Physical Exam  Constitutional:       Appearance: Normal appearance  HENT:      Head: Normocephalic and atraumatic  Nose: Nose normal       Mouth/Throat:      Mouth: Mucous membranes are moist       Pharynx: Oropharynx is clear  Eyes:      Extraocular Movements: Extraocular movements intact  Pupils: Pupils are equal, round, and reactive to light  Neck:      Musculoskeletal: Normal range of motion  Cardiovascular:      Rate and Rhythm: Normal rate and regular rhythm  Heart sounds: Friction rub present  Comments: AV epicardial wires  Pulmonary:      Effort: Pulmonary effort is normal  No respiratory distress  Breath sounds: Decreased breath sounds present  Comments: CT with sero-sang drainage  Abdominal:      General: Abdomen is flat   Bowel sounds are normal  There is no distension  Palpations: Abdomen is soft  Tenderness: There is no abdominal tenderness  Genitourinary:     Comments: Palmer with clear, yellow urine   Musculoskeletal: Normal range of motion  Right lower leg: No edema  Left lower leg: No edema  Skin:     General: Skin is warm and dry  Capillary Refill: Capillary refill takes less than 2 seconds  Comments: MSI dressing clean, dry and intact   Neurological:      General: No focal deficit present  Mental Status: He is alert and oriented to person, place, and time  Invasive lines and devices: Invasive Devices     Central Venous Catheter Line            CVC Central Lines 04/26/21 Triple less than 1 day          Peripheral Intravenous Line            Peripheral IV 04/24/21 Left Antecubital 2 days    Peripheral IV 04/26/21 Right Hand less than 1 day          Line            Pacer Wires less than 1 day    Pacer Wires less than 1 day          Drain            Chest Tube 1 Left Pleural 32 Fr  less than 1 day    Chest Tube 2 Posterior Mediastinal 32 Fr  less than 1 day    Chest Tube 3 Anterior Mediastinal 32 Fr  less than 1 day    Urethral Catheter Non-latex; Temperature probe; Other (Comment) 16 Fr  less than 1 day              ---------------------------------------------------------------------------------------------------------------------------------------------------------------------  Code Status: Level 1 - Full Code    Care Time Delivered:   No Critical Care time spent     Collaborative bedside rounds performed with cardiac surgery attending, critical care attending and bedside RN      SIGNATURE: SHIV Davila  DATE: April 27, 2021  TIME: 6:59 AM 12-Mar-2021

## 2021-12-13 ENCOUNTER — NURSE TRIAGE (OUTPATIENT)
Dept: OTHER | Facility: OTHER | Age: 66
End: 2021-12-13

## 2021-12-13 DIAGNOSIS — Z20.822 SUSPECTED SEVERE ACUTE RESPIRATORY SYNDROME CORONAVIRUS 2 (SARS-COV-2) INFECTION: Primary | ICD-10-CM

## 2021-12-14 ENCOUNTER — TELEMEDICINE (OUTPATIENT)
Dept: FAMILY MEDICINE CLINIC | Facility: CLINIC | Age: 66
End: 2021-12-14
Payer: COMMERCIAL

## 2021-12-14 DIAGNOSIS — U07.1 COVID-19 VIRUS INFECTION: Primary | ICD-10-CM

## 2021-12-14 PROCEDURE — 99442 PR PHYS/QHP TELEPHONE EVALUATION 11-20 MIN: CPT | Performed by: FAMILY MEDICINE

## 2021-12-15 ENCOUNTER — PATIENT MESSAGE (OUTPATIENT)
Dept: FAMILY MEDICINE CLINIC | Facility: CLINIC | Age: 66
End: 2021-12-15

## 2022-01-31 ENCOUNTER — OFFICE VISIT (OUTPATIENT)
Dept: CARDIOLOGY CLINIC | Facility: CLINIC | Age: 67
End: 2022-01-31
Payer: COMMERCIAL

## 2022-01-31 VITALS
WEIGHT: 169 LBS | HEART RATE: 70 BPM | SYSTOLIC BLOOD PRESSURE: 108 MMHG | HEIGHT: 67 IN | OXYGEN SATURATION: 98 % | DIASTOLIC BLOOD PRESSURE: 60 MMHG | BODY MASS INDEX: 26.53 KG/M2

## 2022-01-31 DIAGNOSIS — E78.5 DYSLIPIDEMIA: ICD-10-CM

## 2022-01-31 DIAGNOSIS — Z95.1 S/P CABG (CORONARY ARTERY BYPASS GRAFT): ICD-10-CM

## 2022-01-31 DIAGNOSIS — I65.22 STENOSIS OF LEFT CAROTID ARTERY: ICD-10-CM

## 2022-01-31 DIAGNOSIS — I25.10 CORONARY ARTERY DISEASE INVOLVING NATIVE CORONARY ARTERY OF NATIVE HEART, UNSPECIFIED WHETHER ANGINA PRESENT: Primary | ICD-10-CM

## 2022-01-31 PROCEDURE — 1036F TOBACCO NON-USER: CPT | Performed by: INTERNAL MEDICINE

## 2022-01-31 PROCEDURE — 3008F BODY MASS INDEX DOCD: CPT | Performed by: INTERNAL MEDICINE

## 2022-01-31 PROCEDURE — 99214 OFFICE O/P EST MOD 30 MIN: CPT | Performed by: INTERNAL MEDICINE

## 2022-01-31 RX ORDER — ATORVASTATIN CALCIUM 40 MG/1
40 TABLET, FILM COATED ORAL
Qty: 90 TABLET | Refills: 3 | Status: SHIPPED | OUTPATIENT
Start: 2022-01-31

## 2022-01-31 RX ORDER — AZITHROMYCIN 500 MG/1
TABLET, FILM COATED ORAL
COMMUNITY
Start: 2021-12-15 | End: 2022-04-06

## 2022-01-31 RX ORDER — IVERMECTIN 3 MG/1
TABLET ORAL
COMMUNITY
Start: 2021-12-15 | End: 2022-04-06

## 2022-02-01 DIAGNOSIS — Z95.1 S/P CABG (CORONARY ARTERY BYPASS GRAFT): ICD-10-CM

## 2022-02-01 DIAGNOSIS — I25.10 CORONARY ARTERY DISEASE: ICD-10-CM

## 2022-02-01 DIAGNOSIS — E78.5 DYSLIPIDEMIA: ICD-10-CM

## 2022-02-01 RX ORDER — ASPIRIN 325 MG
325 TABLET ORAL DAILY
Qty: 90 TABLET | Refills: 1 | Status: SHIPPED | OUTPATIENT
Start: 2022-02-01 | End: 2022-02-02

## 2022-02-02 DIAGNOSIS — I25.10 CORONARY ARTERY DISEASE: ICD-10-CM

## 2022-02-02 DIAGNOSIS — Z95.1 S/P CABG (CORONARY ARTERY BYPASS GRAFT): ICD-10-CM

## 2022-02-02 DIAGNOSIS — E78.5 DYSLIPIDEMIA: ICD-10-CM

## 2022-02-02 RX ORDER — ASPIRIN 81 MG/1
81 TABLET ORAL DAILY
COMMUNITY
End: 2022-02-02

## 2022-02-02 RX ORDER — ASPIRIN 81 MG/1
81 TABLET ORAL DAILY
Qty: 30 TABLET | Refills: 11 | Status: SHIPPED | OUTPATIENT
Start: 2022-02-02

## 2022-04-06 ENCOUNTER — OFFICE VISIT (OUTPATIENT)
Dept: FAMILY MEDICINE CLINIC | Facility: CLINIC | Age: 67
End: 2022-04-06
Payer: COMMERCIAL

## 2022-04-06 VITALS
HEIGHT: 67 IN | OXYGEN SATURATION: 96 % | SYSTOLIC BLOOD PRESSURE: 110 MMHG | BODY MASS INDEX: 27.15 KG/M2 | WEIGHT: 173 LBS | DIASTOLIC BLOOD PRESSURE: 60 MMHG | RESPIRATION RATE: 16 BRPM | TEMPERATURE: 97.3 F | HEART RATE: 68 BPM

## 2022-04-06 DIAGNOSIS — E55.9 VITAMIN D DEFICIENCY: ICD-10-CM

## 2022-04-06 DIAGNOSIS — I25.10 CORONARY ARTERY DISEASE INVOLVING NATIVE CORONARY ARTERY OF NATIVE HEART, UNSPECIFIED WHETHER ANGINA PRESENT: ICD-10-CM

## 2022-04-06 DIAGNOSIS — E78.5 DYSLIPIDEMIA: Primary | ICD-10-CM

## 2022-04-06 DIAGNOSIS — I65.22 STENOSIS OF LEFT CAROTID ARTERY: ICD-10-CM

## 2022-04-06 PROCEDURE — 99214 OFFICE O/P EST MOD 30 MIN: CPT | Performed by: FAMILY MEDICINE

## 2022-04-06 PROCEDURE — 3725F SCREEN DEPRESSION PERFORMED: CPT | Performed by: FAMILY MEDICINE

## 2022-04-06 PROCEDURE — 3008F BODY MASS INDEX DOCD: CPT | Performed by: FAMILY MEDICINE

## 2022-04-06 PROCEDURE — 1036F TOBACCO NON-USER: CPT | Performed by: FAMILY MEDICINE

## 2022-04-06 NOTE — PROGRESS NOTES
Assessment/Plan:  Patient given lab requisition for fasting CMP and lipid panel  Patient to continue present treatment  Patient instructed to follow a low-fat, low-salt and a low-carbohydrate diet and continue regular aerobic exercise at least 150 minutes per week  Follow up with specialists as scheduled return the office in 6 months  Diagnoses and all orders for this visit:    Dyslipidemia  -     Comprehensive metabolic panel; Future  -     Lipid panel; Future    Coronary artery disease involving native coronary artery of native heart, unspecified whether angina present    Stenosis of left carotid artery    Vitamin D deficiency          Subjective:      Patient ID: Monae Carr is a 61 y o  male  Patient is here for follow-up appoint for chronic conditions  Patient recently saw Dr Soco Myers at [facility] 2 months ago and atorvastatin was decreased from 80 mg daily to 40 mg daily  He has a follow-up appointment in 6 months and will recheck carotid ultrasound at that time  Patient complains of ongoing numbness in his feet since CABG surgery  Patient is otherwise feeling well overall and exercises regularly walking and stationary bike greater than 150 minutes per week  Also weight training  Patient is up-to-date on colonoscopy  Hyperlipidemia  This is a chronic problem  The problem is controlled  He has no history of diabetes or hypothyroidism  Associated symptoms include a focal sensory loss  Pertinent negatives include no chest pain, focal weakness, leg pain, myalgias or shortness of breath  Current antihyperlipidemic treatment includes statins  The current treatment provides significant improvement of lipids  There are no compliance problems  Risk factors for coronary artery disease include dyslipidemia and family history         The following portions of the patient's history were reviewed and updated as appropriate: allergies, current medications, past family history, past medical history, past social history, past surgical history and problem list     Review of Systems   Constitutional: Negative for activity change, appetite change, chills, diaphoresis, fatigue, fever and unexpected weight change  HENT: Negative  Eyes: Negative  Respiratory: Negative for cough, chest tightness, shortness of breath and wheezing  Cardiovascular: Negative for chest pain, palpitations and leg swelling  Gastrointestinal: Negative for abdominal pain, blood in stool, constipation, diarrhea, nausea and vomiting  Endocrine: Negative for cold intolerance and heat intolerance  Genitourinary: Negative for difficulty urinating, dysuria, frequency and hematuria  Musculoskeletal: Negative for arthralgias, back pain, gait problem, joint swelling, myalgias, neck pain and neck stiffness  Skin: Negative  Neurological: Positive for numbness  Negative for dizziness, focal weakness, syncope, weakness, light-headedness and headaches  Hematological: Negative for adenopathy  Does not bruise/bleed easily  Psychiatric/Behavioral: Negative for decreased concentration, dysphoric mood and sleep disturbance  The patient is not nervous/anxious  Objective:      /60 (BP Location: Left arm, Patient Position: Sitting, Cuff Size: Standard)   Pulse 68   Temp (!) 97 3 °F (36 3 °C) (Skin)   Resp 16   Ht 5' 7" (1 702 m)   Wt 78 5 kg (173 lb)   SpO2 96%   BMI 27 10 kg/m²          Physical Exam  Constitutional:       General: He is not in acute distress  Appearance: Normal appearance  HENT:      Head: Normocephalic  Mouth/Throat:      Mouth: Mucous membranes are moist    Eyes:      General: No scleral icterus  Conjunctiva/sclera: Conjunctivae normal    Neck:      Vascular: No carotid bruit  Cardiovascular:      Rate and Rhythm: Normal rate and regular rhythm  Pulmonary:      Effort: Pulmonary effort is normal       Breath sounds: Normal breath sounds     Abdominal: Palpations: Abdomen is soft  Tenderness: There is no abdominal tenderness  Musculoskeletal:      Cervical back: Neck supple  Right lower leg: No edema  Left lower leg: No edema  Lymphadenopathy:      Cervical: No cervical adenopathy  Skin:     General: Skin is warm and dry  Neurological:      General: No focal deficit present  Mental Status: He is alert and oriented to person, place, and time  Psychiatric:         Mood and Affect: Mood normal          Behavior: Behavior normal          Thought Content: Thought content normal          Judgment: Judgment normal          BMI Counseling: Body mass index is 27 1 kg/m²  The BMI is above normal  Nutrition recommendations include 3-5 servings of fruits/vegetables daily, consuming healthier snacks, reducing intake of saturated fat and trans fat and reducing intake of cholesterol  Exercise recommendations include moderate aerobic physical activity for 150 minutes/week

## 2022-04-11 ENCOUNTER — APPOINTMENT (OUTPATIENT)
Dept: LAB | Facility: CLINIC | Age: 67
End: 2022-04-11
Payer: COMMERCIAL

## 2022-04-11 DIAGNOSIS — E78.5 DYSLIPIDEMIA: ICD-10-CM

## 2022-04-11 LAB
ALBUMIN SERPL BCP-MCNC: 3.8 G/DL (ref 3.5–5)
ALP SERPL-CCNC: 66 U/L (ref 46–116)
ALT SERPL W P-5'-P-CCNC: 51 U/L (ref 12–78)
ANION GAP SERPL CALCULATED.3IONS-SCNC: 4 MMOL/L (ref 4–13)
AST SERPL W P-5'-P-CCNC: 33 U/L (ref 5–45)
BILIRUB SERPL-MCNC: 0.46 MG/DL (ref 0.2–1)
BUN SERPL-MCNC: 17 MG/DL (ref 5–25)
CALCIUM SERPL-MCNC: 9.2 MG/DL (ref 8.3–10.1)
CHLORIDE SERPL-SCNC: 106 MMOL/L (ref 100–108)
CHOLEST SERPL-MCNC: 118 MG/DL
CO2 SERPL-SCNC: 32 MMOL/L (ref 21–32)
CREAT SERPL-MCNC: 1.06 MG/DL (ref 0.6–1.3)
GFR SERPL CREATININE-BSD FRML MDRD: 73 ML/MIN/1.73SQ M
GLUCOSE P FAST SERPL-MCNC: 96 MG/DL (ref 65–99)
HDLC SERPL-MCNC: 32 MG/DL
LDLC SERPL CALC-MCNC: 75 MG/DL (ref 0–100)
NONHDLC SERPL-MCNC: 86 MG/DL
POTASSIUM SERPL-SCNC: 4.3 MMOL/L (ref 3.5–5.3)
PROT SERPL-MCNC: 7.3 G/DL (ref 6.4–8.2)
SODIUM SERPL-SCNC: 142 MMOL/L (ref 136–145)
TRIGL SERPL-MCNC: 54 MG/DL

## 2022-04-11 PROCEDURE — 80053 COMPREHEN METABOLIC PANEL: CPT

## 2022-04-11 PROCEDURE — 36415 COLL VENOUS BLD VENIPUNCTURE: CPT

## 2022-04-11 PROCEDURE — 80061 LIPID PANEL: CPT

## 2022-10-06 ENCOUNTER — OFFICE VISIT (OUTPATIENT)
Dept: FAMILY MEDICINE CLINIC | Facility: CLINIC | Age: 67
End: 2022-10-06
Payer: COMMERCIAL

## 2022-10-06 VITALS
HEIGHT: 67 IN | OXYGEN SATURATION: 97 % | DIASTOLIC BLOOD PRESSURE: 75 MMHG | TEMPERATURE: 97.6 F | WEIGHT: 173 LBS | HEART RATE: 72 BPM | BODY MASS INDEX: 27.15 KG/M2 | RESPIRATION RATE: 16 BRPM | SYSTOLIC BLOOD PRESSURE: 120 MMHG

## 2022-10-06 DIAGNOSIS — Z12.5 SCREENING PSA (PROSTATE SPECIFIC ANTIGEN): ICD-10-CM

## 2022-10-06 DIAGNOSIS — E55.9 VITAMIN D DEFICIENCY: ICD-10-CM

## 2022-10-06 DIAGNOSIS — Z00.00 ANNUAL PHYSICAL EXAM: Primary | ICD-10-CM

## 2022-10-06 DIAGNOSIS — E78.5 DYSLIPIDEMIA: ICD-10-CM

## 2022-10-06 DIAGNOSIS — I25.10 CORONARY ARTERY DISEASE INVOLVING NATIVE CORONARY ARTERY OF NATIVE HEART, UNSPECIFIED WHETHER ANGINA PRESENT: ICD-10-CM

## 2022-10-06 PROCEDURE — 99397 PER PM REEVAL EST PAT 65+ YR: CPT | Performed by: FAMILY MEDICINE

## 2022-10-06 NOTE — PROGRESS NOTES
Name: Lit Burkett      : 1955      MRN: [id number]  Encounter Provider: Ariana Bruce DO  Encounter Date: 10/6/2022   Encounter department: [address] [place] [address]   Patient given lab requisition for fasting labs as below  Patient scheduled to get flu vaccine through work  Recommend high-dose  Discussed Prevnar 20 pneumonia vaccine although patient prefers to hold off at this time and will get it in the future  Patient to follow-up with cardiology next month as scheduled  Patient to schedule follow-up colonoscopy in May of 2023  Return the office in 1 year  1  Annual physical exam  -     CBC and Platelet; Future  -     UA w Reflex to Microscopic w Reflex to Culture -Lab Collect; Future; Expected date: 10/06/2022    2  Coronary artery disease involving native coronary artery of native heart, unspecified whether angina present    3  Dyslipidemia  -     Comprehensive metabolic panel; Future  -     Lipid panel; Future  -     TSH, 3rd generation with Free T4 reflex; Future    4  Vitamin D deficiency  -     Vitamin D 25 hydroxy; Future    5  Screening PSA (prostate specific antigen)  -     PSA, Total Screen; Future           Subjective      Patient is here for annual physical exam   Patient is feeling well overall and exercises regularly walking 30 minutes 5 days a week and weight training 3 days a week  Patient has been following healthy diet  Patient plans to get yearly flu vaccine through work  Patient has a follow-up appoint with Dr Adam Bynum at [facility] on 2022  Patient is up-to-date on colonoscopy and will be due for 5 year recheck colonoscopy in May of 2023 with Dr Jean-Paul Bateman at [address] [place] [address]  Patient continues to work full-time and plans to retire at the end of this year  Review of Systems   Constitutional: Negative for activity change, appetite change, chills, diaphoresis, fatigue, fever and unexpected weight change     HENT: Negative  Eyes: Negative  Respiratory: Negative for cough, chest tightness, shortness of breath and wheezing  Cardiovascular: Negative for chest pain, palpitations and leg swelling  Gastrointestinal: Negative for abdominal pain, blood in stool, constipation, diarrhea, nausea and vomiting  Endocrine: Negative for cold intolerance and heat intolerance  Genitourinary: Negative for difficulty urinating, dysuria, frequency and hematuria  Musculoskeletal: Negative for arthralgias, back pain, gait problem, joint swelling, myalgias, neck pain and neck stiffness  Skin: Negative  Neurological: Negative for dizziness, syncope, weakness, light-headedness and headaches  Hematological: Negative for adenopathy  Does not bruise/bleed easily  Psychiatric/Behavioral: Negative for decreased concentration, dysphoric mood and sleep disturbance  The patient is not nervous/anxious  Current Outpatient Medications on File Prior to Visit   Medication Sig    ascorbic acid (VITAMIN C) 500 mg tablet Take 500 mg by mouth daily    aspirin (ECOTRIN LOW STRENGTH) 81 mg EC tablet Take 1 tablet (81 mg total) by mouth daily    atorvastatin (LIPITOR) 40 mg tablet Take 1 tablet (40 mg total) by mouth daily with dinner    cholecalciferol (VITAMIN D3) 1,000 units tablet Take 1,000 Units by mouth daily    co-enzyme Q-10 30 MG capsule Take 200 mg by mouth daily    Multiple Vitamin (multivitamin) tablet Take 1 tablet by mouth daily       Objective     /75 (BP Location: Left arm, Patient Position: Sitting, Cuff Size: Standard)   Pulse 72   Temp 97 6 °F (36 4 °C) (Skin)   Resp 16   Ht 5' 7" (1 702 m)   Wt 78 5 kg (173 lb)   SpO2 97%   BMI 27 10 kg/m²     Physical Exam  Constitutional:       General: He is not in acute distress  Appearance: Normal appearance  HENT:      Head: Normocephalic  Mouth/Throat:      Mouth: Mucous membranes are moist    Eyes:      General: No scleral icterus  Conjunctiva/sclera: Conjunctivae normal    Neck:      Vascular: No carotid bruit  Cardiovascular:      Rate and Rhythm: Normal rate and regular rhythm  Pulmonary:      Effort: Pulmonary effort is normal       Breath sounds: Normal breath sounds  Abdominal:      Palpations: Abdomen is soft  Tenderness: There is no abdominal tenderness  Musculoskeletal:      Cervical back: Neck supple  Right lower leg: No edema  Left lower leg: No edema  Lymphadenopathy:      Cervical: No cervical adenopathy  Skin:     General: Skin is warm and dry  Neurological:      General: No focal deficit present  Mental Status: He is alert and oriented to person, place, and time  Psychiatric:         Mood and Affect: Mood normal          Behavior: Behavior normal          Thought Content: Thought content normal          Judgment: Judgment normal        Yamil Mondragon DO  BMI Counseling: Body mass index is 27 1 kg/m²  The BMI is above normal  Nutrition recommendations include decreasing overall calorie intake, 3-5 servings of fruits/vegetables daily and reducing fast food intake  Exercise recommendations include moderate aerobic physical activity for 150 minutes/week

## 2022-10-21 ENCOUNTER — APPOINTMENT (OUTPATIENT)
Dept: LAB | Facility: CLINIC | Age: 67
End: 2022-10-21
Payer: COMMERCIAL

## 2022-10-21 DIAGNOSIS — Z00.00 ANNUAL PHYSICAL EXAM: ICD-10-CM

## 2022-10-21 DIAGNOSIS — E78.5 DYSLIPIDEMIA: ICD-10-CM

## 2022-10-21 DIAGNOSIS — Z12.5 SCREENING PSA (PROSTATE SPECIFIC ANTIGEN): ICD-10-CM

## 2022-10-21 DIAGNOSIS — D72.829 LEUKOCYTOSIS, UNSPECIFIED TYPE: Primary | ICD-10-CM

## 2022-10-21 DIAGNOSIS — E55.9 VITAMIN D DEFICIENCY: ICD-10-CM

## 2022-10-21 LAB
25(OH)D3 SERPL-MCNC: 39.3 NG/ML (ref 30–100)
ALBUMIN SERPL BCP-MCNC: 4.2 G/DL (ref 3.5–5)
ALP SERPL-CCNC: 58 U/L (ref 34–104)
ALT SERPL W P-5'-P-CCNC: 43 U/L (ref 7–52)
ANION GAP SERPL CALCULATED.3IONS-SCNC: 6 MMOL/L (ref 4–13)
AST SERPL W P-5'-P-CCNC: 35 U/L (ref 13–39)
BILIRUB SERPL-MCNC: 0.5 MG/DL (ref 0.2–1)
BUN SERPL-MCNC: 18 MG/DL (ref 5–25)
CALCIUM SERPL-MCNC: 9.7 MG/DL (ref 8.4–10.2)
CHLORIDE SERPL-SCNC: 106 MMOL/L (ref 96–108)
CHOLEST SERPL-MCNC: 98 MG/DL
CO2 SERPL-SCNC: 32 MMOL/L (ref 21–32)
CREAT SERPL-MCNC: 0.98 MG/DL (ref 0.6–1.3)
ERYTHROCYTE [DISTWIDTH] IN BLOOD BY AUTOMATED COUNT: 14 % (ref 11.6–15.1)
GFR SERPL CREATININE-BSD FRML MDRD: 80 ML/MIN/1.73SQ M
GLUCOSE P FAST SERPL-MCNC: 94 MG/DL (ref 65–99)
HCT VFR BLD AUTO: 48 % (ref 36.5–49.3)
HDLC SERPL-MCNC: 29 MG/DL
HGB BLD-MCNC: 15.3 G/DL (ref 12–17)
LDLC SERPL CALC-MCNC: 54 MG/DL (ref 0–100)
MCH RBC QN AUTO: 29 PG (ref 26.8–34.3)
MCHC RBC AUTO-ENTMCNC: 31.9 G/DL (ref 31.4–37.4)
MCV RBC AUTO: 91 FL (ref 82–98)
NONHDLC SERPL-MCNC: 69 MG/DL
PLATELET # BLD AUTO: 244 THOUSANDS/UL (ref 149–390)
PMV BLD AUTO: 10.2 FL (ref 8.9–12.7)
POTASSIUM SERPL-SCNC: 4.2 MMOL/L (ref 3.5–5.3)
PROT SERPL-MCNC: 7.1 G/DL (ref 6.4–8.4)
PSA SERPL-MCNC: 0.8 NG/ML (ref 0–4)
RBC # BLD AUTO: 5.27 MILLION/UL (ref 3.88–5.62)
SODIUM SERPL-SCNC: 144 MMOL/L (ref 135–147)
TRIGL SERPL-MCNC: 74 MG/DL
TSH SERPL DL<=0.05 MIU/L-ACNC: 2.19 UIU/ML (ref 0.45–4.5)
WBC # BLD AUTO: 11.72 THOUSAND/UL (ref 4.31–10.16)

## 2022-10-21 PROCEDURE — G0103 PSA SCREENING: HCPCS

## 2022-10-21 PROCEDURE — 80053 COMPREHEN METABOLIC PANEL: CPT

## 2022-10-21 PROCEDURE — 82306 VITAMIN D 25 HYDROXY: CPT

## 2022-10-21 PROCEDURE — 36415 COLL VENOUS BLD VENIPUNCTURE: CPT

## 2022-10-21 PROCEDURE — 84443 ASSAY THYROID STIM HORMONE: CPT

## 2022-10-21 PROCEDURE — 85027 COMPLETE CBC AUTOMATED: CPT

## 2022-10-21 PROCEDURE — 80061 LIPID PANEL: CPT

## 2022-10-24 ENCOUNTER — OFFICE VISIT (OUTPATIENT)
Dept: URGENT CARE | Age: 67
End: 2022-10-24
Payer: COMMERCIAL

## 2022-10-24 VITALS
HEIGHT: 68 IN | BODY MASS INDEX: 25.01 KG/M2 | RESPIRATION RATE: 16 BRPM | TEMPERATURE: 97.6 F | OXYGEN SATURATION: 99 % | SYSTOLIC BLOOD PRESSURE: 113 MMHG | DIASTOLIC BLOOD PRESSURE: 58 MMHG | HEART RATE: 70 BPM | WEIGHT: 165 LBS

## 2022-10-24 DIAGNOSIS — L03.113 RIGHT ARM CELLULITIS: Primary | ICD-10-CM

## 2022-10-24 PROCEDURE — G0382 LEV 3 HOSP TYPE B ED VISIT: HCPCS | Performed by: STUDENT IN AN ORGANIZED HEALTH CARE EDUCATION/TRAINING PROGRAM

## 2022-10-24 RX ORDER — AMOXICILLIN AND CLAVULANATE POTASSIUM 875; 125 MG/1; MG/1
1 TABLET, FILM COATED ORAL EVERY 12 HOURS SCHEDULED
Qty: 14 TABLET | Refills: 0 | Status: SHIPPED | OUTPATIENT
Start: 2022-10-24 | End: 2022-10-31

## 2022-10-24 RX ORDER — PREDNISONE 10 MG/1
10 TABLET ORAL DAILY
Qty: 21 TABLET | Refills: 0 | Status: SHIPPED | OUTPATIENT
Start: 2022-10-24

## 2022-10-24 NOTE — PROGRESS NOTES
[address] [employer] [facility]        NAME: Sharonda Braswell is a 74 y o  male  : 1955    MRN: [id number]  DATE: 2022  TIME: 10:19 AM    Assessment and Plan   Right arm cellulitis [R54 895]  1  Right arm cellulitis  predniSONE 10 mg tablet    amoxicillin-clavulanate (AUGMENTIN) 875-125 mg per tablet         Patient Instructions       Follow up with PCP in 3-5 days  Proceed to  ER if symptoms worsen  Chief Complaint     Chief Complaint   Patient presents with   • Arm Problem     hAD VENAPUNCTURE ON Friday    HAS BRUISING AND STIFFNESS    WOKE THIS AM WITH ERYTHEMA AND VERY SLIGHT SWELLING    NOT HOT OR RAISED    SLEPT ON THAT ARM LAST NIGHT  History of Present Illness       HPI  Patient presents complaining of arm discomfort redness and irritation on the right side  This issue has been ongoing for the past few days  Patient had a vena puncture on Friday since then he has been having bruising symptoms  He woke up this morning with erythema and some swelling  It is not hot or raise  Patient slept on his arm last night    Review of Systems   Review of Systems  For HPI    Current Medications       Current Outpatient Medications:   •  amoxicillin-clavulanate (AUGMENTIN) 875-125 mg per tablet, Take 1 tablet by mouth every 12 (twelve) hours for 7 days, Disp: 14 tablet, Rfl: 0  •  ascorbic acid (VITAMIN C) 500 mg tablet, Take 500 mg by mouth daily, Disp: , Rfl:   •  aspirin (ECOTRIN LOW STRENGTH) 81 mg EC tablet, Take 1 tablet (81 mg total) by mouth daily, Disp: 30 tablet, Rfl: 11  •  atorvastatin (LIPITOR) 40 mg tablet, Take 1 tablet (40 mg total) by mouth daily with dinner, Disp: 90 tablet, Rfl: 3  •  cholecalciferol (VITAMIN D3) 1,000 units tablet, Take 1,000 Units by mouth daily, Disp: , Rfl:   •  co-enzyme Q-10 30 MG capsule, Take 200 mg by mouth daily, Disp: , Rfl:   •  Multiple Vitamin (multivitamin) tablet, Take 1 tablet by mouth daily, Disp: , Rfl:   •  predniSONE 10 mg tablet, Take 1 tablet (10 mg total) by mouth daily 6 tab day 1, 5 tab day 2, 4 tab day 3, 3 tab day 4, 2 tab day 5, 1 tab day 6, Disp: 21 tablet, Rfl: 0    Current Allergies     Allergies as of 10/24/2022   • (No Known Allergies)            The following portions of the patient's history were reviewed and updated as appropriate: allergies, current medications, past family history, past medical history, past social history, past surgical history and problem list      Past Medical History:   Diagnosis Date   • CAD (coronary artery disease)    • Former tobacco use    • History of dysphagia     resolved   • Hyperlipidemia    • Pre-diabetes        Past Surgical History:   Procedure Laterality Date   • CARDIAC CATHETERIZATION     • COLONOSCOPY N/A 5/18/2018    Procedure: COLONOSCOPY;  Surgeon: Rosario Buck MD;  Location: [facility]; Service: Gastroenterology   • CORONARY ARTERY BYPASS GRAFT  4/26/2021   • EGD  08/26/2019   • FINGER AMPUTATION Left     partial 3rd finger   • VA CABG, ARTERY-VEIN, THREE N/A 4/26/2021    Procedure: CORONARY ARTERY BYPASS GRAFT (CABG) x 3; LIMA to LAD; Left EVH/SVG to OM1 and D1;  Surgeon: Galindo Ocampo MD;  Location:  MAIN OR;  Service: Cardiac Surgery       Family History   Problem Relation Age of Onset   • Heart attack Father 62   • No Known Problems Mother    • Prostate cancer Neg Hx    • Colon cancer Neg Hx    • Diabetes Neg Hx    • Stroke Neg Hx          Medications have been verified  Objective   /58   Pulse 70   Temp 97 6 °F (36 4 °C)   Resp 16   Ht 5' 8" (1 727 m)   Wt 74 8 kg (165 lb)   SpO2 99%   BMI 25 09 kg/m²   No LMP for male patient  Physical Exam     Physical Exam  Constitutional:       General: He is not in acute distress  Appearance: He is well-developed  He is not diaphoretic  HENT:      Head: Normocephalic and atraumatic        Right Ear: Tympanic membrane and external ear normal       Left Ear: Tympanic membrane and external ear normal  Mouth/Throat:      Mouth: Mucous membranes are moist       Pharynx: Oropharynx is clear  No oropharyngeal exudate  Eyes:      Extraocular Movements: Extraocular movements intact  Conjunctiva/sclera: Conjunctivae normal    Cardiovascular:      Rate and Rhythm: Normal rate and regular rhythm  Heart sounds: Normal heart sounds  Pulmonary:      Effort: Pulmonary effort is normal  No respiratory distress  Breath sounds: Normal breath sounds  No wheezing  Abdominal:      General: Bowel sounds are normal  There is no distension  Palpations: Abdomen is soft  There is no mass  Tenderness: There is no abdominal tenderness  Musculoskeletal:         General: Tenderness present  No swelling  Cervical back: Normal range of motion  Right lower leg: No edema  Left lower leg: No edema  Comments: Erythema and tenderness near the cubital fossa right arm   Lymphadenopathy:      Cervical: No cervical adenopathy  Skin:     General: Skin is warm  Neurological:      Mental Status: He is alert and oriented to person, place, and time

## 2022-11-08 ENCOUNTER — OFFICE VISIT (OUTPATIENT)
Dept: CARDIOLOGY CLINIC | Facility: CLINIC | Age: 67
End: 2022-11-08

## 2022-11-08 VITALS
WEIGHT: 172 LBS | HEIGHT: 68 IN | DIASTOLIC BLOOD PRESSURE: 62 MMHG | SYSTOLIC BLOOD PRESSURE: 108 MMHG | HEART RATE: 71 BPM | BODY MASS INDEX: 26.07 KG/M2

## 2022-11-08 DIAGNOSIS — I25.10 CORONARY ARTERY DISEASE INVOLVING NATIVE CORONARY ARTERY OF NATIVE HEART, UNSPECIFIED WHETHER ANGINA PRESENT: Primary | ICD-10-CM

## 2022-11-08 DIAGNOSIS — E78.5 DYSLIPIDEMIA: ICD-10-CM

## 2022-11-08 DIAGNOSIS — I65.22 STENOSIS OF LEFT CAROTID ARTERY: ICD-10-CM

## 2022-11-08 DIAGNOSIS — Z95.1 S/P CABG (CORONARY ARTERY BYPASS GRAFT): ICD-10-CM

## 2022-11-08 NOTE — PROGRESS NOTES
Cardiology Follow Up    Stevie Sainz  1955  [id number]  [facility] [place]  [address]  [address]  [place] [address] [phone]    1  Coronary artery disease involving native coronary artery of native heart, unspecified whether angina present  POCT ECG   2  S/P CABG (coronary artery bypass graft)     3  Dyslipidemia     4  Stenosis of left carotid artery         Discussion/Summary:    1  CAD - During hospitalization in April 2021 for unstable angina Kaity Dubose was found to have multivessel disease and he is status post CABG x3 as described below  He has done well since  He completed cardiac rehabilitation and exercises on a regular basis  He has no symptoms of angina  His LV function is normal   We will see him back in 1 year  2   Left carotid stenosis - Found during his CT surgical workup  We ordered an updated carotid duplex today  3   Hyperlipidemia - His LDL responded nicely to atorvastatin 80 mg daily  At our last visit we cut this down to 40 mg daily and his LDL remains at goal   No changes were made  We will continue to follow blood work closely  Interval History:     Mr Benny Marie comes in for follow-up given his history of CAD  I met done during hospitalization in April in which he presented with unstable angina  Cardiac catheterization showed multivessel disease  He then had CABG x3 with a LIMA to the LAD, SVG to OM1 and SVG to D1 which went well without complications  His LV function was normal   He was also found to have a moderate grade left internal carotid artery stenosis  After he recovered he has done well without symptoms of angina  He completed cardiac rehabilitation and continues to exercise on a regular basis  Kaity Dubose denies any return of angina  No chest pain  No shortness of breath or any signs/symptoms of CHF  He denies palpitations, lightheadedness or history of syncope    He has no exercise intolerance  He tells me overall he feels well        Patient Active Problem List   Diagnosis   • Special screening for malignant neoplasms, colon   • Polyp of colon   • Dyslipidemia   • Vitamin D deficiency   • Hiatal hernia   • Coronary artery disease   • Elevated troponin   • S/P CABG (coronary artery bypass graft)   • Stenosis of left carotid artery     Past Medical History:   Diagnosis Date   • CAD (coronary artery disease)    • Former tobacco use    • History of dysphagia     resolved   • Hyperlipidemia    • Pre-diabetes      Social History     Socioeconomic History   • Marital status: Single     Spouse name: Not on file   • Number of children: Not on file   • Years of education: Not on file   • Highest education level: Not on file   Occupational History   • Not on file   Tobacco Use   • Smoking status: Former Smoker     Packs/day: 1 00     Years: 10 00     Pack years: 10 00     Types: Cigarettes     Quit date: 200     Years since quittin 8   • Smokeless tobacco: Never Used   • Tobacco comment: quit    Vaping Use   • Vaping Use: Never used   Substance and Sexual Activity   • Alcohol use: Not Currently     Alcohol/week: 0 0 standard drinks     Comment: Rare   • Drug use: No   • Sexual activity: Yes     Partners: Female     Birth control/protection: None   Other Topics Concern   • Not on file   Social History Narrative    Caffeine - Iced Tea 2cups/d     Social Determinants of Health     Financial Resource Strain: Not on file   Food Insecurity: Not on file   Transportation Needs: Not on file   Physical Activity: Not on file   Stress: Not on file   Social Connections: Not on file   Intimate Partner Violence: Not on file   Housing Stability: Not on file      Family History   Problem Relation Age of Onset   • Heart attack Father 62   • No Known Problems Mother    • Prostate cancer Neg Hx    • Colon cancer Neg Hx    • Diabetes Neg Hx    • Stroke Neg Hx      Past Surgical History:   Procedure Laterality Date   • CARDIAC CATHETERIZATION     • COLONOSCOPY N/A 5/18/2018    Procedure: COLONOSCOPY;  Surgeon: Jaylan Horan MD;  Location: [facility]; Service: Gastroenterology   • CORONARY ARTERY BYPASS GRAFT  4/26/2021   • EGD  08/26/2019   • FINGER AMPUTATION Left     partial 3rd finger   • MA CABG, ARTERY-VEIN, THREE N/A 4/26/2021    Procedure: CORONARY ARTERY BYPASS GRAFT (CABG) x 3; LIMA to LAD;  Left EVH/SVG to OM1 and D1;  Surgeon: Akilah Martinez MD;  Location: [facility];  Service: Cardiac Surgery       Current Outpatient Medications:   •  ascorbic acid (VITAMIN C) 500 mg tablet, Take 500 mg by mouth daily, Disp: , Rfl:   •  aspirin (ECOTRIN LOW STRENGTH) 81 mg EC tablet, Take 1 tablet (81 mg total) by mouth daily, Disp: 30 tablet, Rfl: 11  •  atorvastatin (LIPITOR) 40 mg tablet, Take 1 tablet (40 mg total) by mouth daily with dinner, Disp: 90 tablet, Rfl: 3  •  cholecalciferol (VITAMIN D3) 1,000 units tablet, Take 1,000 Units by mouth daily, Disp: , Rfl:   •  co-enzyme Q-10 30 MG capsule, Take 200 mg by mouth daily, Disp: , Rfl:   •  Multiple Vitamin (multivitamin) tablet, Take 1 tablet by mouth daily, Disp: , Rfl:   •  predniSONE 10 mg tablet, Take 1 tablet (10 mg total) by mouth daily 6 tab day 1, 5 tab day 2, 4 tab day 3, 3 tab day 4, 2 tab day 5, 1 tab day 6, Disp: 21 tablet, Rfl: 0  No Known Allergies    Labs:  Lab Results   Component Value Date    K 4 2 10/21/2022    K 4 9 08/13/2019     10/21/2022     08/13/2019    CO2 32 10/21/2022    CO2 22 04/26/2021    CO2 25 08/13/2019    BUN 18 10/21/2022    BUN 11 08/13/2019    CREATININE 0 98 10/21/2022    GLUCOSE 139 04/26/2021    CALCIUM 9 7 10/21/2022     Lab Results   Component Value Date    WBC 11 72 (H) 10/21/2022    HGB 15 3 10/21/2022    HCT 48 0 10/21/2022    MCV 91 10/21/2022     10/21/2022     Lab Results   Component Value Date    TRIG 74 10/21/2022    TRIG 158 (H) 08/13/2019    HDL 29 (L) 10/21/2022    HDL 27 (L) 08/13/2019     Imaging:  ECG today shows sinus rhythm and is within normal limits  ECHO:  LEFT VENTRICLE:  Systolic function was normal  Ejection fraction was estimated to be 67 %  There were no regional wall motion abnormalities  Left ventricular diastolic function parameters were normal      RIGHT VENTRICLE:  The size was normal   Systolic function was normal       Review of Systems:  Review of Systems   Constitutional: Negative  HENT: Negative  Eyes: Negative  Respiratory: Negative  Cardiovascular: Negative  Gastrointestinal: Negative  Musculoskeletal: Negative  Skin: Negative  Allergic/Immunologic: Negative  Neurological: Negative  Hematological: Negative  Psychiatric/Behavioral: Negative  All other systems reviewed and are negative  Vitals:    11/08/22 1330   BP: 108/62   BP Location: Right arm   Patient Position: Sitting   Cuff Size: Large   Pulse: 71   Weight: 78 kg (172 lb)   Height: 5' 8" (1 727 m)       Physical Exam:  Physical Exam  Vitals and nursing note reviewed  Constitutional:       Appearance: He is well-developed  HENT:      Head: Normocephalic and atraumatic  Eyes:      General: No scleral icterus  Right eye: No discharge  Left eye: No discharge  Pupils: Pupils are equal, round, and reactive to light  Neck:      Thyroid: No thyromegaly  Vascular: No JVD  Cardiovascular:      Rate and Rhythm: Normal rate and regular rhythm  No extrasystoles are present  Pulses: Normal pulses  No decreased pulses  Heart sounds: S1 normal and S2 normal  Murmur heard  Systolic murmur is present with a grade of 1/6  No friction rub  No gallop  Pulmonary:      Effort: Pulmonary effort is normal  No respiratory distress  Breath sounds: Normal breath sounds  No wheezing, rhonchi or rales  Abdominal:      General: Bowel sounds are normal  There is no distension  Palpations: Abdomen is soft  Tenderness:  There is no abdominal tenderness  Musculoskeletal:         General: No tenderness or deformity  Normal range of motion  Cervical back: Normal range of motion and neck supple  Skin:     General: Skin is warm and dry  Findings: No rash  Neurological:      Mental Status: He is alert and oriented to person, place, and time  Cranial Nerves: No cranial nerve deficit  Psychiatric:         Thought Content: Thought content normal          Judgment: Judgment normal        Counseling / Coordination of Care  Total office time spent today 25 minutes  Greater than 50% of total time was spent with the patient and / or family counseling and / or coordination of care

## 2022-11-15 ENCOUNTER — HOSPITAL ENCOUNTER (OUTPATIENT)
Dept: VASCULAR ULTRASOUND | Facility: HOSPITAL | Age: 67
Discharge: HOME | End: 2022-11-15
Attending: INTERNAL MEDICINE

## 2022-11-15 DIAGNOSIS — I65.22 STENOSIS OF LEFT CAROTID ARTERY: ICD-10-CM

## 2022-11-23 ENCOUNTER — TELEPHONE (OUTPATIENT)
Dept: CARDIOLOGY CLINIC | Facility: CLINIC | Age: 67
End: 2022-11-23

## 2023-02-20 DIAGNOSIS — E78.5 DYSLIPIDEMIA: ICD-10-CM

## 2023-02-20 DIAGNOSIS — I25.10 CORONARY ARTERY DISEASE INVOLVING NATIVE CORONARY ARTERY OF NATIVE HEART, UNSPECIFIED WHETHER ANGINA PRESENT: ICD-10-CM

## 2023-02-20 RX ORDER — ATORVASTATIN CALCIUM 40 MG/1
40 TABLET, FILM COATED ORAL
Qty: 90 TABLET | Refills: 3 | Status: SHIPPED | OUTPATIENT
Start: 2023-02-20

## 2023-02-27 DIAGNOSIS — I25.10 CORONARY ARTERY DISEASE: ICD-10-CM

## 2023-02-27 RX ORDER — ASPIRIN 81 MG/1
81 TABLET ORAL DAILY
Qty: 30 TABLET | Refills: 11 | Status: SHIPPED | OUTPATIENT
Start: 2023-02-27 | End: 2023-02-27 | Stop reason: SDUPTHER

## 2023-02-27 RX ORDER — ASPIRIN 81 MG/1
81 TABLET ORAL DAILY
Qty: 90 TABLET | Refills: 5 | Status: SHIPPED | OUTPATIENT
Start: 2023-02-27

## 2023-07-17 NOTE — PRE-PROCEDURE INSTRUCTIONS
My Surgical Experience    The following information was developed to assist you to prepare for your operation. What do I need to do before coming to the hospital?  • Arrange for a responsible person to drive you to and from the hospital.  • Arrange care for your children at home. Children are not allowed in the recovery areas of the hospital  • Plan to wear clothing that is easy to put on and take off. If you are having shoulder surgery, wear a shirt that buttons or zippers in the front  Bathing  o Shower the evening before and the morning of your surgery with an antibacterial soap. Please refer to the Pre Op Showering Instructions for Surgery Patients Sheet.  o Remove nail polish and all body piercing jewelry  o Do not shave any body part for at least 24 hours before surgery-this includes face, arms, legs and upper body  Food  o Nothing to eat or drink after midnight the night before your surgery. This includes candy and chewing gum  o Exception: If your surgery is after 12:00pm (noon), you may have clear liquids such as 7-Up®, ginger ale, apple or cranberry juice, Jell-O®, water, or clear broth until 8:00 am  o Do not drink milk or juice with pulp on the morning before surgery  o Do not drink alcohol 24 hours before surgery  Medicine  o Follow instructions you received from your surgeon about which medicines you may take on the day of surgery  o If instructed to take medicine on the morning of surgery, take pills with just a small sip of water. Call your prescribing doctor for specific infroamtion on what to do if you take insulin    What should I bring to the hospital?    Bring:  • Crutches or a walker, if you have them, for foot or knee surgery  • A list of the daily medicines, vitamins, minerals, herbals and nutritional supplements you take.  Include the dosages of medicines and the time you take them each day  • Glasses, dentures or hearing aids  • Minimal clothing; you will be wearing hospital sleepwear  • Photo ID; required to verify your identity  • If you have a Living Will or Power of , bring a copy of the documents  • If you have an ostomy, bring an extra pouch and any supplies you use    Do not bring  • Medicines or inhalers  • Money, valuables or jewelry    What other information should I know about the day of surgery? • Notify your surgeons if you develop a cold, sore throat, cough, fever, rash or any other illness. • Report to the Ambulatory Surgical/Same Day Surgery Unit  • You will be instructed to stop at Registration only if you have not been pre-registered  • Inform your  fi they do not stay that they will be asked by the staff to leave a phone number where they can be reached  • Be available to be reached before surgery. In the event the operating room schedule changes, you may be asked to come in earlier or later than expected    *It is important to tell your doctor and others involved in your health care if you are taking or have been taking any non-prescription drugs, vitamins, minerals, herbals or other nutritional supplements. Any of these may interact with some food or medicines and cause a reaction      Pre-Surgery Instructions:   Medication Instructions   • ascorbic acid (VITAMIN C) 500 mg tablet Hold day of surgery. • aspirin (ECOTRIN LOW STRENGTH) 81 mg EC tablet Hold day of surgery. • atorvastatin (LIPITOR) 40 mg tablet Hold day of surgery. • cholecalciferol (VITAMIN D3) 1,000 units tablet Hold day of surgery. • co-enzyme Q-10 30 MG capsule Hold day of surgery. • Multiple Vitamin (multivitamin) tablet Hold day of surgery. Wear a button down top.

## 2023-07-24 ENCOUNTER — HOSPITAL ENCOUNTER (OUTPATIENT)
Facility: AMBULARY SURGERY CENTER | Age: 68
Setting detail: OUTPATIENT SURGERY
Discharge: HOME | End: 2023-07-24
Attending: OPHTHALMOLOGY | Admitting: OPHTHALMOLOGY
Payer: COMMERCIAL

## 2023-07-24 ENCOUNTER — ANESTHESIA (OUTPATIENT)
Dept: PERIOP | Facility: AMBULARY SURGERY CENTER | Age: 68
End: 2023-07-24
Payer: COMMERCIAL

## 2023-07-24 ENCOUNTER — ANESTHESIA EVENT (OUTPATIENT)
Dept: PERIOP | Facility: AMBULARY SURGERY CENTER | Age: 68
End: 2023-07-24
Payer: COMMERCIAL

## 2023-07-24 VITALS
TEMPERATURE: 96.7 F | SYSTOLIC BLOOD PRESSURE: 117 MMHG | RESPIRATION RATE: 18 BRPM | DIASTOLIC BLOOD PRESSURE: 67 MMHG | OXYGEN SATURATION: 97 % | HEART RATE: 60 BPM

## 2023-07-24 DIAGNOSIS — H25.11 AGE-RELATED NUCLEAR CATARACT OF RIGHT EYE: Primary | ICD-10-CM

## 2023-07-24 PROCEDURE — V2632 POST CHMBR INTRAOCULAR LENS: HCPCS | Performed by: OPHTHALMOLOGY

## 2023-07-24 DEVICE — ACRYSOF(R) IQ ASPHERIC NATURAL IOL, SINGLE-PIECE ACRYLIC FOLDABLE PCL, UV WITH BLUE LIGHTFILTER, 13.0MM LENGTH, 6.0MM ANTERIORASYMMETRIC BICONVEX OPTIC, PLANAR HAPTICS.
Type: IMPLANTABLE DEVICE | Site: EYE | Status: FUNCTIONAL
Brand: ACRYSOF®

## 2023-07-24 RX ORDER — TETRACAINE HYDROCHLORIDE 5 MG/ML
1 SOLUTION OPHTHALMIC ONCE
Status: COMPLETED | OUTPATIENT
Start: 2023-07-24 | End: 2023-07-24

## 2023-07-24 RX ORDER — KETOROLAC TROMETHAMINE 5 MG/ML
1 SOLUTION OPHTHALMIC 4 TIMES DAILY
Qty: 5 ML | Refills: 0
Start: 2023-07-24

## 2023-07-24 RX ORDER — LIDOCAINE HYDROCHLORIDE 10 MG/ML
INJECTION, SOLUTION EPIDURAL; INFILTRATION; INTRACAUDAL; PERINEURAL AS NEEDED
Status: DISCONTINUED | OUTPATIENT
Start: 2023-07-24 | End: 2023-07-24 | Stop reason: HOSPADM

## 2023-07-24 RX ORDER — GATIFLOXACIN 5 MG/ML
SOLUTION/ DROPS OPHTHALMIC AS NEEDED
Status: DISCONTINUED | OUTPATIENT
Start: 2023-07-24 | End: 2023-07-24 | Stop reason: HOSPADM

## 2023-07-24 RX ORDER — KETOROLAC TROMETHAMINE 5 MG/ML
1 SOLUTION OPHTHALMIC
Status: COMPLETED | OUTPATIENT
Start: 2023-07-24 | End: 2023-07-24

## 2023-07-24 RX ORDER — MIDAZOLAM HYDROCHLORIDE 2 MG/2ML
INJECTION, SOLUTION INTRAMUSCULAR; INTRAVENOUS AS NEEDED
Status: DISCONTINUED | OUTPATIENT
Start: 2023-07-24 | End: 2023-07-24

## 2023-07-24 RX ORDER — BALANCED SALT SOLUTION 6.4; .75; .48; .3; 3.9; 1.7 MG/ML; MG/ML; MG/ML; MG/ML; MG/ML; MG/ML
SOLUTION OPHTHALMIC AS NEEDED
Status: DISCONTINUED | OUTPATIENT
Start: 2023-07-24 | End: 2023-07-24 | Stop reason: HOSPADM

## 2023-07-24 RX ORDER — GATIFLOXACIN 5 MG/ML
1 SOLUTION/ DROPS OPHTHALMIC 2 TIMES DAILY
Qty: 3 ML | Refills: 0
Start: 2023-07-24

## 2023-07-24 RX ORDER — LIDOCAINE HYDROCHLORIDE 20 MG/ML
1 JELLY TOPICAL
Status: COMPLETED | OUTPATIENT
Start: 2023-07-24 | End: 2023-07-24

## 2023-07-24 RX ORDER — CYCLOPENTOLATE HYDROCHLORIDE 10 MG/ML
1 SOLUTION/ DROPS OPHTHALMIC
Status: COMPLETED | OUTPATIENT
Start: 2023-07-24 | End: 2023-07-24

## 2023-07-24 RX ORDER — PHENYLEPHRINE HCL 2.5 %
1 DROPS OPHTHALMIC (EYE)
Status: COMPLETED | OUTPATIENT
Start: 2023-07-24 | End: 2023-07-24

## 2023-07-24 RX ORDER — TETRACAINE HYDROCHLORIDE 5 MG/ML
SOLUTION OPHTHALMIC AS NEEDED
Status: DISCONTINUED | OUTPATIENT
Start: 2023-07-24 | End: 2023-07-24 | Stop reason: HOSPADM

## 2023-07-24 RX ADMIN — LIDOCAINE HYDROCHLORIDE 1 APPLICATION: 20 JELLY TOPICAL at 10:30

## 2023-07-24 RX ADMIN — LIDOCAINE HYDROCHLORIDE 1 APPLICATION: 20 JELLY TOPICAL at 10:45

## 2023-07-24 RX ADMIN — TETRACAINE HYDROCHLORIDE 1 DROP: 5 SOLUTION OPHTHALMIC at 10:15

## 2023-07-24 RX ADMIN — CYCLOPENTOLATE HYDROCHLORIDE 1 DROP: 10 SOLUTION/ DROPS OPHTHALMIC at 10:45

## 2023-07-24 RX ADMIN — PHENYLEPHRINE HYDROCHLORIDE 1 DROP: 25 SOLUTION/ DROPS OPHTHALMIC at 11:00

## 2023-07-24 RX ADMIN — PHENYLEPHRINE HYDROCHLORIDE 1 DROP: 25 SOLUTION/ DROPS OPHTHALMIC at 10:30

## 2023-07-24 RX ADMIN — MIDAZOLAM 1 MG: 1 INJECTION INTRAMUSCULAR; INTRAVENOUS at 11:18

## 2023-07-24 RX ADMIN — CYCLOPENTOLATE HYDROCHLORIDE 1 DROP: 10 SOLUTION/ DROPS OPHTHALMIC at 10:00

## 2023-07-24 RX ADMIN — PHENYLEPHRINE HYDROCHLORIDE 1 DROP: 25 SOLUTION/ DROPS OPHTHALMIC at 10:45

## 2023-07-24 RX ADMIN — CYCLOPENTOLATE HYDROCHLORIDE 1 DROP: 10 SOLUTION/ DROPS OPHTHALMIC at 10:15

## 2023-07-24 RX ADMIN — LIDOCAINE HYDROCHLORIDE 1 APPLICATION: 20 JELLY TOPICAL at 10:15

## 2023-07-24 RX ADMIN — PHENYLEPHRINE HYDROCHLORIDE 1 DROP: 25 SOLUTION/ DROPS OPHTHALMIC at 10:15

## 2023-07-24 RX ADMIN — KETOROLAC TROMETHAMINE 1 DROP: 5 SOLUTION OPHTHALMIC at 11:00

## 2023-07-24 RX ADMIN — KETOROLAC TROMETHAMINE 1 DROP: 5 SOLUTION OPHTHALMIC at 10:15

## 2023-07-24 RX ADMIN — KETOROLAC TROMETHAMINE 1 DROP: 5 SOLUTION OPHTHALMIC at 10:45

## 2023-07-24 RX ADMIN — CYCLOPENTOLATE HYDROCHLORIDE 1 DROP: 10 SOLUTION/ DROPS OPHTHALMIC at 10:30

## 2023-07-24 RX ADMIN — KETOROLAC TROMETHAMINE 1 DROP: 5 SOLUTION OPHTHALMIC at 10:30

## 2023-07-24 RX ADMIN — MIDAZOLAM 1 MG: 1 INJECTION INTRAMUSCULAR; INTRAVENOUS at 11:16

## 2023-07-24 NOTE — OP NOTE
OPERATIVE REPORT    PATIENT NAME: Kristen Newman    :  1955  MRN: [id number]  Pt Location: [facility] OR ROOM 01    Surgery Date: 2023    Surgeon(s) and Role:     * Dony Sosa MD - Primary    Age-related nuclear cataract, right eye [H25.11]    Post-Op Diagnosis Codes:     * Age-related nuclear cataract, right eye [H25.11]    Procedure(s):  EXTRACTION EXTRACAPSULAR CATARACT PHACO INTRAOCULAR LENS (IOL)    Anesthesia Type:   IV Sedation with Anesthesia    Operative Indications:  Age-related nuclear cataract, right eye [H25.11]  Decreased vision to 20/60. With problems driving. Pt requested cataract sx the right eye    Procedure and Technique:    Procedure Details     The patient was brought in the OR in stable condition and placed on the operative table. The right eye was prepped and draped in the usual sterile manner. Attention was directed to the right eye where a lid speculum was placed. A 2.4 mm clear corneal incision was made temperally. 1/2 cc of 1% MPF Lidocaine was irrigated into the anterior chamber followed by viscoat. The side port incision was placed superiorly. The capsularrhexis was made and the nucleus was hydrodissected with BSS. The nucleus was then removed with the phaco handpiece followed by removal of the cortical material with the I/A handpiece. The capsular bag was then filled with Provisc. The IOL was folded and placed in to the capsular bag and centered well. The remaining Provisc was removed from the eye with the I/A. The wounds were hydrated with BSS and found to be water tight. The lid speculum was removed and 2 drops of Gatifloxicin were placed over the cornea. A protective eye shield was taped over the eye and the patient went to PACU in stable condition. I will see the patient in the office tomorrow and the expected post op period is a few weeks.        Complications: None        Disposition: PACU   Condition: Stable    SIGNATURE: Dony Sosa MD  DATE:  2023  TIME: 11:42 AM

## 2023-07-24 NOTE — ANESTHESIA POSTPROCEDURE EVALUATION
Post-Op Assessment Note    CV Status:  Stable  Pain Score: 0    Pain management: adequate     Mental Status:  Awake   Hydration Status:  Stable   PONV Controlled:  None   Airway Patency:  Patent      Post Op Vitals Reviewed: Yes      Staff: CRNA   Comments: spontaneously breeathing, protecting airway, educated patient not to get up without assistance, no operating heavy machinery for 24 hours - pt verbalizes understanding        No notable events documented.     BP   116/62   Temp     Pulse  59   Resp   15   SpO2   100

## 2023-07-24 NOTE — DISCHARGE INSTR - AVS FIRST PAGE
Dr. Rubén Lopez Cataract Instructions    Activity:     1. No Driving until instructed   2. Keep shield on until seen tomorrow except when administering drops   3. No heavy lifting   4. No water in eye     Diet:     1. Resume normal diet    Normal Symptoms:     1. Mild Headache   2. Scratchy or picky feeling around eye    Call the office if:     1. You have any questions or concerns   2. If eye pain is not relieved by extra strength tylenol    Office phone number:  [phone]      Next appointment:     1. See Dr Rubén Lopez at his office tomorrow as scheduled   __________________________________________________________   2. Bring blue eye kit with you and eyedrops to the office    A new set of comprehensive instructions will be given and reviewed with you during your office visit tomorrow.

## 2023-07-24 NOTE — ANESTHESIA PREPROCEDURE EVALUATION
Procedure:  EXTRACTION EXTRACAPSULAR CATARACT PHACO INTRAOCULAR LENS (IOL) (Right: Eye)    Relevant Problems   CARDIO  S?P CABG   (+) Coronary artery disease      GI/HEPATIC   (+) Hiatal hernia      MUSCULOSKELETAL   (+) Hiatal hernia        Physical Exam    Airway    Mallampati score: II  TM Distance: >3 FB  Neck ROM: full     Dental   No notable dental hx     Cardiovascular  Cardiovascular exam normal    Pulmonary  Pulmonary exam normal     Other Findings        Anesthesia Plan  ASA Score- 3     Anesthesia Type- IV sedation with anesthesia with ASA Monitors. Additional Monitors:   Airway Plan:           Plan Factors-Exercise tolerance (METS): >4 METS. Chart reviewed. Existing labs reviewed. Patient summary reviewed. Patient is not a current smoker. Induction-     Postoperative Plan-     Informed Consent- Anesthetic plan and risks discussed with patient. I personally reviewed this patient with the CRNA. Discussed and agreed on the Anesthesia Plan with the CRNA. .             LEFT VENTRICLE:  Systolic function was normal. Ejection fraction was estimated to be 67 %. There were no regional wall motion abnormalities.   Left ventricular diastolic function parameters were normal.     RIGHT VENTRICLE:  The size was normal.  Systolic function was normal.

## 2023-10-12 ENCOUNTER — OFFICE VISIT (OUTPATIENT)
Dept: FAMILY MEDICINE CLINIC | Facility: CLINIC | Age: 68
End: 2023-10-12
Payer: COMMERCIAL

## 2023-10-12 VITALS
WEIGHT: 176 LBS | HEIGHT: 68 IN | SYSTOLIC BLOOD PRESSURE: 125 MMHG | DIASTOLIC BLOOD PRESSURE: 57 MMHG | RESPIRATION RATE: 16 BRPM | TEMPERATURE: 95.4 F | OXYGEN SATURATION: 100 % | HEART RATE: 68 BPM | BODY MASS INDEX: 26.67 KG/M2

## 2023-10-12 DIAGNOSIS — Z12.5 SCREENING PSA (PROSTATE SPECIFIC ANTIGEN): ICD-10-CM

## 2023-10-12 DIAGNOSIS — I25.10 CORONARY ARTERY DISEASE INVOLVING NATIVE CORONARY ARTERY OF NATIVE HEART, UNSPECIFIED WHETHER ANGINA PRESENT: ICD-10-CM

## 2023-10-12 DIAGNOSIS — Z12.11 ENCOUNTER FOR COLONOSCOPY DUE TO HISTORY OF COLONIC POLYP: ICD-10-CM

## 2023-10-12 DIAGNOSIS — Z86.010 ENCOUNTER FOR COLONOSCOPY DUE TO HISTORY OF COLONIC POLYP: ICD-10-CM

## 2023-10-12 DIAGNOSIS — E55.9 VITAMIN D DEFICIENCY: ICD-10-CM

## 2023-10-12 DIAGNOSIS — D72.829 LEUKOCYTOSIS, UNSPECIFIED TYPE: ICD-10-CM

## 2023-10-12 DIAGNOSIS — Z00.00 ANNUAL PHYSICAL EXAM: Primary | ICD-10-CM

## 2023-10-12 DIAGNOSIS — E78.5 DYSLIPIDEMIA: ICD-10-CM

## 2023-10-12 PROCEDURE — 99397 PER PM REEVAL EST PAT 65+ YR: CPT | Performed by: FAMILY MEDICINE

## 2023-10-12 NOTE — PROGRESS NOTES
Assessment/Plan:  Patient given lab requisition for fasting labs as below. Patient declines flu vaccine and pneumonia vaccine. Patient to continue present treatment. Instructed to follow a low-fat, low-salt and a low carbohydrate diet and continue regular aerobic exercise at least 150 minutes/week. Patient to schedule follow-up colonoscopy. Follow-up with specialist as scheduled. Return to the office in 6 months. No problem-specific Assessment & Plan notes found for this encounter. Diagnoses and all orders for this visit:    Annual physical exam    Dyslipidemia  -     Comprehensive metabolic panel; Future  -     Lipid panel; Future  -     TSH, 3rd generation with Free T4 reflex; Future  -     UA w Reflex to Microscopic w Reflex to Culture -Lab Collect; Future    Coronary artery disease involving native coronary artery of native heart, unspecified whether angina present    Leukocytosis, unspecified type  -     CBC and Platelet; Future    Vitamin D deficiency  -     Vitamin D 25 hydroxy; Future    Encounter for colonoscopy due to history of colonic polyp  -     Ambulatory Referral to Gastroenterology; Future    Screening PSA (prostate specific antigen)  -     PSA, Total Screen; Future          Subjective:      Patient ID: Parveen Patel is a 79 y.o. male. Patient is here for annual physical exam and follow-up appoint for chronic conditions and due for fasting labs. Patient retired in December last year and remains active exercising walking for 30 minutes 5 days a week and doing his cardiac rehab exercises 4 days a week for 30 minutes. Patient has a follow-up appoint with Dr. Viral Hope at [facility] cardiology on 12/18/2023. Patient has been feeling well overall. He is due for 5-year follow-up colonoscopy with Dr. Rj Tompkins at [facility] gastroenterology. Patient declines flu vaccine and pneumonia vaccine. Hyperlipidemia  This is a chronic problem. The problem is controlled.  He has no history of chronic renal disease, diabetes or hypothyroidism. Pertinent negatives include no chest pain, focal sensory loss, focal weakness, leg pain, myalgias or shortness of breath. Current antihyperlipidemic treatment includes statins. The current treatment provides significant improvement of lipids. There are no compliance problems. Risk factors for coronary artery disease include dyslipidemia, family history and male sex. The following portions of the patient's history were reviewed and updated as appropriate: allergies, current medications, past family history, past medical history, past social history, past surgical history, and problem list.    Review of Systems   Constitutional:  Negative for activity change, appetite change, chills, diaphoresis, fatigue, fever and unexpected weight change. HENT: Negative. Eyes: Negative. Respiratory:  Negative for cough, chest tightness, shortness of breath and wheezing. Cardiovascular:  Negative for chest pain, palpitations and leg swelling. Gastrointestinal:  Negative for abdominal pain, blood in stool, constipation, diarrhea, nausea and vomiting. Endocrine: Negative for cold intolerance and heat intolerance. Genitourinary:  Negative for difficulty urinating, dysuria, frequency and hematuria. Musculoskeletal:  Negative for arthralgias, back pain, gait problem, joint swelling, myalgias, neck pain and neck stiffness. Skin: Negative. Neurological:  Negative for dizziness, focal weakness, syncope, weakness, light-headedness and headaches. Hematological:  Negative for adenopathy. Does not bruise/bleed easily. Psychiatric/Behavioral:  Negative for decreased concentration, dysphoric mood and sleep disturbance. The patient is not nervous/anxious.           Objective:      /57 (BP Location: Left arm, Patient Position: Sitting, Cuff Size: Standard)   Pulse 68   Temp (!) 95.4 °F (35.2 °C) (Tympanic)   Resp 16   Ht 5' 8" (1.727 m)   Wt 79.8 kg (176 lb)   SpO2 100%   BMI 26.76 kg/m²          Physical Exam  Constitutional:       General: He is not in acute distress. Appearance: Normal appearance. HENT:      Head: Normocephalic. Mouth/Throat:      Mouth: Mucous membranes are moist.   Eyes:      General: No scleral icterus. Conjunctiva/sclera: Conjunctivae normal.   Neck:      Vascular: No carotid bruit. Cardiovascular:      Rate and Rhythm: Normal rate and regular rhythm. Pulmonary:      Effort: Pulmonary effort is normal.      Breath sounds: Normal breath sounds. Abdominal:      Palpations: Abdomen is soft. Tenderness: There is no abdominal tenderness. Musculoskeletal:      Cervical back: Neck supple. Right lower leg: No edema. Left lower leg: No edema. Lymphadenopathy:      Cervical: No cervical adenopathy. Skin:     General: Skin is warm and dry. Neurological:      General: No focal deficit present. Mental Status: He is alert and oriented to person, place, and time. Psychiatric:         Mood and Affect: Mood normal.         Behavior: Behavior normal.         Thought Content:  Thought content normal.         Judgment: Judgment normal.

## 2023-11-16 ENCOUNTER — TELEPHONE (OUTPATIENT)
Age: 68
End: 2023-11-16

## 2023-11-16 ENCOUNTER — PREP FOR PROCEDURE (OUTPATIENT)
Age: 68
End: 2023-11-16

## 2023-11-16 DIAGNOSIS — Z12.11 SCREENING FOR COLON CANCER: Primary | ICD-10-CM

## 2023-11-16 NOTE — TELEPHONE ENCOUNTER
Scheduled date of colonoscopy (as of today): 12/19/23  Physician performing colonoscopy: Maged  Location of colonoscopy: [facility]  Bowel prep reviewed with patient: ferny/jaimee  Instructions reviewed with patient by: brittany  Clearances: n/a

## 2023-11-16 NOTE — TELEPHONE ENCOUNTER
11/16/23  Screened by: Walt Enriquez    Referring Provider     Pre- Screening: Body mass index is 26.76 kg/m². Has patient been referred for a routine screening Colonoscopy? yes  Is the patient between 43-73 years old? yes      Previous Colonoscopy yes   If yes:    Date: 2018    Facility:     Reason: screening      SCHEDULING STAFF: If the patient is between 45yrs-49yrs, please advise patient to confirm benefits/coverage with their insurance company for a routine screening colonoscopy, some insurance carriers will only cover at [address] [place] [address] or older. If the patient is over 66years old, please schedule an office visit. Does the patient want to see a Gastroenterologist prior to their procedure OR are they having any GI symptoms? no    Has the patient been hospitalized or had abdominal surgery in the past 6 months? no    Does the patient use supplemental oxygen? no    Does the patient take Coumadin, Lovenox, Plavix, Elliquis, Xarelto, or other blood thinning medication? no    Has the patient had a stroke, cardiac event, or stent placed in the past year? no    SCHEDULING STAFF: If patient answers NO to above questions, then schedule procedure. If patient answers YES to above questions, then schedule office appointment. If patient is between 45yrs - 49yrs, please advise patient that we will have to confirm benefits & coverage with their insurance company for a routine screening colonoscopy.

## 2023-12-04 ENCOUNTER — APPOINTMENT (OUTPATIENT)
Dept: LAB | Facility: CLINIC | Age: 68
End: 2023-12-04
Payer: COMMERCIAL

## 2023-12-04 ENCOUNTER — ANESTHESIA EVENT (OUTPATIENT)
Dept: ANESTHESIOLOGY | Facility: AMBULATORY SURGERY CENTER | Age: 68
End: 2023-12-04

## 2023-12-04 ENCOUNTER — ANESTHESIA (OUTPATIENT)
Dept: ANESTHESIOLOGY | Facility: AMBULATORY SURGERY CENTER | Age: 68
End: 2023-12-04

## 2023-12-04 DIAGNOSIS — Z12.5 SCREENING PSA (PROSTATE SPECIFIC ANTIGEN): ICD-10-CM

## 2023-12-04 DIAGNOSIS — E55.9 VITAMIN D DEFICIENCY: ICD-10-CM

## 2023-12-04 DIAGNOSIS — E78.5 DYSLIPIDEMIA: ICD-10-CM

## 2023-12-04 DIAGNOSIS — D72.829 LEUKOCYTOSIS, UNSPECIFIED TYPE: ICD-10-CM

## 2023-12-04 LAB
25(OH)D3 SERPL-MCNC: 33.2 NG/ML (ref 30–100)
ALBUMIN SERPL BCP-MCNC: 4.2 G/DL (ref 3.5–5)
ALP SERPL-CCNC: 57 U/L (ref 34–104)
ALT SERPL W P-5'-P-CCNC: 24 U/L (ref 7–52)
ANION GAP SERPL CALCULATED.3IONS-SCNC: 5 MMOL/L
AST SERPL W P-5'-P-CCNC: 25 U/L (ref 13–39)
BACTERIA UR QL AUTO: ABNORMAL /HPF
BILIRUB SERPL-MCNC: 0.52 MG/DL (ref 0.2–1)
BILIRUB UR QL STRIP: NEGATIVE
BUN SERPL-MCNC: 15 MG/DL (ref 5–25)
CALCIUM SERPL-MCNC: 9.9 MG/DL (ref 8.4–10.2)
CHLORIDE SERPL-SCNC: 106 MMOL/L (ref 96–108)
CHOLEST SERPL-MCNC: 115 MG/DL
CLARITY UR: CLEAR
CO2 SERPL-SCNC: 30 MMOL/L (ref 21–32)
COLOR UR: YELLOW
CREAT SERPL-MCNC: 1.19 MG/DL (ref 0.6–1.3)
ERYTHROCYTE [DISTWIDTH] IN BLOOD BY AUTOMATED COUNT: 14 % (ref 11.6–15.1)
GFR SERPL CREATININE-BSD FRML MDRD: 63 ML/MIN/1.73SQ M
GLUCOSE P FAST SERPL-MCNC: 98 MG/DL (ref 65–99)
GLUCOSE UR STRIP-MCNC: NEGATIVE MG/DL
HCT VFR BLD AUTO: 47.5 % (ref 36.5–49.3)
HDLC SERPL-MCNC: 29 MG/DL
HGB BLD-MCNC: 15.1 G/DL (ref 12–17)
HGB UR QL STRIP.AUTO: NEGATIVE
KETONES UR STRIP-MCNC: NEGATIVE MG/DL
LDLC SERPL CALC-MCNC: 71 MG/DL (ref 0–100)
LEUKOCYTE ESTERASE UR QL STRIP: NEGATIVE
MCH RBC QN AUTO: 28.9 PG (ref 26.8–34.3)
MCHC RBC AUTO-ENTMCNC: 31.8 G/DL (ref 31.4–37.4)
MCV RBC AUTO: 91 FL (ref 82–98)
MUCOUS THREADS UR QL AUTO: ABNORMAL
NITRITE UR QL STRIP: NEGATIVE
NON-SQ EPI CELLS URNS QL MICRO: ABNORMAL /HPF
NONHDLC SERPL-MCNC: 86 MG/DL
PH UR STRIP.AUTO: 6 [PH]
PLATELET # BLD AUTO: 210 THOUSANDS/UL (ref 149–390)
PMV BLD AUTO: 9.9 FL (ref 8.9–12.7)
POTASSIUM SERPL-SCNC: 4.6 MMOL/L (ref 3.5–5.3)
PROT SERPL-MCNC: 6.9 G/DL (ref 6.4–8.4)
PROT UR STRIP-MCNC: ABNORMAL MG/DL
PSA SERPL-MCNC: 1.25 NG/ML (ref 0–4)
RBC # BLD AUTO: 5.23 MILLION/UL (ref 3.88–5.62)
RBC #/AREA URNS AUTO: ABNORMAL /HPF
SODIUM SERPL-SCNC: 141 MMOL/L (ref 135–147)
SP GR UR STRIP.AUTO: 1.02 (ref 1–1.03)
TRIGL SERPL-MCNC: 76 MG/DL
TSH SERPL DL<=0.05 MIU/L-ACNC: 1.86 UIU/ML (ref 0.45–4.5)
UROBILINOGEN UR STRIP-ACNC: <2 MG/DL
WBC # BLD AUTO: 7.52 THOUSAND/UL (ref 4.31–10.16)
WBC #/AREA URNS AUTO: ABNORMAL /HPF

## 2023-12-04 PROCEDURE — 80061 LIPID PANEL: CPT

## 2023-12-04 PROCEDURE — 84443 ASSAY THYROID STIM HORMONE: CPT

## 2023-12-04 PROCEDURE — 36415 COLL VENOUS BLD VENIPUNCTURE: CPT

## 2023-12-04 PROCEDURE — G0103 PSA SCREENING: HCPCS

## 2023-12-04 PROCEDURE — 81001 URINALYSIS AUTO W/SCOPE: CPT

## 2023-12-04 PROCEDURE — 82306 VITAMIN D 25 HYDROXY: CPT

## 2023-12-04 PROCEDURE — 80053 COMPREHEN METABOLIC PANEL: CPT

## 2023-12-04 PROCEDURE — 85027 COMPLETE CBC AUTOMATED: CPT

## 2023-12-18 ENCOUNTER — OFFICE VISIT (OUTPATIENT)
Dept: CARDIOLOGY CLINIC | Facility: CLINIC | Age: 68
End: 2023-12-18
Payer: COMMERCIAL

## 2023-12-18 VITALS
HEIGHT: 68 IN | BODY MASS INDEX: 25.76 KG/M2 | SYSTOLIC BLOOD PRESSURE: 112 MMHG | WEIGHT: 170 LBS | HEART RATE: 73 BPM | DIASTOLIC BLOOD PRESSURE: 68 MMHG

## 2023-12-18 DIAGNOSIS — Z95.1 S/P CABG (CORONARY ARTERY BYPASS GRAFT): ICD-10-CM

## 2023-12-18 DIAGNOSIS — E78.5 DYSLIPIDEMIA: ICD-10-CM

## 2023-12-18 DIAGNOSIS — I25.10 CORONARY ARTERY DISEASE INVOLVING NATIVE CORONARY ARTERY OF NATIVE HEART, UNSPECIFIED WHETHER ANGINA PRESENT: Primary | ICD-10-CM

## 2023-12-18 DIAGNOSIS — I65.22 STENOSIS OF LEFT CAROTID ARTERY: ICD-10-CM

## 2023-12-18 PROCEDURE — 93000 ELECTROCARDIOGRAM COMPLETE: CPT | Performed by: INTERNAL MEDICINE

## 2023-12-18 PROCEDURE — 99214 OFFICE O/P EST MOD 30 MIN: CPT | Performed by: INTERNAL MEDICINE

## 2023-12-18 RX ORDER — ATORVASTATIN CALCIUM 20 MG/1
20 TABLET, FILM COATED ORAL
Qty: 90 TABLET | Refills: 3 | Status: SHIPPED | OUTPATIENT
Start: 2023-12-18

## 2023-12-18 NOTE — PROGRESS NOTES
Cardiology Follow Up    Mateo Franco  1955  [id number]  [facility]  [address] [address]  [address]  [facility] [address] [phone]    1. Coronary artery disease involving native coronary artery of native heart, unspecified whether angina present  POCT ECG    atorvastatin (LIPITOR) 20 mg tablet      2. S/P CABG (coronary artery bypass graft)        3. Dyslipidemia  atorvastatin (LIPITOR) 20 mg tablet      4. Stenosis of left carotid artery  VAS carotid complete study          Discussion/Summary:    1.  CAD - During hospitalization in April 2021 for unstable angina Oli was found to have multivessel disease and he is status post CABG x3 as described below.  He has done well since.  He completed cardiac rehabilitation and exercises on a regular basis.  He has no symptoms of angina.  His LV function is normal.  We will see him back in 1 year.    2.  Left carotid stenosis - Found during his CT surgical workup.  Carotid duplex last year continues to show moderate disease.  We ordered an updated 1 today.    3.  Hyperlipidemia - His LDL responded nicely to atorvastatin.  We had cut it down to 40 mg daily, and he went down to 20 mg daily within the last few months.  His LDL remains at goal.  He will continue to have blood work followed closely.      Interval History:     Mr. Franco comes in for follow-up given his history of CAD.  I met done during hospitalization in April 2021 in which he presented with unstable angina.  Cardiac catheterization showed multivessel disease.  He then had CABG x3 with a LIMA to the LAD, SVG to OM1 and SVG to D1 which went well without complications.  His LV function was normal.  He was also found to have a moderate grade left internal carotid artery stenosis.  After he recovered he has done well without symptoms of angina.  He completed cardiac rehabilitation and continues to exercise on a regular  basis.  I last a year ago.  At that visit we did get an updated carotid duplex which showed no changes to his carotid stenosis.    Don denies any return of angina.  No chest pain.  No shortness of breath or any signs/symptoms of CHF.  He denies palpitations, lightheadedness or history of syncope.  He has no exercise intolerance.  He tells me overall he feels well.  He does have some mechanical chest wall pains associated with his incision.  He also battles some numbness in his lower extremities.      Patient Active Problem List   Diagnosis    Special screening for malignant neoplasms, colon    Polyp of colon    Dyslipidemia    Vitamin D deficiency    Hiatal hernia    Coronary artery disease    Elevated troponin    S/P CABG (coronary artery bypass graft)    Stenosis of left carotid artery     Past Medical History:   Diagnosis Date    CAD (coronary artery disease)     Former tobacco use     History of dysphagia     resolved    Hyperlipidemia     Pre-diabetes      Social History     Socioeconomic History    Marital status: /Civil Union     Spouse name: Not on file    Number of children: Not on file    Years of education: Not on file    Highest education level: Not on file   Occupational History    Not on file   Tobacco Use    Smoking status: Former     Current packs/day: 0.00     Average packs/day: 1 pack/day for 10.0 years (10.0 ttl pk-yrs)     Types: Cigarettes     Start date:      Quit date:      Years since quittin.9    Smokeless tobacco: Never    Tobacco comments:     quit    Vaping Use    Vaping status: Never Used   Substance and Sexual Activity    Alcohol use: Not Currently     Alcohol/week: 0.0 standard drinks of alcohol     Comment: Rare    Drug use: No    Sexual activity: Yes     Partners: Female     Birth control/protection: None   Other Topics Concern    Not on file   Social History Narrative    Caffeine - Iced Tea 2cups/d     Social Determinants of Health     Financial Resource  Strain: Not on file   Food Insecurity: Not on file   Transportation Needs: Not on file   Physical Activity: Not on file   Stress: Not on file   Social Connections: Not on file   Intimate Partner Violence: Not on file   Housing Stability: Not on file      Family History   Problem Relation Age of Onset    Heart attack Father 57    No Known Problems Mother     Prostate cancer Neg Hx     Colon cancer Neg Hx     Diabetes Neg Hx     Stroke Neg Hx      Past Surgical History:   Procedure Laterality Date    CARDIAC CATHETERIZATION      COLONOSCOPY N/A 05/18/2018    Procedure: COLONOSCOPY;  Surgeon: Sven Payne MD;  Location: [facility] GI LAB;  Service: Gastroenterology    CORONARY ARTERY BYPASS GRAFT  04/26/2021    EGD  08/26/2019    FINGER AMPUTATION Left     partial 3rd finger    CT CORONARY ARTERY BYP W/VEIN & ARTERY GRAFT 3 VEIN N/A 04/26/2021    Procedure: CORONARY ARTERY BYPASS GRAFT (CABG) x 3; LIMA to LAD; Left EVH/SVG to OM1 and D1;  Surgeon: ALLISON Conde MD;  Location:  MAIN OR;  Service: Cardiac Surgery    CT XCAPSL CTRC RMVL INSJ IO LENS PROSTH W/O ECP Right 07/24/2023    Procedure: EXTRACTION EXTRACAPSULAR CATARACT PHACO INTRAOCULAR LENS (IOL);  Surgeon: Huy Barton MD;  Location: [facility] MAIN OR;  Service: Ophthalmology    SKIN BIOPSY         Current Outpatient Medications:     ascorbic acid (VITAMIN C) 500 mg tablet, Take 500 mg by mouth daily, Disp: , Rfl:     aspirin (ECOTRIN LOW STRENGTH) 81 mg EC tablet, Take 1 tablet (81 mg total) by mouth daily, Disp: 90 tablet, Rfl: 5    atorvastatin (LIPITOR) 20 mg tablet, Take 1 tablet (20 mg total) by mouth daily with dinner, Disp: 90 tablet, Rfl: 3    cholecalciferol (VITAMIN D3) 1,000 units tablet, Take 1,000 Units by mouth daily, Disp: , Rfl:     co-enzyme Q-10 30 MG capsule, Take 200 mg by mouth daily, Disp: , Rfl:     Multiple Vitamin (multivitamin) tablet, Take 1 tablet by mouth daily, Disp: , Rfl:   No Known Allergies    Labs:  Lab Results  "  Component Value Date    K 4.6 12/04/2023    K 4.9 08/13/2019     12/04/2023     08/13/2019    CO2 30 12/04/2023    CO2 22 04/26/2021    CO2 25 08/13/2019    BUN 15 12/04/2023    BUN 11 08/13/2019    CREATININE 1.19 12/04/2023    GLUCOSE 139 04/26/2021    CALCIUM 9.9 12/04/2023     Lab Results   Component Value Date    WBC 7.52 12/04/2023    HGB 15.1 12/04/2023    HCT 47.5 12/04/2023    MCV 91 12/04/2023     12/04/2023     Lab Results   Component Value Date    TRIG 76 12/04/2023    TRIG 158 (H) 08/13/2019    HDL 29 (L) 12/04/2023    HDL 27 (L) 08/13/2019     Imaging:  ECG today shows sinus rhythm with an incomplete right bundle branch block, otherwise normal ECG.    ECHO:  LEFT VENTRICLE:  Systolic function was normal. Ejection fraction was estimated to be 67 %.  There were no regional wall motion abnormalities.  Left ventricular diastolic function parameters were normal.     RIGHT VENTRICLE:  The size was normal.  Systolic function was normal.      Review of Systems:  Review of Systems   Constitutional: Negative.    HENT: Negative.     Eyes: Negative.    Respiratory: Negative.     Cardiovascular: Negative.    Gastrointestinal: Negative.    Musculoskeletal:  Positive for arthralgias.   Skin: Negative.    Allergic/Immunologic: Negative.    Neurological:  Positive for numbness.   Hematological: Negative.    Psychiatric/Behavioral: Negative.     All other systems reviewed and are negative.    Vitals:    12/18/23 0940   BP: 112/68   BP Location: Right arm   Patient Position: Sitting   Cuff Size: Standard   Pulse: 73   Weight: 77.1 kg (170 lb)   Height: 5' 8\" (1.727 m)     Physical Exam  Vitals and nursing note reviewed.   Constitutional:       Appearance: He is well-developed.   HENT:      Head: Normocephalic and atraumatic.   Eyes:      General: No scleral icterus.        Right eye: No discharge.         Left eye: No discharge.      Pupils: Pupils are equal, round, and reactive to light.   Neck:     "  Thyroid: No thyromegaly.      Vascular: No JVD.   Cardiovascular:      Rate and Rhythm: Normal rate and regular rhythm. No extrasystoles are present.     Pulses: Normal pulses. No decreased pulses.      Heart sounds: S1 normal and S2 normal. Murmur heard.      Systolic murmur is present with a grade of 1/6.      No friction rub. No gallop.   Pulmonary:      Effort: Pulmonary effort is normal. No respiratory distress.      Breath sounds: Normal breath sounds. No wheezing, rhonchi or rales.   Abdominal:      General: Bowel sounds are normal. There is no distension.      Palpations: Abdomen is soft.      Tenderness: There is no abdominal tenderness.   Musculoskeletal:         General: No tenderness or deformity. Normal range of motion.      Cervical back: Normal range of motion and neck supple.   Skin:     General: Skin is warm and dry.      Findings: No rash.   Neurological:      Mental Status: He is alert and oriented to person, place, and time.      Cranial Nerves: No cranial nerve deficit.   Psychiatric:         Thought Content: Thought content normal.         Judgment: Judgment normal.       Counseling / Coordination of Care  Total office time spent today 25 minutes.  Greater than 50% of total time was spent with the patient and / or family counseling and / or coordination of care.

## 2023-12-19 ENCOUNTER — ANESTHESIA (OUTPATIENT)
Dept: ANESTHESIOLOGY | Facility: AMBULATORY SURGERY CENTER | Age: 68
End: 2023-12-19

## 2023-12-19 ENCOUNTER — ANESTHESIA EVENT (OUTPATIENT)
Dept: ANESTHESIOLOGY | Facility: AMBULATORY SURGERY CENTER | Age: 68
End: 2023-12-19

## 2023-12-19 ENCOUNTER — ANESTHESIA (OUTPATIENT)
Dept: GASTROENTEROLOGY | Facility: AMBULATORY SURGERY CENTER | Age: 68
End: 2023-12-19

## 2023-12-19 ENCOUNTER — ANESTHESIA EVENT (OUTPATIENT)
Dept: GASTROENTEROLOGY | Facility: AMBULATORY SURGERY CENTER | Age: 68
End: 2023-12-19

## 2023-12-19 ENCOUNTER — HOSPITAL ENCOUNTER (OUTPATIENT)
Dept: GASTROENTEROLOGY | Facility: AMBULATORY SURGERY CENTER | Age: 68
Discharge: HOME | End: 2023-12-19
Attending: INTERNAL MEDICINE
Payer: COMMERCIAL

## 2023-12-19 VITALS
WEIGHT: 170 LBS | TEMPERATURE: 96 F | DIASTOLIC BLOOD PRESSURE: 74 MMHG | HEIGHT: 68 IN | HEART RATE: 74 BPM | BODY MASS INDEX: 25.76 KG/M2 | OXYGEN SATURATION: 98 % | RESPIRATION RATE: 18 BRPM | SYSTOLIC BLOOD PRESSURE: 112 MMHG

## 2023-12-19 DIAGNOSIS — Z12.11 SCREENING FOR COLON CANCER: ICD-10-CM

## 2023-12-19 PROCEDURE — 88305 TISSUE EXAM BY PATHOLOGIST: CPT | Performed by: PATHOLOGY

## 2023-12-19 PROCEDURE — 45380 COLONOSCOPY AND BIOPSY: CPT | Performed by: INTERNAL MEDICINE

## 2023-12-19 RX ORDER — SODIUM CHLORIDE, SODIUM LACTATE, POTASSIUM CHLORIDE, CALCIUM CHLORIDE 600; 310; 30; 20 MG/100ML; MG/100ML; MG/100ML; MG/100ML
125 INJECTION, SOLUTION INTRAVENOUS CONTINUOUS
Status: DISCONTINUED | OUTPATIENT
Start: 2023-12-19 | End: 2023-12-23 | Stop reason: HOSPADM

## 2023-12-19 RX ORDER — PROPOFOL 10 MG/ML
INJECTION, EMULSION INTRAVENOUS AS NEEDED
Status: DISCONTINUED | OUTPATIENT
Start: 2023-12-19 | End: 2023-12-19

## 2023-12-19 RX ORDER — SODIUM CHLORIDE, SODIUM LACTATE, POTASSIUM CHLORIDE, CALCIUM CHLORIDE 600; 310; 30; 20 MG/100ML; MG/100ML; MG/100ML; MG/100ML
INJECTION, SOLUTION INTRAVENOUS CONTINUOUS PRN
Status: DISCONTINUED | OUTPATIENT
Start: 2023-12-19 | End: 2023-12-19

## 2023-12-19 RX ADMIN — PROPOFOL 150 MG: 10 INJECTION, EMULSION INTRAVENOUS at 10:34

## 2023-12-19 RX ADMIN — PROPOFOL 20 MG: 10 INJECTION, EMULSION INTRAVENOUS at 10:40

## 2023-12-19 RX ADMIN — PROPOFOL 20 MG: 10 INJECTION, EMULSION INTRAVENOUS at 10:42

## 2023-12-19 RX ADMIN — PROPOFOL 50 MG: 10 INJECTION, EMULSION INTRAVENOUS at 10:36

## 2023-12-19 RX ADMIN — PROPOFOL 20 MG: 10 INJECTION, EMULSION INTRAVENOUS at 10:38

## 2023-12-19 RX ADMIN — SODIUM CHLORIDE, SODIUM LACTATE, POTASSIUM CHLORIDE, CALCIUM CHLORIDE: 600; 310; 30; 20 INJECTION, SOLUTION INTRAVENOUS at 09:55

## 2023-12-19 NOTE — ANESTHESIA POSTPROCEDURE EVALUATION
Post-Op Assessment Note    CV Status:  Stable  Pain Score: 0    Pain management: adequate       Mental Status:  Sleepy and arousable   PONV Controlled:  None   Airway Patency:  Patent     Post Op Vitals Reviewed: Yes      Staff: CRNA               BP   91/52   Temp 97   Pulse 72   Resp 14   SpO2 98

## 2023-12-19 NOTE — ANESTHESIA PREPROCEDURE EVALUATION
Procedure:  COLONOSCOPY    Relevant Problems   CARDIO   (+) Coronary artery disease      GI/HEPATIC   (+) Hiatal hernia      MUSCULOSKELETAL   (+) Hiatal hernia        Physical Exam    Airway    Mallampati score: I  TM Distance: >3 FB  Neck ROM: full     Dental   No notable dental hx     Cardiovascular      Pulmonary      Other Findings        Anesthesia Plan  ASA Score- 2     Anesthesia Type- IV sedation with anesthesia with ASA Monitors.         Additional Monitors:     Airway Plan:            Plan Factors-Exercise tolerance (METS): >4 METS.    Chart reviewed.    Patient summary reviewed.                  Induction- intravenous.    Postoperative Plan-     Informed Consent- Anesthetic plan and risks discussed with patient.  I personally reviewed this patient with the CRNA. Discussed and agreed on the Anesthesia Plan with the CRNA..

## 2023-12-19 NOTE — H&P
History and Physical -  [facility]  Mateo Franco 68 y.o. male MRN: [id number]    HPI: Mateo Franco is a 68 y.o. year old male who presents with hx of colon polyps      Review of Systems    Historical Information   Past Medical History:   Diagnosis Date    CAD (coronary artery disease)     Cancer (HCC)     basal skin ca    Colon polyp     Coronary artery disease     Former tobacco use     History of dysphagia     resolved    Hyperlipidemia     Pre-diabetes      Past Surgical History:   Procedure Laterality Date    CARDIAC CATHETERIZATION      COLONOSCOPY N/A 2018    Procedure: COLONOSCOPY;  Surgeon: Sven Payne MD;  Location: [facility] GI [facility];  Service: Gastroenterology    CORONARY ARTERY BYPASS GRAFT  2021    EGD  2019    FINGER AMPUTATION Left     partial 3rd finger    AR CORONARY ARTERY BYP W/VEIN & ARTERY GRAFT 3 VEIN N/A 2021    Procedure: CORONARY ARTERY BYPASS GRAFT (CABG) x 3; LIMA to LAD; Left EVH/SVG to OM1 and D1;  Surgeon: ALLISON Conde MD;  Location:  MAIN OR;  Service: Cardiac Surgery    AR XCAPSL CTRC RMVL INSJ IO LENS PROSTH W/O ECP Right 2023    Procedure: EXTRACTION EXTRACAPSULAR CATARACT PHACO INTRAOCULAR LENS (IOL);  Surgeon: Huy Barton MD;  Location: [facility] MAIN OR;  Service: Ophthalmology    SKIN BIOPSY       Social History   Social History     Substance and Sexual Activity   Alcohol Use Not Currently     Social History     Substance and Sexual Activity   Drug Use No     Social History     Tobacco Use   Smoking Status Former    Current packs/day: 0.00    Average packs/day: 1 pack/day for 10.0 years (10.0 ttl pk-yrs)    Types: Cigarettes    Start date:     Quit date:     Years since quittin.9   Smokeless Tobacco Never   Tobacco Comments    quit      Family History   Problem Relation Age of Onset    Heart attack Father 57    No Known Problems Mother     Prostate cancer Neg Hx     Colon cancer Neg Hx      "Diabetes Neg Hx     Stroke Neg Hx        Meds/Allergies     (Not in a hospital admission)      No Known Allergies    Objective     /65   Pulse 72   Temp (!) 96 °F (35.6 °C) (Temporal)   Resp 18   Ht 5' 8\" (1.727 m)   Wt 77.1 kg (170 lb)   SpO2 96%   BMI 25.85 kg/m²       PHYSICAL EXAM    Gen: NAD  CV: RRR  CHEST: Clear  ABD: soft, NT/ND  EXT: no edema  Neuro: AAO      ASSESSMENT/PLAN:  This is a 68 y.o. year old male here for hx of colon polyps    PLAN:   Procedure: colonoscopy      "

## 2023-12-22 ENCOUNTER — HOSPITAL ENCOUNTER (OUTPATIENT)
Dept: NON INVASIVE DIAGNOSTICS | Facility: CLINIC | Age: 68
Discharge: HOME | End: 2023-12-22
Payer: COMMERCIAL

## 2023-12-22 DIAGNOSIS — I65.22 STENOSIS OF LEFT CAROTID ARTERY: ICD-10-CM

## 2023-12-22 PROCEDURE — 93880 EXTRACRANIAL BILAT STUDY: CPT

## 2023-12-22 PROCEDURE — 88305 TISSUE EXAM BY PATHOLOGIST: CPT | Performed by: PATHOLOGY

## 2023-12-23 PROCEDURE — 93880 EXTRACRANIAL BILAT STUDY: CPT | Performed by: SURGERY

## 2024-02-21 PROBLEM — Z12.11 SPECIAL SCREENING FOR MALIGNANT NEOPLASMS, COLON: Status: RESOLVED | Noted: 2018-03-22 | Resolved: 2024-02-21

## 2024-05-20 DIAGNOSIS — I25.10 CORONARY ARTERY DISEASE: ICD-10-CM

## 2024-10-15 ENCOUNTER — OFFICE VISIT (OUTPATIENT)
Dept: FAMILY MEDICINE CLINIC | Facility: CLINIC | Age: 69
End: 2024-10-15
Payer: COMMERCIAL

## 2024-10-15 VITALS
DIASTOLIC BLOOD PRESSURE: 74 MMHG | SYSTOLIC BLOOD PRESSURE: 114 MMHG | TEMPERATURE: 96.4 F | BODY MASS INDEX: 26.92 KG/M2 | HEART RATE: 72 BPM | WEIGHT: 177.6 LBS | RESPIRATION RATE: 16 BRPM | HEIGHT: 68 IN

## 2024-10-15 DIAGNOSIS — Z12.5 SCREENING PSA (PROSTATE SPECIFIC ANTIGEN): ICD-10-CM

## 2024-10-15 DIAGNOSIS — Z00.00 ANNUAL PHYSICAL EXAM: Primary | ICD-10-CM

## 2024-10-15 DIAGNOSIS — E78.5 DYSLIPIDEMIA: ICD-10-CM

## 2024-10-15 DIAGNOSIS — I25.10 CORONARY ARTERY DISEASE INVOLVING NATIVE CORONARY ARTERY OF NATIVE HEART, UNSPECIFIED WHETHER ANGINA PRESENT: ICD-10-CM

## 2024-10-15 DIAGNOSIS — E55.9 VITAMIN D DEFICIENCY: ICD-10-CM

## 2024-10-15 DIAGNOSIS — I65.22 STENOSIS OF LEFT CAROTID ARTERY: ICD-10-CM

## 2024-10-15 PROCEDURE — 99397 PER PM REEVAL EST PAT 65+ YR: CPT | Performed by: FAMILY MEDICINE

## 2024-10-15 NOTE — PROGRESS NOTES
Assessment/Plan:   Patient declines flu injection and pneumonia vaccine and all vaccines.  Patient given lab requisition for fasting labs as below.  Patient to continue present treatment.  Instructed to follow a low-fat, low-salt and a low carbohydrate diet and continue regular aerobic exercise and weight training.  Follow-up with cardiology as scheduled.  Return to the office in 1 year.   Diagnoses and all orders for this visit:    Annual physical exam  -     CBC; Future  -     UA w Reflex to Microscopic w Reflex to Culture; Future    Coronary artery disease involving native coronary artery of native heart, unspecified whether angina present    Dyslipidemia  -     Comprehensive metabolic panel; Future  -     Lipid Panel with Direct LDL reflex; Future  -     TSH, 3rd generation with Free T4 reflex; Future    Stenosis of left carotid artery    Vitamin D deficiency  -     Vitamin D 25 hydroxy; Future    Screening PSA (prostate specific antigen)  -     PSA, Total Screen; Future          Subjective:     Patient ID: Mateo Franco is a 69 y.o. male.    Patient is here for annual physical exam and follow-up of chronic conditions.  Patient is due for fasting labs.  Patient declines flu vaccine and pneumonia vaccine.  In fact he refuses all vaccines.  Patient is feeling well overall and continues to exercise regularly walking for 30 minutes 5 days a week and doing weight resistance exercise for 30 minutes 4 days a week.  Patient follows with [facility] cardiology yearly.  He is up-to-date on colonoscopy having recently had a colonoscopy and was recommended to repeat in 5 years.  Patient has biometric form to be completed for his health insurance plan.    Hyperlipidemia  This is a chronic problem. The problem is controlled. He has no history of diabetes or hypothyroidism. Pertinent negatives include no chest pain, focal sensory loss, focal weakness, leg pain, myalgias or shortness of breath. Current antihyperlipidemic  treatment includes statins. The current treatment provides significant improvement of lipids. There are no compliance problems.  Risk factors for coronary artery disease include dyslipidemia, male sex and family history.       Review of Systems   Respiratory:  Negative for shortness of breath.    Cardiovascular:  Negative for chest pain.   Musculoskeletal:  Negative for myalgias.   Neurological:  Negative for focal weakness.         Objective:     Physical Exam  Constitutional:       General: He is not in acute distress.     Appearance: Normal appearance.   HENT:      Head: Normocephalic.      Mouth/Throat:      Mouth: Mucous membranes are moist.   Eyes:      General: No scleral icterus.     Conjunctiva/sclera: Conjunctivae normal.   Neck:      Vascular: No carotid bruit.   Cardiovascular:      Rate and Rhythm: Normal rate and regular rhythm.   Pulmonary:      Effort: Pulmonary effort is normal.      Breath sounds: Normal breath sounds.   Abdominal:      Palpations: Abdomen is soft.      Tenderness: There is no abdominal tenderness.   Musculoskeletal:      Cervical back: Neck supple.      Right lower leg: No edema.      Left lower leg: No edema.   Lymphadenopathy:      Cervical: No cervical adenopathy.   Skin:     General: Skin is warm and dry.   Neurological:      General: No focal deficit present.      Mental Status: He is alert and oriented to person, place, and time.   Psychiatric:         Mood and Affect: Mood normal.         Behavior: Behavior normal.         Thought Content: Thought content normal.         Judgment: Judgment normal.

## 2024-10-20 LAB
25(OH)D3 SERPL-MCNC: 33 NG/ML (ref 30–100)
ALBUMIN SERPL-MCNC: 4 G/DL (ref 3.6–5.1)
ALBUMIN/GLOB SERPL: 1.5 (CALC) (ref 1–2.5)
ALP SERPL-CCNC: 60 U/L (ref 35–144)
ALT SERPL-CCNC: 22 U/L (ref 9–46)
APPEARANCE UR: CLEAR
AST SERPL-CCNC: 25 U/L (ref 10–35)
BACTERIA UR QL AUTO: ABNORMAL /HPF
BASOPHILS # BLD AUTO: 40 CELLS/UL (ref 0–200)
BASOPHILS NFR BLD AUTO: 0.5 %
BILIRUB SERPL-MCNC: 0.6 MG/DL (ref 0.2–1.2)
BILIRUB UR QL STRIP: NEGATIVE
BUN SERPL-MCNC: 13 MG/DL (ref 7–25)
BUN/CREAT SERPL: NORMAL (CALC) (ref 6–22)
CALCIUM SERPL-MCNC: 9.8 MG/DL (ref 8.6–10.3)
CAOX CRY #/AREA URNS HPF: ABNORMAL /HPF
CHLORIDE SERPL-SCNC: 105 MMOL/L (ref 98–110)
CHOLEST SERPL-MCNC: 157 MG/DL
CHOLEST/HDLC SERPL: 4.8 (CALC)
CO2 SERPL-SCNC: 32 MMOL/L (ref 20–32)
COLOR UR: YELLOW
CREAT SERPL-MCNC: 1.01 MG/DL (ref 0.7–1.35)
EOSINOPHIL # BLD AUTO: 112 CELLS/UL (ref 15–500)
EOSINOPHIL NFR BLD AUTO: 1.4 %
ERYTHROCYTE [DISTWIDTH] IN BLOOD BY AUTOMATED COUNT: 13.1 % (ref 11–15)
GFR/BSA.PRED SERPLBLD CYS-BASED-ARV: 82 ML/MIN/1.73M2
GLOBULIN SER CALC-MCNC: 2.6 G/DL (CALC) (ref 1.9–3.7)
GLUCOSE SERPL-MCNC: 85 MG/DL (ref 65–99)
GLUCOSE UR QL STRIP: NEGATIVE
HCT VFR BLD AUTO: 47.3 % (ref 38.5–50)
HDLC SERPL-MCNC: 33 MG/DL
HGB BLD-MCNC: 15.2 G/DL (ref 13.2–17.1)
HGB UR QL STRIP: NEGATIVE
HYALINE CASTS #/AREA URNS LPF: ABNORMAL /LPF
KETONES UR QL STRIP: NEGATIVE
LDLC SERPL CALC-MCNC: 104 MG/DL (CALC)
LEUKOCYTE ESTERASE UR QL STRIP: NEGATIVE
LYMPHOCYTES # BLD AUTO: 2168 CELLS/UL (ref 850–3900)
LYMPHOCYTES NFR BLD AUTO: 27.1 %
MCH RBC QN AUTO: 29.2 PG (ref 27–33)
MCHC RBC AUTO-ENTMCNC: 32.1 G/DL (ref 32–36)
MCV RBC AUTO: 91 FL (ref 80–100)
MONOCYTES # BLD AUTO: 704 CELLS/UL (ref 200–950)
MONOCYTES NFR BLD AUTO: 8.8 %
NEUTROPHILS # BLD AUTO: 4976 CELLS/UL (ref 1500–7800)
NEUTROPHILS NFR BLD AUTO: 62.2 %
NITRITE UR QL STRIP: NEGATIVE
NONHDLC SERPL-MCNC: 124 MG/DL (CALC)
PH UR STRIP: 6 [PH] (ref 5–8)
PLATELET # BLD AUTO: 219 THOUSAND/UL (ref 140–400)
PMV BLD REES-ECKER: 10.2 FL (ref 7.5–12.5)
POTASSIUM SERPL-SCNC: 4.3 MMOL/L (ref 3.5–5.3)
PROT SERPL-MCNC: 6.6 G/DL (ref 6.1–8.1)
PROT UR QL STRIP: NEGATIVE
PSA SERPL-MCNC: 1.29 NG/ML
RBC # BLD AUTO: 5.2 MILLION/UL (ref 4.2–5.8)
RBC #/AREA URNS HPF: ABNORMAL /HPF
SODIUM SERPL-SCNC: 142 MMOL/L (ref 135–146)
SP GR UR STRIP: 1.02 (ref 1–1.03)
SQUAMOUS #/AREA URNS HPF: ABNORMAL /HPF
TRIGL SERPL-MCNC: 102 MG/DL
TSH SERPL-ACNC: 1.72 MIU/L (ref 0.4–4.5)
WBC # BLD AUTO: 8 THOUSAND/UL (ref 3.8–10.8)
WBC #/AREA URNS HPF: ABNORMAL /HPF

## 2024-12-25 ENCOUNTER — HOSPITAL ENCOUNTER (INPATIENT)
Facility: HOSPITAL | Age: 69
LOS: 2 days | Discharge: HOME | DRG: 038 | End: 2024-12-28
Attending: EMERGENCY MEDICINE | Admitting: STUDENT IN AN ORGANIZED HEALTH CARE EDUCATION/TRAINING PROGRAM
Payer: COMMERCIAL

## 2024-12-25 ENCOUNTER — APPOINTMENT (EMERGENCY)
Dept: CT IMAGING | Facility: HOSPITAL | Age: 69
DRG: 038 | End: 2024-12-25
Payer: COMMERCIAL

## 2024-12-25 DIAGNOSIS — I65.22 STENOSIS OF LEFT CAROTID ARTERY: Primary | ICD-10-CM

## 2024-12-25 DIAGNOSIS — R53.1 RIGHT SIDED WEAKNESS: ICD-10-CM

## 2024-12-25 DIAGNOSIS — R26.2 AMBULATORY DYSFUNCTION: ICD-10-CM

## 2024-12-25 DIAGNOSIS — I63.9 CVA (CEREBRAL VASCULAR ACCIDENT) (HCC): ICD-10-CM
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
## 2024-12-25 DIAGNOSIS — R93.1 ABNORMAL ECHOCARDIOGRAM: ICD-10-CM

## 2024-12-25 PROBLEM — R29.90 STROKE-LIKE SYMPTOMS: Status: ACTIVE | Noted: 2024-12-25

## 2024-12-25 LAB
2HR DELTA HS TROPONIN: -1 NG/L
ANION GAP SERPL CALCULATED.3IONS-SCNC: 8 MMOL/L (ref 4–13)
APTT PPP: 24 SECONDS (ref 23–34)
ATRIAL RATE: 71 BPM
BUN SERPL-MCNC: 22 MG/DL (ref 5–25)
CALCIUM SERPL-MCNC: 9.9 MG/DL (ref 8.4–10.2)
CARDIAC TROPONIN I PNL SERPL HS: 4 NG/L (ref ?–50)
CARDIAC TROPONIN I PNL SERPL HS: 5 NG/L (ref ?–50)
CHLORIDE SERPL-SCNC: 100 MMOL/L (ref 96–108)
CHOLEST SERPL-MCNC: 163 MG/DL (ref ?–200)
CO2 SERPL-SCNC: 28 MMOL/L (ref 21–32)
CREAT SERPL-MCNC: 1.14 MG/DL (ref 0.6–1.3)
ERYTHROCYTE [DISTWIDTH] IN BLOOD BY AUTOMATED COUNT: 14.2 % (ref 11.6–15.1)
EST. AVERAGE GLUCOSE BLD GHB EST-MCNC: 128 MG/DL
GFR SERPL CREATININE-BSD FRML MDRD: 66 ML/MIN/1.73SQ M
GLUCOSE SERPL-MCNC: 88 MG/DL (ref 65–140)
GLUCOSE SERPL-MCNC: 91 MG/DL (ref 65–140)
HBA1C MFR BLD: 6.1 %
HCT VFR BLD AUTO: 46.3 % (ref 36.5–49.3)
HDLC SERPL-MCNC: 26 MG/DL
HGB BLD-MCNC: 14.9 G/DL (ref 12–17)
INR PPP: 1 (ref 0.85–1.19)
LDLC SERPL CALC-MCNC: 123 MG/DL (ref 0–100)
MCH RBC QN AUTO: 28.5 PG (ref 26.8–34.3)
MCHC RBC AUTO-ENTMCNC: 32.2 G/DL (ref 31.4–37.4)
MCV RBC AUTO: 89 FL (ref 82–98)
P AXIS: 51 DEGREES
PLATELET # BLD AUTO: 231 THOUSANDS/UL (ref 149–390)
PMV BLD AUTO: 9.5 FL (ref 8.9–12.7)
POTASSIUM SERPL-SCNC: 4.2 MMOL/L (ref 3.5–5.3)
PR INTERVAL: 168 MS
PROTHROMBIN TIME: 13.9 SECONDS (ref 12.3–15)
QRS AXIS: 35 DEGREES
QRSD INTERVAL: 134 MS
QT INTERVAL: 422 MS
QTC INTERVAL: 458 MS
RBC # BLD AUTO: 5.23 MILLION/UL (ref 3.88–5.62)
SODIUM SERPL-SCNC: 136 MMOL/L (ref 135–147)
T WAVE AXIS: 39 DEGREES
TRIGL SERPL-MCNC: 68 MG/DL (ref ?–150)
VENTRICULAR RATE: 71 BPM
WBC # BLD AUTO: 11.64 THOUSAND/UL (ref 4.31–10.16)

## 2024-12-25 PROCEDURE — 80061 LIPID PANEL: CPT | Performed by: STUDENT IN AN ORGANIZED HEALTH CARE EDUCATION/TRAINING PROGRAM

## 2024-12-25 PROCEDURE — 70498 CT ANGIOGRAPHY NECK: CPT

## 2024-12-25 PROCEDURE — 99205 OFFICE O/P NEW HI 60 MIN: CPT | Performed by: PSYCHIATRY & NEUROLOGY

## 2024-12-25 PROCEDURE — 82948 REAGENT STRIP/BLOOD GLUCOSE: CPT

## 2024-12-25 PROCEDURE — 85027 COMPLETE CBC AUTOMATED: CPT | Performed by: EMERGENCY MEDICINE

## 2024-12-25 PROCEDURE — 99285 EMERGENCY DEPT VISIT HI MDM: CPT

## 2024-12-25 PROCEDURE — 83036 HEMOGLOBIN GLYCOSYLATED A1C: CPT | Performed by: STUDENT IN AN ORGANIZED HEALTH CARE EDUCATION/TRAINING PROGRAM

## 2024-12-25 PROCEDURE — 36415 COLL VENOUS BLD VENIPUNCTURE: CPT | Performed by: EMERGENCY MEDICINE

## 2024-12-25 PROCEDURE — 93005 ELECTROCARDIOGRAM TRACING: CPT

## 2024-12-25 PROCEDURE — 70496 CT ANGIOGRAPHY HEAD: CPT

## 2024-12-25 PROCEDURE — 99291 CRITICAL CARE FIRST HOUR: CPT | Performed by: EMERGENCY MEDICINE

## 2024-12-25 PROCEDURE — 99223 1ST HOSP IP/OBS HIGH 75: CPT | Performed by: STUDENT IN AN ORGANIZED HEALTH CARE EDUCATION/TRAINING PROGRAM

## 2024-12-25 PROCEDURE — 84484 ASSAY OF TROPONIN QUANT: CPT | Performed by: EMERGENCY MEDICINE

## 2024-12-25 PROCEDURE — 85730 THROMBOPLASTIN TIME PARTIAL: CPT | Performed by: EMERGENCY MEDICINE

## 2024-12-25 PROCEDURE — 85610 PROTHROMBIN TIME: CPT | Performed by: EMERGENCY MEDICINE

## 2024-12-25 PROCEDURE — 80048 BASIC METABOLIC PNL TOTAL CA: CPT | Performed by: EMERGENCY MEDICINE

## 2024-12-25 RX ORDER — ATORVASTATIN CALCIUM 40 MG/1
40 TABLET, FILM COATED ORAL EVERY EVENING
Status: DISCONTINUED | OUTPATIENT
Start: 2024-12-25 | End: 2024-12-25

## 2024-12-25 RX ORDER — ASPIRIN 81 MG/1
81 TABLET ORAL DAILY
Status: DISCONTINUED | OUTPATIENT
Start: 2024-12-26 | End: 2024-12-28 | Stop reason: HOSPADM

## 2024-12-25 RX ORDER — CLOPIDOGREL BISULFATE 75 MG/1
75 TABLET ORAL DAILY
Status: DISCONTINUED | OUTPATIENT
Start: 2024-12-25 | End: 2024-12-25

## 2024-12-25 RX ORDER — ASPIRIN 81 MG/1
243 TABLET, CHEWABLE ORAL ONCE
Status: COMPLETED | OUTPATIENT
Start: 2024-12-25 | End: 2024-12-25

## 2024-12-25 RX ORDER — ATORVASTATIN CALCIUM 40 MG/1
80 TABLET, FILM COATED ORAL EVERY EVENING
Status: DISCONTINUED | OUTPATIENT
Start: 2024-12-25 | End: 2024-12-28 | Stop reason: HOSPADM

## 2024-12-25 RX ORDER — CLOPIDOGREL BISULFATE 75 MG/1
300 TABLET ORAL ONCE
Status: COMPLETED | OUTPATIENT
Start: 2024-12-25 | End: 2024-12-25

## 2024-12-25 RX ORDER — ENOXAPARIN SODIUM 100 MG/ML
40 INJECTION SUBCUTANEOUS DAILY
Status: DISCONTINUED | OUTPATIENT
Start: 2024-12-25 | End: 2024-12-27

## 2024-12-25 RX ORDER — CLOPIDOGREL BISULFATE 75 MG/1
75 TABLET ORAL DAILY
Status: DISCONTINUED | OUTPATIENT
Start: 2024-12-26 | End: 2024-12-28 | Stop reason: HOSPADM

## 2024-12-25 RX ADMIN — CLOPIDOGREL BISULFATE 300 MG: 75 TABLET ORAL at 13:08

## 2024-12-25 RX ADMIN — IOHEXOL 75 ML: 350 INJECTION, SOLUTION INTRAVENOUS at 12:22

## 2024-12-25 RX ADMIN — ENOXAPARIN SODIUM 40 MG: 40 INJECTION SUBCUTANEOUS at 15:24

## 2024-12-25 RX ADMIN — ATORVASTATIN CALCIUM 80 MG: 40 TABLET, FILM COATED ORAL at 18:37

## 2024-12-25 RX ADMIN — ASPIRIN 81 MG CHEWABLE TABLET 243 MG: 81 TABLET CHEWABLE at 13:08

## 2024-12-25 NOTE — ASSESSMENT & PLAN NOTE
Placed on Lipitor 40 in setting of stroke protocol and adjust based on lipid  Continue Asa 81 mg daily

## 2024-12-25 NOTE — H&P
H&P - Hospitalist   Name: Mateo Franco 69 y.o. male I MRN: [id number]  Unit/Bed#: ED-39 I Date of Admission: 12/25/2024   Date of Service: 12/25/2024 I Hospital Day: 0     Assessment & Plan  Stroke-like symptoms  Patient brought in after fall at home.  Family states that patient has been not himself since this past Monday, 12/23/2024, they state patient has been less talkative and they just assumed he was angry with somebody.  In the ED patient also complained of having right upper extremity and right lower extremity weakness with dysarthria  Stroke alert was called    CT Head  1. Loss of normal gray-white matter differentiation in the left globus pallidus (series 2, image 21) worrisome for age-indeterminate left middle cerebral artery infarction.  2. No acute intracranial hemorrhage.  3. Mild microangiopathy, also age indeterminant without the benefit of prior imaging..    CTA Head/Neck  1. At least 90% focal high-grade stenosis left internal carotid artery origin. Vascular surgery assessment advised.  2. Nonvisualization of the intradural segment of the right vertebral artery possibly related to vascular occlusion or flow that is too slow to detect.  3. Wisp like enhancement of the extradural right vertebral artery possibly on the basis of developmental hypoplasia and/or severe stenosis.    Status post aspirin 243 mg and plavix 300 mg loading dose  Continue with asa 81 and plavix 75 mg daily, Lipitor 40  F/U MRI brain, A1C, Lipid panel  Telemetry  Neuro and vascular recommendations appreciated   PT OT    Dyslipidemia  Placed on Lipitor 40 in setting of stroke protocol and adjust based on lipid  Coronary artery disease  Placed on Lipitor 40 in setting of stroke protocol and adjust based on lipid  Continue Asa 81 mg daily  S/P CABG (coronary artery bypass graft)  Continue home aspirin 81 mg daily   Placed on Lipitor 40 in setting of stroke protocol and adjust based on lipid      VTE Pharmacologic Prophylaxis:    High Risk (Score >/= 5) - Pharmacological DVT Prophylaxis Ordered: enoxaparin (Lovenox). Sequential Compression Devices Ordered.  Code Status: Level 1 - Full Code   Discussion with family: Updated  (mother and sister) at bedside.    Anticipated Length of Stay: Patient will be admitted on an observation basis with an anticipated length of stay of less than 2 midnights secondary to stroke like symptoms.    History of Present Illness   Chief Complaint: Fall at home    Mateo Franco is a 69 y.o. male with a PMH of CABG, HLD, CAD who presents as a fall today.  Patient's family at bedside state that patient has not been acting himself since Monday, 12/23/2024.  They state that he has been less talkative than normal and more confused.  They assumed that patient was angry with somebody and did not want to talk about it.  In the ED patient complained of dysarthria along with right upper extremity and right lower extremity paresthesia/weakness.  Stroke alert was called in the ED. CTA head neck noted for 90% focal stenosis of left internal carotid artery along with other stenotic findings right vertebral artery.  Neurology was consulted who recommended loading with aspirin 243 mg and Plavix 300 mg. Patient being admitted for stroke work up.  On my evaluation patient states that his speech difficulties are starting to improve.      Review of Systems   Constitutional:  Negative for chills and fever.   HENT:  Negative for ear pain and sore throat.    Eyes:  Negative for pain and visual disturbance.   Respiratory:  Negative for cough and shortness of breath.    Cardiovascular:  Negative for chest pain and palpitations.   Gastrointestinal:  Negative for abdominal pain and vomiting.   Genitourinary:  Negative for dysuria and hematuria.   Musculoskeletal:  Negative for arthralgias and back pain.   Skin:  Negative for color change and rash.   Neurological:  Positive for speech difficulty, weakness and numbness.  Negative for seizures and syncope.   Psychiatric/Behavioral:  Positive for confusion.    All other systems reviewed and are negative.      Historical Information   Past Medical History:   Diagnosis Date    CAD (coronary artery disease)     Cancer (HCC)     basal skin ca    Colon polyp     Coronary artery disease     Former tobacco use     History of dysphagia     resolved    Hyperlipidemia     Pre-diabetes      Past Surgical History:   Procedure Laterality Date    CARDIAC CATHETERIZATION      COLONOSCOPY N/A 2018    Procedure: COLONOSCOPY;  Surgeon: Sven Payne MD;  Location: [facility] GI LAB;  Service: Gastroenterology    CORONARY ARTERY BYPASS GRAFT  2021    EGD  2019    FINGER AMPUTATION Left     partial 3rd finger    [place] CORONARY ARTERY BYP W/VEIN & ARTERY GRAFT 3 VEIN N/A 2021    Procedure: CORONARY ARTERY BYPASS GRAFT (CABG) x 3; LIMA to LAD; Left EVH/SVG to OM1 and D1;  Surgeon: ALLISON Conde MD;  Location:  MAIN OR;  Service: Cardiac Surgery    AK XCAPSL CTRC RMVL INSJ IO LENS PROSTH W/O ECP Right 2023    Procedure: EXTRACTION EXTRACAPSULAR CATARACT PHACO INTRAOCULAR LENS (IOL);  Surgeon: Huy Barton MD;  Location: [facility] MAIN OR;  Service: Ophthalmology    SKIN BIOPSY       Social History     Tobacco Use    Smoking status: Former     Current packs/day: 0.00     Average packs/day: 1 pack/day for 10.0 years (10.0 ttl pk-yrs)     Types: Cigarettes     Start date:      Quit date:      Years since quittin.0    Smokeless tobacco: Never    Tobacco comments:     quit    Vaping Use    Vaping status: Never Used   Substance and Sexual Activity    Alcohol use: Not Currently    Drug use: No    Sexual activity: Yes     Partners: Female     Birth control/protection: None     E-Cigarette/Vaping    E-Cigarette Use Never User      E-Cigarette/Vaping Substances    Nicotine No     THC No     CBD No     Flavoring No      Family history non-contributory  Social  History:  Marital Status: /Civil Union     Patient Pre-hospital Living Situation: Home  Patient Pre-hospital Level of Mobility: unable to be assessed at time of evaluation  Patient Pre-hospital Diet Restrictions: none    Meds/Allergies   I have reviewed home medications with patient personally.  Prior to Admission medications    Medication Sig Start Date End Date Taking? Authorizing Provider   ascorbic acid (VITAMIN C) 500 mg tablet Take 500 mg by mouth daily    Historical Provider, MD   aspirin (ECOTRIN LOW STRENGTH) 81 mg EC tablet Take 1 tablet (81 mg total) by mouth daily 5/21/24   Jesse Nichole MD   atorvastatin (LIPITOR) 20 mg tablet Take 1 tablet (20 mg total) by mouth daily with dinner 12/18/23   Jesse Nichole MD   cholecalciferol (VITAMIN D3) 1,000 units tablet Take 1,000 Units by mouth daily    Historical Provider, MD   co-enzyme Q-10 30 MG capsule Take 200 mg by mouth daily    Historical Provider, MD   Multiple Vitamin (multivitamin) tablet Take 1 tablet by mouth daily    Historical Provider, MD     No Known Allergies    Objective :  HR:  [59-76] 59  BP: (124-143)/(63-75) 128/67  Resp:  [16-21] 20  SpO2:  [97 %-100 %] 97 %  O2 Device: None (Room air)    Physical Exam  Vitals and nursing note reviewed.   Constitutional:       General: He is not in acute distress.     Appearance: He is well-developed.   HENT:      Head: Normocephalic and atraumatic.   Eyes:      Conjunctiva/sclera: Conjunctivae normal.   Cardiovascular:      Rate and Rhythm: Normal rate and regular rhythm.      Heart sounds: No murmur heard.  Pulmonary:      Effort: Pulmonary effort is normal. No respiratory distress.      Breath sounds: Normal breath sounds.   Abdominal:      Palpations: Abdomen is soft.      Tenderness: There is no abdominal tenderness.   Musculoskeletal:         General: No swelling.   Skin:     General: Skin is warm and dry.   Neurological:      Mental Status: He is alert.      Motor: Weakness present.       Comments: 4/5 with flexion of right elbow, 4/5 strength left dorsi flexion,   Psychiatric:         Mood and Affect: Mood normal.          Lines/Drains:            Lab Results: I have reviewed the following results:  Results from last 7 days   Lab Units 12/25/24  1224   WBC Thousand/uL 11.64*   HEMOGLOBIN g/dL 14.9   HEMATOCRIT % 46.3   PLATELETS Thousands/uL 231     Results from last 7 days   Lab Units 12/25/24  1224   SODIUM mmol/L 136   POTASSIUM mmol/L 4.2   CHLORIDE mmol/L 100   CO2 mmol/L 28   BUN mg/dL 22   CREATININE mg/dL 1.14   ANION GAP mmol/L 8   CALCIUM mg/dL 9.9   GLUCOSE RANDOM mg/dL 91     Results from last 7 days   Lab Units 12/25/24  1312   INR  1.00     Results from last 7 days   Lab Units 12/25/24  1219   POC GLUCOSE mg/dl 88     Lab Results   Component Value Date    HGBA1C 6.0 (H) 04/20/2021           Imaging Results Review: I reviewed radiology reports from this admission including: CT head.  Other Study Results Review: EKG was reviewed.     Administrative Statements       ** Please Note: This note has been constructed using a voice recognition system. **

## 2024-12-25 NOTE — ASSESSMENT & PLAN NOTE
Patient brought in after fall at home.  Family states that patient has been not himself since this past Monday, 12/23/2024, they state patient has been less talkative and they just assumed he was angry with somebody.  In the ED patient also complained of having right upper extremity and right lower extremity weakness with dysarthria  Stroke alert was called    CT Head  1. Loss of normal gray-white matter differentiation in the left globus pallidus (series 2, image 21) worrisome for age-indeterminate left middle cerebral artery infarction.  2. No acute intracranial hemorrhage.  3. Mild microangiopathy, also age indeterminant without the benefit of prior imaging..    CTA Head/Neck  1. At least 90% focal high-grade stenosis left internal carotid artery origin. Vascular surgery assessment advised.  2. Nonvisualization of the intradural segment of the right vertebral artery possibly related to vascular occlusion or flow that is too slow to detect.  3. Wisp like enhancement of the extradural right vertebral artery possibly on the basis of developmental hypoplasia and/or severe stenosis.    Status post aspirin 243 mg and plavix 300 mg loading dose  Continue with asa 81 and plavix 75 mg daily, Lipitor 40  F/U MRI brain, A1C, Lipid panel  Telemetry  Neuro and vascular recommendations appreciated   PT OT

## 2024-12-25 NOTE — ASSESSMENT & PLAN NOTE
Continue home aspirin 81 mg daily   Placed on Lipitor 40 in setting of stroke protocol and adjust based on lipid

## 2024-12-26 ENCOUNTER — APPOINTMENT (OUTPATIENT)
Dept: MRI IMAGING | Facility: HOSPITAL | Age: 69
DRG: 038 | End: 2024-12-26
Payer: COMMERCIAL

## 2024-12-26 ENCOUNTER — APPOINTMENT (OUTPATIENT)
Dept: NON INVASIVE DIAGNOSTICS | Facility: HOSPITAL | Age: 69
DRG: 038 | End: 2024-12-26
Payer: COMMERCIAL

## 2024-12-26 ENCOUNTER — APPOINTMENT (OUTPATIENT)
Dept: VASCULAR ULTRASOUND | Facility: HOSPITAL | Age: 69
DRG: 038 | End: 2024-12-26
Payer: COMMERCIAL

## 2024-12-26 ENCOUNTER — TELEPHONE (OUTPATIENT)
Dept: VASCULAR SURGERY | Facility: CLINIC | Age: 69
End: 2024-12-26

## 2024-12-26 PROBLEM — I63.239 SYMPTOMATIC CAROTID ARTERY STENOSIS WITH INFARCTION (HCC): Status: ACTIVE | Noted: 2024-12-26

## 2024-12-26 LAB
ABO GROUP BLD: NORMAL
ALBUMIN SERPL BCG-MCNC: 4 G/DL (ref 3.5–5)
ANION GAP SERPL CALCULATED.3IONS-SCNC: 6 MMOL/L (ref 4–13)
AORTIC ROOT: 3.2 CM
APICAL FOUR CHAMBER EJECTION FRACTION: 56 %
ASCENDING AORTA: 3.5 CM
BLD GP AB SCN SERPL QL: NEGATIVE
BSA FOR ECHO PROCEDURE: 1.93 M2
BUN SERPL-MCNC: 17 MG/DL (ref 5–25)
CALCIUM SERPL-MCNC: 9.4 MG/DL (ref 8.4–10.2)
CHLORIDE SERPL-SCNC: 103 MMOL/L (ref 96–108)
CO2 SERPL-SCNC: 29 MMOL/L (ref 21–32)
CREAT SERPL-MCNC: 1.17 MG/DL (ref 0.6–1.3)
E WAVE DECELERATION TIME: 283 MS
E/A RATIO: 0.77
ERYTHROCYTE [DISTWIDTH] IN BLOOD BY AUTOMATED COUNT: 14.2 % (ref 11.6–15.1)
FRACTIONAL SHORTENING: 37 (ref 28–44)
GFR SERPL CREATININE-BSD FRML MDRD: 64 ML/MIN/1.73SQ M
GLUCOSE P FAST SERPL-MCNC: 99 MG/DL (ref 65–99)
GLUCOSE SERPL-MCNC: 99 MG/DL (ref 65–140)
HCT VFR BLD AUTO: 46.6 % (ref 36.5–49.3)
HGB BLD-MCNC: 15.4 G/DL (ref 12–17)
INTERVENTRICULAR SEPTUM IN DIASTOLE (PARASTERNAL SHORT AXIS VIEW): 1 CM
INTERVENTRICULAR SEPTUM: 1 CM (ref 0.6–1.1)
LAAS-AP2: 20.5 CM2
LAAS-AP4: 20.3 CM2
LEFT ATRIUM SIZE: 3.5 CM
LEFT ATRIUM VOLUME (MOD BIPLANE): 58 ML
LEFT ATRIUM VOLUME INDEX (MOD BIPLANE): 30.1 ML/M2
LEFT INTERNAL DIMENSION IN SYSTOLE: 2.6 CM (ref 2.1–4)
LEFT VENTRICULAR INTERNAL DIMENSION IN DIASTOLE: 4.1 CM (ref 3.5–6)
LEFT VENTRICULAR POSTERIOR WALL IN END DIASTOLE: 1.2 CM
LEFT VENTRICULAR STROKE VOLUME: 50 ML
LVSV (TEICH): 50 ML
MAGNESIUM SERPL-MCNC: 2.2 MG/DL (ref 1.9–2.7)
MCH RBC QN AUTO: 29.2 PG (ref 26.8–34.3)
MCHC RBC AUTO-ENTMCNC: 33 G/DL (ref 31.4–37.4)
MCV RBC AUTO: 88 FL (ref 82–98)
MV E'TISSUE VEL-LAT: 14 CM/S
MV E'TISSUE VEL-SEP: 10 CM/S
MV PEAK A VEL: 0.86 M/S
MV PEAK E VEL: 66 CM/S
MV STENOSIS PRESSURE HALF TIME: 82 MS
MV VALVE AREA P 1/2 METHOD: 2.68
PHOSPHATE SERPL-MCNC: 3.2 MG/DL (ref 2.3–4.1)
PLATELET # BLD AUTO: 212 THOUSANDS/UL (ref 149–390)
PMV BLD AUTO: 9.5 FL (ref 8.9–12.7)
POTASSIUM SERPL-SCNC: 4.1 MMOL/L (ref 3.5–5.3)
RBC # BLD AUTO: 5.28 MILLION/UL (ref 3.88–5.62)
RH BLD: POSITIVE
RIGHT ATRIUM AREA SYSTOLE A4C: 17.4 CM2
RIGHT VENTRICLE ID DIMENSION: 3.5 CM
SL CV LEFT ATRIUM LENGTH A2C: 5.6 CM
SL CV LV EF: 60
SL CV PED ECHO LEFT VENTRICLE DIASTOLIC VOLUME (MOD BIPLANE) 2D: 76 ML
SL CV PED ECHO LEFT VENTRICLE SYSTOLIC VOLUME (MOD BIPLANE) 2D: 25 ML
SODIUM SERPL-SCNC: 138 MMOL/L (ref 135–147)
SPECIMEN EXPIRATION DATE: NORMAL
TR MAX PG: 20 MMHG
TR PEAK VELOCITY: 2.3 M/S
TRICUSPID ANNULAR PLANE SYSTOLIC EXCURSION: 2 CM
TRICUSPID VALVE PEAK REGURGITATION VELOCITY: 2.26 M/S
WBC # BLD AUTO: 8.7 THOUSAND/UL (ref 4.31–10.16)

## 2024-12-26 PROCEDURE — 86850 RBC ANTIBODY SCREEN: CPT | Performed by: PHYSICIAN ASSISTANT

## 2024-12-26 PROCEDURE — 97166 OT EVAL MOD COMPLEX 45 MIN: CPT

## 2024-12-26 PROCEDURE — 97129 THER IVNTJ 1ST 15 MIN: CPT

## 2024-12-26 PROCEDURE — 80069 RENAL FUNCTION PANEL: CPT | Performed by: STUDENT IN AN ORGANIZED HEALTH CARE EDUCATION/TRAINING PROGRAM

## 2024-12-26 PROCEDURE — 70551 MRI BRAIN STEM W/O DYE: CPT

## 2024-12-26 PROCEDURE — 86900 BLOOD TYPING SEROLOGIC ABO: CPT | Performed by: PHYSICIAN ASSISTANT

## 2024-12-26 PROCEDURE — 93880 EXTRACRANIAL BILAT STUDY: CPT

## 2024-12-26 PROCEDURE — 99232 SBSQ HOSP IP/OBS MODERATE 35: CPT

## 2024-12-26 PROCEDURE — 93880 EXTRACRANIAL BILAT STUDY: CPT | Performed by: SURGERY

## 2024-12-26 PROCEDURE — 86920 COMPATIBILITY TEST SPIN: CPT

## 2024-12-26 PROCEDURE — 86901 BLOOD TYPING SEROLOGIC RH(D): CPT | Performed by: PHYSICIAN ASSISTANT

## 2024-12-26 PROCEDURE — 93306 TTE W/DOPPLER COMPLETE: CPT | Performed by: STUDENT IN AN ORGANIZED HEALTH CARE EDUCATION/TRAINING PROGRAM

## 2024-12-26 PROCEDURE — 99223 1ST HOSP IP/OBS HIGH 75: CPT | Performed by: SURGERY

## 2024-12-26 PROCEDURE — 85027 COMPLETE CBC AUTOMATED: CPT | Performed by: STUDENT IN AN ORGANIZED HEALTH CARE EDUCATION/TRAINING PROGRAM

## 2024-12-26 PROCEDURE — 97163 PT EVAL HIGH COMPLEX 45 MIN: CPT

## 2024-12-26 PROCEDURE — 99222 1ST HOSP IP/OBS MODERATE 55: CPT | Performed by: INTERNAL MEDICINE

## 2024-12-26 PROCEDURE — 93306 TTE W/DOPPLER COMPLETE: CPT

## 2024-12-26 PROCEDURE — 83735 ASSAY OF MAGNESIUM: CPT | Performed by: STUDENT IN AN ORGANIZED HEALTH CARE EDUCATION/TRAINING PROGRAM

## 2024-12-26 RX ADMIN — ATORVASTATIN CALCIUM 80 MG: 40 TABLET, FILM COATED ORAL at 18:01

## 2024-12-26 RX ADMIN — CLOPIDOGREL BISULFATE 75 MG: 75 TABLET ORAL at 09:24

## 2024-12-26 RX ADMIN — ENOXAPARIN SODIUM 40 MG: 40 INJECTION SUBCUTANEOUS at 09:24

## 2024-12-26 RX ADMIN — ASPIRIN 81 MG: 81 TABLET, COATED ORAL at 09:24

## 2024-12-26 NOTE — CONSULTS
Cardiology   Mateo Franco 69 y.o. male MRN: [id number]  Unit/Bed#: S -01 Encounter: [id number]      Reason for Consult / Principal Problem: Preop cardiac risk assessment    Physician Requesting Consult:  Nate Albright MD    Outpatient Cardiologist: Dr. Nichole.    Assessment  1. Cardiac risk assessment.   -Problem: Symptomatic left ICA stenosis, acute CVA.  -Surgery; TBD  -Timing; TBD  -No recent anginal or anginal equivalent symptoms.  -No evidence of acutely decompensated CHF  -Activity tolerance is good. > 4 METs  -No arrhythmias identified on ECG/telemetry   Cardiac imaging/testing.  -TTE 12/26/2024-EF preserved 60%, normal wall motion, normal diastolic function, RV normal size/systolic function, LA/RA normal in size, possible PFO.  -ECG 12/25/2024; NSR, RBBB.  2. Left carotid artery stenosis, likely symptomatic.   -Vascular surgery following.  -CTA head neck- At least 90% focal high-grade stenosis left internal carotid artery origin, Nonvisualization of the intradural segment of the right vertebral artery possibly related to vascular occlusion or flow that is too slow to detect, Wisp like enhancement of the extradural right vertebral artery possibly on the basis of developmental hypoplasia and/or severe stenosis.   -Carotid duplex study; < 50% right ICA stenosis, >70% gnosis in the left ICA  -On aspirin 81 mg daily + clopidogrel 75 mg daily (added this admission)  -On atorvastatin 20 mg PTA, intensified to 80 mg daily this admission.  3. Acute CVA  -Neurology following.  -Acute infarcts involving the left cerebral hemisphere (MCA PCA) and internal capsule as described above and predominantly watershed in distribution.  -On aspirin 81 mg daily + clopidogrel 75 mg daily (added this admission)  -On atorvastatin 20 mg PTA, intensified to 80 mg daily this admission.  4. CAD s/p CABG x 3 (LIMA to LAD, SVG to OM1 and D1) in 4/2021.   -On aspirin 81 mg daily + clopidogrel 75 mg daily (added this admission)  5.  Dyslipidemia  -Lipid profile 12/25/2024; cholesterol 163, triglyceride 68, HDL 26 and LDL calculated 123.  -On atorvastatin 20 mg PTA, intensified to 80 mg daily this admission.    Plan  -Pt denies any specific cardiac or respiratory complaints at the time of my evaluation.  No current supplemental O2 requirements.  Euvolemic on exam.  -No current cardiac contraindications to proceeding with vascular intervention in reference to severe left ICA stenosis.  Pending final surgical recommendations by the vascular surgery team.  -Continue DAPT with aspirin/clopidogrel.  -Continue high intensity statin therapy.  -Prior DERECK from 2021 and TTE from today reviewed by attending cardiologist, no significant PFO detected; repeat DERECK would provide limited utility at this time per attending cardiologist.  -Continue to monitor on telemetry to rule out any significant arrhythmias during this hospital course.      HPI: Mateo Franco 69 y.o. year old male with a medical history of CAD s/p CABG x 3 (LIMA to LAD, SVG to OM1 and D1) in 4/2021, severe left carotid artery stenosis, and dyslipidemia.  Former cigarette smoker, denies excessive alcohol or recreational/illicit drug use.  Follows with Dr. Giron with [facility].  Presented to the [facility] campus on 12/25 with altered mental status, speech difficulty, and right UE/RLE extremity weakness.  Upon further workup was found to have severe high-grade stenosis of the left ICA and acute infarcts involving the left cerebral hemisphere and internal capsule.  Initiated on DAPT with low-dose aspirin/clopidogrel and intensified statin therapy.  Seen by both neuro and vascular surgery, concern for symptomatic left ICA stenosis which may warrant surgical intervention.  TTE imaging today demonstrated preserved biventricular systolic function with possible PFO.  Cardiology has not been consulted for further evaluation/treatment recommendations.    Family History:   Family History   Problem Relation  Age of Onset    Heart attack Father 57    No Known Problems Mother     Prostate cancer Neg Hx     Colon cancer Neg Hx     Diabetes Neg Hx     Stroke Neg Hx      Historical Information   Past Medical History:   Diagnosis Date    CAD (coronary artery disease)     Cancer (HCC)     basal skin ca    Colon polyp     Coronary artery disease     Former tobacco use     History of dysphagia     resolved    Hyperlipidemia     Pre-diabetes      Past Surgical History:   Procedure Laterality Date    CARDIAC CATHETERIZATION      COLONOSCOPY N/A 2018    Procedure: COLONOSCOPY;  Surgeon: Sven Payne MD;  Location: [facility] GI LAB;  Service: Gastroenterology    CORONARY ARTERY BYPASS GRAFT  2021    EGD  2019    FINGER AMPUTATION Left     partial 3rd finger    [place] CORONARY ARTERY BYP W/VEIN & ARTERY GRAFT 3 VEIN N/A 2021    Procedure: CORONARY ARTERY BYPASS GRAFT (CABG) x 3; LIMA to LAD; Left EVH/SVG to OM1 and D1;  Surgeon: ALLISON Conde MD;  Location:  MAIN OR;  Service: Cardiac Surgery    CA XCAPSL CTRC RMVL INSJ IO LENS PROSTH W/O ECP Right 2023    Procedure: EXTRACTION EXTRACAPSULAR CATARACT PHACO INTRAOCULAR LENS (IOL);  Surgeon: Huy Barton MD;  Location: [facility] MAIN OR;  Service: Ophthalmology    SKIN BIOPSY       Social History   Social History     Substance and Sexual Activity   Alcohol Use Not Currently     Social History     Substance and Sexual Activity   Drug Use No     Social History     Tobacco Use   Smoking Status Former    Current packs/day: 0.00    Average packs/day: 1 pack/day for 10.0 years (10.0 ttl pk-yrs)    Types: Cigarettes    Start date:     Quit date:     Years since quittin.0   Smokeless Tobacco Never   Tobacco Comments    quit      Family History:   Family History   Problem Relation Age of Onset    Heart attack Father 57    No Known Problems Mother     Prostate cancer Neg Hx     Colon cancer Neg Hx     Diabetes Neg Hx     Stroke Neg Hx   "      Review of Systems:  Review of Systems   Constitutional:  Negative for chills, fatigue and fever.   Eyes:  Negative for visual disturbance.   Respiratory:  Negative for cough, chest tightness and shortness of breath.    Cardiovascular:  Negative for chest pain, palpitations and leg swelling.   Gastrointestinal:  Negative for abdominal pain.   Musculoskeletal:         Right upper and lower extremity weakness.    Neurological:  Negative for dizziness, light-headedness and headaches.           Scheduled Meds:  Current Facility-Administered Medications   Medication Dose Route Frequency Provider Last Rate    aspirin  81 mg Oral Daily Pandi Todhe, DO      atorvastatin  80 mg Oral QPM Elder Woodard, DO      clopidogrel  75 mg Oral Daily Pandi Todhe, DO      enoxaparin  40 mg Subcutaneous Daily Pandi Todhe, DO       Continuous Infusions:   PRN Meds:.  all current active meds have been reviewed and current meds:   Current Facility-Administered Medications:     aspirin (ECOTRIN LOW STRENGTH) EC tablet 81 mg, Daily    atorvastatin (LIPITOR) tablet 80 mg, QPM    clopidogrel (PLAVIX) tablet 75 mg, Daily    enoxaparin (LOVENOX) subcutaneous injection 40 mg, Daily    No Known Allergies    Objective   Vitals: Blood pressure 140/69, pulse 58, temperature 97.6 °F (36.4 °C), temperature source Oral, resp. rate 21, height 5' 8\" (1.727 m), weight 79.4 kg (175 lb), SpO2 98%., Body mass index is 26.61 kg/m².,   Orthostatic Blood Pressures      Flowsheet Row Most Recent Value   Blood Pressure 140/69 filed at 12/26/2024 1121   Patient Position - Orthostatic VS Lying filed at 12/26/2024 1121              Intake/Output Summary (Last 24 hours) at 12/26/2024 1249  Last data filed at 12/26/2024 1247  Gross per 24 hour   Intake 1440 ml   Output 400 ml   Net 1040 ml       Invasive Devices       Peripheral Intravenous Line  Duration             Peripheral IV 12/25/24 Left Antecubital 1 day                  Physical Exam:  Physical " "Exam  Vitals and nursing note reviewed.   Constitutional:       General: He is not in acute distress.     Appearance: He is not diaphoretic.   HENT:      Head: Normocephalic and atraumatic.   Eyes:      General: No scleral icterus.  Cardiovascular:      Rate and Rhythm: Normal rate and regular rhythm.      Pulses: Normal pulses.      Heart sounds: Normal heart sounds.   Pulmonary:      Effort: Pulmonary effort is normal.      Breath sounds: Normal breath sounds. No wheezing or rales.   Abdominal:      Palpations: Abdomen is soft.      Tenderness: There is no abdominal tenderness.   Musculoskeletal:      Right lower leg: No edema.      Left lower leg: No edema.   Skin:     General: Skin is warm and dry.   Neurological:      Mental Status: He is alert.      Motor: Weakness present.   Psychiatric:         Mood and Affect: Mood normal.         Lab Results:   Recent Results (from the past 24 hours)   Protime-INR    Collection Time: 12/25/24  1:12 PM   Result Value Ref Range    Protime 13.9 12.3 - 15.0 seconds    INR 1.00 0.85 - 1.19   APTT    Collection Time: 12/25/24  1:12 PM   Result Value Ref Range    PTT 24 23 - 34 seconds   HS Troponin I 2hr    Collection Time: 12/25/24  2:39 PM   Result Value Ref Range    hs TnI 2hr 4 \"Refer to ACS Flowchart\"- see link ng/L    Delta 2hr hsTnI -1 <20 ng/L   Lipid Panel with Direct LDL reflex    Collection Time: 12/25/24  2:39 PM   Result Value Ref Range    Cholesterol 163 See Comment mg/dL    Triglycerides 68 See Comment mg/dL    HDL, Direct 26 (L) >=40 mg/dL    LDL Calculated 123 (H) 0 - 100 mg/dL   CBC    Collection Time: 12/26/24  5:53 AM   Result Value Ref Range    WBC 8.70 4.31 - 10.16 Thousand/uL    RBC 5.28 3.88 - 5.62 Million/uL    Hemoglobin 15.4 12.0 - 17.0 g/dL    Hematocrit 46.6 36.5 - 49.3 %    MCV 88 82 - 98 fL    MCH 29.2 26.8 - 34.3 pg    MCHC 33.0 31.4 - 37.4 g/dL    RDW 14.2 11.6 - 15.1 %    Platelets 212 149 - 390 Thousands/uL    MPV 9.5 8.9 - 12.7 fL   Renal " function panel    Collection Time: 12/26/24  5:53 AM   Result Value Ref Range    Albumin 4.0 3.5 - 5.0 g/dL    Calcium 9.4 8.4 - 10.2 mg/dL    Phosphorus 3.2 2.3 - 4.1 mg/dL    Glucose 99 65 - 140 mg/dL    BUN 17 5 - 25 mg/dL    Creatinine 1.17 0.60 - 1.30 mg/dL    Sodium 138 135 - 147 mmol/L    Potassium 4.1 3.5 - 5.3 mmol/L    Chloride 103 96 - 108 mmol/L    CO2 29 21 - 32 mmol/L    ANION GAP 6 4 - 13 mmol/L    eGFR 64 ml/min/1.73sq m    Glucose, Fasting 99 65 - 99 mg/dL   Magnesium    Collection Time: 12/26/24  5:53 AM   Result Value Ref Range    Magnesium 2.2 1.9 - 2.7 mg/dL   Echo complete w/ contrast if indicated    Collection Time: 12/26/24 10:52 AM   Result Value Ref Range    BSA 1.93 m2    A4C EF 56 %    LVIDd 4.10 cm    LVIDS 2.60 cm    IVSd 1.00 cm    LVPWd 1.20 cm    FS 37 28 - 44    MV E' Tissue Velocity Septal 10 cm/s    MV E' Tissue Velocity Lateral 14 cm/s    LA Volume Index (BP) 30.1 mL/m2    E/A ratio 0.77     E wave deceleration time 283 ms    MV Peak E Lonnie 66 cm/s    MV Peak A Lonnie 0.86 m/s    RVID d 3.5 cm    Tricuspid annular plane systolic excursion 2.00 cm    LA size 3.5 cm    LA length (A2C) 5.60 cm    LA volume (BP) 58 mL    RAA A4C 17.4 cm2    MV stenosis pressure 1/2 time 82 ms    MV valve area p 1/2 method 2.68     TR Peak Lonnie 2.3 m/s    Triscuspid Valve Regurgitation Peak Gradient 20.0 mmHg    Ao root 3.20 cm    Asc Ao 3.5 cm    Tricuspid valve peak regurgitation velocity 2.26 m/s    Left ventricular stroke volume (2D) 50.00 mL    IVS 1 cm    LEFT VENTRICLE SYSTOLIC VOLUME (MOD BIPLANE) 2D 25 mL    LV DIASTOLIC VOLUME (MOD BIPLANE) 2D 76 mL    Left Atrium Area-systolic Four Chamber 20.3 cm2    Left Atrium Area-systolic Apical Two Chamber 20.5 cm2    LVSV, 2D 50 mL    LV EF 60          * Please Note: Fluency DirectDictation voice to text software may have been used in the creation of this document. **

## 2024-12-26 NOTE — ASSESSMENT & PLAN NOTE
Patient brought in after fall at home.  Family states that patient has been not himself since this past Monday, 12/23/2024, they state patient has been less talkative and they just assumed he was angry with somebody.  In the ED patient also complained of having right upper extremity and right lower extremity weakness with dysarthria  Stroke alert was called  CT Head  Loss of normal gray-white matter differentiation in the left globus pallidus (series 2, image 21) worrisome for age-indeterminate left middle cerebral artery infarction.  No acute intracranial hemorrhage.  Mild microangiopathy, also age indeterminant without the benefit of prior imaging.  CTA Head/Neck  At least 90% focal high-grade stenosis left internal carotid artery origin. Vascular surgery assessment advised.  Nonvisualization of the intradural segment of the right vertebral artery possibly related to vascular occlusion or flow that is too slow to detect.  Wisp like enhancement of the extradural right vertebral artery possibly on the basis of developmental hypoplasia and/or severe stenosis.  Status post aspirin 243 mg and plavix 300 mg loading dose  Continue with Asa 81 and plavix 75 mg daily, Lipitor 40  MRI: acute infarcts involving the L cerebral hemisphere and internal capsule as described above and predominantly watershed distribution. No acute intracranial hemorrhage   hgbA1c: 6.1  Lipid panel  Total: 163, HDL 26,   Telemetry  PT OT  Neuro and vascular surgery following   Tentative plan for CEA tomorrow at 1230pm. NPO at midnight, hold lovenox in the am   Cardiology consulted for cardiac clearance   ECHO: EF 60%, systolic function normal, diastolic function normal. There was no R to L shunting visualized however there was bubble washout adjacent to the intra-atrial septum suggesting possible L to R flow through PFO or ASD. DERECK from 2021 shows possible PFO. Recommended DERECK if clinically indicated   Per discussion with oncall cardiology,  no PFO visualized on DERECK in 2021, or from ECHO this visit - repeat DERECK felt to be low utility given L ICA stenosis.

## 2024-12-26 NOTE — PROGRESS NOTES
Progress Note - Hospitalist   Name: Mateo Franco 69 y.o. male I MRN: [id number]  Unit/Bed#: S -01 I Date of Admission: 12/25/2024   Date of Service: 12/26/2024 I Hospital Day: 0    Assessment & Plan  Stroke-like symptoms  Patient brought in after fall at home.  Family states that patient has been not himself since this past Monday, 12/23/2024, they state patient has been less talkative and they just assumed he was angry with somebody.  In the ED patient also complained of having right upper extremity and right lower extremity weakness with dysarthria  Stroke alert was called  CT Head  Loss of normal gray-white matter differentiation in the left globus pallidus (series 2, image 21) worrisome for age-indeterminate left middle cerebral artery infarction.  No acute intracranial hemorrhage.  Mild microangiopathy, also age indeterminant without the benefit of prior imaging.  CTA Head/Neck  At least 90% focal high-grade stenosis left internal carotid artery origin. Vascular surgery assessment advised.  Nonvisualization of the intradural segment of the right vertebral artery possibly related to vascular occlusion or flow that is too slow to detect.  Wisp like enhancement of the extradural right vertebral artery possibly on the basis of developmental hypoplasia and/or severe stenosis.  Status post aspirin 243 mg and plavix 300 mg loading dose  Continue with Asa 81 and plavix 75 mg daily, Lipitor 40  MRI: acute infarcts involving the L cerebral hemisphere and internal capsule as described above and predominantly watershed distribution. No acute intracranial hemorrhage   hgbA1c: 6.1  Lipid panel  Total: 163, HDL 26,   Telemetry  PT OT  Neuro and vascular surgery following   Tentative plan for CEA tomorrow at 1230pm. NPO at midnight, hold lovenox in the am   Cardiology consulted for cardiac clearance   ECHO: EF 60%, systolic function normal, diastolic function normal. There was no R to L shunting visualized  "however there was bubble washout adjacent to the intra-atrial septum suggesting possible L to R flow through PFO or ASD. DERECK from 2021 shows possible PFO. Recommended DERECK if clinically indicated   Per discussion with oncall cardiology, no PFO visualized on DERECK in 2021, or from ECHO this visit - repeat DERECK felt to be low utility given L ICA stenosis.  Dyslipidemia  Placed on Lipitor 40 in setting of stroke protocol and adjust based on lipid  Coronary artery disease  Placed on Lipitor 40 in setting of stroke protocol and adjust based on lipid  Continue Asa 81 mg daily  S/P CABG (coronary artery bypass graft)  Continue home aspirin 81 mg daily   Placed on Lipitor 40 in setting of stroke protocol and adjust based on lipid    VTE Pharmacologic Prophylaxis: VTE Score: 3 Moderate Risk (Score 3-4) - Pharmacological DVT Prophylaxis Ordered: enoxaparin (Lovenox).    Mobility:   Basic Mobility Inpatient Raw Score: 22  JH-HLM Goal: 7: Walk 25 feet or more  JH-HLM Achieved: 8: Walk 250 feet ot more  JH-HLM Goal achieved. Continue to encourage appropriate mobility.    Patient Centered Rounds: I performed bedside rounds with nursing staff today.   Discussions with Specialists or Other Care Team Provider: Vascular surgery, cardiology, neuro     Education and Discussions with Family / Patient: Updated  (wife) at bedside.    Current Length of Stay: 0 day(s)  Current Patient Status: Observation   Certification Statement: The patient will continue to require additional inpatient hospital stay due to further stroke workup, tentative plan for CEA during this hospitalization  Discharge Plan: Anticipate discharge in 48 hrs to discharge location to be determined pending rehab evaluations.    Code Status: Level 1 - Full Code    Subjective   Seen and examined. No acute events overnight. States he is feeling ok. Per conversation with significant other, patient has been \"off\" for a couple of days. Once he wasn't able to stand up, " SO brought him to the ED. Patient reports some weakness on the R side, however is improving. Eating and drinking without difficulty. No acute complaints at this time.     Objective :  Temp:  [97.1 °F (36.2 °C)-98.2 °F (36.8 °C)] 97.6 °F (36.4 °C)  HR:  [57-63] 58  BP: (107-143)/(62-80) 140/69  Resp:  [16-24] 21  SpO2:  [94 %-98 %] 98 %  O2 Device: None (Room air)    Body mass index is 26.61 kg/m².     Input and Output Summary (last 24 hours):     Intake/Output Summary (Last 24 hours) at 12/26/2024 1403  Last data filed at 12/26/2024 1247  Gross per 24 hour   Intake 1440 ml   Output 400 ml   Net 1040 ml       Physical Exam  Constitutional:       General: He is not in acute distress.  HENT:      Mouth/Throat:      Mouth: Mucous membranes are moist.   Eyes:      Conjunctiva/sclera: Conjunctivae normal.   Cardiovascular:      Heart sounds: Normal heart sounds.   Pulmonary:      Breath sounds: Normal breath sounds. No wheezing, rhonchi or rales.   Abdominal:      Palpations: Abdomen is soft.   Musculoskeletal:      Right lower leg: No edema.      Left lower leg: No edema.   Skin:     General: Skin is warm.   Neurological:      General: No focal deficit present.      Mental Status: He is oriented to person, place, and time.      Motor: Weakness (R>L) present.       Lines/Drains:        Telemetry:  Telemetry Orders (From admission, onward)               24 Hour Telemetry Monitoring  Continuous x 24 Hours (Telem)        Expiring   Question:  Reason for 24 Hour Telemetry  Answer:  TIA/Suspected CVA/ Confirmed CVA                     Telemetry Reviewed: Normal Sinus Rhythm  Indication for Continued Telemetry Use: Acute CVA               Lab Results: I have reviewed the following results:   Results from last 7 days   Lab Units 12/26/24  0553   WBC Thousand/uL 8.70   HEMOGLOBIN g/dL 15.4   HEMATOCRIT % 46.6   PLATELETS Thousands/uL 212     Results from last 7 days   Lab Units 12/26/24  0553   SODIUM mmol/L 138   POTASSIUM  mmol/L 4.1   CHLORIDE mmol/L 103   CO2 mmol/L 29   BUN mg/dL 17   CREATININE mg/dL 1.17   ANION GAP mmol/L 6   CALCIUM mg/dL 9.4   ALBUMIN g/dL 4.0   GLUCOSE RANDOM mg/dL 99     Results from last 7 days   Lab Units 12/25/24  1312   INR  1.00     Results from last 7 days   Lab Units 12/25/24  1219   POC GLUCOSE mg/dl 88     Results from last 7 days   Lab Units 12/25/24  1224   HEMOGLOBIN A1C % 6.1*           Recent Cultures (last 7 days):         Imaging Results Review: I reviewed radiology reports from this admission including: CT head, MRI brain, and Ultrasound(s).  Other Study Results Review: No additional pertinent studies reviewed.    Last 24 Hours Medication List:     Current Facility-Administered Medications:     aspirin (ECOTRIN LOW STRENGTH) EC tablet 81 mg, Daily    atorvastatin (LIPITOR) tablet 80 mg, QPM    clopidogrel (PLAVIX) tablet 75 mg, Daily    enoxaparin (LOVENOX) subcutaneous injection 40 mg, Daily    Administrative Statements   Today, Patient Was Seen By: Brook Hernandez PA-C    **Please Note: This note may have been constructed using a voice recognition system.**

## 2024-12-26 NOTE — PLAN OF CARE
Problem: PAIN - ADULT  Goal: Verbalizes/displays adequate comfort level or baseline comfort level  Description: Interventions:  - Encourage patient to monitor pain and request assistance  - Assess pain using appropriate pain scale  - Administer analgesics based on type and severity of pain and evaluate response  - Implement non-pharmacological measures as appropriate and evaluate response  - Consider cultural and social influences on pain and pain management  - Notify physician/advanced practitioner if interventions unsuccessful or patient reports new pain  Outcome: Progressing     Problem: INFECTION - ADULT  Goal: Absence or prevention of progression during hospitalization  Description: INTERVENTIONS:  - Assess and monitor for signs and symptoms of infection  - Monitor lab/diagnostic results  - Monitor all insertion sites, i.e. indwelling lines, tubes, and drains  - Monitor endotracheal if appropriate and nasal secretions for changes in amount and color  - [place] appropriate cooling/warming therapies per order  - Administer medications as ordered  - Instruct and encourage patient and family to use good hand hygiene technique  - Identify and instruct in appropriate isolation precautions for identified infection/condition  Outcome: Progressing  Goal: Absence of fever/infection during neutropenic period  Description: INTERVENTIONS:  - Monitor WBC    Outcome: Progressing     Problem: SAFETY ADULT  Goal: Patient will remain free of falls  Description: INTERVENTIONS:  - Educate patient/family on patient safety including physical limitations  - Instruct patient to call for assistance with activity   - Consult OT/PT to assist with strengthening/mobility   - Keep Call bell within reach  - Keep bed low and locked with side rails adjusted as appropriate  - Keep care items and personal belongings within reach  - Initiate and maintain comfort rounds  - Make Fall Risk Sign visible to staff  - Offer Toileting every  Hours,  in advance of need  - Initiate/Maintain alarm  - Obtain necessary fall risk management equipment:   - Apply yellow socks and bracelet for high fall risk patients  - Consider moving patient to room near nurses station  Outcome: Progressing  Goal: Maintain or return to baseline ADL function  Description: INTERVENTIONS:  -  Assess patient's ability to carry out ADLs; assess patient's baseline for ADL function and identify physical deficits which impact ability to perform ADLs (bathing, care of mouth/teeth, toileting, grooming, dressing, etc.)  - Assess/evaluate cause of self-care deficits   - Assess range of motion  - Assess patient's mobility; develop plan if impaired  - Assess patient's need for assistive devices and provide as appropriate  - Encourage maximum independence but intervene and supervise when necessary  - Involve family in performance of ADLs  - Assess for home care needs following discharge   - Consider OT consult to assist with ADL evaluation and planning for discharge  - Provide patient education as appropriate  Outcome: Progressing  Goal: Maintains/Returns to pre admission functional level  Description: INTERVENTIONS:  - Perform AM-PAC 6 Click Basic Mobility/ Daily Activity assessment daily.  - Set and communicate daily mobility goal to care team and patient/family/caregiver.   - Collaborate with rehabilitation services on mobility goals if consulted  - Perform Range of Motion  times a day.  - Reposition patient every  hours.  - Dangle patient  times a day  - Stand patient  times a day  - Ambulate patient  times a day  - Out of bed to chair  times a day   - Out of bed for meals  times a day  - Out of bed for toileting  - Record patient progress and toleration of activity level   Outcome: Progressing     Problem: SAFETY ADULT  Goal: Maintains/Returns to pre admission functional level  Description: INTERVENTIONS:  - Perform AM-PAC 6 Click Basic Mobility/ Daily Activity assessment daily.  - Set and  communicate daily mobility goal to care team and patient/family/caregiver.   - Collaborate with rehabilitation services on mobility goals if consulted  - Perform Range of Motion  times a day.  - Reposition patient every  hours.  - Dangle patient  times a day  - Stand patient  times a day  - Ambulate patient  times a day  - Out of bed to chair  times a day   - Out of bed for meals  times a day  - Out of bed for toileting  - Record patient progress and toleration of activity level   Outcome: Progressing     Problem: DISCHARGE PLANNING  Goal: Discharge to home or other facility with appropriate resources  Description: INTERVENTIONS:  - Identify barriers to discharge w/patient and caregiver  - Arrange for needed discharge resources and transportation as appropriate  - Identify discharge learning needs (meds, wound care, etc.)  - Arrange for interpretive services to assist at discharge as needed  - Refer to Case Management Department for coordinating discharge planning if the patient needs post-hospital services based on physician/advanced practitioner order or complex needs related to functional status, cognitive ability, or social support system  Outcome: Progressing     Problem: Knowledge Deficit  Goal: Patient/family/caregiver demonstrates understanding of disease process, treatment plan, medications, and discharge instructions  Description: Complete learning assessment and assess knowledge base.  Interventions:  - Provide teaching at level of understanding  - Provide teaching via preferred learning methods  Outcome: Progressing

## 2024-12-26 NOTE — PHYSICAL THERAPY NOTE
PHYSICAL THERAPY EVALUATION NOTE    Patient Name: Mateo Franco  Today's Date: 12/26/2024  AGE:   69 y.o.  Mrn:   [id number]  ADMIT DX:  CVA (cerebral vascular accident) (HCC) [I63.9]  Stenosis of left carotid artery [I65.22]  Right sided weakness [R53.1]  Ambulatory dysfunction [R26.2]    Past Medical History:   Diagnosis Date    CAD (coronary artery disease)     Cancer (HCC)     basal skin ca    Colon polyp     Coronary artery disease     Former tobacco use     History of dysphagia     resolved    Hyperlipidemia     Pre-diabetes      Length Of Stay: 0  PHYSICAL THERAPY EVALUATION :    12/26/24 0827   PT Last Visit   PT Visit Date 12/26/24   Pain Assessment   Pain Assessment Tool 0-10   Pain Score No Pain   Restrictions/Precautions   Other Precautions Fall Risk  ([place])   Home Living   Type of Home House   Home Layout One level;Laundry in basement;Other (Comment)  (2 ELIDA)   Additional Comments lives w/ spouse, alone during the day. ambulates w/o device. no DME. independent w/ ADLs and IADLs. 1 fall in last 6 months.   General   Additional Pertinent History room air resting pulse ox 96% and 73 BPM.   Family/Caregiver Present No   Cognition   Arousal/Participation Alert   Orientation Level Oriented to person;Other (Comment)  (pt was identified w/ full name, birth date)   Following Commands Follows one step commands without difficulty   Subjective   Subjective pt seen supine in bed. agreed to PT eval. denied pain or dizziness. stated feeling tired.   RUE Assessment   RUE Assessment WFL   LUE Assessment   LUE Assessment WFL   RLE Assessment   RLE Assessment WFL  (4- to 4/5)   LLE Assessment   LLE Assessment WFL  (4- to 4/5)   Coordination   Movements are Fluid and Coordinated 1   Sensation X  (light touch impaired bilateral feet)   Bed Mobility   Supine to Sit 7  Independent   Additional items HOB elevated;Increased time required    Transfers   Sit to Stand 7  Independent   Additional items Increased time required   Stand to Sit 7  Independent   Ambulation/Elevation   Gait pattern Foward flexed;Short stride   Gait Assistance 5  Supervision   Assistive Device None   Distance 140 feet x2   Stair Management Assistance 5  Supervision   Additional items Increased time required   Stair Management Technique One rail R;Other (Comment)  (reciprocal up, nonreciprocal down)   Number of Stairs 5   Ambulation/Elevation Additional Comments Comfortable Gait Speed: 0.56 m/s   Balance   Static Sitting Normal   Dynamic Sitting Fair +   Static Standing Fair +   Dynamic Standing Fair   Ambulatory Fair -   Activity Tolerance   Activity Tolerance Patient limited by fatigue   Nurse Made Aware spoke to Cameron Garza OT   Assessment   Problem List Decreased strength;Decreased endurance;Impaired balance;Decreased mobility;Decreased safety awareness;Impaired sensation   Assessment Pt has been less talkative than normal and more confused. In the ED pt complained of dysarthria along with right upper extremity and right lower extremity paresthesia/weakness. Dx: stroke like symptoms, dyslipidemia, and s/p fall. order placed for PT eval and tx. pt presents w/ comorbidities of CAD, CABG, dysphagia, cataract and hyperlipidemia and personal factors of stair(s) to enter home and positive fall history. pt presents w/ weakness, decreased endurance, impaired balance, gait deviations, altered sensation, decreased safety awareness, and fall risk. these impairments are evident in findings from physical examination (weakness and altered sensation), mobility assessment (need for standby assist w/ gait and stair use when usually mobilizing independently, tolerance to only 140 feet of ambulation, and need for cueing for mobility technique), and Barthel Index: 85/100 and Comfortable Gait Speed: 0.56 m/s (less than 1.0 m/s indicates need for intervention to address falls risk). pt  needed input for task focus and mobility technique. pt is at risk for falls due to physical and safety awareness deficits. pt's clinical presentation is unstable/unpredictable (evident in need for standby assist w/ ambulation and stair use when usually mobilizing independently, tolerance to only 140 feet of ambulation, and need for input for mobility technique/safety). pt needs inpatient PT tx to improve mobility deficits and progress mobility training as appropriate.   Goals   Patient Goals I want to go home.   STG Expiration Date 01/05/25   Short Term Goal #1 pt will: Increase bilateral LE strength 1/2 grade to facilitate independent mobility, Ambulate 400 ft. without assistive device independently w/o LOB to improve functional independence, Navigate 10 stair(s) independently with unilateral handrail to facilitate return to previous living environment, Increase ambulatory balance 1 grade to decrease risk for falls, Complete exercise program independently to increase strength and endurance, Tolerate 3 hr OOB to faciliate upright tolerance, Improve gait speed to 0.66 m/s to reduce fall risk and increase independence, and Improve Barthel Index score to 100 to facilitate independence   Plan   Treatment/Interventions Elevations;Therapeutic exercise;LE strengthening/ROM;Endurance training;Gait training;Patient/family training   PT Frequency 1-2x/wk   Discharge Recommendation   Rehab Resource Intensity Level, PT III (Minimum Resource Intensity)   AM-PAC Basic Mobility Inpatient   Turning in Flat Bed Without Bedrails 4   Lying on Back to Sitting on Edge of Flat Bed Without Bedrails 4   Moving Bed to Chair 4   Standing Up From Chair Using Arms 4   Walk in Room 3   Climb 3-5 Stairs With Railing 3   Basic Mobility Inpatient Raw Score 22   Basic Mobility Standardized Score 47.4   [facility] Highest Level Of Mobility   -HLM Goal 7: Walk 25 feet or more   -HLM Achieved 8: Walk 250 feet ot more   Barthel Index   Feeding 10    Bathing 0   Grooming Score 5   Dressing Score 10   Bladder Score 10   Bowels Score 10   Toilet Use Score 10   Transfers (Bed/Chair) Score 15   Mobility (Level Surface) Score 10   Stairs Score 5   Barthel Index Score 85   End of Consult   Patient Position at End of Consult Bedside chair;All needs within reach;Other (comment)  (Cameron OT was present w/ pt at end of session)     Comfortable Gait Speed: 0.56 m/s  Gait Speed Interpretation:  Gain of 0.1 m/s is a predictor of well-being in those w/ abnormal walking speed compared to age matched peers  <0.7m/s is at an increased risk for falls    Household ambulator: <0.4 m/s  Limited community ambulator: 0.4-0.8 m/s  Community ambulator: 0.8-1.2 m/s  Able to safely cross streets: >1.2 m/s    The patient's AM-PAC Basic Mobility Inpatient Short Form Raw Score is 22. A Raw score of greater than 16 suggests the patient may benefit from discharge to home. Please also refer to the recommendation of the Physical Therapist for safe discharge planning.    Skilled PT recommended while in hospital and upon DC to progress pt toward treatment goals.     Koko King, PT

## 2024-12-26 NOTE — PLAN OF CARE
Problem: PAIN - ADULT  Goal: Verbalizes/displays adequate comfort level or baseline comfort level  Description: Interventions:  - Encourage patient to monitor pain and request assistance  - Assess pain using appropriate pain scale  - Administer analgesics based on type and severity of pain and evaluate response  - Implement non-pharmacological measures as appropriate and evaluate response  - Consider cultural and social influences on pain and pain management  - Notify physician/advanced practitioner if interventions unsuccessful or patient reports new pain  Outcome: Progressing     Problem: INFECTION - ADULT  Goal: Absence or prevention of progression during hospitalization  Description: INTERVENTIONS:  - Assess and monitor for signs and symptoms of infection  - Monitor lab/diagnostic results  - Monitor all insertion sites, i.e. indwelling lines, tubes, and drains  - Monitor endotracheal if appropriate and nasal secretions for changes in amount and color  - [place] appropriate cooling/warming therapies per order  - Administer medications as ordered  - Instruct and encourage patient and family to use good hand hygiene technique  - Identify and instruct in appropriate isolation precautions for identified infection/condition  Outcome: Progressing  Goal: Absence of fever/infection during neutropenic period  Description: INTERVENTIONS:  - Monitor WBC    Outcome: Progressing     Problem: SAFETY ADULT  Goal: Patient will remain free of falls  Description: INTERVENTIONS:  - Educate patient/family on patient safety including physical limitations  - Instruct patient to call for assistance with activity   - Consult OT/PT to assist with strengthening/mobility   - Keep Call bell within reach  - Keep bed low and locked with side rails adjusted as appropriate  - Keep care items and personal belongings within reach  - Initiate and maintain comfort rounds  - Make Fall Risk Sign visible to staff  - Offer Toileting every 2 Hours,  in advance of need  - Initiate/Maintain bed and chair alarm  - Obtain necessary fall risk management equipment:   - Apply yellow socks and bracelet for high fall risk patients  - Consider moving patient to room near nurses station  Outcome: Progressing  Goal: Maintain or return to baseline ADL function  Description: INTERVENTIONS:  -  Assess patient's ability to carry out ADLs; assess patient's baseline for ADL function and identify physical deficits which impact ability to perform ADLs (bathing, care of mouth/teeth, toileting, grooming, dressing, etc.)  - Assess/evaluate cause of self-care deficits   - Assess range of motion  - Assess patient's mobility; develop plan if impaired  - Assess patient's need for assistive devices and provide as appropriate  - Encourage maximum independence but intervene and supervise when necessary  - Involve family in performance of ADLs  - Assess for home care needs following discharge   - Consider OT consult to assist with ADL evaluation and planning for discharge  - Provide patient education as appropriate  Outcome: Progressing  Goal: Maintains/Returns to pre admission functional level  Description: INTERVENTIONS:  - Perform AM-PAC 6 Click Basic Mobility/ Daily Activity assessment daily.  - Set and communicate daily mobility goal to care team and patient/family/caregiver.   - Collaborate with rehabilitation services on mobility goals if consulted  - Perform Range of Motion 3 times a day.  - Reposition patient every 2 hours.  - Dangle patient 3 times a day  - Stand patient 3 times a day  - Ambulate patient 3 times a day  - Out of bed to chair 3 times a day   - Out of bed for meals 3 times a day  - Out of bed for toileting  - Record patient progress and toleration of activity level   Outcome: Progressing     Problem: DISCHARGE PLANNING  Goal: Discharge to home or other facility with appropriate resources  Description: INTERVENTIONS:  - Identify barriers to discharge w/patient and  caregiver  - Arrange for needed discharge resources and transportation as appropriate  - Identify discharge learning needs (meds, wound care, etc.)  - Arrange for interpretive services to assist at discharge as needed  - Refer to Case Management Department for coordinating discharge planning if the patient needs post-hospital services based on physician/advanced practitioner order or complex needs related to functional status, cognitive ability, or social support system  Outcome: Progressing     Problem: Knowledge Deficit  Goal: Patient/family/caregiver demonstrates understanding of disease process, treatment plan, medications, and discharge instructions  Description: Complete learning assessment and assess knowledge base.  Interventions:  - Provide teaching at level of understanding  - Provide teaching via preferred learning methods  Outcome: Progressing

## 2024-12-26 NOTE — PROGRESS NOTES
"Progress Note - Neurology   Name: Mateo Franco 69 y.o. male I MRN: [id number]  Unit/Bed#: S -01 I Date of Admission: 12/25/2024   Date of Service: 12/26/2024 I Hospital Day: 0  { ?Quick Links I Problem List I PORCH I Billing Tip:80559}  Assessment & Plan  Stroke-like symptoms    Dyslipidemia    Coronary artery disease    S/P CABG (coronary artery bypass graft)      {Neurology Follow Up:38496}  {Admin Statements (Optional):39462}    Subjective   Patient seen and examined this morning at bedside. No acute overnight events. ***    Review of Systems  {Ros - neuro:81533}  Complete 14 point review of systems completed and was otherwise unremarkable aside noted above.    Objective :  {?Quick Links I ICU Summary I Vitals I I/Os I LDAs I Mobility (PT/OT) I Code Status / ACP   ?Quick Links I Active Meds I Pain Meds I Antibiotics I Anticoagulants:48161}  Temp:  [97.1 °F (36.2 °C)-98.2 °F (36.8 °C)] 97.4 °F (36.3 °C)  HR:  [57-76] 57  BP: (107-143)/(62-80) 129/70  Resp:  [16-24] 18  SpO2:  [94 %-100 %] 95 %  O2 Device: None (Room air)    GENERAL EXAM:  General Appearance: {:13351::\"in no apparent distress\",\"well developed and well nourished\",\"non-toxic\",\"in no respiratory distress and acyanotic\"}  HENT: Normocephalic and atraumatic. Nose and Ears normal.   Eyes: No scleral icterus. No discharge.   Cardiovascular:  Distal extremities warm without palpable edema or tenderness, no observed significant swelling.   Pulmonary: Pulmonary effort is normal. Not in respiratory distress  Musculoskeletal: No swelling or deformity.  Psychiatric: Normal behavior and appropriate affect     NEUROLOGIC  EXAM:  Mental Status: Alertness: {Findings; LOC exam:85489::\"alert\"}. Orientation: {Orientation:23582}. Speech/Language {SPEECH:86357}. Commands: {commands (Optional):86663::\"Can follow multistep commands\"}  Cranial Nerves:  I Not tested   II Visual Fields {Findings; eye exam visual field defects :31222::\"normal\"}   II, Ill,  IV, VI " "Pupils {Findings; pupil exam:91127::\"equal, round, reactive to light and accommodation\"}  EOM {Extra-ocular movements:508727::\"full and intact\"}   V {EXAM; NEURO PED CN 5 DETAIL:00484}   VII {Facial symmetry:33925}   VIII Normal hearing to speech   IX, X Normal Palatal Elevation, No Uvular Deviation   XI Shoulder shrug and head turn is normal   XII Midline tongue protrusion   Motor:{adneuro (Optional):57366::\"Pronator Drift - Absent\",\"Atrophy - No atrophy or muscle wasting\",\"Normal Tone\",\"No Involuntary Movements\",\"No Tremors Appreciated\"}  Arm Right  Left Leg Right  Left   Deltoid {tmmotorexam:88470::\"5/5\"} {tmmotorexam:44977::\"5/5\"} lliopsoas {tmmotorexam:42159::\"5/5\"} {tmmotorexam:29953::\"5/5\"}   Biceps {tmmotorexam:51737::\"5/5\"} {tmmotorexam:75948::\"5/5\"} Quads {tmmotorexam:07764::\"5/5\"} {tmmotorexam:27555::\"5/5\"}   Triceps {tmmotorexam:57230::\"5/5\"} {tmmotorexam:11425::\"5/5\"} Hamstrings {tmmotorexam:91079::\"5/5\"} {tmmotorexam:91319::\"5/5\"}   Wrist Extension {tmmotorexam:94228::\"5/5\"} {tmmotorexam:03833::\"5/5\"} Ankle Dorsiflexion {tmmotorexam:28554::\"5/5\"} {tmmotorexam:77027::\"5/5\"}   Wrist Flexion {tmmotorexam:18665::\"5/5\"} {tmmotorexam:97379::\"5/5\"} Ankle Plantarflexion {tmmotorexam:41758::\"5/5\"} {tmmotorexam:12066::\"5/5\"}   Reflexes: {ADPEREFLEX:67695::\"Reflexes are normal and symmetric\",\"DTR's are 2/4 in all tested locations\",\"Ankle Clonus: Absent\",\"Babinski: Bilateral toes downgoing\",\"Bo Sign: Absent\"}  Sensory: {ADNESENSORY (Optional):61764::\"Normal sensation to light touch, pinprick, vibration, temperature, and proprioception in all limbs, romberg negative\"}  Coordination: Finger To Nose:  R - {:05332::\"Intact\"}, L - {:22834::\"Intact\"}, Heel To Shin: R - {:03251::\"Intact\"}, L - {:38409::\"Intact\"}  Gait: Not tested    {?Quick Links I Lab Review I Micro Results I Radiology I Cardiology:54206}  Lab Results: I have reviewed the following results:  CBC:  Results from last 7 days   Lab Units 12/26/24  0553 " "12/25/24  1224   WBC Thousand/uL 8.70 11.64*   RBC Million/uL 5.28 5.23   HEMOGLOBIN g/dL 15.4 14.9   HEMATOCRIT % 46.6 46.3   MCV fL 88 89   MCH pg 29.2 28.5   MCHC g/dL 33.0 32.2   RDW % 14.2 14.2   MPV fL 9.5 9.5   PLATELETS Thousands/uL 212 231   ,   Chemistry Profile:   Results from last 7 days   Lab Units 12/26/24  0553 12/25/24  1224   POTASSIUM mmol/L 4.1 4.2   CHLORIDE mmol/L 103 100   CO2 mmol/L 29 28   BUN mg/dL 17 22   CREATININE mg/dL 1.17 1.14   CALCIUM mg/dL 9.4 9.9   MAGNESIUM mg/dL 2.2  --    PHOSPHORUS mg/dL 3.2  --    EGFR ml/min/1.73sq m 64 66     Results from last 7 days   Lab Units 12/26/24  0553 12/25/24  1224   GLUCOSE RANDOM mg/dL 99 91      Recent Labs     12/25/24  1219   POCGLU 88     PT/INR:   Lab Results   Component Value Date    INR 1.00 12/25/2024   ,   Hemoglobin A1c 6.1 (12/25/2024),  (12/25/2024)    Micro: I have reviewed pertinent results.        Invalid input(s): \"BACTERIAUA\"  No results found for: \"BLOODCX\", \"URINECX\", \"WOUNDCULT\", \"SPUTUMCULTUR\"    Imaging Results Review: I reviewed radiology reports from this admission including: {Radiology Studies:37427}.  CTA stroke alert (head/neck)  Result Date: 12/25/2024  Impression: 1. At least 90% focal high-grade stenosis left internal carotid artery origin. Vascular surgery assessment advised. 2. Nonvisualization of the intradural segment of the right vertebral artery possibly related to vascular occlusion or flow that is too slow to detect. 3. [place] like enhancement of the extradural right vertebral artery possibly on the basis of developmental hypoplasia and/or severe stenosis. I personally provided preliminary results of this study with Dr. Markie French and Bob Ayers on 12/25/2024 at 12:38 p.m. Workstation performed: [id number]     CT stroke alert brain  Result Date: 12/25/2024  Impression: 1. Loss of normal gray-white matter differentiation in the left globus pallidus (series 2, image 21) worrisome for age-indeterminate " left middle cerebral artery infarction. 2. No acute intracranial hemorrhage. 3. Mild microangiopathy, also age indeterminant without the benefit of prior imaging.. Workstation performed: [id number]      Other Diagnostic Results: I have personally reviewed pertinent reports  No results found for this or any previous visit.      ACTIVE MEDICATIONS   Scheduled PRN   aspirin, 81 mg, Daily  atorvastatin, 80 mg, QPM  clopidogrel, 75 mg, Daily  enoxaparin, 40 mg, Daily          Continuous          VTE Mechanical Prophylaxis: Sequential Compression Device  VTE Pharmacologic Prophylaxis: VTE covered by:  enoxaparin, Subcutaneous, 40 mg at 12/25/24 1524      ====================================================  Elder Woodard,  [facility] Neurology Residency, PGY-3

## 2024-12-26 NOTE — CONSULTS
Consultation - Vascular Surgery   Name: Mateo Franco 69 y.o. male I MRN: [id number]  Unit/Bed#: S -01 I Date of Admission: 12/25/2024   Date of Service: 12/26/2024 I Hospital Day: 0   Inpatient consult to Vascular Surgery  Consult performed by: Chanda Boston PA-C  Consult ordered by: Bob Ayers DO        Physician Requesting Evaluation: Nate Albright MD   Reason for Evaluation / Principal Problem: stroke like sx    Assessment & Plan  Stroke-like symptoms  RUE/RLE weakness, dysarthria- improving  CT Head: Loss of normal gray-white matter differentiation in the left globus pallidus (series 2, image 21) worrisome for age-indeterminate left middle cerebral artery infarction   CTA head/neck: 90% focal high-grade stenosis left internal carotid artery origin  - s/p asa/plavix load  - f/u ECHO with bubble study  - f/u carotid duplex  - f/u MRI brain  - f/u neuro consult  - given ICA stenosis and evidence of Left MCA infarction, would likely benefit from Left carotid intervention, however final recs pending rest of workup and attending attestation  Dyslipidemia  - cont statin  Please contact the SecureChat role for the Vascular Surgery service with any questions/concerns.    History of Present Illness   Mateo Franco is a 69 y.o. male PMH HLD, CAD s/p CABG in 2021 (on asa/statin) who presents with stroke like symptoms. Reportedly, per chart review, family states pt had not been acting himself since Monday, thought he was just mad at someone. Per pt, had weakness in both right upper and lower extremities starting yesterday with some difficulty getting words out in addition to understanding words that were being spoken to him. Denies prior similar sx in the past. No prior personal or family hx of strokes per pt. Endorses blurry vision in left eye last night that has since resolved. States sx overall have significantly improved- still with some weakness in RUE/RLE however improved, walked to bathroom this  morning. Prior smoker- 1 PPD x 10 years, quit 30 years ago. No prior neck surgery or radiation.  Asa/Plavix load given yesterday.    Review of Systems  I have reviewed the patient's PMH, PSH, Social History, Family History, Meds, and Allergies  Historical Information   Past Medical History:   Diagnosis Date    CAD (coronary artery disease)     Cancer (HCC)     basal skin ca    Colon polyp     Coronary artery disease     Former tobacco use     History of dysphagia     resolved    Hyperlipidemia     Pre-diabetes      Past Surgical History:   Procedure Laterality Date    CARDIAC CATHETERIZATION      COLONOSCOPY N/A 2018    Procedure: COLONOSCOPY;  Surgeon: Sven Payne MD;  Location: [facility] GI LAB;  Service: Gastroenterology    CORONARY ARTERY BYPASS GRAFT  2021    EGD  2019    FINGER AMPUTATION Left     partial 3rd finger    MA CORONARY ARTERY BYP W/VEIN & ARTERY GRAFT 3 VEIN N/A 2021    Procedure: CORONARY ARTERY BYPASS GRAFT (CABG) x 3; LIMA to LAD; Left EVH/SVG to OM1 and D1;  Surgeon: ALLISON Conde MD;  Location:  MAIN OR;  Service: Cardiac Surgery    MA XCAPSL CTRC RMVL INSJ IO LENS PROSTH W/O ECP Right 2023    Procedure: EXTRACTION EXTRACAPSULAR CATARACT PHACO INTRAOCULAR LENS (IOL);  Surgeon: Huy Barton MD;  Location: [facility] MAIN OR;  Service: Ophthalmology    SKIN BIOPSY       Social History     Tobacco Use    Smoking status: Former     Current packs/day: 0.00     Average packs/day: 1 pack/day for 10.0 years (10.0 ttl pk-yrs)     Types: Cigarettes     Start date:      Quit date:      Years since quittin.0    Smokeless tobacco: Never    Tobacco comments:     quit    Vaping Use    Vaping status: Never Used   Substance and Sexual Activity    Alcohol use: Not Currently    Drug use: No    Sexual activity: Yes     Partners: Female     Birth control/protection: None     E-Cigarette/Vaping    E-Cigarette Use Never User      E-Cigarette/Vaping Substances     Nicotine No     THC No     CBD No     Flavoring No      Family History   Problem Relation Age of Onset    Heart attack Father 57    No Known Problems Mother     Prostate cancer Neg Hx     Colon cancer Neg Hx     Diabetes Neg Hx     Stroke Neg Hx      Social History     Tobacco Use    Smoking status: Former     Current packs/day: 0.00     Average packs/day: 1 pack/day for 10.0 years (10.0 ttl pk-yrs)     Types: Cigarettes     Start date:      Quit date:      Years since quittin.0    Smokeless tobacco: Never    Tobacco comments:     quit    Vaping Use    Vaping status: Never Used   Substance and Sexual Activity    Alcohol use: Not Currently    Drug use: No    Sexual activity: Yes     Partners: Female     Birth control/protection: None       Objective :  Temp:  [97.1 °F (36.2 °C)-98.2 °F (36.8 °C)] 97.6 °F (36.4 °C)  HR:  [57-76] 63  BP: (107-143)/(62-80) 130/75  Resp:  [16-24] 16  SpO2:  [94 %-100 %] 97 %  O2 Device: None (Room air)      Physical Exam    Lab Results: I have reviewed the following results:  Recent Labs     24  1224 24  1312 24  1439 24  0553   WBC 11.64*  --   --  8.70   HGB 14.9  --   --  15.4   HCT 46.3  --   --  46.6     --   --  212   SODIUM 136  --   --  138   K 4.2  --   --  4.1     --   --  103   CO2 28  --   --  29   BUN 22  --   --  17   CREATININE 1.14  --   --  1.17   GLUC 91  --   --  99   MG  --   --   --  2.2   PHOS  --   --   --  3.2   ALB  --   --   --  4.0   PTT  --  24  --   --    INR  --  1.00  --   --    HSTNI0 5  --   --   --    HSTNI2  --   --  4  --        Imaging Results Review: I reviewed radiology reports from this admission including: CTA head/neck and CT head.  Other Study Results Review: No additional pertinent studies reviewed.    VTE Pharmacologic Prophylaxis: VTE covered by:  enoxaparin, Subcutaneous, 40 mg at 24 1524     VTE Mechanical Prophylaxis: sequential compression device    Administrative Statements    I have spent a total time of 30 minutes in caring for this patient on the day of the visit/encounter including Diagnostic results, Risks and benefits of tx options, Instructions for management, Patient and family education, Importance of tx compliance, Impressions, Counseling / Coordination of care, Documenting in the medical record, Reviewing / ordering tests, medicine, procedures  , Obtaining or reviewing history  , and Communicating with other healthcare professionals .

## 2024-12-26 NOTE — OCCUPATIONAL THERAPY NOTE
Occupational Therapy Evaluation/Treatment (ACLS)     Patient Name: Mateo Franco  Today's Date: 2024  Problem List  Principal Problem:    Stroke-like symptoms  Active Problems:    Dyslipidemia    Coronary artery disease    S/P CABG (coronary artery bypass graft)    Past Medical History  Past Medical History:   Diagnosis Date    CAD (coronary artery disease)     Cancer (HCC)     basal skin ca    Colon polyp     Coronary artery disease     Former tobacco use     History of dysphagia     resolved    Hyperlipidemia     Pre-diabetes      Past Surgical History  Past Surgical History:   Procedure Laterality Date    CARDIAC CATHETERIZATION      COLONOSCOPY N/A 2018    Procedure: COLONOSCOPY;  Surgeon: Sven Payne MD;  Location: [facility] GI LAB;  Service: Gastroenterology    CORONARY ARTERY BYPASS GRAFT  2021    EGD  2019    FINGER AMPUTATION Left     partial 3rd finger    [place] CORONARY ARTERY BYP W/VEIN & ARTERY GRAFT 3 VEIN N/A 2021    Procedure: CORONARY ARTERY BYPASS GRAFT (CABG) x 3; LIMA to LAD; Left EVH/SVG to OM1 and D1;  Surgeon: ALLISON Conde MD;  Location:  MAIN OR;  Service: Cardiac Surgery    [place] XCAPSL CTRC RMVL INSJ IO LENS PROSTH W/O ECP Right 2023    Procedure: EXTRACTION EXTRACAPSULAR CATARACT PHACO INTRAOCULAR LENS (IOL);  Surgeon: Huy Barton MD;  Location: [facility] MAIN OR;  Service: Ophthalmology    SKIN BIOPSY        Patient's identity confirmed via 2 patient identifiers (full name and ) at start of session        24 0808   OT Last Visit   OT Visit Date 24   Note Type   Note type Evaluation  (and Treatment)   Pain Assessment   Pain Assessment Tool 0-10   Pain Score No Pain   Restrictions/Precautions   Weight Bearing Precautions Per Order No   Other Precautions Cognitive;Telemetry   Home Living   Type of Home House   Home Layout One level;Performs ADLs on one level;Able to live on main level with bedroom/bathroom  (2 ELIDA)   Bathroom  "Shower/Tub Walk-in shower   Bathroom Toilet Standard   Bathroom Equipment   (none per pt)   Bathroom Accessibility Accessible   Home Equipment   (none per)   Prior Function   Level of Drew Independent with ADLs;Independent with functional mobility;Independent with IADLS   Lives With Spouse  (works full time)   Receives Help From Family   IADLs Independent with driving;Independent with meal prep;Family/Friend/Other provides medication management  (wife organizes pill box)   Falls in the last 6 months 1 to 4  (1 fall per EMR, pt does not recall)   Vocational Retired  ()   Comments Pt reports being fully (I) PTA, no AD   Lifestyle   Autonomy Pt lives w/ spouse in a 1 level house and is fully (I) PTA, no AD, (+) falls, (+)    Reciprocal Relationships Supportive spouse, family   Service to Others Retired    Intrinsic Gratification Pt enjoys biking   General   Additional Pertinent History Pt admitted w/ strok-like symptoms including speech deficits and R sided weakness/sensory changes. CT stroke alert: Loss of normal gray-white matter differentiation in the left globus pallidus (series 2, image 21) worrisome for age-indeterminate left middle cerebral artery infarction.   Family/Caregiver Present No   Subjective   Subjective \"My L eye gets a little fuzzy\"   ADL   Where Assessed Edge of bed   Eating Assistance 7  Independent   Eating Deficit Setup;Beverage management   Grooming Assistance 6  Modified Independent   UB Bathing Assistance 6  Modified Independent   LB Bathing Assistance 6  Modified Independent   UB Dressing Assistance 6  Modified independent   LB Dressing Assistance 6  Modified independent   LB Dressing Deficit Setup;Don/doff R sock;Don/doff L sock  (sitting EOB)   Toileting Assistance  6  Modified independent   Bed Mobility   Supine to Sit 7  Independent   Additional items HOB elevated   Additional Comments Denies lightheadedness w/ positional changes, OOB to " recliner at end of session   Transfers   Sit to Stand 7  Independent   Stand to Sit 7  Independent   Additional Comments no AD   Functional Mobility   Functional Mobility 7  Independent   Additional items   (no AD)   Balance   Static Sitting Normal   Dynamic Sitting Good   Static Standing Fair +   Dynamic Standing Fair +   Activity Tolerance   Activity Tolerance Patient tolerated treatment well  (limited by cognition)   Medical Staff Made Aware Spoke to PT RJ   Nurse Made Aware yes, RADHA fisher to see pt and updated   RUE Assessment   RUE Assessment WFL  (questionable weakness vs submaximal effort during MMT testing)   RUE Strength   RUE Overall Strength Within Functional Limits - able to perform ADL tasks with strength   R Shoulder Flexion 4/5   R Elbow Flexion 3+/5   R Elbow Extension 3+/5   LUE Assessment   LUE Assessment WFL  (questionable weakness vs submaximal effort during MMT testing)   LUE Strength   LUE Overall Strength Within Functional Limits - able to perform ADL tasks with strength   L Shoulder Flexion 4/5   L Elbow Flexion 3+/5   L Elbow Extension 3+/5   Hand Function   Gross Motor Coordination Functional   Fine Motor Coordination Functional   Sensation   Light Touch No apparent deficits   Vision-Basic Assessment   Current Vision Wears glasses only for reading   Vision - Complex Assessment   Ocular Range of Motion Intact   Tracking Impaired  (noted to have delayed tracking, eyes jumping to catch up w/ target)   Saccades Impaired  (questionable if due to cognition and poor processing of instruction. Improved after visual demonstration however continues to overshoot/undershoot target)   Acuity Able to read clock/calendar on wall without difficulty;Able to read employee name badge without difficulty   Additional Comments Pt reports increased blurry vision in L eye as compared to baseline. Tracking appears somewhat delayed, L>R difficulty   Cognition   Overall Cognitive Status Impaired    Arousal/Participation Alert;Cooperative   Attention Attends with cues to redirect   Orientation Level Oriented X4   Memory Decreased short term memory;Decreased recall of recent events   Following Commands Follows one step commands without difficulty   Comments Pleasant and agreeable. Flat affect. Delayed processing and responses. Difficulty following multistep commands. See ACLS below   Cognition Assessment Tools (S)  ACLS   Score (S)  4.8   Assessment   Limitation Decreased Safe judgement during ADL;Decreased cognition;Decreased endurance;Decreased high-level ADLs  (visual deficit, attention to task, direction following, insight, problem solving, and initiation, response time, flat affect)   Prognosis Good   Assessment Pt is a 69 y.o. male seen for OT evaluation s/p admit to [facility] on 12/25/2024 w/Stroke-like symptoms. Prior to admission, pt was living with spouse in a 1 level house, fully (I) PTA, no AD, (+) falls, (+) . Personal and environmental factors affecting patient at time of evaluation include current habits and behavioral patterns, limited social support, inaccessibility to community, and difficulty completing IADLs. Personal factors supporting patient at time of evaluation include age, (I) PLOF, supportive spouse and family, attitude towards recovery, and FFSU. Based upon this evaluation, pt is functioning below baseline due to cognitive decline impacting safety/performance of IADLs. Recommend f/u w/ OPOT for congition, vision and participation in FITNESS TO DRIVE EVALUATION. No further acute OT needs identified at this time as pt is (I) w/ ADLs/mobility. Recommend continued active ADL participation and mobilization with hospital staff while in the hospital to increase pt’s endurance and strength upon D/C. From OT standpoint, recommend D/C to home with family support when medically cleared. D/C pt from OT caseload at this time.   Goals   Patient Goals to go home   Plan   Treatment  Interventions   (eval only)   OT Frequency Eval only   Discharge Recommendation   Rehab Resource Intensity Level, OT (S)  III (Minimum Resource Intensity)  (F/u w/ OPOT for cognition, vision and participation in FITNESS TO DRIVE EVALUATION if pt wishes to return to driving)   AM-PAC Daily Activity Inpatient   Lower Body Dressing 4   Bathing 4   Toileting 4   Upper Body Dressing 4   Grooming 4   Eating 4   Daily Activity Raw Score 24   Daily Activity Standardized Score (Calc for Raw Score >=11) 57.54   AM-PAC Applied Cognition Inpatient   Following a Speech/Presentation 2   Understanding Ordinary Conversation 3   Taking Medications 2   Remembering Where Things Are Placed or Put Away 3   Remembering List of 4-5 Errands 2   Taking Care of Complicated Tasks 2   Applied Cognition Raw Score 14   Applied Cognition Standardized Score 32.02   Chavez Cognitive Level   Chavez Cognitive Level Score (S)  4.8   Chavez Cognitive Score Recommendation (S)  Live alone with daily assistance   Chavez Cognitive Level Comments Assessment completed in quiet, well lit, distraction free environment. Pt able to complete running stitch followed by whipstitch, spontaneously recognized error and made additional running stitches w/o difficulty. Pt able to make x 3 whipstitch w/o difficulty and correct both cross in back and twisted lace errors after prompting. Pt unable to create cordovan stitch, entering from back to front and through loop from front to back as if it were a single step. Pt performance improved after demonstration, entering from front to back however continues to go through loop as if it were one step. After second demonstration pt able to create cordovan however unable to tighten as demonstrated and twisted lace w/ poor problem solving.   4.8    Administered Chavez Cognitive Level Screen (ACLS).  The patient scored 4.8/6.0 indicating that they may live alone with daily assistance to monitor personal safety and check problem solving  effectiveness.      Behavior:  Asks for verification.  Knows how 2 concurrent schedules fit together.  Fits daily routine into schedule of others.  Can avoid obvious hazards.  More flexible and willing to adapt to new ideas.  Insight into disability is fair.  Processing is delayed.  May be able to get to a regularly scheduled appointment without need for assistance. May frequently stop a task to examine objects. Seeks verification from others.  Grooming:  Checks results of grooming, hair styling and make up application.  Learns new grooming procedures slowly.  May rigidly follow prescribed routines.  Dressing: Considers all striking features of garments including color, pattern, condition and fit.  May not attend to cleanliness or consider social standards.  May don clothing slowly and check results in mirror.  Bathing:  May coordinate bathing schedule with others.  Attends to all features of bathing environment.  May check results and vary rate.  Reports low supplies to caregivers.   Walking/Exercising:  Learns new routes over several days.  Scans environment, reads street signs and attempts to use this information but may still get lost.  Understands explanations of safety hazards.  Learns an exercise program and does it without variation.  Eating:  Eats and attends to social conversation though may not be able to talk and eat at the same time.  Manages all utensils.  Follows a meal time schedule.  May be reminded to check food temperatures.  Toileting:  Independently performs usual toileting routine.  May not anticipate use of bathroom before trips.  May learn bathroom etiquette like conserving paper.  Medications:  Follows new prescriptions slowly.  Considers variables like time of day and amount but with difficulty.  Follows verbal explanations.  May follow a set schedule set up by others for renewing prescriptions.  Utilize a daily pill box (set up by someone else).  Use of adaptive equipment:  Recognizes loss  of strength, sensation and balance.  May learn a series of new actions slowly.    Housekeeping:  Initiates and completes a routine of housekeeping activities.  Learns new procedures slowly.  Notes all visible effects of cleaning and checking results.  Corrects visible errors one at a time.  May not anticipate supplies but reports low supplies to caregiver.  May not identify potential hazards related to electric, gas, toxins, poisons and improper storage/disposal of items.  Food preparation:  Does not plan for long term food needs.  May learn to follow a plan for purchasing food items.  May follow a weekly schedule for shopping.  Memorizes a new sequence of actions to prepare dishes.  Follows a written recipe in a rigid manner.  Check stove after use.  Spending Money:  Slowly follows budget or learns money management procedures.  May need assistance to adjust to change in income or usual banking procedures.  Shopping: Learns directions to new shopping areas.  May learn to use a shopping list or use money saving procedures like coupons.  May not read labels or consider whether it is practical or affordable.  Laundry:  Completes laundry routine in a fixed manner.  Memorizes procedures like using a new [employer].  May try to read new labels but needs assistance in interpreting them.  May overload machine.  Traveling:  Learns new routes or travel procedures slowly.  May learn to use a wheelchair but has difficulty maneuvering or estimating space requirements.  Does not anticipate hazards.  May follow safety precautions pointed out by others.  Telephone:  Learns to use a new telephone answering machine or pay phone slowly.  Does not vary actions. May memorize correct times to call others.   Driving:  Should NOT operate a motor vehicle    Mini-Cog Assessment   Word Version Version 1: Banana, Sunrise, Chair   Clock Draw (CDT) 0  (labeled hour hand at 10 and minute hand at 2)   Word Recall 1  (unable to recall sunrise or  chair despite cues)   Mini-Cog Score 1   Mini-Cog Results Positive screen for dementia   Additional Treatment Session   Start Time 0830   End Time 0844   Treatment Assessment Pt seen for additional treatment session to continue cognitive assessment and assist w/ safe DC planning. See ACLS above for scoring and recommendations.   Additional Treatment Day 1   End of Consult   Patient Position at End of Consult Bedside chair;All needs within reach   Nurse Communication Nurse aware of consult     CIRO Orantes, OTR/L  [place] License [id number]  [place] License [id number]

## 2024-12-26 NOTE — PLAN OF CARE
Problem: PHYSICAL THERAPY ADULT  Goal: Performs mobility at highest level of function for planned discharge setting.  See evaluation for individualized goals.  Description: Treatment/Interventions: Elevations, Therapeutic exercise, LE strengthening/ROM, Endurance training, Gait training, Patient/family training          See flowsheet documentation for full assessment, interventions and recommendations.  Note:    Problem List: Decreased strength, Decreased endurance, Impaired balance, Decreased mobility, Decreased safety awareness, Impaired sensation  Assessment: Pt has been less talkative than normal and more confused. In the ED pt complained of dysarthria along with right upper extremity and right lower extremity paresthesia/weakness. Dx: stroke like symptoms, dyslipidemia, and s/p fall. order placed for PT eval and tx. pt presents w/ comorbidities of CAD, CABG, dysphagia, cataract and hyperlipidemia and personal factors of stair(s) to enter home and positive fall history. pt presents w/ weakness, decreased endurance, impaired balance, gait deviations, altered sensation, decreased safety awareness, and fall risk. these impairments are evident in findings from physical examination (weakness and altered sensation), mobility assessment (need for standby assist w/ gait and stair use when usually mobilizing independently, tolerance to only 140 feet of ambulation, and need for cueing for mobility technique), and Barthel Index: 85/100 and Comfortable Gait Speed: 0.56 m/s (less than 1.0 m/s indicates need for intervention to address falls risk). pt needed input for task focus and mobility technique. pt is at risk for falls due to physical and safety awareness deficits. pt's clinical presentation is unstable/unpredictable (evident in need for standby assist w/ ambulation and stair use when usually mobilizing independently, tolerance to only 140 feet of ambulation, and need for input for mobility technique/safety). pt  needs inpatient PT tx to improve mobility deficits and progress mobility training as appropriate.        Rehab Resource Intensity Level, PT: III (Minimum Resource Intensity)    See flowsheet documentation for full assessment.

## 2024-12-26 NOTE — PROGRESS NOTES
Progress Note - Vascular Surgery   Name: Mateo Franco 69 y.o. male I MRN: [id number]  Unit/Bed#: S -01 I Date of Admission: 12/25/2024   Date of Service: 12/27/2024 I Hospital Day: 1    Assessment & Plan  Symptomatic carotid artery stenosis with infarction (HCC)  69-year-old male presenting 12/25 with right upper and lower extremity numbness, paresthesias, dysarthria, change in affect.  Symptoms now resolved.    Found to have 90% focal high-grade stenosis of left internal carotid artery, age-indeterminate left middle cerebral artery infarction.    12/25 CT Head: Loss of normal gray-white matter differentiation in the left globus pallidus (series 2, image 21) worrisome for age-indeterminate left middle cerebral artery infarction     12/25 CTA head/neck: 90% focal high-grade stenosis left internal carotid artery origin    12/26 VAS carotid: Right less than 50% stenosis internal carotid artery; left greater than 70% stenosis internal carotid artery    12/26 echo: EF 60%, possible PFO    12/26 MRI brain; acute infarcts in left cerebral hemisphere, internal capsule; no acute intracranial hemorrhage    Plan:  - OR today for left carotid endarterectomy   - NPO  - Continue ASA/Plavix, DVT ppx  - Follow-up neurology consult  Stroke-like symptoms  - As above  Dyslipidemia  - Continue statin    Subjective   Patient denies pain, nausea, vomiting, fever, chills, shortness of breath, chest pain.  Denies numbness or weakness.  Reporting strokelike symptoms have resolved.  Voiding and having bowel    Objective :  Temp:  [97.3 °F (36.3 °C)-98.2 °F (36.8 °C)] 97.3 °F (36.3 °C)  HR:  [58-78] 67  BP: (104-140)/(69-85) 128/73  Resp:  [16-21] 18  SpO2:  [95 %-98 %] 96 %  O2 Device: None (Room air)    I/O         12/24 0701 12/25 0700 12/25 0701 12/26 0700 12/26 0701 12/27 0700    P.O.  720 720    Total Intake(mL/kg)  720 (9) 720 (9.1)    Urine (mL/kg/hr)   400 (0.5)    Stool   0    Total Output   400    Net  +720 +320            Unmeasured Urine Occurrence  3 x     Unmeasured Stool Occurrence   0 x            Physical Exam  Gen: NAD, Resting in bed  Neuro: A&O, No focal deficits - motor/sensory grossly intact bilateral upper and lower extremities   Head: Normal Cephalic, Atraumatic  Eye: EOMI, No scleral icterus  CV: Regular rate, Cap refill <2 sec  Pulm: Normal work of breathing, No respiratory distress  Abd: Soft, Non-Distended, Non-Tender  Ext: No edema bilateral lower extremities, Non-tender  Skin: Warm, Dry, Intact      Lab Results: I have reviewed the following results:  Recent Labs     12/25/24  1224 12/25/24  1312 12/25/24  1439 12/26/24  0553 12/27/24  0546   WBC 11.64*  --   --  8.70 7.29   HGB 14.9  --   --  15.4 15.1   HCT 46.3  --   --  46.6 45.5     --   --  212 202   SODIUM 136  --   --  138 141   K 4.2  --   --  4.1 4.4     --   --  103 106   CO2 28  --   --  29 29   BUN 22  --   --  17 17   CREATININE 1.14  --   --  1.17 1.13   GLUC 91  --   --  99 107   MG  --   --   --  2.2  --    PHOS  --   --   --  3.2  --    ALB  --   --   --  4.0  --    PTT  --  24  --   --   --    INR  --  1.00  --   --   --    HSTNI0 5  --   --   --   --    HSTNI2  --   --  4  --   --      MRI brain wo contrast   Final Result by Mauricio Robles MD (12/26 1201)      Acute infarcts involving the left cerebral hemisphere and internal capsule as described above and predominantly watershed in distribution. No acute intracranial hemorrhage.      The study was marked in EPIC for immediate notification.      Workstation performed: [id number]         VAS carotid complete study   Final Result by Eduar Carlos MD (12/26 9551)      CT stroke alert brain   Final Result by Ghulam Ladd MD (12/25 4619)         1. Loss of normal gray-white matter differentiation in the left globus pallidus (series 2, image 21) worrisome for age-indeterminate left middle cerebral artery infarction.   2. No acute intracranial hemorrhage.   3. Mild  microangiopathy, also age indeterminant without the benefit of prior imaging..         Workstation performed: [id number]         CTA stroke alert (head/neck)   Final Result by Ghulam Ladd MD (12/25 1248)      1. At least 90% focal high-grade stenosis left internal carotid artery origin. Vascular surgery assessment advised.   2. Nonvisualization of the intradural segment of the right vertebral artery possibly related to vascular occlusion or flow that is too slow to detect.   3. Wisp like enhancement of the extradural right vertebral artery possibly on the basis of developmental hypoplasia and/or severe stenosis.      I personally provided preliminary results of this study with Dr. Markie French and Bob Ayers on 12/25/2024 at 12:38 p.m.      Workstation performed: [id number]         VAS intra-op ultrasound CEA    (Results Pending)     VTE Pharmacologic Prophylaxis: VTE covered by:  [Held by provider] enoxaparin, Subcutaneous, 40 mg at 12/26/24 0924      VTE Mechanical Prophylaxis: sequential compression device    ---  Rahda Castro MD  General Surgery PGY-II

## 2024-12-26 NOTE — PLAN OF CARE
Problem: PAIN - ADULT  Goal: Verbalizes/displays adequate comfort level or baseline comfort level  Description: Interventions:  - Encourage patient to monitor pain and request assistance  - Assess pain using appropriate pain scale  - Administer analgesics based on type and severity of pain and evaluate response  - Implement non-pharmacological measures as appropriate and evaluate response  - Consider cultural and social influences on pain and pain management  - Notify physician/advanced practitioner if interventions unsuccessful or patient reports new pain  Outcome: Progressing     Problem: INFECTION - ADULT  Goal: Absence or prevention of progression during hospitalization  Description: INTERVENTIONS:  - Assess and monitor for signs and symptoms of infection  - Monitor lab/diagnostic results  - Monitor all insertion sites, i.e. indwelling lines, tubes, and drains  - Monitor endotracheal if appropriate and nasal secretions for changes in amount and color  - [place] appropriate cooling/warming therapies per order  - Administer medications as ordered  - Instruct and encourage patient and family to use good hand hygiene technique  - Identify and instruct in appropriate isolation precautions for identified infection/condition  Outcome: Progressing  Goal: Absence of fever/infection during neutropenic period  Description: INTERVENTIONS:  - Monitor WBC    Outcome: Progressing     Problem: SAFETY ADULT  Goal: Patient will remain free of falls  Description: INTERVENTIONS:  - Educate patient/family on patient safety including physical limitations  - Instruct patient to call for assistance with activity   - Consult OT/PT to assist with strengthening/mobility   - Keep Call bell within reach  - Keep bed low and locked with side rails adjusted as appropriate  - Keep care items and personal belongings within reach  - Initiate and maintain comfort rounds  - Make Fall Risk Sign visible to staff  - Offer Toileting every 2 Hours,  in advance of need  - Initiate/Maintain bed and chair alarm  - Obtain necessary fall risk management equipment:   - Apply yellow socks and bracelet for high fall risk patients  - Consider moving patient to room near nurses station  Outcome: Progressing  Goal: Maintain or return to baseline ADL function  Description: INTERVENTIONS:  -  Assess patient's ability to carry out ADLs; assess patient's baseline for ADL function and identify physical deficits which impact ability to perform ADLs (bathing, care of mouth/teeth, toileting, grooming, dressing, etc.)  - Assess/evaluate cause of self-care deficits   - Assess range of motion  - Assess patient's mobility; develop plan if impaired  - Assess patient's need for assistive devices and provide as appropriate  - Encourage maximum independence but intervene and supervise when necessary  - Involve family in performance of ADLs  - Assess for home care needs following discharge   - Consider OT consult to assist with ADL evaluation and planning for discharge  - Provide patient education as appropriate  Outcome: Progressing  Goal: Maintains/Returns to pre admission functional level  Description: INTERVENTIONS:  - Perform AM-PAC 6 Click Basic Mobility/ Daily Activity assessment daily.  - Set and communicate daily mobility goal to care team and patient/family/caregiver.   - Collaborate with rehabilitation services on mobility goals if consulted  - Perform Range of Motion 3 times a day.  - Reposition patient every 2 hours.  - Dangle patient 3 times a day  - Stand patient 3 times a day  - Ambulate patient 3 times a day  - Out of bed to chair 3 times a day   - Out of bed for meals 3 times a day  - Out of bed for toileting  - Record patient progress and toleration of activity level   Outcome: Progressing     Problem: DISCHARGE PLANNING  Goal: Discharge to home or other facility with appropriate resources  Description: INTERVENTIONS:  - Identify barriers to discharge w/patient and  caregiver  - Arrange for needed discharge resources and transportation as appropriate  - Identify discharge learning needs (meds, wound care, etc.)  - Arrange for interpretive services to assist at discharge as needed  - Refer to Case Management Department for coordinating discharge planning if the patient needs post-hospital services based on physician/advanced practitioner order or complex needs related to functional status, cognitive ability, or social support system  Outcome: Progressing     Problem: Knowledge Deficit  Goal: Patient/family/caregiver demonstrates understanding of disease process, treatment plan, medications, and discharge instructions  Description: Complete learning assessment and assess knowledge base.  Interventions:  - Provide teaching at level of understanding  - Provide teaching via preferred learning methods  Outcome: Progressing

## 2024-12-26 NOTE — ASSESSMENT & PLAN NOTE
Continue home aspirin 81 mg daily   Placed on Lipitor 40 in setting of stroke protocol and adjust based on lipid   verbal instruction

## 2024-12-26 NOTE — ASSESSMENT & PLAN NOTE
RUE/RLE weakness, dysarthria- improving  CT Head: Loss of normal gray-white matter differentiation in the left globus pallidus (series 2, image 21) worrisome for age-indeterminate left middle cerebral artery infarction   CTA head/neck: 90% focal high-grade stenosis left internal carotid artery origin  - s/p asa/plavix load  - f/u ECHO with bubble study  - f/u carotid duplex  - f/u MRI brain  - f/u neuro consult  - given ICA stenosis and evidence of Left MCA infarction, would likely benefit from Left carotid intervention, however final recs pending rest of workup and attending attestation

## 2024-12-26 NOTE — CONSULTS
NEUROLOGY RESIDENCY CONSULT NOTE   Name: Mateo Franco   Age & Sex: 69 y.o. male   MRN: [id number]  Unit/Bed#: S -01   Encounter: [id number]  Length of Stay: 0    ASSESSMENT & PLAN   #Stroke-like symptoms  69 M presenting with recurrent episodes of recurrent right upper extremity numbness/paresthesias over the past 2 weeks  with flat affect and change in mood over the last 3 days observed by family. Patient also with unwitnessed fall prior to arrival. Stroke alert called on arrival with LKW night prior?? NIH 0. NC CTH L globidus pallidus grey/white differentiation. CTA showed LICA with 90% . TNK was not given d/t symptoms resolving.     NC CTH: Loss of normal gray-white matter differentiation in the left globus pallidus (series 2, image 21) worrisome for age-indeterminate left middle cerebral artery infarction.   CTA Head/Neck: At least 90% high-grade stenosis of the left ICA, none visualization of the intradural segment of the R vertebral artery possibly related to vascular occlusion versus flow-limiting state, enhancement of the extradural right vertebral artery possibly on the basis of developmental hypoplasia and/or severe stenosis.   MRI Brain: Pending  TTE: Pending  Labs: Hemoglobin A1c 6.1 (12/25/2024),  (12/25/2024)    Impression Concern for symptomatic Left ICA    Plan:  Stroke pathway with Telemetry monitoring  Permissive HTN first 24 Hrs  AC/AP: Load with ASA and Plavix and continue DAPT  Satin: High intensity statin with atorvastatin 80 mg QHS  Imaging: MRI Brain  TTE  Consult vascular surgery  PT/OT     SUBJECTIVE   Reason for Consult / Principal Problem: RUE Weakness and numbness  Hx and PE limited by: None    HPI: 69 M presenting with recurrent episodes of recurrent right upper extremity numbness/paresthesias over the past 2 weeks  with flat affect and change in mood over the last 3 days observed by family. Patient also with unwitnessed fall prior to arrival. Stroke alert called on  arrival with LKW night prior?? TNK was not given d/t symptoms resolving. NIH 0. NC CTH L globidus pallidus grey/white differentiation. CTA showed LICA with 90% .       Historical Information   Past Medical History:   Diagnosis Date    CAD (coronary artery disease)     Cancer (HCC)     basal skin ca    Colon polyp     Coronary artery disease     Former tobacco use     History of dysphagia     resolved    Hyperlipidemia     Pre-diabetes      Past Surgical History:   Procedure Laterality Date    CARDIAC CATHETERIZATION      COLONOSCOPY N/A 2018    Procedure: COLONOSCOPY;  Surgeon: Sven Payne MD;  Location: [facility];  Service: Gastroenterology    CORONARY ARTERY BYPASS GRAFT  2021    EGD  2019    FINGER AMPUTATION Left     partial 3rd finger    NM CORONARY ARTERY BYP W/VEIN & ARTERY GRAFT 3 VEIN N/A 2021    Procedure: CORONARY ARTERY BYPASS GRAFT (CABG) x 3; LIMA to LAD; Left EVH/SVG to OM1 and D1;  Surgeon: ALLISON Conde MD;  Location:  MAIN OR;  Service: Cardiac Surgery    NM XCAPSL CTRC RMVL INSJ IO LENS PROSTH W/O ECP Right 2023    Procedure: EXTRACTION EXTRACAPSULAR CATARACT PHACO INTRAOCULAR LENS (IOL);  Surgeon: Huy Barton MD;  Location: [facility] MAIN OR;  Service: Ophthalmology    SKIN BIOPSY       Social History   Social History     Substance and Sexual Activity   Alcohol Use Not Currently     Social History     Substance and Sexual Activity   Drug Use No     E-Cigarette/Vaping    E-Cigarette Use Never User      E-Cigarette/Vaping Substances    Nicotine No     THC No     CBD No     Flavoring No      Social History     Tobacco Use   Smoking Status Former    Current packs/day: 0.00    Average packs/day: 1 pack/day for 10.0 years (10.0 ttl pk-yrs)    Types: Cigarettes    Start date:     Quit date:     Years since quittin.0   Smokeless Tobacco Never   Tobacco Comments    quit      Family History: Family history non-contributory  Meds/Allergies    all current active meds have been reviewed  No Known Allergies    Review of previous medical records was completed.  Review of Systems   Constitutional:  Negative for chills, fatigue, fever and unexpected weight change.   HENT:  Negative for drooling, hearing loss, sinus pressure, sinus pain, tinnitus, trouble swallowing and voice change.    Eyes:  Negative for photophobia and visual disturbance.   Respiratory:  Negative for chest tightness and shortness of breath.    Cardiovascular:  Negative for chest pain, palpitations and leg swelling.   Gastrointestinal:  Negative for constipation, diarrhea and nausea.   Endocrine: Negative for cold intolerance.   Genitourinary:  Negative for difficulty urinating.   Musculoskeletal:  Negative for arthralgias, back pain, gait problem, myalgias, neck pain and neck stiffness.   Skin:  Negative for pallor and rash.   Neurological:  Positive for weakness. Negative for dizziness, tremors, seizures, syncope, facial asymmetry, speech difficulty, light-headedness, numbness and headaches.   Psychiatric/Behavioral:  Positive for behavioral problems. Negative for confusion, decreased concentration and sleep disturbance.        OBJECTIVE     Patient ID: Mateo Franco is a 69 y.o. male.    Vitals:   Vitals:    24 2220 24 0320 24 0520 24 0753   BP: 107/73 133/80 129/70 130/75   BP Location: Left arm Left arm Left arm    Pulse:  57  63   Resp:  18 18 16   Temp:   (!) 97.4 °F (36.3 °C) 97.6 °F (36.4 °C)   TempSrc:   Oral    SpO2:    97%   Weight:          Body mass index is 26.72 kg/m².     Intake/Output Summary (Last 24 hours) at 2024 0900  Last data filed at 2024 0553  Gross per 24 hour   Intake 720 ml   Output --   Net 720 ml       Temperature:   Temp (24hrs), Av.6 °F (36.4 °C), Min:97.1 °F (36.2 °C), Max:98.2 °F (36.8 °C)    Temperature: 97.6 °F (36.4 °C)    Invasive Devices:   Invasive Devices       Peripheral Intravenous Line  Duration              Peripheral IV 12/25/24 Left Antecubital <1 day                    GENERAL EXAM:  Constitutional: Not in acute distress. Not ill-appearing, toxic-appearing or diaphoretic.   HENT: Normocephalic and atraumatic. Nose and Ears normal.   Eyes: No scleral icterus. No discharge.   Cardiovascular:  Distal extremities warm without palpable edema or tenderness, no observed significant swelling.   Pulmonary: Pulmonary effort is normal. Not in respiratory distress  Musculoskeletal: No swelling or deformity.  Psychiatric: Normal behavior and appropriate affect     NEUROLOGIC  EXAM:  Mental Status: Alertness: alert, Orientation: time, date, person, place, city, president, Speech/Language fluent, clear, coherent, Commands: Can follow multistep commands  Cranial Nerves:  I Not tested   II Visual Fields normal   II, Ill,  IV, VI Pupils equal, round, reactive to light and accommodation  EOM full and intact   V facial sensation was normal and symmetrical   VII facial symmetry equal   VIII Normal hearing to speech   IX, X Normal Palatal Elevation, No Uvular Deviation   XI Shoulder shrug and head turn is normal   XII Midline tongue protrusion   Motor: Pronator Drift - Absent  Atrophy - No atrophy or muscle wasting  Normal Tone  No Involuntary Movements  No Tremors Appreciated  Arm Right  Left Leg Right  Left   Deltoid 5/5 5/5 lliopsoas 5/5 5/5   Biceps 5/5 5/5 Quads 5/5 5/5   Triceps 5/5 5/5 Hamstrings 5/5 5/5   Wrist Extension 5/5 5/5 Ankle Dorsi Flexion 5/5 5/5   Wrist Flexion 5/5 5/5 Ankle Plantar Flexion 5/5 5/5   Reflexes: No clonus, no Bo's, no cross abductors, toes down     BICEP   TRICEPS   BRACHIO   PATELLAR   ACHILLES   RIGHT 2+ 2+ 2+ 2+ 2+   LEFT 2+ 2+ 2+ 2+ 2+   Sensory: Normal sensation to light touch, pinprick, vibration, temperature, and proprioception in all limbs, romberg negative  Coordination: Cerebellar arm drift present. Finger To Nose: Right Intact, Left Intact. Heel To Shin: Right Intact, Left  "Intact  Station/Gait: Deferred d/t medical severity  LABORATORY DATA   Labs: I have personally reviewed pertinent labs.    CBC:  Results from last 7 days   Lab Units 12/26/24  0553 12/25/24  1224   WBC Thousand/uL 8.70 11.64*   RBC Million/uL 5.28 5.23   HEMOGLOBIN g/dL 15.4 14.9   HEMATOCRIT % 46.6 46.3   MCV fL 88 89   MCH pg 29.2 28.5   MCHC g/dL 33.0 32.2   RDW % 14.2 14.2   MPV fL 9.5 9.5   PLATELETS Thousands/uL 212 231   ,   Chemistry Profile:   Results from last 7 days   Lab Units 12/26/24  0553 12/25/24  1224   POTASSIUM mmol/L 4.1 4.2   CHLORIDE mmol/L 103 100   CO2 mmol/L 29 28   BUN mg/dL 17 22   CREATININE mg/dL 1.17 1.14   CALCIUM mg/dL 9.4 9.9   MAGNESIUM mg/dL 2.2  --    PHOSPHORUS mg/dL 3.2  --    EGFR ml/min/1.73sq m 64 66     Results from last 7 days   Lab Units 12/26/24  0553 12/25/24  1224   GLUCOSE RANDOM mg/dL 99 91      Recent Labs     12/25/24  1219   POCGLU 88     PT/INR:   Lab Results   Component Value Date    INR 1.00 12/25/2024   ,   Hemoglobin A1c 6.1 (12/25/2024),  (12/25/2024)    Micro: I have reviewed pertinent results.      No results found for: \"BLOODCX\", \"URINECX\", \"WOUNDCULT\", \"SPUTUMCULTUR\"  IMAGING STUDIES    Radiology Results: I have personally reviewed pertinent reports and reviewed films in PACS  CTA stroke alert (head/neck)  Result Date: 12/25/2024  Impression: 1. At least 90% focal high-grade stenosis left internal carotid artery origin. Vascular surgery assessment advised. 2. Nonvisualization of the intradural segment of the right vertebral artery possibly related to vascular occlusion or flow that is too slow to detect. 3. [place] like enhancement of the extradural right vertebral artery possibly on the basis of developmental hypoplasia and/or severe stenosis. I personally provided preliminary results of this study with Dr. Markie French and Bob Ayers on 12/25/2024 at 12:38 p.m. Workstation performed: [id number]     CT stroke alert brain  Result Date: " 12/25/2024  Impression: 1. Loss of normal gray-white matter differentiation in the left globus pallidus (series 2, image 21) worrisome for age-indeterminate left middle cerebral artery infarction. 2. No acute intracranial hemorrhage. 3. Mild microangiopathy, also age indeterminant without the benefit of prior imaging.. Workstation performed: [id number]      Other Diagnostic Results: I have personally reviewed pertinent reports  No results found for this or any previous visit.    Encounter Date: 12/25/24   ECG 12 lead   Result Value    Ventricular Rate 71    Atrial Rate 71    WV Interval 168    QRSD Interval 134    QT Interval 422    QTC Interval 458    P Axis 51    QRS Axis 35    T Wave Axis 39        ACTIVE MEDICATIONS   Scheduled PRN   aspirin, 81 mg, Daily  atorvastatin, 80 mg, QPM  clopidogrel, 75 mg, Daily  enoxaparin, 40 mg, Daily          Continuous          Prior to Admission medications    Medication Sig Start Date End Date Taking? Authorizing Provider   ascorbic acid (VITAMIN C) 500 mg tablet Take 500 mg by mouth daily    Historical Provider, MD   aspirin (ECOTRIN LOW STRENGTH) 81 mg EC tablet Take 1 tablet (81 mg total) by mouth daily 5/21/24   Jesse Nichole MD   atorvastatin (LIPITOR) 20 mg tablet Take 1 tablet (20 mg total) by mouth daily with dinner 12/18/23   Jesse Nichole MD   cholecalciferol (VITAMIN D3) 1,000 units tablet Take 1,000 Units by mouth daily    Historical Provider, MD   co-enzyme Q-10 30 MG capsule Take 200 mg by mouth daily    Historical Provider, MD   Multiple Vitamin (multivitamin) tablet Take 1 tablet by mouth daily    Historical Provider, MD       VTE Mechanical Prophylaxis: Sequential Compression Device  VTE Pharmacologic Prophylaxis: VTE covered by:  enoxaparin, Subcutaneous, 40 mg at 12/25/24 1524      CODE STATUS & ADVANCED DIRECTIVES   Code Status: Level 1 - Full Code  Advance Directive and Living Will:      Power of :    POLST:     ====================================================  Elder Woodard,  [facility]'s Neurology Residency, PGY-3

## 2024-12-26 NOTE — UTILIZATION REVIEW
"Initial Clinical Review    OBSERVATION 12/25 CHANGED TO INPATIENT ON 12/26 @ 1411 - STROKE ALERT WITH + MRI BRAIN FOR STROKE AND GOING TO OR ON 12/27 FOR CEA.      Admission: Date/Time/Statement:   Admission Orders (From admission, onward)       Ordered        12/26/24 1411  INPATIENT ADMISSION  Once            12/25/24 1333  Place in Observation  Once                          Orders Placed This Encounter   Procedures    INPATIENT ADMISSION     Standing Status:   Standing     Number of Occurrences:   1     Level of Care:   Med Surg [16]     Estimated length of stay:   More than 2 Midnights     Certification:   I certify that inpatient services are medically necessary for this patient for a duration of greater than two midnights. See H&P and MD Progress Notes for additional information about the patient's course of treatment.     ED Arrival Information       Expected   -    Arrival   12/25/2024 12:09    Acuity   Emergent              Means of arrival   Ambulance    Escorted by   [facility]    Service   Hospitalist    Admission type   Emergency              Arrival complaint   EMS             Chief Complaint   Patient presents with    CVA/TIA-like Symptoms     Pt arrives via EMS from home with \"not being himself\" for days. Wife reports to EMS right hand weakness that started today at 1130       Initial Presentation: 69 y.o. male presents to the ED via EMS from home with c/o fall on day of admit, has not been himself x several days, less talkative, confused, dysarthria RUE, RLE paresthesia, weakness.  PMH: CABG, HLD, CAD.  In the ED stroke alert called. Labs - + leukocytosis.  Imaging - 90% focal stenosis of left internal carotid artery along with other stenotic findings right vertebral artery. Treated with ASA, Plavix, sq Lovenox.  On exam motor weakness, 4/5 with flexion of right elbow, 4/5 strength left dorsi flexion.  Admitted to Observation Status with Stroke-like symptoms -   PLAN: continue ASA, Plavix, statin, " MRI Brain, lipid panel, tele, neuro and vascular consult, therapy evals.      Anticipated Length of Stay/Certification Statement:  Patient will be admitted on an observation basis with an anticipated length of stay of less than 2 midnights secondary to stroke like symptoms.     12/25 Neuro Consult - NIHSS 0 - no TNK, symptoms resolving.  CTH L globidus pallidus grey/white differentiation. CTA showed LICA with 90%.  MRI Brain pending. Echo, continue tele, vascular consult, therapy evals. No deficits on exam.      Date: 12/26  CHANGED TO INPATIENT STATUS TODAY:     Stroke alert, stroke like symptoms - MRI Brain - acute infarcts involving the L cerebral hemisphere and internal capsule as described above and predominantly watershed distribution. Pt will have CEA on 12/27 NPO p MN, hold Lovenox.  Echo with bubble washout, suggesting possible L to R flow through PFO or ASD. Mobility score 10/16.  MRI - mild to moderate stroke burden left hemisphere correlating to symptomatic left extracranial carotid.  On exam having episodes including this morning of blurry vision in the left eye, and yesterday events of blurry vision in the left eye multiple times as well. Keep BP at higher end of normal.      12/26 Vascular Surgery Consult - has irregular compliance with statin.  Known carotid stenosis.  Imaging - high-grade near occlusive left carotid stenosis with some irregular plaque projection into the lumen. Small stroke burden seen on MRI in the left cerebral hemisphere internal capsule area. Neuro symptoms resolved. Needs CEA and agreeable will do 12/27.      12/26 Cardio Consult - may proceed with CEA w/ stenting w/o cardiac work up.  Mod surgical risk.  Continue AP, statin.  There is no benefit to PFO closure even if he has one. Would recommend holding off on repeat DERECK at this time.     Date: 12/27  Day 2 IP:   Stroke alert, stroke like symptoms - pt is on the schedule for CEA at 1230 today. NPO.  A line inserted for OR.       +++++++++++++++++++  12/27 OPERATIVE NOTE      +++++++++++++++++++    ED Treatment-Medication Administration from 12/25/2024 1209 to 12/25/2024 1819         Date/Time Order Dose Route Action     12/25/2024 1222 iohexol (OMNIPAQUE) 350 MG/ML injection (SINGLE-DOSE) 75 mL 75 mL Intravenous Given     12/25/2024 1308 aspirin chewable tablet 243 mg 243 mg Oral Given     12/25/2024 1308 clopidogrel (PLAVIX) tablet 300 mg 300 mg Oral Given     12/25/2024 1524 enoxaparin (LOVENOX) subcutaneous injection 40 mg 40 mg Subcutaneous Given            Scheduled Medications:  aspirin, 81 mg, Oral, Daily  atorvastatin, 80 mg, Oral, QPM  clopidogrel, 75 mg, Oral, Daily  enoxaparin, 40 mg, Subcutaneous, Daily      Continuous IV Infusions:     PRN Meds:     ED Triage Vitals   Temperature Pulse Respirations Blood Pressure SpO2 Pain Score   12/25/24 1743 12/25/24 1218 12/25/24 1218 12/25/24 1218 12/25/24 1212 12/25/24 1820   97.7 °F (36.5 °C) 67 18 142/73 100 % No Pain     Weight (last 2 days)       Date/Time Weight    12/26/24 1000 79.4 (175)    12/25/24 1820 79.7 (175.71)            Vital Signs (last 3 days)       Date/Time Temp Pulse Resp BP MAP (mmHg) SpO2 O2 Device Patient Position - Orthostatic VS [place] Coma Scale Score Pain    12/27/24 07:49:42 97.7 °F (36.5 °C) 62 -- 127/65 86 97 % -- -- -- --    12/26/24 23:22:27 97.3 °F (36.3 °C) 67 -- 128/73 91 96 % -- -- -- --    12/26/24 2120 -- -- -- -- -- -- -- -- 15 No Pain    12/26/24 19:19:57 97.8 °F (36.6 °C) 78 18 104/71 82 95 % -- -- -- --    12/26/24 15:27:59 98.2 °F (36.8 °C) 72 -- 139/85 103 96 % -- -- -- --    12/26/24 1320 -- -- -- -- -- -- -- -- 15 --    12/26/24 11:21:37 97.6 °F (36.4 °C) 58 21 140/69 93 98 % None (Room air) Lying -- --    12/26/24 1000 -- 63 -- 130/75 -- -- -- -- -- --    12/26/24 0920 -- -- -- -- -- -- -- -- 15 No Pain    12/26/24 0827 -- -- -- -- -- -- -- -- -- No Pain    12/26/24 0808 -- -- -- -- -- -- -- -- -- No Pain    12/26/24 07:53:19 97.6 °F  (36.4 °C) 63 16 130/75 93 97 % -- -- -- --    12/26/24 0520 97.4 °F (36.3 °C) -- 18 129/70 90 -- None (Room air) Lying -- --    12/26/24 0320 -- 57 18 133/80 98 -- None (Room air) Lying -- --    12/25/24 2220 -- -- -- 107/73 84 -- -- -- -- --    12/25/24 21:24:12 97.1 °F (36.2 °C) 60 18 131/62 85 95 % -- -- -- --    12/25/24 2020 -- -- 18 120/69 86 94 % None (Room air) Lying -- --    12/25/24 1920 97.4 °F (36.3 °C) 61 20 116/72 87 95 % None (Room air) Lying 15 --    12/25/24 1845 -- -- -- -- -- -- -- -- -- No Pain    12/25/24 18:23:06 98.2 °F (36.8 °C) -- -- 143/77 99 -- -- -- -- --    12/25/24 1820 -- -- -- -- -- -- -- -- 15 No Pain    12/25/24 1745 -- 57 22 121/67 89 96 % -- -- -- --    12/25/24 1743 97.7 °F (36.5 °C) -- -- -- -- -- -- -- -- --    12/25/24 1700 -- 59 24 122/71 -- 96 % -- -- 15 --    12/25/24 1615 -- 59 20 122/66 88 97 % -- -- -- --    12/25/24 1600 -- -- -- -- -- -- -- -- 15 --    12/25/24 1527 -- 58 22 122/63 88 97 % -- -- -- --    12/25/24 1500 -- -- -- -- -- -- -- -- 15 --    12/25/24 1445 -- -- -- -- -- -- -- -- 15 --    12/25/24 1430 -- 59 20 128/67 92 97 % -- -- 15 --    12/25/24 1415 -- -- -- -- -- -- -- -- 15 --    12/25/24 1400 -- -- 16 124/65 -- 97 % -- -- 15 --    12/25/24 1345 -- -- 21 128/67 -- 98 % -- -- 15 --    12/25/24 1330 -- 62 18 141/70 98 97 % -- -- 15 --    12/25/24 1315 -- -- -- -- -- -- -- -- 15 --    12/25/24 1300 -- -- -- -- -- -- -- -- 15 --    12/25/24 1245 -- 69 21 143/75 -- 98 % -- -- 15 --    12/25/24 1230 -- 76 18 138/63 -- 97 % -- -- 15 --    12/25/24 1218 -- 67 18 142/73 -- 97 % None (Room air) Sitting 15 --    12/25/24 1212 -- -- -- -- -- 100 % -- -- -- --              Pertinent Labs/Diagnostic Test Results:   Radiology:  MRI brain wo contrast   Final Interpretation by Mauricio Robles MD (12/26 8031)      Acute infarcts involving the left cerebral hemisphere and internal capsule as described above and predominantly watershed in distribution. No acute intracranial  hemorrhage.      The study was marked in EPIC for immediate notification.      Workstation performed: [id number]         VAS carotid complete study   Final Interpretation by Eduar Carlos MD (12/26 8634)      CT stroke alert brain   Final Interpretation by Ghulam Ladd MD (12/25 3961)         1. Loss of normal gray-white matter differentiation in the left globus pallidus (series 2, image 21) worrisome for age-indeterminate left middle cerebral artery infarction.   2. No acute intracranial hemorrhage.   3. Mild microangiopathy, also age indeterminant without the benefit of prior imaging..         Workstation performed: [id number]         CTA stroke alert (head/neck)   Final Interpretation by Ghulam Ladd MD (12/25 0258)      1. At least 90% focal high-grade stenosis left internal carotid artery origin. Vascular surgery assessment advised.   2. Nonvisualization of the intradural segment of the right vertebral artery possibly related to vascular occlusion or flow that is too slow to detect.   3. Wisp like enhancement of the extradural right vertebral artery possibly on the basis of developmental hypoplasia and/or severe stenosis.      I personally provided preliminary results of this study with Dr. Markie French and Bob Ayers on 12/25/2024 at 12:38 p.m.      Workstation performed: [id number]         VAS intra-op ultrasound CEA    (Results Pending)     Cardiology:  Echo complete w/ contrast if indicated   Final Result by Earlene Morton MD (12/26 1141)   Addendum (preliminary) 1 of 1 by Earlene Morton MD (12/26 1141)        Left Ventricle: Left ventricular cavity size is normal. Wall thickness    is normal. There is borderline concentric hypertrophy. The left    ventricular ejection fraction is 60%. Systolic function is normal. Wall    motion is normal. Diastolic function is normal.     IVS: There is abnormal septal motion consistent with post-operative    status.     Atrial Septum: There is a  possible patent foramen ovale.  There was no    right-to-left shunting visualized with contrast saline study done at rest,    with cough, and with Valsalva.  However, there was bubble washout adjacent    to the intra-atrial septum suggesting possible left-to-right flow through    possible PFO or ASD.     Tricuspid Valve: There is mild regurgitation.      There was no right-to-left shunting visualized with contrast saline study    done at rest, with cough, and with Valsalva. However, there was bubble    washout adjacent to the intra-atrial septum suggesting possible    left-to-right flow through PFO or ASD.  Additionally, review of    transesophageal echocardiogram done in 4/2021 shows possible PFO. No    definitive flow across PFO seen. Recommend DERECK if clinically indicated.         ECG 12 lead    by Interface, Ris Results In (12/25 1235)        GI:  No orders to display           Results from last 7 days   Lab Units 12/27/24  0546 12/26/24  0553 12/25/24  1224   WBC Thousand/uL 7.29 8.70 11.64*   HEMOGLOBIN g/dL 15.1 15.4 14.9   HEMATOCRIT % 45.5 46.6 46.3   PLATELETS Thousands/uL 202 212 231         Results from last 7 days   Lab Units 12/27/24  0546 12/26/24  0553 12/25/24  1224   SODIUM mmol/L 141 138 136   POTASSIUM mmol/L 4.4 4.1 4.2   CHLORIDE mmol/L 106 103 100   CO2 mmol/L 29 29 28   ANION GAP mmol/L 6 6 8   BUN mg/dL 17 17 22   CREATININE mg/dL 1.13 1.17 1.14   EGFR ml/min/1.73sq m 66 64 66   CALCIUM mg/dL 9.5 9.4 9.9   MAGNESIUM mg/dL  --  2.2  --    PHOSPHORUS mg/dL  --  3.2  --      Results from last 7 days   Lab Units 12/26/24  0553   ALBUMIN g/dL 4.0     Results from last 7 days   Lab Units 12/25/24  1219   POC GLUCOSE mg/dl 88     Results from last 7 days   Lab Units 12/27/24  0546 12/26/24  0553 12/25/24  1224   GLUCOSE RANDOM mg/dL 107 99 91         Results from last 7 days   Lab Units 12/25/24  1224   HEMOGLOBIN A1C % 6.1*   EAG mg/dl 128     Results from last 7 days   Lab Units 12/25/24  1439  12/25/24  1224   HS TNI 0HR ng/L  --  5   HS TNI 2HR ng/L 4  --    HSTNI D2 ng/L -1  --          Results from last 7 days   Lab Units 12/25/24  1312   PROTIME seconds 13.9   INR  1.00   PTT seconds 24     Past Medical History:   Diagnosis Date    CAD (coronary artery disease)     Cancer (HCC)     basal skin ca    Colon polyp     Coronary artery disease     Former tobacco use     History of dysphagia     resolved    Hyperlipidemia     Pre-diabetes      Present on Admission:   Dyslipidemia   Coronary artery disease   Stroke-like symptoms      Admitting Diagnosis: CVA (cerebral vascular accident) (HCC) [I63.9]  Stenosis of left carotid artery [I65.22]  Right sided weakness [R53.1]  Ambulatory dysfunction [R26.2]  Age/Sex: 69 y.o. male    Network Utilization Review Department  ATTENTION: Please call with any questions or concerns to [phone] and carefully listen to the prompts so that you are directed to the right person. All voicemails are confidential.   For Discharge needs, contact Care Management DC Support Team at [phone] opt. 2  Send all requests for admission clinical reviews, approved or denied determinations and any other requests to dedicated fax number below belonging to the campus where the patient is receiving treatment. List of dedicated fax numbers for the Facilities:  FACILITY NAME UR FAX NUMBER   ADMISSION DENIALS (Administrative/Medical Necessity) [phone]   DISCHARGE SUPPORT TEAM (NETWORK) [phone]   PARENT CHILD HEALTH (Maternity/NICU/Pediatrics) [phone]   [facility] [phone]   [facility] [phone]   [facility] [phone]   [facility] [phone]   [facility] [phone]   [facility] [phone]   [facility] [phone]   [facility] [phone]    [facility] [phone]   [facility] [phone]   [facility] [phone]   [facility] [phone]

## 2024-12-26 NOTE — PROGRESS NOTES
"Progress Note - Neurology   Name: Mateo Franco 69 y.o. male I MRN: [id number]  Unit/Bed#: S -01 I Date of Admission: 12/25/2024   Date of Service: 12/26/2024 I Hospital Day: 0  { ?Quick Links I Problem List I PORCH I Billing Tip:17248}  Assessment & Plan  Stroke-like symptoms    Dyslipidemia    Coronary artery disease    S/P CABG (coronary artery bypass graft)      {Neurology Follow Up:30624}  {Admin Statements (Optional):58667}    Subjective   Patient seen and examined this morning at bedside. No acute overnight events. ***    Review of Systems  {Ros - neuro:39689}  Complete 14 point review of systems completed and was otherwise unremarkable aside noted above.    Objective :  {?Quick Links I ICU Summary I Vitals I I/Os I LDAs I Mobility (PT/OT) I Code Status / ACP   ?Quick Links I Active Meds I Pain Meds I Antibiotics I Anticoagulants:04391}  Temp:  [97.1 °F (36.2 °C)-98.2 °F (36.8 °C)] 97.6 °F (36.4 °C)  HR:  [57-76] 63  BP: (107-143)/(62-80) 130/75  Resp:  [16-24] 16  SpO2:  [94 %-100 %] 97 %  O2 Device: None (Room air)    GENERAL EXAM:  General Appearance: {:31958::\"in no apparent distress\",\"well developed and well nourished\",\"non-toxic\",\"in no respiratory distress and acyanotic\"}  HENT: Normocephalic and atraumatic. Nose and Ears normal.   Eyes: No scleral icterus. No discharge.   Cardiovascular:  Distal extremities warm without palpable edema or tenderness, no observed significant swelling.   Pulmonary: Pulmonary effort is normal. Not in respiratory distress  Musculoskeletal: No swelling or deformity.  Psychiatric: Normal behavior and appropriate affect     NEUROLOGIC  EXAM:  Mental Status: Alertness: {Findings; LOC exam:99145::\"alert\"}. Orientation: {Orientation:20044}. Speech/Language {SPEECH:69972}. Commands: {commands (Optional):91728::\"Can follow multistep commands\"}  Cranial Nerves:  I Not tested   II Visual Fields {Findings; eye exam visual field defects :72795::\"normal\"}   II, Ill,  IV, VI " "Pupils {Findings; pupil exam:08215::\"equal, round, reactive to light and accommodation\"}  EOM {Extra-ocular movements:803004::\"full and intact\"}   V {EXAM; NEURO PED CN 5 DETAIL:25421}   VII {Facial symmetry:79561}   VIII Normal hearing to speech   IX, X Normal Palatal Elevation, No Uvular Deviation   XI Shoulder shrug and head turn is normal   XII Midline tongue protrusion   Motor:{adneuro (Optional):99372::\"Pronator Drift - Absent\",\"Atrophy - No atrophy or muscle wasting\",\"Normal Tone\",\"No Involuntary Movements\",\"No Tremors Appreciated\"}  Arm Right  Left Leg Right  Left   Deltoid {tmmotorexam:84923::\"5/5\"} {tmmotorexam:50507::\"5/5\"} lliopsoas {tmmotorexam:44147::\"5/5\"} {tmmotorexam:04462::\"5/5\"}   Biceps {tmmotorexam:19159::\"5/5\"} {tmmotorexam:19722::\"5/5\"} Quads {tmmotorexam:28651::\"5/5\"} {tmmotorexam:80492::\"5/5\"}   Triceps {tmmotorexam:36770::\"5/5\"} {tmmotorexam:53070::\"5/5\"} Hamstrings {tmmotorexam:31263::\"5/5\"} {tmmotorexam:61561::\"5/5\"}   Wrist Extension {tmmotorexam:76564::\"5/5\"} {tmmotorexam:20780::\"5/5\"} Ankle Dorsiflexion {tmmotorexam:83313::\"5/5\"} {tmmotorexam:77257::\"5/5\"}   Wrist Flexion {tmmotorexam:89533::\"5/5\"} {tmmotorexam:51735::\"5/5\"} Ankle Plantarflexion {tmmotorexam:54953::\"5/5\"} {tmmotorexam:06064::\"5/5\"}   Reflexes: {ADPEREFLEX:36747::\"Reflexes are normal and symmetric\",\"DTR's are 2/4 in all tested locations\",\"Ankle Clonus: Absent\",\"Babinski: Bilateral toes downgoing\",\"Bo Sign: Absent\"}  Sensory: {ADNESENSORY (Optional):89510::\"Normal sensation to light touch, pinprick, vibration, temperature, and proprioception in all limbs, romberg negative\"}  Coordination: Finger To Nose:  R - {:30602::\"Intact\"}, L - {:16213::\"Intact\"}, Heel To Shin: R - {:28312::\"Intact\"}, L - {:11351::\"Intact\"}  Gait: Not tested    {?Quick Links I Lab Review I Micro Results I Radiology I Cardiology:65704}  Lab Results: I have reviewed the following results:  CBC:  Results from last 7 days   Lab Units 12/26/24  0553 " "12/25/24  1224   WBC Thousand/uL 8.70 11.64*   RBC Million/uL 5.28 5.23   HEMOGLOBIN g/dL 15.4 14.9   HEMATOCRIT % 46.6 46.3   MCV fL 88 89   MCH pg 29.2 28.5   MCHC g/dL 33.0 32.2   RDW % 14.2 14.2   MPV fL 9.5 9.5   PLATELETS Thousands/uL 212 231   ,   Chemistry Profile:   Results from last 7 days   Lab Units 12/26/24  0553 12/25/24  1224   POTASSIUM mmol/L 4.1 4.2   CHLORIDE mmol/L 103 100   CO2 mmol/L 29 28   BUN mg/dL 17 22   CREATININE mg/dL 1.17 1.14   CALCIUM mg/dL 9.4 9.9   MAGNESIUM mg/dL 2.2  --    PHOSPHORUS mg/dL 3.2  --    EGFR ml/min/1.73sq m 64 66     Results from last 7 days   Lab Units 12/26/24  0553 12/25/24  1224   GLUCOSE RANDOM mg/dL 99 91      Recent Labs     12/25/24  1219   POCGLU 88     PT/INR:   Lab Results   Component Value Date    INR 1.00 12/25/2024   ,   Hemoglobin A1c 6.1 (12/25/2024),  (12/25/2024)    Micro: I have reviewed pertinent results.        Invalid input(s): \"BACTERIAUA\"  No results found for: \"BLOODCX\", \"URINECX\", \"WOUNDCULT\", \"SPUTUMCULTUR\"    Imaging Results Review: I reviewed radiology reports from this admission including: {Radiology Studies:64132}.  CTA stroke alert (head/neck)  Result Date: 12/25/2024  Impression: 1. At least 90% focal high-grade stenosis left internal carotid artery origin. Vascular surgery assessment advised. 2. Nonvisualization of the intradural segment of the right vertebral artery possibly related to vascular occlusion or flow that is too slow to detect. 3. Wisp like enhancement of the extradural right vertebral artery possibly on the basis of developmental hypoplasia and/or severe stenosis. I personally provided preliminary results of this study with Dr. Markie French and Bob Ayers on 12/25/2024 at 12:38 p.m. Workstation performed: [id number]     CT stroke alert brain  Result Date: 12/25/2024  Impression: 1. Loss of normal gray-white matter differentiation in the left globus pallidus (series 2, image 21) worrisome for age-indeterminate " left middle cerebral artery infarction. 2. No acute intracranial hemorrhage. 3. Mild microangiopathy, also age indeterminant without the benefit of prior imaging.. Workstation performed: [id number]      Other Diagnostic Results: I have personally reviewed pertinent reports  No results found for this or any previous visit.      ACTIVE MEDICATIONS   Scheduled PRN   aspirin, 81 mg, Daily  atorvastatin, 80 mg, QPM  clopidogrel, 75 mg, Daily  enoxaparin, 40 mg, Daily          Continuous          VTE Mechanical Prophylaxis: Sequential Compression Device  VTE Pharmacologic Prophylaxis: VTE covered by:  enoxaparin, Subcutaneous, 40 mg at 12/25/24 1524      ====================================================  Elder Woodard,  [facility], PGY-3

## 2024-12-26 NOTE — ASSESSMENT & PLAN NOTE
69-year-old male presenting 12/25 with right upper and lower extremity numbness, paresthesias, dysarthria, change in affect.  Symptoms now resolved.    Found to have 90% focal high-grade stenosis of left internal carotid artery, age-indeterminate left middle cerebral artery infarction.    12/25 CT Head: Loss of normal gray-white matter differentiation in the left globus pallidus (series 2, image 21) worrisome for age-indeterminate left middle cerebral artery infarction     12/25 CTA head/neck: 90% focal high-grade stenosis left internal carotid artery origin    12/26 VAS carotid: Right less than 50% stenosis internal carotid artery; left greater than 70% stenosis internal carotid artery    12/26 echo: EF 60%, possible PFO    12/26 MRI brain; acute infarcts in left cerebral hemisphere, internal capsule; no acute intracranial hemorrhage    Plan:  - OR today for left carotid endarterectomy   - NPO  - Continue ASA/Plavix, DVT ppx  - Follow-up neurology consult

## 2024-12-27 ENCOUNTER — ANESTHESIA (INPATIENT)
Dept: PERIOP | Facility: HOSPITAL | Age: 69
DRG: 038 | End: 2024-12-27
Payer: COMMERCIAL

## 2024-12-27 ENCOUNTER — ANESTHESIA EVENT (INPATIENT)
Dept: PERIOP | Facility: HOSPITAL | Age: 69
DRG: 038 | End: 2024-12-27
Payer: COMMERCIAL

## 2024-12-27 ENCOUNTER — HOSPITAL ENCOUNTER (INPATIENT)
Dept: VASCULAR ULTRASOUND | Facility: HOSPITAL | Age: 69
Discharge: HOME | DRG: 038 | End: 2024-12-27
Payer: COMMERCIAL

## 2024-12-27 LAB
ANION GAP SERPL CALCULATED.3IONS-SCNC: 6 MMOL/L (ref 4–13)
BUN SERPL-MCNC: 17 MG/DL (ref 5–25)
CALCIUM SERPL-MCNC: 9.5 MG/DL (ref 8.4–10.2)
CHLORIDE SERPL-SCNC: 106 MMOL/L (ref 96–108)
CO2 SERPL-SCNC: 29 MMOL/L (ref 21–32)
CREAT SERPL-MCNC: 1.13 MG/DL (ref 0.6–1.3)
ERYTHROCYTE [DISTWIDTH] IN BLOOD BY AUTOMATED COUNT: 14.2 % (ref 11.6–15.1)
GFR SERPL CREATININE-BSD FRML MDRD: 66 ML/MIN/1.73SQ M
GLUCOSE SERPL-MCNC: 107 MG/DL (ref 65–140)
GLUCOSE SERPL-MCNC: 83 MG/DL (ref 65–140)
HCT VFR BLD AUTO: 45.5 % (ref 36.5–49.3)
HGB BLD-MCNC: 15.1 G/DL (ref 12–17)
KCT BLD-ACNC: 183 SEC (ref 89–137)
KCT BLD-ACNC: 195 SEC (ref 89–137)
KCT BLD-ACNC: 201 SEC (ref 89–137)
MCH RBC QN AUTO: 29.4 PG (ref 26.8–34.3)
MCHC RBC AUTO-ENTMCNC: 33.2 G/DL (ref 31.4–37.4)
MCV RBC AUTO: 89 FL (ref 82–98)
PLATELET # BLD AUTO: 202 THOUSANDS/UL (ref 149–390)
PMV BLD AUTO: 9.5 FL (ref 8.9–12.7)
POTASSIUM SERPL-SCNC: 4.4 MMOL/L (ref 3.5–5.3)
RBC # BLD AUTO: 5.14 MILLION/UL (ref 3.88–5.62)
SODIUM SERPL-SCNC: 141 MMOL/L (ref 135–147)
SPECIMEN SOURCE: ABNORMAL
WBC # BLD AUTO: 7.29 THOUSAND/UL (ref 4.31–10.16)

## 2024-12-27 PROCEDURE — 93882 EXTRACRANIAL UNI/LTD STUDY: CPT

## 2024-12-27 PROCEDURE — C1781 MESH (IMPLANTABLE): HCPCS | Performed by: SURGERY

## 2024-12-27 PROCEDURE — 85347 COAGULATION TIME ACTIVATED: CPT

## 2024-12-27 PROCEDURE — 80048 BASIC METABOLIC PNL TOTAL CA: CPT

## 2024-12-27 PROCEDURE — NC001 PR NO CHARGE: Performed by: SURGERY

## 2024-12-27 PROCEDURE — 03CJ0ZZ EXTIRPATION OF MATTER FROM LEFT COMMON CAROTID ARTERY, OPEN APPROACH: ICD-10-PCS | Performed by: SURGERY

## 2024-12-27 PROCEDURE — 35301 RECHANNELING OF ARTERY: CPT | Performed by: SURGERY

## 2024-12-27 PROCEDURE — 82948 REAGENT STRIP/BLOOD GLUCOSE: CPT

## 2024-12-27 PROCEDURE — 03UJ0KZ SUPPLEMENT LEFT COMMON CAROTID ARTERY WITH NONAUTOLOGOUS TISSUE SUBSTITUTE, OPEN APPROACH: ICD-10-PCS | Performed by: SURGERY

## 2024-12-27 PROCEDURE — 99232 SBSQ HOSP IP/OBS MODERATE 35: CPT | Performed by: SURGERY

## 2024-12-27 PROCEDURE — 85027 COMPLETE CBC AUTOMATED: CPT

## 2024-12-27 DEVICE — XENOSURE BIOLOGIC PATCH, 0.8CM X 8CM, EIFU
Type: IMPLANTABLE DEVICE | Site: CAROTID | Status: FUNCTIONAL
Brand: XENOSURE BIOLOGIC PATCH

## 2024-12-27 RX ORDER — HEPARIN SODIUM 1000 [USP'U]/ML
INJECTION, SOLUTION INTRAVENOUS; SUBCUTANEOUS AS NEEDED
Status: DISCONTINUED | OUTPATIENT
Start: 2024-12-27 | End: 2024-12-27

## 2024-12-27 RX ORDER — CEFAZOLIN SODIUM 1 G/3ML
INJECTION, POWDER, FOR SOLUTION INTRAMUSCULAR; INTRAVENOUS AS NEEDED
Status: DISCONTINUED | OUTPATIENT
Start: 2024-12-27 | End: 2024-12-27

## 2024-12-27 RX ORDER — DEXAMETHASONE SODIUM PHOSPHATE 10 MG/ML
INJECTION, SOLUTION INTRAMUSCULAR; INTRAVENOUS AS NEEDED
Status: DISCONTINUED | OUTPATIENT
Start: 2024-12-27 | End: 2024-12-27

## 2024-12-27 RX ORDER — CLOPIDOGREL BISULFATE 75 MG/1
75 TABLET ORAL DAILY
COMMUNITY
End: 2024-12-28

## 2024-12-27 RX ORDER — HYDRALAZINE HYDROCHLORIDE 20 MG/ML
15 INJECTION INTRAMUSCULAR; INTRAVENOUS
Status: DISCONTINUED | OUTPATIENT
Start: 2024-12-27 | End: 2024-12-28 | Stop reason: HOSPADM

## 2024-12-27 RX ORDER — LIDOCAINE HYDROCHLORIDE 20 MG/ML
INJECTION, SOLUTION EPIDURAL; INFILTRATION; INTRACAUDAL; PERINEURAL AS NEEDED
Status: DISCONTINUED | OUTPATIENT
Start: 2024-12-27 | End: 2024-12-27

## 2024-12-27 RX ORDER — PROTAMINE SULFATE 10 MG/ML
INJECTION, SOLUTION INTRAVENOUS AS NEEDED
Status: DISCONTINUED | OUTPATIENT
Start: 2024-12-27 | End: 2024-12-27

## 2024-12-27 RX ORDER — PROPOFOL 10 MG/ML
INJECTION, EMULSION INTRAVENOUS AS NEEDED
Status: DISCONTINUED | OUTPATIENT
Start: 2024-12-27 | End: 2024-12-27

## 2024-12-27 RX ORDER — CHLORHEXIDINE GLUCONATE ORAL RINSE 1.2 MG/ML
15 SOLUTION DENTAL EVERY 12 HOURS SCHEDULED
Status: CANCELLED | OUTPATIENT
Start: 2024-12-27

## 2024-12-27 RX ORDER — SODIUM CHLORIDE, SODIUM LACTATE, POTASSIUM CHLORIDE, CALCIUM CHLORIDE 600; 310; 30; 20 MG/100ML; MG/100ML; MG/100ML; MG/100ML
INJECTION, SOLUTION INTRAVENOUS CONTINUOUS PRN
Status: DISCONTINUED | OUTPATIENT
Start: 2024-12-27 | End: 2024-12-27

## 2024-12-27 RX ORDER — ROCURONIUM BROMIDE 10 MG/ML
INJECTION, SOLUTION INTRAVENOUS AS NEEDED
Status: DISCONTINUED | OUTPATIENT
Start: 2024-12-27 | End: 2024-12-27

## 2024-12-27 RX ORDER — FENTANYL CITRATE/PF 50 MCG/ML
50 SYRINGE (ML) INJECTION
Refills: 0 | Status: DISCONTINUED | OUTPATIENT
Start: 2024-12-27 | End: 2024-12-27 | Stop reason: HOSPADM

## 2024-12-27 RX ORDER — HEPARIN SODIUM 5000 [USP'U]/ML
5000 INJECTION, SOLUTION INTRAVENOUS; SUBCUTANEOUS EVERY 8 HOURS SCHEDULED
Status: DISCONTINUED | OUTPATIENT
Start: 2024-12-27 | End: 2024-12-27

## 2024-12-27 RX ORDER — CEFAZOLIN SODIUM 2 G/50ML
2000 SOLUTION INTRAVENOUS ONCE
OUTPATIENT
Start: 2024-12-27

## 2024-12-27 RX ORDER — ONDANSETRON 2 MG/ML
INJECTION INTRAMUSCULAR; INTRAVENOUS AS NEEDED
Status: DISCONTINUED | OUTPATIENT
Start: 2024-12-27 | End: 2024-12-27

## 2024-12-27 RX ORDER — BUPIVACAINE HYDROCHLORIDE AND EPINEPHRINE 5; 5 MG/ML; UG/ML
INJECTION, SOLUTION PERINEURAL AS NEEDED
Status: DISCONTINUED | OUTPATIENT
Start: 2024-12-27 | End: 2024-12-27 | Stop reason: HOSPADM

## 2024-12-27 RX ORDER — SODIUM CHLORIDE 9 MG/ML
INJECTION, SOLUTION INTRAVENOUS CONTINUOUS PRN
Status: DISCONTINUED | OUTPATIENT
Start: 2024-12-27 | End: 2024-12-27

## 2024-12-27 RX ORDER — FENTANYL CITRATE 50 UG/ML
INJECTION, SOLUTION INTRAMUSCULAR; INTRAVENOUS AS NEEDED
Status: DISCONTINUED | OUTPATIENT
Start: 2024-12-27 | End: 2024-12-27

## 2024-12-27 RX ORDER — HEPARIN SODIUM 5000 [USP'U]/ML
5000 INJECTION, SOLUTION INTRAVENOUS; SUBCUTANEOUS EVERY 8 HOURS SCHEDULED
Status: DISCONTINUED | OUTPATIENT
Start: 2024-12-27 | End: 2024-12-28 | Stop reason: HOSPADM

## 2024-12-27 RX ORDER — LIDOCAINE HYDROCHLORIDE 10 MG/ML
INJECTION, SOLUTION EPIDURAL; INFILTRATION; INTRACAUDAL; PERINEURAL AS NEEDED
Status: DISCONTINUED | OUTPATIENT
Start: 2024-12-27 | End: 2024-12-27 | Stop reason: HOSPADM

## 2024-12-27 RX ORDER — CHLORHEXIDINE GLUCONATE ORAL RINSE 1.2 MG/ML
15 SOLUTION DENTAL EVERY 12 HOURS SCHEDULED
Status: DISCONTINUED | OUTPATIENT
Start: 2024-12-27 | End: 2024-12-28 | Stop reason: HOSPADM

## 2024-12-27 RX ORDER — SODIUM CHLORIDE, SODIUM LACTATE, POTASSIUM CHLORIDE, CALCIUM CHLORIDE 600; 310; 30; 20 MG/100ML; MG/100ML; MG/100ML; MG/100ML
20 INJECTION, SOLUTION INTRAVENOUS CONTINUOUS
OUTPATIENT
Start: 2024-12-27

## 2024-12-27 RX ORDER — ONDANSETRON 2 MG/ML
4 INJECTION INTRAMUSCULAR; INTRAVENOUS ONCE AS NEEDED
Status: DISCONTINUED | OUTPATIENT
Start: 2024-12-27 | End: 2024-12-27 | Stop reason: HOSPADM

## 2024-12-27 RX ORDER — MIDAZOLAM HYDROCHLORIDE 2 MG/2ML
INJECTION, SOLUTION INTRAMUSCULAR; INTRAVENOUS AS NEEDED
Status: DISCONTINUED | OUTPATIENT
Start: 2024-12-27 | End: 2024-12-27

## 2024-12-27 RX ORDER — LABETALOL HYDROCHLORIDE 5 MG/ML
5 INJECTION, SOLUTION INTRAVENOUS
Status: DISCONTINUED | OUTPATIENT
Start: 2024-12-27 | End: 2024-12-28 | Stop reason: HOSPADM

## 2024-12-27 RX ORDER — SODIUM CHLORIDE, SODIUM LACTATE, POTASSIUM CHLORIDE, CALCIUM CHLORIDE 600; 310; 30; 20 MG/100ML; MG/100ML; MG/100ML; MG/100ML
75 INJECTION, SOLUTION INTRAVENOUS CONTINUOUS
Status: DISCONTINUED | OUTPATIENT
Start: 2024-12-27 | End: 2024-12-27

## 2024-12-27 RX ADMIN — CHLORHEXIDINE GLUCONATE 15 ML: 1.2 SOLUTION ORAL at 21:36

## 2024-12-27 RX ADMIN — ENOXAPARIN SODIUM 40 MG: 40 INJECTION SUBCUTANEOUS at 09:09

## 2024-12-27 RX ADMIN — DEXAMETHASONE SODIUM PHOSPHATE 10 MG: 10 INJECTION, SOLUTION INTRAMUSCULAR; INTRAVENOUS at 12:58

## 2024-12-27 RX ADMIN — SODIUM CHLORIDE, SODIUM LACTATE, POTASSIUM CHLORIDE, CALCIUM CHLORIDE: 600; 310; 30; 20 INJECTION, SOLUTION INTRAVENOUS at 14:13

## 2024-12-27 RX ADMIN — PROPOFOL 110 MG: 10 INJECTION, EMULSION INTRAVENOUS at 12:58

## 2024-12-27 RX ADMIN — SODIUM CHLORIDE: 9 INJECTION, SOLUTION INTRAVENOUS at 13:48

## 2024-12-27 RX ADMIN — LIDOCAINE HYDROCHLORIDE 80 MG: 20 INJECTION, SOLUTION EPIDURAL; INFILTRATION; INTRACAUDAL; PERINEURAL at 12:58

## 2024-12-27 RX ADMIN — ASPIRIN 81 MG: 81 TABLET, COATED ORAL at 09:09

## 2024-12-27 RX ADMIN — HEPARIN SODIUM 1000 UNITS: 1000 INJECTION, SOLUTION INTRAVENOUS; SUBCUTANEOUS at 14:28

## 2024-12-27 RX ADMIN — HEPARIN SODIUM 1000 UNITS: 1000 INJECTION, SOLUTION INTRAVENOUS; SUBCUTANEOUS at 15:05

## 2024-12-27 RX ADMIN — ROCURONIUM BROMIDE 20 MG: 10 INJECTION, SOLUTION INTRAVENOUS at 14:55

## 2024-12-27 RX ADMIN — ROCURONIUM BROMIDE 30 MG: 10 INJECTION, SOLUTION INTRAVENOUS at 13:47

## 2024-12-27 RX ADMIN — FENTANYL CITRATE 50 MCG: 50 INJECTION, SOLUTION INTRAMUSCULAR; INTRAVENOUS at 12:58

## 2024-12-27 RX ADMIN — ROCURONIUM BROMIDE 50 MG: 10 INJECTION, SOLUTION INTRAVENOUS at 12:58

## 2024-12-27 RX ADMIN — FENTANYL CITRATE 50 MCG: 50 INJECTION, SOLUTION INTRAMUSCULAR; INTRAVENOUS at 13:30

## 2024-12-27 RX ADMIN — ONDANSETRON 4 MG: 2 INJECTION INTRAMUSCULAR; INTRAVENOUS at 13:10

## 2024-12-27 RX ADMIN — HEPARIN SODIUM 7000 UNITS: 1000 INJECTION, SOLUTION INTRAVENOUS; SUBCUTANEOUS at 14:03

## 2024-12-27 RX ADMIN — PROTAMINE SULFATE 50 MG: 10 INJECTION, SOLUTION INTRAVENOUS at 15:42

## 2024-12-27 RX ADMIN — CEFAZOLIN SODIUM 2000 MG: 1 INJECTION, POWDER, FOR SOLUTION INTRAMUSCULAR; INTRAVENOUS at 13:19

## 2024-12-27 RX ADMIN — CLOPIDOGREL BISULFATE 75 MG: 75 TABLET ORAL at 09:09

## 2024-12-27 RX ADMIN — HEPARIN SODIUM 5000 UNITS: 5000 INJECTION INTRAVENOUS; SUBCUTANEOUS at 21:36

## 2024-12-27 RX ADMIN — MIDAZOLAM HYDROCHLORIDE 2 MG: 2 INJECTION, SOLUTION INTRAMUSCULAR; INTRAVENOUS at 12:49

## 2024-12-27 RX ADMIN — SODIUM CHLORIDE, SODIUM LACTATE, POTASSIUM CHLORIDE, CALCIUM CHLORIDE: 600; 310; 30; 20 INJECTION, SOLUTION INTRAVENOUS at 12:53

## 2024-12-27 NOTE — QUICK NOTE
POST-OP CHECK NOTE:      Subjective:  69 y.o. male Day of Surgery s/p L CEA w/ Dr. Carlos.  Patients pain is well controlled. He denies any nausea, chest pain, shortness of breath, headaches, or confusion/weakness. Patient has not eaten anything since surgery.      Objective:  Vitals:    12/27/24 1700 12/27/24 1715 12/27/24 1730 12/27/24 1745   BP: 112/57 101/54     BP Location:       Pulse: 70 66 66 57   Resp: 20 20 (!) 29    Temp:   98.2 °F (36.8 °C)    TempSrc:   Oral    SpO2: 95% 94% 96% 96%   Weight:       Height:            Physical Exam:  General: well-developed, well-nourished male lying in bed in NAD, appears comfortable  HEENT: normocephalic, atraumatic, EOMI, normal external nose & ears, MMM  Neck: well-approximated L carotid incision with superficial ecchymosis however no subcutaneous edema, hematoma, or induration. Incisions appropriately tender  Cardiovascular: grossly well perfused perfused, regular rate.  Lungs: normal WOB on RA. No increased respiratory effort.  Abdomen: Soft, non-tender, non-distended.  Extremities: No edema, clubbing or cyanosis  Neuro: CN II-XII intact, neurovascularly intact distally in 4/4 extremities w/ 5/5 strength  (upper) and plantar/dorsiflexion (lower)  Skin: Warm, dry. No rashes, ecchymoses, or abrasions.    Assessment & Plan  Symptomatic carotid artery stenosis with infarction (HCC)  69-year-old male presenting 12/25 with right upper and lower extremity numbness, paresthesias, dysarthria, change in affect.  Symptoms now resolved.    Found to have 90% focal high-grade stenosis of left internal carotid artery, age-indeterminate left middle cerebral artery infarction.    12/25 CT Head: Loss of normal gray-white matter differentiation in the left globus pallidus (series 2, image 21) worrisome for age-indeterminate left middle cerebral artery infarction     12/25 CTA head/neck: 90% focal high-grade stenosis left internal carotid artery origin    12/26 VAS carotid: Right  less than 50% stenosis internal carotid artery; left greater than 70% stenosis internal carotid artery    12/26 echo: EF 60%, possible PFO    12/26 MRI brain; acute infarcts in left cerebral hemisphere, internal capsule; no acute intracranial hemorrhage    S/p L CEA w/ Dr. Carlos on 12/27. No post-op neuro deficits    Plan:  - patient okay for CLD, advance diet as tolerated  - appreciate ICU monitoring post-operatively  - permissive HTN (SBP goal: 100-160)   -prn BP medications ordered for outside parameters  - hold plavix & ASA  - follow up am labs  Stroke-like symptoms  - As above  Dyslipidemia  - Continue statin  Coronary artery disease    S/P CABG (coronary artery bypass graft)

## 2024-12-27 NOTE — PLAN OF CARE
Problem: SAFETY ADULT  Goal: Patient will remain free of falls  Description: INTERVENTIONS:  - Educate patient/family on patient safety including physical limitations  - Instruct patient to call for assistance with activity   - Consult OT/PT to assist with strengthening/mobility   - Keep Call bell within reach  - Keep bed low and locked with side rails adjusted as appropriate  - Keep care items and personal belongings within reach  - Initiate and maintain comfort rounds  - Make Fall Risk Sign visible to staff  - Offer Toileting every 2 Hours, in advance of need  - Initiate/Maintain 2alarm  - Obtain necessary fall risk management equipment: 2  - Apply yellow socks and bracelet for high fall risk patients  - Consider moving patient to room near nurses station  Outcome: Progressing  Goal: Maintain or return to baseline ADL function  Description: INTERVENTIONS:  -  Assess patient's ability to carry out ADLs; assess patient's baseline for ADL function and identify physical deficits which impact ability to perform ADLs (bathing, care of mouth/teeth, toileting, grooming, dressing, etc.)  - Assess/evaluate cause of self-care deficits   - Assess range of motion  - Assess patient's mobility; develop plan if impaired  - Assess patient's need for assistive devices and provide as appropriate  - Encourage maximum independence but intervene and supervise when necessary  - Involve family in performance of ADLs  - Assess for home care needs following discharge   - Consider OT consult to assist with ADL evaluation and planning for discharge  - Provide patient education as appropriate  Outcome: Progressing  Goal: Maintains/Returns to pre admission functional level  Description: INTERVENTIONS:  - Perform AM-PAC 6 Click Basic Mobility/ Daily Activity assessment daily.  - Set and communicate daily mobility goal to care team and patient/family/caregiver.   - Collaborate with rehabilitation services on mobility goals if consulted  -  Perform Range of Motion 2 times a day.  - Reposition patient every 2 hours.  - Dangle patient 2 times a day  - Stand patient 2 times a day  - Ambulate patient 2 times a day  - Out of bed to chair 2 times a day   - Out of bed for meals 2 times a day  - Out of bed for toileting  - Record patient progress and toleration of activity level   Outcome: Progressing

## 2024-12-27 NOTE — ANESTHESIA PROCEDURE NOTES
Arterial Line Insertion    Performed by: Yessenia Grijalva MD  Authorized by: Yessenia Grijalva MD  Consent: Verbal consent obtained. Written consent obtained.  Risks and benefits: risks, benefits and alternatives were discussed  Consent given by: patient  Patient understanding: patient states understanding of the procedure being performed  Patient consent: the patient's understanding of the procedure matches consent given  Procedure consent: procedure consent matches procedure scheduled  Relevant documents: relevant documents present and verified  Patient identity confirmed: verbally with patient, arm band and hospital-assigned identification number  Preparation: Patient was prepped and draped in the usual sterile fashion.  Indications: hemodynamic monitoring  Orientation:  Left  Location: radial artery  Sedation:  Patient sedated: yes (GETA)    Procedure Details:  Needle gauge: 20  Number of attempts: 1    Post-procedure:  Post-procedure: dressing applied  Waveform: good waveform  Patient tolerance: Patient tolerated the procedure well with no immediate complications  Comments: Ultrasound guidance  Single poke  Single arterial puncture  Patient tolerated well with no immediate complications

## 2024-12-27 NOTE — TREATMENT PLAN
Unable to physically/directly encounter patient today due to being off floor for vascular procedure.  Status post left endarterectomy today with findings of a left proximal ICA soft plaque/thrombus.  Plan for transfer to the ICU from PACU once bed available.  Continue ongoing medical management for acute/chronic medical issues.

## 2024-12-27 NOTE — ASSESSMENT & PLAN NOTE
69-year-old male presenting 12/25 with right upper and lower extremity numbness, paresthesias, dysarthria, change in affect.  Symptoms now resolved.    Found to have 90% focal high-grade stenosis of left internal carotid artery, age-indeterminate left middle cerebral artery infarction.    12/25 CT Head: Loss of normal gray-white matter differentiation in the left globus pallidus (series 2, image 21) worrisome for age-indeterminate left middle cerebral artery infarction     12/25 CTA head/neck: 90% focal high-grade stenosis left internal carotid artery origin    12/26 VAS carotid: Right less than 50% stenosis internal carotid artery; left greater than 70% stenosis internal carotid artery    12/26 echo: EF 60%, possible PFO    12/26 MRI brain; acute infarcts in left cerebral hemisphere, internal capsule; no acute intracranial hemorrhage    S/p L CEA w/ Dr. Carlos on 12/27. No post-op neuro deficits    Plan:  - patient okay for CLD, advance diet as tolerated  - appreciate ICU monitoring post-operatively  - permissive HTN (SBP goal: 100-160)   -prn BP medications ordered for outside parameters  - hold plavix & ASA  - follow up am labs

## 2024-12-27 NOTE — ANESTHESIA POSTPROCEDURE EVALUATION
Post-Op Assessment Note    CV Status:  Stable  Pain Score: 0    Pain management: adequate       Mental Status:  Alert and awake   Hydration Status:  Euvolemic   PONV Controlled:  Controlled   Airway Patency:  Patent  Airway: intubated     Post Op Vitals Reviewed: Yes    No anethesia notable event occurred.    Staff: CRNA           Last Filed PACU Vitals:  Vitals Value Taken Time   Temp 97.4    Pulse 78    /59    Resp 17    SpO2 97        Modified Shane:  No data recorded

## 2024-12-27 NOTE — QUICK NOTE
No deficits on exam today.  Mri Brain scan without contrast reviewed mild to moderate stroke burden left hemisphere correlating to symptomatic left extracranial carotid.  The patient is also been having episodes including this morning of blurry vision in the left eye, and yesterday events of blurry vision in the left eye multiple times as well.  Per discussion with vascular team in text communication given the highly unstable plaque in the carotid industry, repetitive events, and the mild to moderate stroke burden where hemorrhagic conversion can still occur but the risk is lower, per review of risks and benefits okay for carotid surgery tomorrow from neurologic standpoint.  Would recommend for blood pressures to be kept at the higher end of normal to prevent the development of watershed strokes.

## 2024-12-27 NOTE — OP NOTE
OPERATIVE REPORT  PATIENT NAME: Mateo Franco    :  1955  MRN: [id number]  Pt Location: AN OR ROOM 01    SURGERY DATE: 2024    Surgeons and Role:     * Eduar Carlos MD - Primary  Mukesh Serrato MD - Surgical Resident    Preop Diagnosis:  Stenosis of left carotid artery [I65.22]  CVA (cerebral vascular accident) (HCC) [I63.9]    Post-Op Diagnosis Codes:     * Stenosis of left carotid artery [I65.22]     * CVA (cerebral vascular accident) (HCC) [I63.9]    Procedure(s):  Left - ENDARTERECTOMY ARTERY CAROTID    Specimen(s):  * No specimens in log *    Estimated Blood Loss:   100cc    Drains:  * No LDAs found *    Anesthesia Type:   General    Operative Indications:  Stenosis of left carotid artery [I65.22]  CVA (cerebral vascular accident) (HCC) [I63.9]      Operative Findings:  Ulcerated plaque in the proximal left internal carotid artery with soft plaque and thrombus    Complications:   None    Procedure and Technique:  The patient was brought to the operating room and placed on the operating table in the supine position. The patient was identified by verbal confirmation and armband identification. The patient was prepped and draped in usual sterile fashion and a formal timeout was called.    A transverse incision was made in a skin fold overlying the previously marked left carotid bifurcation. Dissection was carried through the subcutaneous tissue and platysma to the anterior border the sternocleidomastoid muscle. The Weitleaner retractor was inserted.  The internal jugular vein was mobilized and retracted laterally and the facial vein was ligated and divided to expose the carotid bifurcation. With careful mobilization the proximal and distal carotid artery were dissected and encircled with Vesseloops. The external carotid artery was separately encircled with a vessel loop as was the superior laryngeal artery. The hypoglossal nerve was carefully mobilized and preserved. When adequate mobilization  of the internal carotid artery was achieved the patient received 8000 units of heparin. After suitable delay traction was placed on the vessel loops to achieve vascular control. An arteriotomy was created in the CCA extending into the left ICA using 11 blade and Pott's scissors.  An 8 Swedish [place] shunt was then inserted into the internal carotid artery with good backbleeding noted from the brain. The shunt was secured with a Gorge clamp. The proximal end of the [place] shunt was placed in the common carotid.  We performed an endarterectomy using [place] elevator and Mark's forceps.  The distal plaque was transected to a smooth edge.  The distal plaque was then tacked using 3 separate tacking sutures of 7-0 prolene.  The proximal endpoint was developed by transecting the plaque at the CCA.  The proximal endpoint was tacked with 2 separate tacking sutures of 7-0 Prolene.  The external carotid plaque was removed by eversion endarterectomy.  The endartectomized surface was throughly flushed with heparinized saline and all debris were meticulously removed.  A bovine pericardial patch was then obtained.  It was sutured using standard 4-quadrant technique using 6-0 prolene sutures. When the suture line was nearly completed the shunt was removed the vessels were flushed vigorously in both directions. The suture line was tied down and flow was restored first in the external and then the internal carotid artery.    Duplex ultrasound was brought to the field and insonation of the common external and internal carotid arteries showed excellent waveforms and no technical defects.    50 mg of protamine was administered and FloSeal procoagulant was instilled in the operative field. The skin edges were infiltrated with half percent Marcaine with epinephrine and closure was then achieved with a running 3-0 Monocryl suture for the platysma and a 4-0 Monocryl subcuticular skin closure.  Exofin was applied.    The patient awoke and  was moving all 4 extremities. The patient was transferred to recovery in stable condition          I was present for the entire procedure.    Patient Disposition:  PACU              SIGNATURE: Eduar Carlos MD  DATE: December 27, 2024  TIME: 4:39 PM    Vascular Quality Initiative - Carotid Endarterectomy     Urgency:  Urgent    Anesthesia: General Type: Conventional    Side: left    Patch Type: Bovine Pericardial     If Prosthetic, Patch : Vicki    Shunt: Yes- Routine    Skin Prep: Chlorhexidine    Drain: no  Heparin: yes    Protamine: yes      Dextran: no     Re-explore artery after closure: no    Total Procedure time: 140min    Monitoring:   EEG: no   Stump Pressure: no   Other: no    Completion Study:   Doppler: no   Duplex: yes   Arteriogram: no    Concomitant Procedure:   Proximal Endovascular: no   Distal Endovascular: no   CABG: no   Other Arterial Op: no

## 2024-12-27 NOTE — ANESTHESIA PREPROCEDURE EVALUATION
Procedure:  ENDARTERECTOMY ARTERY CAROTID (Left: Neck)    EKG:  NSR with BBB    Echo 12/2024:    Left Ventricle: Left ventricular cavity size is normal. Wall thickness is normal. There is borderline concentric hypertrophy. The left ventricular ejection fraction is 60%. Systolic function is normal. Wall motion is normal. Diastolic function is normal.    IVS: There is abnormal septal motion consistent with post-operative status.    Atrial Septum: There is a possible patent foramen ovale.  There was no right-to-left shunting visualized with contrast saline study done at rest, with cough, and with Valsalva.  However, there was bubble washout adjacent to the intra-atrial septum suggesting possible left-to-right flow through possible PFO or ASD.    Tricuspid Valve: There is mild regurgitation.     There was no right-to-left shunting visualized with contrast saline study done at rest, with cough, and with Valsalva. However, there was bubble washout adjacent to the intra-atrial septum suggesting possible left-to-right flow through PFO or ASD.  Additionally, review of transesophageal echocardiogram done in 4/2021 shows possible PFO. No definitive flow across PFO seen. Recommend DERECK if clinically indicated.    Relevant Problems   CARDIO   (+) Coronary artery disease   (+) Stenosis of left carotid artery   (+) Symptomatic carotid artery stenosis with infarction (HCC)      GI/HEPATIC   (+) Hiatal hernia      MUSCULOSKELETAL   (+) Hiatal hernia     Highly unstable plaques noted on Carotid imaging, per Neruology.     Previously had RUE and RLE numbness but neither present currently    Meds:  ASA and plavix today  METS >4  NPO since before midnight    Physical Exam    Airway    Mallampati score: II         Dental   No notable dental hx     Cardiovascular  Cardiovascular exam normal    Pulmonary  Pulmonary exam normal     Other Findings          Anesthesia Plan  ASA Score- 4     Anesthesia Type- general with ASA Monitors.          Additional Monitors: arterial line.    Airway Plan: ETT.           Plan Factors-Exercise tolerance (METS): >4 METS.    Chart reviewed. EKG reviewed. Imaging results reviewed.  Patient summary reviewed.    Patient is not a current smoker.              Induction- intravenous.    Postoperative Plan- Plan for postoperative opioid use. Planned trial extubation    Perioperative Resuscitation Plan - Level 1 - Full Code.       Informed Consent- Anesthetic plan and risks discussed with patient.  I personally reviewed this patient with the CRNA. Discussed and agreed on the Anesthesia Plan with the CRNA..

## 2024-12-27 NOTE — CASE MANAGEMENT
Case Management Assessment & Discharge Planning Note    Patient name Mateo Franco  Location OR POOL/OR POOL MRN [id number]  : 1955 Date 2024       Current Admission Date: 2024  Current Admission Diagnosis:Symptomatic carotid artery stenosis with infarction (HCC)   Patient Active Problem List    Diagnosis Date Noted Date Diagnosed    Symptomatic carotid artery stenosis with infarction (HCC) 2024     Stroke-like symptoms 2024     Stenosis of left carotid artery 10/06/2021     S/P CABG (coronary artery bypass graft) 2021     Coronary artery disease 2021     Elevated troponin 2021     Hiatal hernia 2020     Polyp of colon 2018     Dyslipidemia 2018     Vitamin D deficiency 2018       LOS (days): 1  Geometric Mean LOS (GMLOS) (days):   Days to GMLOS:     OBJECTIVE:    Risk of Unplanned Readmission Score: 8.13         Current admission status: Inpatient       Preferred Pharmacy:   [facility] [address]   [address] [facility] [address]  Phone: [phone] Fax: [phone]    Primary Care Provider: Jesse Braga DO    Primary Insurance: CIGNEENA  Secondary Insurance:     ASSESSMENT:  Active Health Care Proxies    There are no active Health Care Proxies on file.                 Readmission Root Cause  30 Day Readmission: No    Patient Information  Admitted from:: Home  Mental Status: Alert  During Assessment patient was accompanied by: Spouse  Assessment information provided by:: Patient, Spouse  Primary Caregiver: Self  Support Systems: Self, Spouse/significant other  County of Residence: [place]  What city do you live in?: [place]  Home entry access options. Select all that apply.: Stairs  Number of steps to enter home.: 3  Type of Current Residence: [facility]  Living Arrangements: Lives w/ Spouse/significant other    Activities of Daily Living Prior to Admission  Functional Status:  Independent  Completes ADLs independently?: Yes  Ambulates independently?: Yes  Does patient use assisted devices?: No  Does patient have a history of Outpatient Therapy (PT/OT)?: Yes  Does the patient have a history of Short-Term Rehab?: No  Does patient have a history of HHC?: No  Does patient currently have HHC?: No         Patient Information Continued  Income Source: Pension/[employer]  Does patient have prescription coverage?: Yes  Does patient receive dialysis treatments?: No  Does patient have a history of substance abuse?: No  Does patient have a history of Mental Health Diagnosis?: No         Means of Transportation  Means of Transport to Appts:: Drives Self          DISCHARGE DETAILS:    Discharge planning discussed with:: Patient, spouse  Freedom of Choice: Yes  Comments - Freedom of Choice: Home w/ OPPT  CM contacted family/caregiver?: Yes (Spouse present at bedside)  Were Treatment Team discharge recommendations reviewed with patient/caregiver?: Yes  Did patient/caregiver verbalize understanding of patient care needs?: Yes  Were patient/caregiver advised of the risks associated with not following Treatment Team discharge recommendations?: Yes    Contacts  Patient Contacts: Patient, spouse  Contact Method: In Person  Reason/Outcome: Continuity of Care, Emergency Contact, Discharge Planning    Requested Home Health Care         Is the patient interested in HHC at discharge?: No    DME Referral Provided  Referral made for DME?: No    Other Referral/Resources/Interventions Provided:  Interventions: Other (Specify)  Referral Comments: CM spoke with pt and spouse at bedside, introduced self and role with dcp. Pt reported he lives with his spouse in a ranch style home with about 3 ELIDA. Pt reported being fully IPTA, does not own or use DME. Pt reported driving self. Pt reported [facility] and PCP is . Pt preference is to f/u with OPPT. No further CM needs noted.    Treatment Team  Recommendation: Home, Outpatient Rehab  Discharge Destination Plan:: Home, Outpatient Rehab  Transport at Discharge : Family

## 2024-12-28 VITALS
WEIGHT: 175 LBS | TEMPERATURE: 98.7 F | BODY MASS INDEX: 26.52 KG/M2 | HEART RATE: 73 BPM | OXYGEN SATURATION: 99 % | DIASTOLIC BLOOD PRESSURE: 59 MMHG | RESPIRATION RATE: 24 BRPM | HEIGHT: 68 IN | SYSTOLIC BLOOD PRESSURE: 127 MMHG

## 2024-12-28 LAB
ABO GROUP BLD BPU: NORMAL
ABO GROUP BLD BPU: NORMAL
ANION GAP SERPL CALCULATED.3IONS-SCNC: 9 MMOL/L (ref 4–13)
APTT PPP: 27 SECONDS (ref 23–34)
BPU ID: NORMAL
BPU ID: NORMAL
BUN SERPL-MCNC: 16 MG/DL (ref 5–25)
CALCIUM SERPL-MCNC: 9 MG/DL (ref 8.4–10.2)
CHLORIDE SERPL-SCNC: 106 MMOL/L (ref 96–108)
CO2 SERPL-SCNC: 23 MMOL/L (ref 21–32)
CREAT SERPL-MCNC: 0.94 MG/DL (ref 0.6–1.3)
CROSSMATCH: NORMAL
CROSSMATCH: NORMAL
ERYTHROCYTE [DISTWIDTH] IN BLOOD BY AUTOMATED COUNT: 14.1 % (ref 11.6–15.1)
GFR SERPL CREATININE-BSD FRML MDRD: 83 ML/MIN/1.73SQ M
GLUCOSE SERPL-MCNC: 113 MG/DL (ref 65–140)
HCT VFR BLD AUTO: 41.4 % (ref 36.5–49.3)
HGB BLD-MCNC: 13.9 G/DL (ref 12–17)
INR PPP: 1.1 (ref 0.85–1.19)
MAGNESIUM SERPL-MCNC: 1.9 MG/DL (ref 1.9–2.7)
MCH RBC QN AUTO: 29.5 PG (ref 26.8–34.3)
MCHC RBC AUTO-ENTMCNC: 33.6 G/DL (ref 31.4–37.4)
MCV RBC AUTO: 88 FL (ref 82–98)
PHOSPHATE SERPL-MCNC: 4 MG/DL (ref 2.3–4.1)
PLATELET # BLD AUTO: 206 THOUSANDS/UL (ref 149–390)
PMV BLD AUTO: 9.7 FL (ref 8.9–12.7)
POTASSIUM SERPL-SCNC: 4.2 MMOL/L (ref 3.5–5.3)
PROTHROMBIN TIME: 15 SECONDS (ref 12.3–15)
RBC # BLD AUTO: 4.71 MILLION/UL (ref 3.88–5.62)
SODIUM SERPL-SCNC: 138 MMOL/L (ref 135–147)
UNIT DISPENSE STATUS: NORMAL
UNIT DISPENSE STATUS: NORMAL
UNIT PRODUCT CODE: NORMAL
UNIT PRODUCT CODE: NORMAL
UNIT PRODUCT VOLUME: 350 ML
UNIT PRODUCT VOLUME: 350 ML
UNIT RH: NORMAL
UNIT RH: NORMAL
WBC # BLD AUTO: 16.2 THOUSAND/UL (ref 4.31–10.16)

## 2024-12-28 PROCEDURE — 84100 ASSAY OF PHOSPHORUS: CPT | Performed by: PHYSICIAN ASSISTANT

## 2024-12-28 PROCEDURE — 85610 PROTHROMBIN TIME: CPT | Performed by: PHYSICIAN ASSISTANT

## 2024-12-28 PROCEDURE — 83735 ASSAY OF MAGNESIUM: CPT | Performed by: PHYSICIAN ASSISTANT

## 2024-12-28 PROCEDURE — NC001 PR NO CHARGE: Performed by: SURGERY

## 2024-12-28 PROCEDURE — 85027 COMPLETE CBC AUTOMATED: CPT | Performed by: PHYSICIAN ASSISTANT

## 2024-12-28 PROCEDURE — NC001 PR NO CHARGE

## 2024-12-28 PROCEDURE — 85730 THROMBOPLASTIN TIME PARTIAL: CPT | Performed by: PHYSICIAN ASSISTANT

## 2024-12-28 PROCEDURE — 99024 POSTOP FOLLOW-UP VISIT: CPT | Performed by: SURGERY

## 2024-12-28 PROCEDURE — 80048 BASIC METABOLIC PNL TOTAL CA: CPT | Performed by: PHYSICIAN ASSISTANT

## 2024-12-28 RX ORDER — ATORVASTATIN CALCIUM 80 MG/1
80 TABLET, FILM COATED ORAL EVERY EVENING
Qty: 30 TABLET | Refills: 0 | Status: SHIPPED | OUTPATIENT
Start: 2024-12-28 | End: 2024-12-28

## 2024-12-28 RX ORDER — CLOPIDOGREL BISULFATE 75 MG/1
75 TABLET ORAL DAILY
Qty: 90 TABLET | Refills: 0 | Status: SHIPPED | OUTPATIENT
Start: 2024-12-29 | End: 2024-12-28

## 2024-12-28 RX ORDER — ATORVASTATIN CALCIUM 80 MG/1
80 TABLET, FILM COATED ORAL EVERY EVENING
Qty: 30 TABLET | Refills: 0 | Status: SHIPPED | OUTPATIENT
Start: 2024-12-28

## 2024-12-28 RX ORDER — MAGNESIUM SULFATE HEPTAHYDRATE 40 MG/ML
2 INJECTION, SOLUTION INTRAVENOUS ONCE
Status: COMPLETED | OUTPATIENT
Start: 2024-12-28 | End: 2024-12-28

## 2024-12-28 RX ORDER — CLOPIDOGREL BISULFATE 75 MG/1
75 TABLET ORAL DAILY
Qty: 90 TABLET | Refills: 0 | Status: SHIPPED | OUTPATIENT
Start: 2024-12-29

## 2024-12-28 RX ADMIN — CHLORHEXIDINE GLUCONATE 15 ML: 1.2 SOLUTION ORAL at 08:12

## 2024-12-28 RX ADMIN — HEPARIN SODIUM 5000 UNITS: 5000 INJECTION INTRAVENOUS; SUBCUTANEOUS at 05:08

## 2024-12-28 RX ADMIN — ASPIRIN 81 MG: 81 TABLET, COATED ORAL at 08:12

## 2024-12-28 RX ADMIN — CLOPIDOGREL BISULFATE 75 MG: 75 TABLET ORAL at 08:12

## 2024-12-28 RX ADMIN — MAGNESIUM SULFATE HEPTAHYDRATE 2 G: 40 INJECTION, SOLUTION INTRAVENOUS at 06:52

## 2024-12-28 RX ADMIN — LABETALOL HYDROCHLORIDE 5 MG: 5 INJECTION, SOLUTION INTRAVENOUS at 08:12

## 2024-12-28 NOTE — ASSESSMENT & PLAN NOTE
69-year-old male presenting 12/25 with right upper and lower extremity numbness, paresthesias, dysarthria, change in affect.  Symptoms now resolved.    Found to have 90% focal high-grade stenosis of left internal carotid artery, age-indeterminate left middle cerebral artery infarction.    12/25 CT Head: Loss of normal gray-white matter differentiation in the left globus pallidus (series 2, image 21) worrisome for age-indeterminate left middle cerebral artery infarction     12/25 CTA head/neck: 90% focal high-grade stenosis left internal carotid artery origin    12/26 VAS carotid: Right less than 50% stenosis internal carotid artery; left greater than 70% stenosis internal carotid artery    12/26 echo: EF 60%, possible PFO    12/26 MRI brain; acute infarcts in left cerebral hemisphere, internal capsule; no acute intracranial hemorrhage    S/p L CEA w/ Dr. Carlos on 12/27. No post-op neuro deficits    Plan:  - patient okay for regular diet, advance diet as tolerated  - appreciate ICU monitoring post-operatively   -no pressor requirements or drips   -if patient appropriate for transition from ICU by CC team, can go to medicine primary  - permissive HTN (SBP goal: 100-160)   -prn BP medications ordered for outside parameters  - hold plavix & ASA  - follow up am labs

## 2024-12-28 NOTE — PROGRESS NOTES
Progress Note - Critical Care/ICU   Name: Mateo Franco 69 y.o. male I MRN: [id number]  Unit/Bed#: ICU 08 I Date of Admission: 12/25/2024   Date of Service: 12/27/2024 I Hospital Day: 1      Assessment & Plan  Symptomatic carotid artery stenosis with infarction (HCC)  -Vascular surgery following.  - Left Carotid endarterectomy 12/27  -- permissive HTN (SBP goal: 100-160)               -prn BP medications ordered for outside parameters  - CTA head neck- At least 90% focal high-grade stenosis left internal carotid artery origin, Nonvisualization of the intradural segment of the right vertebral artery possibly related to vascular occlusion or flow that is too slow to detect, Wisp like enhancement of the extradural right vertebral artery possibly on the basis of developmental hypoplasia and/or severe stenosis.   - Carotid duplex study; < 50% right ICA stenosis, >70% gnosis in the left ICA  Dyslipidemia  Lipid profile 12/25/2024; cholesterol 163, triglyceride 68, HDL 26 and LDL calculated 123   - Continue Atorvastatin 80mg  Coronary artery disease  PMHx CABG  - Continue aspirin 81 mg daily + clopidogrel 75 mg daily   - Continue Atorvastatin 80mg     S/P CABG (coronary artery bypass graft)  PMHx CABG  Stroke-like symptoms  Presented 12/25 for right sided numbness, weakness, dysarthria   Found to have 90% focal high-grade stenosis of left internal carotid artery, age-indeterminate left middle cerebral artery infarction.  - S/p left carotid endarterectomy 12/12   - vascular surgery following   - Systolic Blood pressure parameters 100 -160  - Neurology following, appreciate     12/25 CT Head: Loss of normal gray-white matter differentiation in the left globus pallidus (series 2, image 21) worrisome for age-indeterminate left middle cerebral artery infarction      12/25 CTA head/neck: 90% focal high-grade stenosis left internal carotid artery origin     12/26 VAS carotid: Right less than 50% stenosis internal carotid  artery; left greater than 70% stenosis internal carotid artery     12/26 echo: EF 60%, possible PFO     12/26 MRI brain; acute infarcts in left cerebral hemisphere, internal capsule; no acute intracranial hemorrhage  Disposition: Stepdown Level 1    ICU Core Measures     A: Assess, Prevent, and Manage Pain Has pain been assessed? Yes  Need for changes to pain regimen? NA   B: Both SAT/SAT  N/A   C: Choice of Sedation RASS Goal: 0 Alert and Calm or N/A patient not on sedation  Need for changes to sedation or analgesia regimen? NA   D: Delirium CAM-ICU: Negative   E: Early Mobility  Plan for early mobility? Yes   F: Family Engagement Plan for family engagement today? Yes       Antibiotic Review: Post op requirements     Review of Invasive Devices:        [place]alk[place] Plan: Keep arterial line for hemodynamic monitoring    Prophylaxis:  VTE VTE covered by:  heparin (porcine), Subcutaneous, 5,000 Units at 12/27/24 2136       Stress Ulcer  not ordered         24 Hour Events : Pt presents to critical care level from OR after left carotid endarterectomy for blood pressure control and close monitoring. He has no complaints at this time.     Subjective   Review of Systems: See HPI for Review of Systems    Objective :                   Vitals I/O      Most Recent Min/Max in 24hrs   Temp 97.7 °F (36.5 °C) Temp  Min: 97 °F (36.1 °C)  Max: 98.2 °F (36.8 °C)   Pulse 61 Pulse  Min: 57  Max: 82   Resp 18 Resp  Min: 18  Max: 29   BP (!) 120/45 BP  Min: 101/54  Max: 128/73   O2 Sat 97 % SpO2  Min: 94 %  Max: 98 %      Intake/Output Summary (Last 24 hours) at 12/27/2024 2240  Last data filed at 12/27/2024 1627  Gross per 24 hour   Intake 1700 ml   Output 250 ml   Net 1450 ml       No diet orders on file    Invasive Monitoring   Arterial Line  [place] /55  Arterial Line BP  Min: 99/54  Max: 141/51   MAP 79 mmHg  Arterial Line MAP (mmHg)  Min: 65 mmHg  Max: 80 mmHg           Physical Exam   Physical Exam  Vitals and nursing note reviewed.    Skin:     General: Skin is warm and dry.   HENT:      Head: Normocephalic and atraumatic.   Cardiovascular:      Rate and Rhythm: Normal rate and regular rhythm.      Pulses: Normal pulses.   Musculoskeletal:         General: No deformity or signs of injury. Normal range of motion.   Abdominal:      Palpations: Abdomen is soft.      Tenderness: There is no abdominal tenderness.   Constitutional:       General: He is not in acute distress.     Appearance: He is well-developed and well-nourished.   Pulmonary:      Effort: Pulmonary effort is normal. No respiratory distress.      Breath sounds: Normal breath sounds. No stridor. No wheezing, rhonchi or rales.   Neurological:      General: No focal deficit present.      Mental Status: He is alert and oriented to person, place and time.      Motor: Strength full and intact in all extremities.          Diagnostic Studies        Lab Results: I have reviewed the following results:     Medications:  Scheduled PRN   aspirin, 81 mg, Daily  atorvastatin, 80 mg, QPM  ceFAZolin (ANCEF) 1,000 mg in sodium chloride 0.9 % 1,000 mL OR irrigation, , Once  chlorhexidine, 15 mL, Q12H REESE  clopidogrel, 75 mg, Daily  heparin (porcine), 5,000 Units, Q8H REESE      Artificial Tears, 1 drop, Q4H PRN  labetalol, 5 mg, Q15 Min PRN   And  hydrALAZINE, 15 mg, Q15 Min PRN   And  niCARdipine, 1-15 mg/hr, Continuous PRN  sodium chloride, 500 mL, Once PRN   Followed by  [START ON 12/29/2024] phenylephine,  mcg/min, Continuous PRN       Continuous    niCARdipine, 1-15 mg/hr  [START ON 12/29/2024] phenylephine,  mcg/min         Labs:   CBC    Recent Labs     12/26/24  0553 12/27/24  0546   WBC 8.70 7.29   HGB 15.4 15.1   HCT 46.6 45.5    202     BMP    Recent Labs     12/26/24  0553 12/27/24  0546   SODIUM 138 141   K 4.1 4.4    106   CO2 29 29   AGAP 6 6   BUN 17 17   CREATININE 1.17 1.13   CALCIUM 9.4 9.5       Coags    No recent results     Additional Electrolytes  Recent  Labs     12/26/24  0553   MG 2.2   PHOS 3.2          Blood Gas    No recent results  No recent results LFTs  Recent Labs     12/26/24  0553   ALB 4.0       Infectious  No recent results  Glucose  Recent Labs     12/26/24  0553 12/27/24  0546   GLUC 99 107

## 2024-12-28 NOTE — UTILIZATION REVIEW
SEE INITIAL REVIEW AT BOTTOM  Continued Stay Review    Date: 12/27/2024                          Current Patient Class: Inpatient  Current Level of Care: ICU       Assessment/Plan:   12/27/2024 OP NOTE  OPERATIVE REPORT  SURGERY DATE: 12/27/2024   Procedure(s):  Left - ENDARTERECTOMY ARTERY CAROTID   Anesthesia Type:   General     Operative Indications:  Stenosis of left carotid artery [I65.22]  CVA (cerebral vascular accident) ([facility]) [I63.9]       Operative Findings:  Ulcerated plaque in the proximal left internal carotid artery with soft plaque and thrombus        ICU  status post carotid endarterectomy earlier today.  Closely monitor neuroexam.  Currently 5 out of 5 throughout no deficits noted on exam.  Also maintain permissive hypertension 100-160 systolics.  Every hour neurochecks modal pain control due to recent surgery.  okay for CLD, advance diet as tolerated  - appreciate ICU monitoring post-operatively  - permissive HTN (SBP goal: 100-160)               -prn BP medications ordered for outside parameters  - hold plavix & ASA  - follow up am labs     Medications:   Scheduled Medications:  aspirin, 81 mg, Oral, Daily  atorvastatin, 80 mg, Oral, QPM  ceFAZolin (ANCEF) 1,000 mg in sodium chloride 0.9 % 1,000 mL OR irrigation, , Irrigation, Once  chlorhexidine, 15 mL, Mouth/Throat, Q12H REESE  clopidogrel, 75 mg, Oral, Daily  heparin (porcine), 5,000 Units, Subcutaneous, Q8H REESE  magnesium sulfate, 2 g, Intravenous, Once      Continuous IV Infusions:  niCARdipine, 1-15 mg/hr, Intravenous, Continuous PRN  [START ON 12/29/2024] phenylephine,  mcg/min, Intravenous, Continuous PRN      PRN Meds:  Artificial Tears, 1 drop, Both Eyes, Q4H PRN  labetalol, 5 mg, Intravenous, Q15 Min PRN   And  hydrALAZINE, 15 mg, Intravenous, Q15 Min PRN   And  niCARdipine, 1-15 mg/hr, Intravenous, Continuous PRN  sodium chloride, 500 mL, Intravenous, Once PRN   Followed by  [START ON 12/29/2024] phenylephine,  mcg/min,  Intravenous, Continuous PRN      Discharge Plan: TBD    Vital Signs (last 3 days)       Date/Time Temp Pulse Resp BP MAP (mmHg) Arterial Line BP MAP SpO2 O2 Device Patient Position - Orthostatic VS Law Coma Scale Score Pain    12/28/24 0600 -- 68 16 -- -- 155/64 93 mmHg 97 % -- -- 15 --    12/28/24 0500 -- 69 16 -- -- 154/63 92 mmHg 98 % -- -- -- --    12/28/24 0400 -- 71 18 -- -- 132/69 94 mmHg 97 % -- -- 15 --    12/28/24 0330 -- -- -- -- -- 153/62 90 mmHg -- -- -- -- --    12/28/24 0315 -- -- -- -- -- 154/61 90 mmHg -- -- -- -- --    12/28/24 0300 -- 69 14 -- -- 152/61 89 mmHg 93 % -- -- -- --    12/28/24 0245 -- -- -- -- -- 148/60 87 mmHg -- -- -- -- --    12/28/24 0230 -- -- -- -- -- 130/60 83 mmHg -- -- -- -- --    12/28/24 0215 -- -- -- -- -- 139/58 84 mmHg -- -- -- -- --    12/28/24 0200 -- 73 20 -- -- 148/62 88 mmHg 95 % -- -- 15 --    12/28/24 0130 -- -- -- -- -- 147/58 85 mmHg -- -- -- -- --    12/28/24 0115 -- -- -- -- -- 146/58 85 mmHg -- -- -- -- --    12/28/24 0100 -- 69 19 -- -- 146/59 86 mmHg 95 % -- -- -- --    12/28/24 0045 -- -- -- -- -- 142/58 84 mmHg -- -- -- -- --    12/28/24 0030 -- -- -- -- -- 139/57 82 mmHg -- -- -- -- --    12/28/24 0015 -- -- -- -- -- 139/58 83 mmHg -- -- -- -- --    12/28/24 0000 -- 74 16 -- -- 135/55 79 mmHg 96 % -- -- 15 --    12/27/24 2345 -- -- -- -- -- 138/59 83 mmHg -- -- -- -- --    12/27/24 2330 -- -- -- -- -- 134/56 80 mmHg -- -- -- -- --    12/27/24 2317 97.9 °F (36.6 °C) -- -- -- -- -- -- -- -- -- -- --    12/27/24 2315 -- -- -- -- -- 136/56 80 mmHg -- -- -- -- --    12/27/24 2305 -- 77 18 -- -- -- -- 94 % -- -- -- --    12/27/24 2300 -- 76 20 -- -- 139/56 81 mmHg 94 % -- -- -- --    12/27/24 2245 -- -- -- -- -- 134/55 78 mmHg -- -- -- -- --    12/27/24 2230 -- -- -- -- -- 129/54 76 mmHg -- -- -- -- --    12/27/24 2215 -- -- -- -- -- 133/54 77 mmHg -- -- -- -- --    12/27/24 2200 -- 75 20 -- -- 135/55 79 mmHg 93 % -- -- 15 --    12/27/24 2145 -- -- -- --  -- 134/54 77 mmHg -- -- -- -- --    12/27/24 2130 -- -- -- -- -- 125/50 71 mmHg -- -- -- -- --    12/27/24 2115 -- -- -- -- -- 119/49 70 mmHg -- -- -- -- --    12/27/24 2100 -- 77 15 -- -- 121/50 71 mmHg 95 % -- -- 15 --    12/27/24 2045 -- -- -- -- -- 122/50 72 mmHg -- -- -- -- --    12/27/24 2030 -- -- -- -- -- 123/50 72 mmHg -- -- -- -- --    12/27/24 2015 -- -- -- -- -- 127/51 73 mmHg -- -- -- -- --    12/27/24 2000 -- 76 -- -- -- 141/51 80 mmHg 98 % -- -- 15 --    12/27/24 1945 -- -- -- -- -- 122/49 70 mmHg -- -- -- -- --    12/27/24 1930 -- -- -- -- -- 133/49 73 mmHg -- -- -- -- --    12/27/24 1915 -- -- -- -- -- 126/47 70 mmHg -- -- -- -- --    12/27/24 1900 97.7 °F (36.5 °C) 61 18 120/45 67 99/54 69 mmHg 97 % None (Room air) Lying 15 No Pain    12/27/24 1845 -- 62 -- -- -- 106/56 73 mmHg 97 % -- -- -- --    12/27/24 1830 -- 62 -- -- -- 122/48 69 mmHg 97 % -- -- -- --    12/27/24 1815 -- 62 -- -- -- 122/48 69 mmHg 97 % -- -- -- --    12/27/24 1800 -- 62 -- -- -- 127/50 72 mmHg 97 % -- -- -- --    12/27/24 1745 -- 57 -- -- -- 117/42 65 mmHg 96 % -- -- -- --    12/27/24 1730 98.2 °F (36.8 °C) 66 29 -- -- 115/44 65 mmHg 96 % -- -- 15 No Pain    12/27/24 1715 -- 66 20 101/54 74 116/67 -- 94 % -- -- -- --    12/27/24 1700 -- 70 20 112/57 80 119/52 72 mmHg 95 % None (Room air) -- -- No Pain    12/27/24 1645 -- 76 20 110/55 77 122/52 -- 95 % None (Room air) -- -- No Pain    12/27/24 1639 97.8 °F (36.6 °C) 82 18 116/56 79 137/59 79 mmHg 96 % None (Room air) -- -- No Pain    12/27/24 1155 97 °F (36.1 °C) 63 18 120/64 -- -- -- 98 % None (Room air) -- -- --    12/27/24 1100 -- -- -- -- -- -- -- -- -- -- 15 --    12/27/24 0920 97.9 °F (36.6 °C) 61 18 123/71 88 -- -- 97 % None (Room air) Lying 15 No Pain    12/27/24 07:49:42 97.7 °F (36.5 °C) 62 -- 127/65 86 -- -- 97 % -- -- -- --    12/26/24 23:22:27 97.3 °F (36.3 °C) 67 -- 128/73 91 -- -- 96 % -- -- -- --    12/26/24 2120 -- -- -- -- -- -- -- -- -- -- 15 No Pain     12/26/24 19:19:57 97.8 °F (36.6 °C) 78 18 104/71 82 -- -- 95 % -- -- -- --    12/26/24 15:27:59 98.2 °F (36.8 °C) 72 -- 139/85 103 -- -- 96 % -- -- -- --    12/26/24 1320 -- -- -- -- -- -- -- -- -- -- 15 --    12/26/24 11:21:37 97.6 °F (36.4 °C) 58 21 140/69 93 -- -- 98 % None (Room air) Lying -- --    12/26/24 1000 -- 63 -- 130/75 -- -- -- -- -- -- -- --    12/26/24 0920 -- -- -- -- -- -- -- -- -- -- 15 No Pain    12/26/24 0827 -- -- -- -- -- -- -- -- -- -- -- No Pain    12/26/24 0808 -- -- -- -- -- -- -- -- -- -- -- No Pain    12/26/24 07:53:19 97.6 °F (36.4 °C) 63 16 130/75 93 -- -- 97 % -- -- -- --    12/26/24 0520 97.4 °F (36.3 °C) -- 18 129/70 90 -- -- -- None (Room air) Lying -- --    12/26/24 0320 -- 57 18 133/80 98 -- -- -- None (Room air) Lying -- --    12/25/24 2220 -- -- -- 107/73 84 -- -- -- -- -- -- --    12/25/24 21:24:12 97.1 °F (36.2 °C) 60 18 131/62 85 -- -- 95 % -- -- -- --    12/25/24 2020 -- -- 18 120/69 86 -- -- 94 % None (Room air) Lying -- --    12/25/24 1920 97.4 °F (36.3 °C) 61 20 116/72 87 -- -- 95 % None (Room air) Lying 15 --    12/25/24 1845 -- -- -- -- -- -- -- -- -- -- -- No Pain    12/25/24 18:23:06 98.2 °F (36.8 °C) -- -- 143/77 99 -- -- -- -- -- -- --    12/25/24 1820 -- -- -- -- -- -- -- -- -- -- 15 No Pain    12/25/24 1745 -- 57 22 121/67 89 -- -- 96 % -- -- -- --    12/25/24 1743 97.7 °F (36.5 °C) -- -- -- -- -- -- -- -- -- -- --    12/25/24 1700 -- 59 24 122/71 -- -- -- 96 % -- -- 15 --    12/25/24 1615 -- 59 20 122/66 88 -- -- 97 % -- -- -- --    12/25/24 1600 -- -- -- -- -- -- -- -- -- -- 15 --    12/25/24 1527 -- 58 22 122/63 88 -- -- 97 % -- -- -- --    12/25/24 1500 -- -- -- -- -- -- -- -- -- -- 15 --    12/25/24 1445 -- -- -- -- -- -- -- -- -- -- 15 --    12/25/24 1430 -- 59 20 128/67 92 -- -- 97 % -- -- 15 --    12/25/24 1415 -- -- -- -- -- -- -- -- -- -- 15 --    12/25/24 1400 -- -- 16 124/65 -- -- -- 97 % -- -- 15 --    12/25/24 1345 -- -- 21 128/67 -- -- -- 98 % -- --  15 --    12/25/24 1330 -- 62 18 141/70 98 -- -- 97 % -- -- 15 --    12/25/24 1315 -- -- -- -- -- -- -- -- -- -- 15 --    12/25/24 1300 -- -- -- -- -- -- -- -- -- -- 15 --    12/25/24 1245 -- 69 21 143/75 -- -- -- 98 % -- -- 15 --    12/25/24 1230 -- 76 18 138/63 -- -- -- 97 % -- -- 15 --    12/25/24 1218 -- 67 18 142/73 -- -- -- 97 % None (Room air) Sitting 15 --    12/25/24 1212 -- -- -- -- -- -- -- 100 % -- -- -- --          Weight (last 2 days)       Date/Time Weight    12/26/24 1000 79.4 (175)            Pertinent Labs/Diagnostic Results:   Radiology:  VAS intra-op ultrasound CEA   Final Interpretation by Eduar Carlos MD (12/27 1644)      MRI brain wo contrast   Final Interpretation by Mauricio Robles MD (12/26 1201)      Acute infarcts involving the left cerebral hemisphere and internal capsule as described above and predominantly watershed in distribution. No acute intracranial hemorrhage.      The study was marked in EPIC for immediate notification.      Workstation performed: [id number]         VAS carotid complete study   Final Interpretation by Eduar Carlos MD (12/26 7298)      CT stroke alert brain   Final Interpretation by Ghulam Ladd MD (12/25 6378)         1. Loss of normal gray-white matter differentiation in the left globus pallidus (series 2, image 21) worrisome for age-indeterminate left middle cerebral artery infarction.   2. No acute intracranial hemorrhage.   3. Mild microangiopathy, also age indeterminant without the benefit of prior imaging..         Workstation performed: [id number]         CTA stroke alert (head/neck)   Final Interpretation by Ghulam Ladd MD (12/25 9552)      1. At least 90% focal high-grade stenosis left internal carotid artery origin. Vascular surgery assessment advised.   2. Nonvisualization of the intradural segment of the right vertebral artery possibly related to vascular occlusion or flow that is too slow to detect.   3. Wisp like  enhancement of the extradural right vertebral artery possibly on the basis of developmental hypoplasia and/or severe stenosis.      I personally provided preliminary results of this study with Dr. Markie French and Bob Ayers on 12/25/2024 at 12:38 p.m.      Workstation performed: [id number]           Cardiology:  Echo complete w/ contrast if indicated   Final Result by Earlene Morton MD (12/26 1141)   Addendum (preliminary) 1 of 1 by Earlene Morton MD (12/26 1141)        Left Ventricle: Left ventricular cavity size is normal. Wall thickness    is normal. There is borderline concentric hypertrophy. The left    ventricular ejection fraction is 60%. Systolic function is normal. Wall    motion is normal. Diastolic function is normal.     IVS: There is abnormal septal motion consistent with post-operative    status.     Atrial Septum: There is a possible patent foramen ovale.  There was no    right-to-left shunting visualized with contrast saline study done at rest,    with cough, and with Valsalva.  However, there was bubble washout adjacent    to the intra-atrial septum suggesting possible left-to-right flow through    possible PFO or ASD.     Tricuspid Valve: There is mild regurgitation.      There was no right-to-left shunting visualized with contrast saline study    done at rest, with cough, and with Valsalva. However, there was bubble    washout adjacent to the intra-atrial septum suggesting possible    left-to-right flow through PFO or ASD.  Additionally, review of    transesophageal echocardiogram done in 4/2021 shows possible PFO. No    definitive flow across PFO seen. Recommend DERECK if clinically indicated.         ECG 12 lead    by Interface, Ris Results In (12/25 1235)        GI:  No orders to display           Results from last 7 days   Lab Units 12/28/24  0509 12/27/24  0546 12/26/24  0553 12/25/24  1224   WBC Thousand/uL 16.20* 7.29 8.70 11.64*   HEMOGLOBIN g/dL 13.9 15.1 15.4 14.9   HEMATOCRIT % 41.4 45.5  "46.6 46.3   PLATELETS Thousands/uL 206 202 212 231         Results from last 7 days   Lab Units 12/28/24  0509 12/27/24  0546 12/26/24  0553 12/25/24  1224   SODIUM mmol/L 138 141 138 136   POTASSIUM mmol/L 4.2 4.4 4.1 4.2   CHLORIDE mmol/L 106 106 103 100   CO2 mmol/L 23 29 29 28   ANION GAP mmol/L 9 6 6 8   BUN mg/dL 16 17 17 22   CREATININE mg/dL 0.94 1.13 1.17 1.14   EGFR ml/min/1.73sq m 83 66 64 66   CALCIUM mg/dL 9.0 9.5 9.4 9.9   MAGNESIUM mg/dL 1.9  --  2.2  --    PHOSPHORUS mg/dL 4.0  --  3.2  --      Results from last 7 days   Lab Units 12/26/24  0553   ALBUMIN g/dL 4.0     Results from last 7 days   Lab Units 12/27/24  1159 12/25/24  1219   POC GLUCOSE mg/dl 83 88     Results from last 7 days   Lab Units 12/28/24  0509 12/27/24  0546 12/26/24  0553 12/25/24  1224   GLUCOSE RANDOM mg/dL 113 107 99 91         Results from last 7 days   Lab Units 12/25/24  1224   HEMOGLOBIN A1C % 6.1*   EAG mg/dl 128     No results found for: \"BETA-HYDROXYBUTYRATE\"                   Results from last 7 days   Lab Units 12/25/24  1439 12/25/24  1224   HS TNI 0HR ng/L  --  5   HS TNI 2HR ng/L 4  --    HSTNI D2 ng/L -1  --          Results from last 7 days   Lab Units 12/28/24  0509 12/25/24  1312   PROTIME seconds 15.0 13.9   INR  1.10 1.00   PTT seconds 27 24                                     Results from last 7 days   Lab Units 12/26/24  1621   UNIT PRODUCT CODE  H9551S92  [id number]   UNIT NUMBER  [id number]  [id number]   UNITABO  A  A   UNITRH  POS  POS   CROSSMATCH  Compatible  Compatible   UNIT DISPENSE STATUS  Crossmatched  Crossmatched   UNIT PRODUCT VOL ml 350  350                                                                           Network Utilization Review Department  ATTENTION: Please call with any questions or concerns to [phone] and carefully listen to the prompts so that you are directed to the right person. All voicemails are confidential.   For Discharge needs, contact Care " Management DC Support Team at [phone] opt. 2  Send all requests for admission clinical reviews, approved or denied determinations and any other requests to dedicated fax number below belonging to the campus where the patient is receiving treatment. List of dedicated fax numbers for the Facilities:  FACILITY NAME UR FAX NUMBER   ADMISSION DENIALS (Administrative/Medical Necessity) [phone]   DISCHARGE SUPPORT TEAM (NETWORK) [phone]   PARENT CHILD HEALTH (Maternity/NICU/Pediatrics) [phone]   [facility] [phone]   [facility] [phone]   [facility] [phone]   [facility] [phone]   [facility] [phone]   [facility] [phone]   [facility] [phone]   [facility] [phone]   [facility] [phone]   [facility] [phone]   [facility] [phone]   [facility] [phone]

## 2024-12-28 NOTE — PROGRESS NOTES
Progress Note - Vascular Surgery   Name: Mateo Franco 69 y.o. male I MRN: [id number]  Unit/Bed#: ICU 08 I Date of Admission: 12/25/2024   Date of Service: 12/28/2024 I Hospital Day: 2    Assessment & Plan  Symptomatic carotid artery stenosis with infarction (HCC)  69-year-old male presenting 12/25 with right upper and lower extremity numbness, paresthesias, dysarthria, change in affect.  Symptoms now resolved.    Found to have 90% focal high-grade stenosis of left internal carotid artery, age-indeterminate left middle cerebral artery infarction.    12/25 CT Head: Loss of normal gray-white matter differentiation in the left globus pallidus (series 2, image 21) worrisome for age-indeterminate left middle cerebral artery infarction     12/25 CTA head/neck: 90% focal high-grade stenosis left internal carotid artery origin    12/26 VAS carotid: Right less than 50% stenosis internal carotid artery; left greater than 70% stenosis internal carotid artery    12/26 echo: EF 60%, possible PFO    12/26 MRI brain; acute infarcts in left cerebral hemisphere, internal capsule; no acute intracranial hemorrhage    S/p L CEA w/ Dr. Carlos on 12/27. No post-op neuro deficits    Plan:  - patient okay for regular diet, advance diet as tolerated  - appreciate ICU monitoring post-operatively   -no pressor requirements or drips   -if patient appropriate for transition from ICU by CC team, can go to medicine primary  - permissive HTN (SBP goal: 100-160)   -prn BP medications ordered for outside parameters  - hold plavix & ASA  - follow up am labs  Stroke-like symptoms  - As above  Dyslipidemia  - Continue statin  Coronary artery disease    S/P CABG (coronary artery bypass graft)      Vascular Surgery service will follow.      Subjective   NAEON. AVSS. Patient tolerating liquids overnight w/o nausea/vomiting. Reports appropriate pain control on current regimen. Voiding. +Flatus. -BM.    UOP: 925cc    WBC: 16.2 (from 7.3)  HGB: 13.9 (from  15.1)  Cr: 0.94 (from 1.13)    Objective :  Temp:  [97 °F (36.1 °C)-98.7 °F (37.1 °C)] 98.7 °F (37.1 °C)  HR:  [57-82] 67  BP: (101-127)/(45-71) 127/59  Resp:  [13-29] 18  SpO2:  [93 %-98 %] 98 %  O2 Device: None (Room air)    I/O         12/26 0701  12/27 0700 12/27 0701  12/28 0700    P.O. 1020 0    I.V. (mL/kg)  1700 (21.4)    Total Intake(mL/kg) 1020 (12.8) 1700 (21.4)    Urine (mL/kg/hr) 700 (0.4) 250 (0.2)    Stool 0     Total Output 700 250    Net +320 +1450          Unmeasured Urine Occurrence 1 x     Unmeasured Stool Occurrence 0 x           Lines/Drains/Airways       Active Status       Name Placement date Placement time Site Days    Arterial Line 12/27/24 Left Radial 12/27/24  1306  Radial  less than 1                  Physical Exam:  GENERAL APPEARANCE: well developed, well nourished male in NAD.  HEENT: NCAT; EOMI; normal external nose & ears; MMM  NECK: Supple, well-approximated L carotid incision with superficial ecchymosis however no subcutaneous edema, hematoma, or induration. Incisions appropriately tender   CARDIOVASCULAR: Regular rate. Grossly well perfused.  LUNGS/CHEST: Symmetric chest rise/fall with respirations.  ABD: Soft; non-distended; non-tender.  EXT: Normal ROM. No observable deformities in 4/4 extremities. No edema.  NEURO: AAOx4. No focal neurologic deficits. CN II-XII intact, neurovascularly intact distally in 4/4 extremities w/ 5/5 strength  (upper) and plantar/dorsiflexion (lower)  SKIN: Warm, dry and well perfused; no rash; no jaundice.      Lab Results: I have reviewed the following results:  Recent Labs     12/25/24  1224 12/25/24  1224 12/25/24  1312 12/25/24  1439 12/26/24  0553 12/27/24  0546 12/28/24  0509   WBC 11.64*  --   --   --  8.70   < > 16.20*   HGB 14.9  --   --   --  15.4   < > 13.9   HCT 46.3  --   --   --  46.6   < > 41.4     --   --   --  212   < > 206   SODIUM 136  --   --   --  138   < > 138   K 4.2  --   --   --  4.1   < > 4.2     --   --    --  103   < > 106   CO2 28  --   --   --  29   < > 23   BUN 22  --   --   --  17   < > 16   CREATININE 1.14  --   --   --  1.17   < > 0.94   GLUC 91  --   --   --  99   < > 113   MG  --    < >  --   --  2.2  --  1.9   PHOS  --    < >  --   --  3.2  --  4.0   ALB  --   --   --   --  4.0  --   --    PTT  --   --    < >  --   --   --  27   INR  --   --    < >  --   --   --  1.10   HSTNI0 5  --   --   --   --   --   --    HSTNI2  --   --   --  4  --   --   --     < > = values in this interval not displayed.     Imaging Results Review: No pertinent imaging studies reviewed.  Other Study Results Review: No additional pertinent studies reviewed.    VTE Pharmacologic Prophylaxis: VTE covered by:  heparin (porcine), Subcutaneous, 5,000 Units at 12/28/24 0508    VTE Mechanical Prophylaxis: sequential compression device

## 2024-12-28 NOTE — DISCHARGE INSTR - AVS FIRST PAGE
DISCHARGE INSTRUCTIONS  CAROTID ENDARTERECTOMY    ACTIVITY:  Limit your physical activity to walking for the first week and then increase your activity as tolerated.  Walking up steps and normal activities may be resumed as you feel ready.  Avoid strenuous activity such as vigorous exercise.  Avoid heavy lifting (do not lift more than 15 pounds) for the first four weeks after surgery.  You should not drive a car for at least one week following discharge from the hospital and you are off all narcotic pain medication.  You may ride in a car.  If you have had a stroke or mini-stroke, please consult with your neurologist prior to driving.    DO NOT START OR RESUME ANY PREVIOUSLY PLANNED THERAPY (PHYSICAL, CARDIAC, REHAB, ETC…) UNTIL YOU DISCUSS WITH YOUR SURGEON AND THEY FEEL IT IS SAFE TO ENGAGE IN THERAPY.    DIET:  Resume your normal diet.  Good nutrition is important for healing of your incision.  You can expect to have a sore throat and trouble swallowing after surgery which should improve quickly.  If you feel like you are choking, please call your doctor.    RECURRENT SYMPTOMS: If you develop any new numbness, weakness, vision changes, drooping of the face, or difficulty with speech after discharge, CALL 911 or go to the nearest Emergency department immediately.    INCISION SITE:  You may have surgical glue at your incision site.  There are stitches present under the skin which will absorb on their own.  The glue is used to cover the incision, assist in closure, and prevent contamination. This adhesive will darken and peel away on its own within one to two weeks. Do not pick at it.  If you have a bandage, please remove this on the second day after surgery.    You should shower daily.  Wash incision daily with soap and water, but do not rub or scrub the incision; rinse thoroughly and pat dry.  Do not bathe in a tub or swim for the first 4 weeks following surgery or if you have any open wounds.  It is normal to  have some bruising, swelling or discoloration around the incision.  IF increasing redness, pain, bleeding or a bulge develops, call our office immediately.    If you notice any active bleeding at the site, apply pressure to the site and call our office ([phone]) or [phone].    FOLLOW UP STUDIES: Doppler ultrasound studies are very important to your post-operative care. Your surgeon will arrange them at your first postoperative visit. The first study will be 3 months after surgery.    FOLLOW UP APPOINTMENTS:  Making and keeping follow up appointments and ultrasound tests are important to your recovery.  If you have difficulty making it to or keeping your follow up appointments, call the office.    If you have increased pain, fever >101.5, increased drainage, redness or a bad smell at your surgery site, new coldness/numbness of your arm or leg, please call us immediately and GO directly to the ER.    PLEASE CALL THE OFFICE IF YOU HAVE ANY QUESTIONS  [phone]  [phone] [address], [address], [place] [address]  [address] [place] [address]  [address] [place] [address]  [address] [facility], [place] [address]  [address] [facility], [address], [place] [address]  [address] [facility], [address], [place] [address]  [address] [facility], Entr[address], 2nd Floor, [place] [address]  [address] [facility], 1st Floor, [address], [place] [address]  [address] [facility] [place] [address]  [address] [facility], [address], [place] [address]

## 2024-12-28 NOTE — PROGRESS NOTES
Progress Note - Critical Care/ICU   Name: Mateo Franco 69 y.o. male I MRN: [id number]  Unit/Bed#: ICU 08 I Date of Admission: 12/25/2024   Date of Service: 12/28/2024 I Hospital Day: 2       Critical Care Interval Transfer Note:    Brief Hospital Summary: Presented as a fall from home with stroke like symptoms. No traumatic injuries from fall. Stroke workup revealed L carotid artery 90% occluded. Underwent a left CEA on 12/27. Post op came to the ICU for strict BP parameters which patient met natively and did not require any pharmacological intervention.     Barriers to discharge:   PT/OT eval   Blood pressure stabilization      Consults: IP CONSULT TO VASCULAR SURGERY  IP CONSULT TO CASE MANAGEMENT  IP CONSULT TO NUTRITION SERVICES  IP CONSULT TO CARDIOLOGY  IP CONSULT TO SURGICAL CRITICAL CARE    Recommended to review admission imaging for incidental findings and document in discharge navigator: Chart reviewed, no known incidental findings noted at this time.      Discharge Plan: Anticipate discharge in 24-48 hrs to home.       Patient seen and evaluated by Critical Care today and deemed to be appropriate for transfer to Med Surg with Telemetry. Spoke to Dr. Mae from [facility] to accept transfer. Critical care can be contacted via SecureChat with any questions or concerns. Please use the Critical Care AP Role in Secure Chat for any provider inquires until the patient is transferred out of the ICU or until tomorrow at 0600.

## 2024-12-28 NOTE — ASSESSMENT & PLAN NOTE
-Vascular surgery following.  - Left Carotid endarterectomy 12/27  -- permissive HTN (SBP goal: 100-160)               -prn BP medications ordered for outside parameters  - CTA head neck- At least 90% focal high-grade stenosis left internal carotid artery origin, Nonvisualization of the intradural segment of the right vertebral artery possibly related to vascular occlusion or flow that is too slow to detect, Wisp like enhancement of the extradural right vertebral artery possibly on the basis of developmental hypoplasia and/or severe stenosis.   - Carotid duplex study; < 50% right ICA stenosis, >70% gnosis in the left ICA

## 2024-12-28 NOTE — DISCHARGE SUMMARY
Discharge Summary - Critical Care/ICU   Name: Mateo Franco 69 y.o. male I MRN: [id number]  Unit/Bed#: ICU 08 I Date of Admission: 12/25/2024   Date of Service: 12/28/2024 I Hospital Day: 2    Medical Problems       Resolved Problems  Date Reviewed: 12/27/2024   None       Discharging Physician / Practitioner: SHIV Oviedo  PCP: Jesse Braga DO  Admission Date:   Admission Orders (From admission, onward)       Ordered        12/26/24 1411  INPATIENT ADMISSION  Once            12/25/24 1333  Place in Observation  Once                          Discharge Date: 12/28/24    Consultations During Hospital Stay:  Stroke Like Symptoms   Symptomatic carotid artery stenosis with infarction     Procedures Performed:   Left Carotid Endarterectomy     Significant Findings / Test Results:   CT Head: Loss of normal gray-white matter differentiation in the left globus pallidus (series 2, image 21) worrisome for age-indeterminate left middle cerebral artery infarction. No acute intracranial hemorrhage. Mild microangiopathy, also age indeterminant without the benefit of prior imaging..  CTA Head Neck: At least 90% focal high-grade stenosis left internal carotid artery origin. Vascular surgery assessment advised. Nonvisualization of the intradural segment of the right vertebral artery possibly related to vascular occlusion or flow that is too slow to detect. Wisp like enhancement of the extradural right vertebral artery possibly on the basis of developmental hypoplasia and/or severe stenosis.  Carotid Study: There is >70% stenosis noted in the internal carotid artery. Plaque is heterogenous and irregular. Vertebral artery flow is antegrade. There is no significant subclavian artery disease.    MRI: Acute infarcts involving the left cerebral hemisphere and internal capsule as described above and predominantly watershed in distribution. No acute intracranial hemorrhage     Incidental Findings:   All findings noted  "above.     Test Results Pending at Discharge (will require follow up):    None.      Outpatient Tests Requested:  None     Complications:  None    Reason for Admission: Stroke-Like Symptoms    Hospital Course:   Mateo Franco is a 69 y.o. male patient who originally presented to the hospital on 12/25/2024 due to stroke like symptoms at home. Per Dr. Darling on admission \"Patient's family at bedside state that patient has not been acting himself since Monday, 12/23/2024.  They state that he has been less talkative than normal and more confused.  They assumed that patient was angry with somebody and did not want to talk about it.  In the ED patient complained of dysarthria along with right upper extremity and right lower extremity paresthesia/weakness.  Stroke alert was called in the ED. CTA head neck noted for 90% focal stenosis of left internal carotid artery along with other stenotic findings right vertebral artery.  Neurology was consulted who recommended loading with aspirin 243 mg and Plavix 300 mg. Patient being admitted for stroke work up.\"   Underwent a Left CEA on 12/27. Case was uncomplicated. He was admitted to the ICU overnight for blood pressure monitoring. He did not require any blood pressure intervention.   Cleared by vascular surgery for discharge on 12/28.       Please see above list of diagnoses and related plan for additional information.     Condition at Discharge: good    Discharge Day Visit / Exam:   Subjective:  \"I am feeling well.\"   Vitals: Blood Pressure: 127/59 (12/28/24 0530)  Pulse: 73 (12/28/24 1000)  Temperature: 98.7 °F (37.1 °C) (12/28/24 0715)  Temp Source: Oral (12/28/24 0715)  Respirations: (!) 24 (12/28/24 1000)  Height: 5' 8\" (172.7 cm) (12/26/24 1000)  Weight - Scale: 79.4 kg (175 lb) (12/26/24 1000)  SpO2: 99 % (12/28/24 1000)  Physical Exam   Neuro: Alert, AAOx3, nonfocal.   Cardiac: S1S2, Regular rate and rhythm, no murmur  Lungs: Clear. RA.   ABD: soft non-distended, " non-tender  Skin: Warm, dry, intact. Left carotid incision - CDI     Discussion with Family: Updated  (wife) at bedside.    Discharge instructions/Information to patient and family:   See after visit summary for information provided to patient and family.      Provisions for Follow-Up Care:  See after visit summary for information related to follow-up care and any pertinent home health orders.      Mobility at time of Discharge:   Basic Mobility Inpatient Raw Score: 24  JH-HLM Goal: 8: Walk 250 feet or more  JH-HLM Achieved: 2: Bed activities/Dependent transfer  HLM Goal achieved. Continue to encourage appropriate mobility.     Disposition:   Home    Planned Readmission: none.     Discharge Medications:  See after visit summary for reconciled discharge medications provided to patient and/or family.      Administrative Statements   Discharge Statement:  I have spent a total time of 15 minutes in caring for this patient on the day of the visit/encounter. .    **Please Note: This note may have been constructed using a voice recognition system**

## 2024-12-28 NOTE — PROGRESS NOTES
Progress Note - Critical Care/ICU   Name: Mateo Franco 69 y.o. male I MRN: [id number]  Unit/Bed#: ICU 08 I Date of Admission: 12/25/2024   Date of Service: 12/28/2024 I Hospital Day: 2      Assessment & Plan  Symptomatic carotid artery stenosis with infarction (HCC)  -Vascular surgery following.  - Left Carotid endarterectomy 12/27  -- permissive HTN (SBP goal: 100-160)               -prn BP medications ordered for outside parameters  - CTA head neck- At least 90% focal high-grade stenosis left internal carotid artery origin, Nonvisualization of the intradural segment of the right vertebral artery possibly related to vascular occlusion or flow that is too slow to detect, Wisp like enhancement of the extradural right vertebral artery possibly on the basis of developmental hypoplasia and/or severe stenosis.   - Carotid duplex study; < 50% right ICA stenosis, >70% gnosis in the left ICA  Dyslipidemia  Lipid profile 12/25/2024; cholesterol 163, triglyceride 68, HDL 26 and LDL calculated 123   - Continue Atorvastatin 80mg  Coronary artery disease  PMHx CABG  - Continue aspirin 81 mg daily + clopidogrel 75 mg daily   - Continue Atorvastatin 80mg     S/P CABG (coronary artery bypass graft)  PMHx CABG  Stroke-like symptoms  Presented 12/25 for right sided numbness, weakness, dysarthria   Found to have 90% focal high-grade stenosis of left internal carotid artery, age-indeterminate left middle cerebral artery infarction.  - S/p left carotid endarterectomy 12/12   - vascular surgery following   - Systolic Blood pressure parameters 100 -160  - Neurology following    12/25 CT Head: Loss of normal gray-white matter differentiation in the left globus pallidus (series 2, image 21) worrisome for age-indeterminate left middle cerebral artery infarction      12/25 CTA head/neck: 90% focal high-grade stenosis left internal carotid artery origin     12/26 VAS carotid: Right less than 50% stenosis internal carotid artery; left  greater than 70% stenosis internal carotid artery     12/26 echo: EF 60%, possible PFO     12/26 MRI brain; acute infarcts in left cerebral hemisphere, internal capsule; no acute intracranial hemorrhage  Disposition: Med Surg    ICU Core Measures     A: Assess, Prevent, and Manage Pain Has pain been assessed? Yes  Need for changes to pain regimen? No   B: Both SAT/SAT  N/A   C: Choice of Sedation RASS Goal: 0 Alert and Calm or N/A patient not on sedation  Need for changes to sedation or analgesia regimen? NA   D: Delirium CAM-ICU: Negative   E: Early Mobility  Plan for early mobility? Yes   F: Family Engagement Plan for family engagement today? Yes       Antibiotic Review: Post op requirements     Review of Invasive Devices:        Malka Plan: Discontinue arterial line    Prophylaxis:  VTE VTE covered by:  heparin (porcine), Subcutaneous, 5,000 Units at 12/28/24 0508       Stress Ulcer  not ordered         24 Hour Events : No acute events overnight. No issues with blood pressure control   Subjective   Review of Systems: See HPI for Review of Systems    Objective :                   Vitals I/O      Most Recent Min/Max in 24hrs   Temp 97.9 °F (36.6 °C) Temp  Min: 97 °F (36.1 °C)  Max: 98.2 °F (36.8 °C)   Pulse 69 Pulse  Min: 57  Max: 82   Resp 16 Resp  Min: 14  Max: 29   BP (!) 120/45 BP  Min: 101/54  Max: 127/65   O2 Sat 98 % SpO2  Min: 93 %  Max: 98 %      Intake/Output Summary (Last 24 hours) at 12/28/2024 0605  Last data filed at 12/28/2024 0509  Gross per 24 hour   Intake 1700 ml   Output 975 ml   Net 725 ml       No diet orders on file    Invasive Monitoring   Arterial Line  Malka /63  Arterial Line BP  Min: 99/54  Max: 154/63   MAP 92 mmHg  Arterial Line MAP (mmHg)  Min: 65 mmHg  Max: 94 mmHg           Physical Exam   Physical Exam  Vitals and nursing note reviewed.   Skin:     General: Skin is cool.   HENT:      Head: Normocephalic and atraumatic.      Mouth/Throat:      Mouth: Mucous membranes are  moist.   Neck:      Comments: Dressing over surgical site clean and dry to left neck  Cardiovascular:      Rate and Rhythm: Normal rate and regular rhythm.      Pulses: Normal pulses.      Heart sounds: Normal heart sounds.   Musculoskeletal:         General: No swelling or signs of injury. Normal range of motion.   Abdominal:      Palpations: Abdomen is soft.      Tenderness: There is no abdominal tenderness.   Constitutional:       General: He is not in acute distress.     Appearance: He is well-developed and well-nourished.   Pulmonary:      Effort: Pulmonary effort is normal. No respiratory distress.      Breath sounds: Normal breath sounds.   Neurological:      General: No focal deficit present.      Mental Status: He is alert and oriented to person, place and time.      Motor: Strength full and intact in all extremities.          Diagnostic Studies        Lab Results: I have reviewed the following results:     Medications:  Scheduled PRN   aspirin, 81 mg, Daily  atorvastatin, 80 mg, QPM  ceFAZolin (ANCEF) 1,000 mg in sodium chloride 0.9 % 1,000 mL OR irrigation, , Once  chlorhexidine, 15 mL, Q12H REESE  clopidogrel, 75 mg, Daily  heparin (porcine), 5,000 Units, Q8H REESE      Artificial Tears, 1 drop, Q4H PRN  labetalol, 5 mg, Q15 Min PRN   And  hydrALAZINE, 15 mg, Q15 Min PRN   And  niCARdipine, 1-15 mg/hr, Continuous PRN  sodium chloride, 500 mL, Once PRN   Followed by  [START ON 12/29/2024] phenylephine,  mcg/min, Continuous PRN       Continuous    niCARdipine, 1-15 mg/hr  [START ON 12/29/2024] phenylephine,  mcg/min         Labs:   CBC    Recent Labs     12/27/24  0546 12/28/24  0509   WBC 7.29 16.20*   HGB 15.1 13.9   HCT 45.5 41.4    206     BMP    Recent Labs     12/27/24  0546 12/28/24  0509   SODIUM 141 138   K 4.4 4.2    106   CO2 29 23   AGAP 6 9   BUN 17 16   CREATININE 1.13 0.94   CALCIUM 9.5 9.0       Coags    Recent Labs     12/28/24  0509   INR 1.10   PTT 27         Additional Electrolytes  Recent Labs     12/28/24  0509   MG 1.9   PHOS 4.0          Blood Gas    No recent results  No recent results LFTs  No recent results    Infectious  No recent results  Glucose  Recent Labs     12/27/24  0546 12/28/24  0509   GLUC 107 113

## 2024-12-28 NOTE — ASSESSMENT & PLAN NOTE
PMHx CABG  - Continue aspirin 81 mg daily + clopidogrel 75 mg daily   - Continue Atorvastatin 80mg

## 2024-12-28 NOTE — ASSESSMENT & PLAN NOTE
Lipid profile 12/25/2024; cholesterol 163, triglyceride 68, HDL 26 and LDL calculated 123   - Continue Atorvastatin 80mg

## 2024-12-28 NOTE — ASSESSMENT & PLAN NOTE
Presented 12/25 for right sided numbness, weakness, dysarthria   Found to have 90% focal high-grade stenosis of left internal carotid artery, age-indeterminate left middle cerebral artery infarction.  - S/p left carotid endarterectomy 12/12   - vascular surgery following   - Systolic Blood pressure parameters 100 -160  - Neurology following    12/25 CT Head: Loss of normal gray-white matter differentiation in the left globus pallidus (series 2, image 21) worrisome for age-indeterminate left middle cerebral artery infarction      12/25 CTA head/neck: 90% focal high-grade stenosis left internal carotid artery origin     12/26 VAS carotid: Right less than 50% stenosis internal carotid artery; left greater than 70% stenosis internal carotid artery     12/26 echo: EF 60%, possible PFO     12/26 MRI brain; acute infarcts in left cerebral hemisphere, internal capsule; no acute intracranial hemorrhage

## 2024-12-28 NOTE — ASSESSMENT & PLAN NOTE
Presented 12/25 for right sided numbness, weakness, dysarthria   Found to have 90% focal high-grade stenosis of left internal carotid artery, age-indeterminate left middle cerebral artery infarction.  - S/p left carotid endarterectomy 12/12   - vascular surgery following   - Systolic Blood pressure parameters 100 -160  - Neurology following, appreciate     12/25 CT Head: Loss of normal gray-white matter differentiation in the left globus pallidus (series 2, image 21) worrisome for age-indeterminate left middle cerebral artery infarction      12/25 CTA head/neck: 90% focal high-grade stenosis left internal carotid artery origin     12/26 VAS carotid: Right less than 50% stenosis internal carotid artery; left greater than 70% stenosis internal carotid artery     12/26 echo: EF 60%, possible PFO     12/26 MRI brain; acute infarcts in left cerebral hemisphere, internal capsule; no acute intracranial hemorrhage

## 2024-12-30 ENCOUNTER — TRANSITIONAL CARE MANAGEMENT (OUTPATIENT)
Dept: FAMILY MEDICINE CLINIC | Facility: CLINIC | Age: 69
End: 2024-12-30

## 2024-12-30 ENCOUNTER — TELEPHONE (OUTPATIENT)
Dept: VASCULAR SURGERY | Facility: CLINIC | Age: 69
End: 2024-12-30

## 2024-12-30 ENCOUNTER — TELEPHONE (OUTPATIENT)
Age: 69
End: 2024-12-30

## 2024-12-30 LAB
ATRIAL RATE: 71 BPM
P AXIS: 51 DEGREES
PR INTERVAL: 168 MS
QRS AXIS: 35 DEGREES
QRSD INTERVAL: 134 MS
QT INTERVAL: 422 MS
QTC INTERVAL: 458 MS
T WAVE AXIS: 39 DEGREES
VENTRICULAR RATE: 71 BPM

## 2024-12-30 PROCEDURE — 93010 ELECTROCARDIOGRAM REPORT: CPT | Performed by: INTERNAL MEDICINE

## 2024-12-30 NOTE — UTILIZATION REVIEW
"Initial Clinical Review    OBSERVATION 12/25 CHANGED TO INPATIENT ON 12/26 @ 1411 - STROKE ALERT WITH + MRI BRAIN FOR STROKE AND GOING TO OR ON 12/27 FOR CEA.      Admission: Date/Time/Statement:   Admission Orders (From admission, onward)       Ordered        12/26/24 1411  INPATIENT ADMISSION  Once            12/25/24 1333  Place in Observation  Once                          Orders Placed This Encounter   Procedures    INPATIENT ADMISSION     Standing Status:   Standing     Number of Occurrences:   1     Level of Care:   Med Surg [16]     Estimated length of stay:   More than 2 Midnights     Certification:   I certify that inpatient services are medically necessary for this patient for a duration of greater than two midnights. See H&P and MD Progress Notes for additional information about the patient's course of treatment.     ED Arrival Information       Expected   -    Arrival   12/25/2024 12:09    Acuity   Emergent              Means of arrival   Ambulance    Escorted by   [facility]    Service   Hospitalist    Admission type   Emergency              Arrival complaint   EMS             Chief Complaint   Patient presents with    CVA/TIA-like Symptoms     Pt arrives via EMS from home with \"not being himself\" for days. Wife reports to EMS right hand weakness that started today at 1130       Initial Presentation: 69 y.o. male presents to the ED via EMS from home with c/o fall on day of admit, has not been himself x several days, less talkative, confused, dysarthria RUE, RLE paresthesia, weakness.  PMH: CABG, HLD, CAD.  In the ED stroke alert called. Labs - + leukocytosis.  Imaging - 90% focal stenosis of left internal carotid artery along with other stenotic findings right vertebral artery. Treated with ASA, Plavix, sq Lovenox.  On exam motor weakness, 4/5 with flexion of right elbow, 4/5 strength left dorsi flexion.  Admitted to Observation Status with Stroke-like symptoms -   PLAN: continue ASA, Plavix, statin, " MRI Brain, lipid panel, tele, neuro and vascular consult, therapy evals.      Anticipated Length of Stay/Certification Statement:  Patient will be admitted on an observation basis with an anticipated length of stay of less than 2 midnights secondary to stroke like symptoms.     12/25 Neuro Consult - NIHSS 0 - no TNK, symptoms resolving.  CTH L globidus pallidus grey/white differentiation. CTA showed LICA with 90%.  MRI Brain pending. Echo, continue tele, vascular consult, therapy evals. No deficits on exam.      Date: 12/26  CHANGED TO INPATIENT STATUS TODAY:     Stroke alert, stroke like symptoms - MRI Brain - acute infarcts involving the L cerebral hemisphere and internal capsule as described above and predominantly watershed distribution. Pt will have CEA on 12/27 NPO p MN, hold Lovenox.  Echo with bubble washout, suggesting possible L to R flow through PFO or ASD. Mobility score 10/16.  MRI - mild to moderate stroke burden left hemisphere correlating to symptomatic left extracranial carotid.  On exam having episodes including this morning of blurry vision in the left eye, and yesterday events of blurry vision in the left eye multiple times as well. Keep BP at higher end of normal.      12/26 Vascular Surgery Consult - has irregular compliance with statin.  Known carotid stenosis.  Imaging - high-grade near occlusive left carotid stenosis with some irregular plaque projection into the lumen. Small stroke burden seen on MRI in the left cerebral hemisphere internal capsule area. Neuro symptoms resolved. Needs CEA and agreeable will do 12/27.      12/26 Cardio Consult - may proceed with CEA w/ stenting w/o cardiac work up.  Mod surgical risk.  Continue AP, statin.  There is no benefit to PFO closure even if he has one. Would recommend holding off on repeat DERECK at this time.     Date: 12/27  Day 2 IP:   Stroke alert, stroke like symptoms - pt is on the schedule for CEA at 1230 today. NPO.  A line inserted for OR.       +++++++++++++++++++  12/27 OPERATIVE NOTE      +++++++++++++++++++    ED Treatment-Medication Administration from 12/25/2024 1209 to 12/25/2024 1819         Date/Time Order Dose Route Action     12/25/2024 1222 iohexol (OMNIPAQUE) 350 MG/ML injection (SINGLE-DOSE) 75 mL 75 mL Intravenous Given     12/25/2024 1308 aspirin chewable tablet 243 mg 243 mg Oral Given     12/25/2024 1308 clopidogrel (PLAVIX) tablet 300 mg 300 mg Oral Given     12/25/2024 1524 enoxaparin (LOVENOX) subcutaneous injection 40 mg 40 mg Subcutaneous Given            Scheduled Medications:  No current facility-administered medications for this encounter.    Continuous IV Infusions:  No current facility-administered medications for this encounter.    PRN Meds:  No current facility-administered medications for this encounter.    ED Triage Vitals   Temperature Pulse Respirations Blood Pressure SpO2 Pain Score   12/25/24 1743 12/25/24 1218 12/25/24 1218 12/25/24 1218 12/25/24 1212 12/25/24 1820   97.7 °F (36.5 °C) 67 18 142/73 100 % No Pain     Weight (last 2 days) before discharge       Date/Time Weight    12/26/24 1000 79.4 (175)            Vital Signs (last 3 days) before discharge       Date/Time Temp Pulse Resp BP MAP (mmHg) Arterial Line BP MAP SpO2 O2 Device Patient Position - Orthostatic VS Law Coma Scale Score Pain    12/28/24 1000 -- 73 24 -- -- 157/63 94 mmHg 99 % -- -- -- --    12/28/24 0945 -- 72 22 -- -- 152/63 92 mmHg 98 % -- -- -- --    12/28/24 0930 -- 72 17 -- -- 152/63 93 mmHg 98 % -- -- -- --    12/28/24 0915 -- 70 18 -- -- 154/63 93 mmHg 96 % -- -- -- --    12/28/24 0900 -- 68 22 -- -- 160/65 96 mmHg 98 % -- -- -- --    12/28/24 0845 -- 67 18 -- -- 161/65 97 mmHg 99 % -- -- -- --    12/28/24 0830 -- 69 18 -- -- 155/63 95 mmHg 100 % -- -- -- --    12/28/24 0815 -- 71 26 -- -- 155/64 94 mmHg 99 % -- -- -- --    12/28/24 0800 -- 68 16 -- -- 151/63 93 mmHg 98 % -- -- 15 No Pain    12/28/24 0745 -- 69 18 -- -- 157/65 96 mmHg  100 % -- -- -- --    12/28/24 0730 -- 71 -- -- -- 157/67 97 mmHg 99 % -- -- -- --    12/28/24 0715 98.7 °F (37.1 °C) 67 18 -- -- 151/64 93 mmHg 98 % None (Room air) Lying -- --    12/28/24 0700 -- 72 23 -- -- 147/65 93 mmHg 98 % -- -- -- --    12/28/24 0645 -- 69 16 -- -- 158/66 96 mmHg 98 % -- -- -- --    12/28/24 0630 -- 65 13 -- -- 159/65 97 mmHg 97 % -- -- -- --    12/28/24 0615 -- 70 23 -- -- 152/62 91 mmHg 98 % -- -- -- --    12/28/24 0600 -- 68 16 -- -- 155/64 93 mmHg 97 % -- -- 15 --    12/28/24 0545 -- 69 21 -- -- 151/63 92 mmHg 96 % -- -- -- --    12/28/24 0530 -- 71 14 127/59 84 148/62 90 mmHg 97 % -- -- -- --    12/28/24 0515 -- 71 20 -- -- 149/62 90 mmHg 96 % -- -- -- --    12/28/24 0500 -- 69 16 -- -- 154/63 92 mmHg 98 % -- -- -- --    12/28/24 0400 -- 71 18 -- -- 132/69 94 mmHg 97 % -- -- 15 --    12/28/24 0330 -- -- -- -- -- 153/62 90 mmHg -- -- -- -- --    12/28/24 0315 -- -- -- -- -- 154/61 90 mmHg -- -- -- -- --    12/28/24 0300 -- 69 14 -- -- 152/61 89 mmHg 93 % -- -- -- --    12/28/24 0245 -- -- -- -- -- 148/60 87 mmHg -- -- -- -- --    12/28/24 0230 -- -- -- -- -- 130/60 83 mmHg -- -- -- -- --    12/28/24 0215 -- -- -- -- -- 139/58 84 mmHg -- -- -- -- --    12/28/24 0200 -- 73 20 -- -- 148/62 88 mmHg 95 % -- -- 15 --    12/28/24 0130 -- -- -- -- -- 147/58 85 mmHg -- -- -- -- --    12/28/24 0115 -- -- -- -- -- 146/58 85 mmHg -- -- -- -- --    12/28/24 0100 -- 69 19 -- -- 146/59 86 mmHg 95 % -- -- -- --    12/28/24 0045 -- -- -- -- -- 142/58 84 mmHg -- -- -- -- --    12/28/24 0030 -- -- -- -- -- 139/57 82 mmHg -- -- -- -- --    12/28/24 0015 -- -- -- -- -- 139/58 83 mmHg -- -- -- -- --    12/28/24 0000 -- 74 16 -- -- 135/55 79 mmHg 96 % -- -- 15 --    12/27/24 2345 -- -- -- -- -- 138/59 83 mmHg -- -- -- -- --    12/27/24 2330 -- -- -- -- -- 134/56 80 mmHg -- -- -- -- --    12/27/24 2317 97.9 °F (36.6 °C) -- -- -- -- -- -- -- -- -- -- --    12/27/24 2315 -- -- -- -- -- 136/56 80 mmHg -- -- -- --  --    12/27/24 2305 -- 77 18 -- -- -- -- 94 % -- -- -- --    12/27/24 2300 -- 76 20 -- -- 139/56 81 mmHg 94 % -- -- -- --    12/27/24 2245 -- -- -- -- -- 134/55 78 mmHg -- -- -- -- --    12/27/24 2230 -- -- -- -- -- 129/54 76 mmHg -- -- -- -- --    12/27/24 2215 -- -- -- -- -- 133/54 77 mmHg -- -- -- -- --    12/27/24 2200 -- 75 20 -- -- 135/55 79 mmHg 93 % -- -- 15 --    12/27/24 2145 -- -- -- -- -- 134/54 77 mmHg -- -- -- -- --    12/27/24 2130 -- -- -- -- -- 125/50 71 mmHg -- -- -- -- --    12/27/24 2115 -- -- -- -- -- 119/49 70 mmHg -- -- -- -- --    12/27/24 2100 -- 77 15 -- -- 121/50 71 mmHg 95 % -- -- 15 --    12/27/24 2045 -- -- -- -- -- 122/50 72 mmHg -- -- -- -- --    12/27/24 2030 -- -- -- -- -- 123/50 72 mmHg -- -- -- -- --    12/27/24 2015 -- -- -- -- -- 127/51 73 mmHg -- -- -- -- --    12/27/24 2000 -- 76 -- -- -- 141/51 80 mmHg 98 % -- -- 15 --    12/27/24 1945 -- -- -- -- -- 122/49 70 mmHg -- -- -- -- --    12/27/24 1930 -- -- -- -- -- 133/49 73 mmHg -- -- -- -- --    12/27/24 1915 -- -- -- -- -- 126/47 70 mmHg -- -- -- -- --    12/27/24 1900 97.7 °F (36.5 °C) 61 18 120/45 67 99/54 69 mmHg 97 % None (Room air) Lying 15 No Pain    12/27/24 1845 -- 62 -- -- -- 106/56 73 mmHg 97 % -- -- -- --    12/27/24 1830 -- 62 -- -- -- 122/48 69 mmHg 97 % -- -- -- --    12/27/24 1815 -- 62 -- -- -- 122/48 69 mmHg 97 % -- -- -- --    12/27/24 1800 -- 62 -- -- -- 127/50 72 mmHg 97 % -- -- -- --    12/27/24 1745 -- 57 -- -- -- 117/42 65 mmHg 96 % -- -- -- --    12/27/24 1730 98.2 °F (36.8 °C) 66 29 -- -- 115/44 65 mmHg 96 % -- -- 15 No Pain    12/27/24 1715 -- 66 20 101/54 74 116/67 -- 94 % -- -- -- --    12/27/24 1700 -- 70 20 112/57 80 119/52 72 mmHg 95 % None (Room air) -- -- No Pain    12/27/24 1645 -- 76 20 110/55 77 122/52 -- 95 % None (Room air) -- -- No Pain    12/27/24 1639 97.8 °F (36.6 °C) 82 18 116/56 79 137/59 79 mmHg 96 % None (Room air) -- -- No Pain    12/27/24 1155 97 °F (36.1 °C) 63 18 120/64 -- --  -- 98 % None (Room air) -- -- --    12/27/24 1100 -- -- -- -- -- -- -- -- -- -- 15 --    12/27/24 0920 97.9 °F (36.6 °C) 61 18 123/71 88 -- -- 97 % None (Room air) Lying 15 No Pain    12/27/24 07:49:42 97.7 °F (36.5 °C) 62 -- 127/65 86 -- -- 97 % -- -- -- --    12/26/24 23:22:27 97.3 °F (36.3 °C) 67 -- 128/73 91 -- -- 96 % -- -- -- --    12/26/24 2120 -- -- -- -- -- -- -- -- -- -- 15 No Pain    12/26/24 19:19:57 97.8 °F (36.6 °C) 78 18 104/71 82 -- -- 95 % -- -- -- --    12/26/24 15:27:59 98.2 °F (36.8 °C) 72 -- 139/85 103 -- -- 96 % -- -- -- --    12/26/24 1320 -- -- -- -- -- -- -- -- -- -- 15 --    12/26/24 11:21:37 97.6 °F (36.4 °C) 58 21 140/69 93 -- -- 98 % None (Room air) Lying -- --    12/26/24 1000 -- 63 -- 130/75 -- -- -- -- -- -- -- --    12/26/24 0920 -- -- -- -- -- -- -- -- -- -- 15 No Pain    12/26/24 0827 -- -- -- -- -- -- -- -- -- -- -- No Pain    12/26/24 0808 -- -- -- -- -- -- -- -- -- -- -- No Pain    12/26/24 07:53:19 97.6 °F (36.4 °C) 63 16 130/75 93 -- -- 97 % -- -- -- --    12/26/24 0520 97.4 °F (36.3 °C) -- 18 129/70 90 -- -- -- None (Room air) Lying -- --    12/26/24 0320 -- 57 18 133/80 98 -- -- -- None (Room air) Lying -- --    12/25/24 2220 -- -- -- 107/73 84 -- -- -- -- -- -- --    12/25/24 21:24:12 97.1 °F (36.2 °C) 60 18 131/62 85 -- -- 95 % -- -- -- --    12/25/24 2020 -- -- 18 120/69 86 -- -- 94 % None (Room air) Lying -- --    12/25/24 1920 97.4 °F (36.3 °C) 61 20 116/72 87 -- -- 95 % None (Room air) Lying 15 --    12/25/24 1845 -- -- -- -- -- -- -- -- -- -- -- No Pain    12/25/24 18:23:06 98.2 °F (36.8 °C) -- -- 143/77 99 -- -- -- -- -- -- --    12/25/24 1820 -- -- -- -- -- -- -- -- -- -- 15 No Pain    12/25/24 1745 -- 57 22 121/67 89 -- -- 96 % -- -- -- --    12/25/24 1743 97.7 °F (36.5 °C) -- -- -- -- -- -- -- -- -- -- --    12/25/24 1700 -- 59 24 122/71 -- -- -- 96 % -- -- 15 --    12/25/24 1615 -- 59 20 122/66 88 -- -- 97 % -- -- -- --    12/25/24 1600 -- -- -- -- -- -- -- -- --  -- 15 --    12/25/24 1527 -- 58 22 122/63 88 -- -- 97 % -- -- -- --    12/25/24 1500 -- -- -- -- -- -- -- -- -- -- 15 --    12/25/24 1445 -- -- -- -- -- -- -- -- -- -- 15 --    12/25/24 1430 -- 59 20 128/67 92 -- -- 97 % -- -- 15 --    12/25/24 1415 -- -- -- -- -- -- -- -- -- -- 15 --    12/25/24 1400 -- -- 16 124/65 -- -- -- 97 % -- -- 15 --    12/25/24 1345 -- -- 21 128/67 -- -- -- 98 % -- -- 15 --    12/25/24 1330 -- 62 18 141/70 98 -- -- 97 % -- -- 15 --    12/25/24 1315 -- -- -- -- -- -- -- -- -- -- 15 --    12/25/24 1300 -- -- -- -- -- -- -- -- -- -- 15 --    12/25/24 1245 -- 69 21 143/75 -- -- -- 98 % -- -- 15 --    12/25/24 1230 -- 76 18 138/63 -- -- -- 97 % -- -- 15 --    12/25/24 1218 -- 67 18 142/73 -- -- -- 97 % None (Room air) Sitting 15 --    12/25/24 1212 -- -- -- -- -- -- -- 100 % -- -- -- --              Pertinent Labs/Diagnostic Test Results:   Radiology:  VAS intra-op ultrasound CEA   Final Interpretation by Eduar Carlos MD (12/27 2384)      MRI brain wo contrast   Final Interpretation by Mauricio Robles MD (12/26 5671)      Acute infarcts involving the left cerebral hemisphere and internal capsule as described above and predominantly watershed in distribution. No acute intracranial hemorrhage.      The study was marked in EPIC for immediate notification.      Workstation performed: [id number]         VAS carotid complete study   Final Interpretation by Eduar Carlos MD (12/26 0112)      CT stroke alert brain   Final Interpretation by Ghulam Ladd MD (12/25 7782)         1. Loss of normal gray-white matter differentiation in the left globus pallidus (series 2, image 21) worrisome for age-indeterminate left middle cerebral artery infarction.   2. No acute intracranial hemorrhage.   3. Mild microangiopathy, also age indeterminant without the benefit of prior imaging..         Workstation performed: [id number]         CTA stroke alert (head/neck)   Final Interpretation by Ghulam  Dg Ladd MD (12/25 3641)      1. At least 90% focal high-grade stenosis left internal carotid artery origin. Vascular surgery assessment advised.   2. Nonvisualization of the intradural segment of the right vertebral artery possibly related to vascular occlusion or flow that is too slow to detect.   3. Wisp like enhancement of the extradural right vertebral artery possibly on the basis of developmental hypoplasia and/or severe stenosis.      I personally provided preliminary results of this study with Dr. Markie French and Bob Ayers on 12/25/2024 at 12:38 p.m.      Workstation performed: [id number]           Cardiology:  Echo complete w/ contrast if indicated   Final Result by Earlene Morton MD (12/26 9641)   Addendum (preliminary) 1 of 1 by Earlene Morton MD (12/26 2271)        Left Ventricle: Left ventricular cavity size is normal. Wall thickness    is normal. There is borderline concentric hypertrophy. The left    ventricular ejection fraction is 60%. Systolic function is normal. Wall    motion is normal. Diastolic function is normal.     IVS: There is abnormal septal motion consistent with post-operative    status.     Atrial Septum: There is a possible patent foramen ovale.  There was no    right-to-left shunting visualized with contrast saline study done at rest,    with cough, and with Valsalva.  However, there was bubble washout adjacent    to the intra-atrial septum suggesting possible left-to-right flow through    possible PFO or ASD.     Tricuspid Valve: There is mild regurgitation.      There was no right-to-left shunting visualized with contrast saline study    done at rest, with cough, and with Valsalva. However, there was bubble    washout adjacent to the intra-atrial septum suggesting possible    left-to-right flow through PFO or ASD.  Additionally, review of    transesophageal echocardiogram done in 4/2021 shows possible PFO. No    definitive flow across PFO seen. Recommend DERECK if clinically  indicated.         ECG 12 lead   Final Result by Sherry Martinez MD (12/30 0316)   Normal sinus rhythm   Right bundle branch block   Abnormal ECG   Confirmed by Sherry Martinez ([id number]) on 12/30/2024 3:16:10 AM        GI:  No orders to display           Results from last 7 days   Lab Units 12/28/24  0509 12/27/24  0546 12/26/24  0553 12/25/24  1224   WBC Thousand/uL 16.20* 7.29 8.70 11.64*   HEMOGLOBIN g/dL 13.9 15.1 15.4 14.9   HEMATOCRIT % 41.4 45.5 46.6 46.3   PLATELETS Thousands/uL 206 202 212 231         Results from last 7 days   Lab Units 12/28/24  0509 12/27/24  0546 12/26/24  0553 12/25/24  1224   SODIUM mmol/L 138 141 138 136   POTASSIUM mmol/L 4.2 4.4 4.1 4.2   CHLORIDE mmol/L 106 106 103 100   CO2 mmol/L 23 29 29 28   ANION GAP mmol/L 9 6 6 8   BUN mg/dL 16 17 17 22   CREATININE mg/dL 0.94 1.13 1.17 1.14   EGFR ml/min/1.73sq m 83 66 64 66   CALCIUM mg/dL 9.0 9.5 9.4 9.9   MAGNESIUM mg/dL 1.9  --  2.2  --    PHOSPHORUS mg/dL 4.0  --  3.2  --      Results from last 7 days   Lab Units 12/26/24  0553   ALBUMIN g/dL 4.0     Results from last 7 days   Lab Units 12/27/24  1159 12/25/24  1219   POC GLUCOSE mg/dl 83 88     Results from last 7 days   Lab Units 12/28/24  0509 12/27/24  0546 12/26/24  0553 12/25/24  1224   GLUCOSE RANDOM mg/dL 113 107 99 91         Results from last 7 days   Lab Units 12/25/24  1224   HEMOGLOBIN A1C % 6.1*   EAG mg/dl 128     Results from last 7 days   Lab Units 12/25/24  1439 12/25/24  1224   HS TNI 0HR ng/L  --  5   HS TNI 2HR ng/L 4  --    HSTNI D2 ng/L -1  --          Results from last 7 days   Lab Units 12/28/24  0509 12/25/24  1312   PROTIME seconds 15.0 13.9   INR  1.10 1.00   PTT seconds 27 24     Past Medical History:   Diagnosis Date    CAD (coronary artery disease)     Cancer (HCC)     basal skin ca    Colon polyp     Coronary artery disease     Former tobacco use     History of dysphagia     resolved    Hyperlipidemia     Pre-diabetes      Present on Admission:    Dyslipidemia   Coronary artery disease   Stroke-like symptoms      Admitting Diagnosis: CVA (cerebral vascular accident) (HCC) [I63.9]  Stenosis of left carotid artery [I65.22]  Right sided weakness [R53.1]  Ambulatory dysfunction [R26.2]  Age/Sex: 69 y.o. male    Network Utilization Review Department  ATTENTION: Please call with any questions or concerns to [phone] and carefully listen to the prompts so that you are directed to the right person. All voicemails are confidential.   For Discharge needs, contact Care Management DC Support Team at [phone] opt. [phone]  Send all requests for admission clinical reviews, approved or denied determinations and any other requests to dedicated fax number below belonging to the campus where the patient is receiving treatment. List of dedicated fax numbers for the Facilities:  FACILITY NAME UR FAX NUMBER   ADMISSION DENIALS (Administrative/Medical Necessity) [phone]   DISCHARGE SUPPORT TEAM (NETWORK) [phone]   PARENT CHILD HEALTH (Maternity/NICU/Pediatrics) [phone]   [facility] [phone]   [facility] [phone]   [facility] [phone]   [facility] [phone]   Critical access hospital [phone]   [facility] [phone]   [facility] [phone]   [facility] [phone]   [facility] [phone]   [facility] [phone]   [facility] [phone]   [facility] [phone]

## 2024-12-30 NOTE — TELEPHONE ENCOUNTER
Vascular Nurse Navigator Post Op Call    Procedure: Left - ENDARTERECTOMY ARTERY CAROTID     Date of Procedure: 12/27/24    Surgeon:    * Eduar Carlos MD - Primary  Mukesh Serrato MD - Surgical Resident    Discharge Date: 12/28/24    Discharge Disposition:  Home    Change in Vision?: No    Change in Speech?: No    Weakness?: No    Uncontrolled Pain?: No    Hoarseness?: No    Trouble Swallowing?: No    Incision Concerns?: No    Anticoagulation pt was discharged on post op?: Aspirin and Clopidogrel (Plavix)    Statin pt was discharged on post op?: Lipitor (atorvastatin)    Bleeding?: No    Fever/chills?: No      Reviewed discharge instructions and incision care with patient.      NEXT OFFICE VISIT SCHEDULED:  Not scheduled at time of call    Transportation Available?: Yes      Any further questions/concerns?    Spoke with patient's wife Susan in regards to above and patient was near her during conversation.  She stated that patient is doing good, but has been quiet.  Reviewed incision care with her - wash daily with soap and water.  Reviewed discharge medications - Aspirin and Plavix.  All questions answered.  No concerns expressed at this time.    Patient does not have a post op appointment scheduled.  Attempted to contact office scheduling and all were busy with other patients.  Informed Susan of same and informed her I will send a message to office staff to contact her to schedule patient's post op appointment.  She was agreeable to same.

## 2024-12-30 NOTE — UTILIZATION REVIEW
SEE INITIAL REVIEW AT BOTTOM  Continued Stay Review    Date: 12/27/2024                          Current Patient Class: Inpatient  Current Level of Care: ICU       Assessment/Plan:   12/27/2024 OP NOTE  OPERATIVE REPORT  SURGERY DATE: 12/27/2024   Procedure(s):  Left - ENDARTERECTOMY ARTERY CAROTID   Anesthesia Type:   General     Operative Indications:  Stenosis of left carotid artery [I65.22]  CVA (cerebral vascular accident) ([facility]) [I63.9]       Operative Findings:  Ulcerated plaque in the proximal left internal carotid artery with soft plaque and thrombus        ICU  status post carotid endarterectomy earlier today.  Closely monitor neuroexam.  Currently 5 out of 5 throughout no deficits noted on exam.  Also maintain permissive hypertension 100-160 systolics.  Every hour neurochecks modal pain control due to recent surgery.  okay for CLD, advance diet as tolerated  - appreciate ICU monitoring post-operatively  - permissive HTN (SBP goal: 100-160)               -prn BP medications ordered for outside parameters  - hold plavix & ASA  - follow up am labs     Medications:   Scheduled Medications:  aspirin, 81 mg, Oral, Daily  atorvastatin, 80 mg, Oral, QPM  ceFAZolin (ANCEF) 1,000 mg in sodium chloride 0.9 % 1,000 mL OR irrigation, , Irrigation, Once  chlorhexidine, 15 mL, Mouth/Throat, Q12H REESE  clopidogrel, 75 mg, Oral, Daily  heparin (porcine), 5,000 Units, Subcutaneous, Q8H REESE  magnesium sulfate, 2 g, Intravenous, Once      Continuous IV Infusions:  niCARdipine, 1-15 mg/hr, Intravenous, Continuous PRN  [START ON 12/29/2024] phenylephine,  mcg/min, Intravenous, Continuous PRN      PRN Meds:  Artificial Tears, 1 drop, Both Eyes, Q4H PRN  labetalol, 5 mg, Intravenous, Q15 Min PRN   And  hydrALAZINE, 15 mg, Intravenous, Q15 Min PRN   And  niCARdipine, 1-15 mg/hr, Intravenous, Continuous PRN  sodium chloride, 500 mL, Intravenous, Once PRN   Followed by  [START ON 12/29/2024] phenylephine,  mcg/min,  Intravenous, Continuous PRN      Discharge Plan: TBD    Vital Signs (last 3 days) before discharge       Date/Time Temp Pulse Resp BP MAP (mmHg) Arterial Line BP MAP SpO2 O2 Device Patient Position - Orthostatic VS Spring Coma Scale Score Pain    12/28/24 1000 -- 73 24 -- -- 157/63 94 mmHg 99 % -- -- -- --    12/28/24 0945 -- 72 22 -- -- 152/63 92 mmHg 98 % -- -- -- --    12/28/24 0930 -- 72 17 -- -- 152/63 93 mmHg 98 % -- -- -- --    12/28/24 0915 -- 70 18 -- -- 154/63 93 mmHg 96 % -- -- -- --    12/28/24 0900 -- 68 22 -- -- 160/65 96 mmHg 98 % -- -- -- --    12/28/24 0845 -- 67 18 -- -- 161/65 97 mmHg 99 % -- -- -- --    12/28/24 0830 -- 69 18 -- -- 155/63 95 mmHg 100 % -- -- -- --    12/28/24 0815 -- 71 26 -- -- 155/64 94 mmHg 99 % -- -- -- --    12/28/24 0800 -- 68 16 -- -- 151/63 93 mmHg 98 % -- -- 15 No Pain    12/28/24 0745 -- 69 18 -- -- 157/65 96 mmHg 100 % -- -- -- --    12/28/24 0730 -- 71 -- -- -- 157/67 97 mmHg 99 % -- -- -- --    12/28/24 0715 98.7 °F (37.1 °C) 67 18 -- -- 151/64 93 mmHg 98 % None (Room air) Lying -- --    12/28/24 0700 -- 72 23 -- -- 147/65 93 mmHg 98 % -- -- -- --    12/28/24 0645 -- 69 16 -- -- 158/66 96 mmHg 98 % -- -- -- --    12/28/24 0630 -- 65 13 -- -- 159/65 97 mmHg 97 % -- -- -- --    12/28/24 0615 -- 70 23 -- -- 152/62 91 mmHg 98 % -- -- -- --    12/28/24 0600 -- 68 16 -- -- 155/64 93 mmHg 97 % -- -- 15 --    12/28/24 0545 -- 69 21 -- -- 151/63 92 mmHg 96 % -- -- -- --    12/28/24 0530 -- 71 14 127/59 84 148/62 90 mmHg 97 % -- -- -- --    12/28/24 0515 -- 71 20 -- -- 149/62 90 mmHg 96 % -- -- -- --    12/28/24 0500 -- 69 16 -- -- 154/63 92 mmHg 98 % -- -- -- --    12/28/24 0400 -- 71 18 -- -- 132/69 94 mmHg 97 % -- -- 15 --    12/28/24 0330 -- -- -- -- -- 153/62 90 mmHg -- -- -- -- --    12/28/24 0315 -- -- -- -- -- 154/61 90 mmHg -- -- -- -- --    12/28/24 0300 -- 69 14 -- -- 152/61 89 mmHg 93 % -- -- -- --    12/28/24 0245 -- -- -- -- -- 148/60 87 mmHg -- -- -- -- --     12/28/24 0230 -- -- -- -- -- 130/60 83 mmHg -- -- -- -- --    12/28/24 0215 -- -- -- -- -- 139/58 84 mmHg -- -- -- -- --    12/28/24 0200 -- 73 20 -- -- 148/62 88 mmHg 95 % -- -- 15 --    12/28/24 0130 -- -- -- -- -- 147/58 85 mmHg -- -- -- -- --    12/28/24 0115 -- -- -- -- -- 146/58 85 mmHg -- -- -- -- --    12/28/24 0100 -- 69 19 -- -- 146/59 86 mmHg 95 % -- -- -- --    12/28/24 0045 -- -- -- -- -- 142/58 84 mmHg -- -- -- -- --    12/28/24 0030 -- -- -- -- -- 139/57 82 mmHg -- -- -- -- --    12/28/24 0015 -- -- -- -- -- 139/58 83 mmHg -- -- -- -- --    12/28/24 0000 -- 74 16 -- -- 135/55 79 mmHg 96 % -- -- 15 --    12/27/24 2345 -- -- -- -- -- 138/59 83 mmHg -- -- -- -- --    12/27/24 2330 -- -- -- -- -- 134/56 80 mmHg -- -- -- -- --    12/27/24 2317 97.9 °F (36.6 °C) -- -- -- -- -- -- -- -- -- -- --    12/27/24 2315 -- -- -- -- -- 136/56 80 mmHg -- -- -- -- --    12/27/24 2305 -- 77 18 -- -- -- -- 94 % -- -- -- --    12/27/24 2300 -- 76 20 -- -- 139/56 81 mmHg 94 % -- -- -- --    12/27/24 2245 -- -- -- -- -- 134/55 78 mmHg -- -- -- -- --    12/27/24 2230 -- -- -- -- -- 129/54 76 mmHg -- -- -- -- --    12/27/24 2215 -- -- -- -- -- 133/54 77 mmHg -- -- -- -- --    12/27/24 2200 -- 75 20 -- -- 135/55 79 mmHg 93 % -- -- 15 --    12/27/24 2145 -- -- -- -- -- 134/54 77 mmHg -- -- -- -- --    12/27/24 2130 -- -- -- -- -- 125/50 71 mmHg -- -- -- -- --    12/27/24 2115 -- -- -- -- -- 119/49 70 mmHg -- -- -- -- --    12/27/24 2100 -- 77 15 -- -- 121/50 71 mmHg 95 % -- -- 15 --    12/27/24 2045 -- -- -- -- -- 122/50 72 mmHg -- -- -- -- --    12/27/24 2030 -- -- -- -- -- 123/50 72 mmHg -- -- -- -- --    12/27/24 2015 -- -- -- -- -- 127/51 73 mmHg -- -- -- -- --    12/27/24 2000 -- 76 -- -- -- 141/51 80 mmHg 98 % -- -- 15 --    12/27/24 1945 -- -- -- -- -- 122/49 70 mmHg -- -- -- -- --    12/27/24 1930 -- -- -- -- -- 133/49 73 mmHg -- -- -- -- --    12/27/24 1915 -- -- -- -- -- 126/47 70 mmHg -- -- -- -- --    12/27/24 1900  97.7 °F (36.5 °C) 61 18 120/45 67 99/54 69 mmHg 97 % None (Room air) Lying 15 No Pain    12/27/24 1845 -- 62 -- -- -- 106/56 73 mmHg 97 % -- -- -- --    12/27/24 1830 -- 62 -- -- -- 122/48 69 mmHg 97 % -- -- -- --    12/27/24 1815 -- 62 -- -- -- 122/48 69 mmHg 97 % -- -- -- --    12/27/24 1800 -- 62 -- -- -- 127/50 72 mmHg 97 % -- -- -- --    12/27/24 1745 -- 57 -- -- -- 117/42 65 mmHg 96 % -- -- -- --    12/27/24 1730 98.2 °F (36.8 °C) 66 29 -- -- 115/44 65 mmHg 96 % -- -- 15 No Pain    12/27/24 1715 -- 66 20 101/54 74 116/67 -- 94 % -- -- -- --    12/27/24 1700 -- 70 20 112/57 80 119/52 72 mmHg 95 % None (Room air) -- -- No Pain    12/27/24 1645 -- 76 20 110/55 77 122/52 -- 95 % None (Room air) -- -- No Pain    12/27/24 1639 97.8 °F (36.6 °C) 82 18 116/56 79 137/59 79 mmHg 96 % None (Room air) -- -- No Pain    12/27/24 1155 97 °F (36.1 °C) 63 18 120/64 -- -- -- 98 % None (Room air) -- -- --    12/27/24 1100 -- -- -- -- -- -- -- -- -- -- 15 --    12/27/24 0920 97.9 °F (36.6 °C) 61 18 123/71 88 -- -- 97 % None (Room air) Lying 15 No Pain    12/27/24 07:49:42 97.7 °F (36.5 °C) 62 -- 127/65 86 -- -- 97 % -- -- -- --    12/26/24 23:22:27 97.3 °F (36.3 °C) 67 -- 128/73 91 -- -- 96 % -- -- -- --    12/26/24 2120 -- -- -- -- -- -- -- -- -- -- 15 No Pain    12/26/24 19:19:57 97.8 °F (36.6 °C) 78 18 104/71 82 -- -- 95 % -- -- -- --    12/26/24 15:27:59 98.2 °F (36.8 °C) 72 -- 139/85 103 -- -- 96 % -- -- -- --    12/26/24 1320 -- -- -- -- -- -- -- -- -- -- 15 --    12/26/24 11:21:37 97.6 °F (36.4 °C) 58 21 140/69 93 -- -- 98 % None (Room air) Lying -- --    12/26/24 1000 -- 63 -- 130/75 -- -- -- -- -- -- -- --    12/26/24 0920 -- -- -- -- -- -- -- -- -- -- 15 No Pain    12/26/24 0827 -- -- -- -- -- -- -- -- -- -- -- No Pain    12/26/24 0808 -- -- -- -- -- -- -- -- -- -- -- No Pain    12/26/24 07:53:19 97.6 °F (36.4 °C) 63 16 130/75 93 -- -- 97 % -- -- -- --    12/26/24 0520 97.4 °F (36.3 °C) -- 18 129/70 90 -- -- -- None  (Room air) Lying -- --    12/26/24 0320 -- 57 18 133/80 98 -- -- -- None (Room air) Lying -- --    12/25/24 2220 -- -- -- 107/73 84 -- -- -- -- -- -- --    12/25/24 21:24:12 97.1 °F (36.2 °C) 60 18 131/62 85 -- -- 95 % -- -- -- --    12/25/24 2020 -- -- 18 120/69 86 -- -- 94 % None (Room air) Lying -- --    12/25/24 1920 97.4 °F (36.3 °C) 61 20 116/72 87 -- -- 95 % None (Room air) Lying 15 --    12/25/24 1845 -- -- -- -- -- -- -- -- -- -- -- No Pain    12/25/24 18:23:06 98.2 °F (36.8 °C) -- -- 143/77 99 -- -- -- -- -- -- --    12/25/24 1820 -- -- -- -- -- -- -- -- -- -- 15 No Pain    12/25/24 1745 -- 57 22 121/67 89 -- -- 96 % -- -- -- --    12/25/24 1743 97.7 °F (36.5 °C) -- -- -- -- -- -- -- -- -- -- --    12/25/24 1700 -- 59 24 122/71 -- -- -- 96 % -- -- 15 --    12/25/24 1615 -- 59 20 122/66 88 -- -- 97 % -- -- -- --    12/25/24 1600 -- -- -- -- -- -- -- -- -- -- 15 --    12/25/24 1527 -- 58 22 122/63 88 -- -- 97 % -- -- -- --    12/25/24 1500 -- -- -- -- -- -- -- -- -- -- 15 --    12/25/24 1445 -- -- -- -- -- -- -- -- -- -- 15 --    12/25/24 1430 -- 59 20 128/67 92 -- -- 97 % -- -- 15 --    12/25/24 1415 -- -- -- -- -- -- -- -- -- -- 15 --    12/25/24 1400 -- -- 16 124/65 -- -- -- 97 % -- -- 15 --    12/25/24 1345 -- -- 21 128/67 -- -- -- 98 % -- -- 15 --    12/25/24 1330 -- 62 18 141/70 98 -- -- 97 % -- -- 15 --    12/25/24 1315 -- -- -- -- -- -- -- -- -- -- 15 --    12/25/24 1300 -- -- -- -- -- -- -- -- -- -- 15 --    12/25/24 1245 -- 69 21 143/75 -- -- -- 98 % -- -- 15 --    12/25/24 1230 -- 76 18 138/63 -- -- -- 97 % -- -- 15 --    12/25/24 1218 -- 67 18 142/73 -- -- -- 97 % None (Room air) Sitting 15 --    12/25/24 1212 -- -- -- -- -- -- -- 100 % -- -- -- --          Weight (last 2 days) before discharge       Date/Time Weight    12/26/24 1000 79.4 (175)            Pertinent Labs/Diagnostic Results:   Radiology:  VAS intra-op ultrasound CEA   Final Interpretation by Eduar Carlos MD (12/27 1644)       MRI brain wo contrast   Final Interpretation by Mauricio Robles MD (12/26 1201)      Acute infarcts involving the left cerebral hemisphere and internal capsule as described above and predominantly watershed in distribution. No acute intracranial hemorrhage.      The study was marked in EPIC for immediate notification.      Workstation performed: [id number]         VAS carotid complete study   Final Interpretation by Eduar Carlos MD (12/26 0912)      CT stroke alert brain   Final Interpretation by Ghulam Ladd MD (12/25 1233)         1. Loss of normal gray-white matter differentiation in the left globus pallidus (series 2, image 21) worrisome for age-indeterminate left middle cerebral artery infarction.   2. No acute intracranial hemorrhage.   3. Mild microangiopathy, also age indeterminant without the benefit of prior imaging..         Workstation performed: [id number]         CTA stroke alert (head/neck)   Final Interpretation by Ghulam Ladd MD (12/25 1243)      1. At least 90% focal high-grade stenosis left internal carotid artery origin. Vascular surgery assessment advised.   2. Nonvisualization of the intradural segment of the right vertebral artery possibly related to vascular occlusion or flow that is too slow to detect.   3. Wisp like enhancement of the extradural right vertebral artery possibly on the basis of developmental hypoplasia and/or severe stenosis.      I personally provided preliminary results of this study with Dr. Markie French and Bob Ayers on 12/25/2024 at 12:38 p.m.      Workstation performed: [id number]           Cardiology:  Echo complete w/ contrast if indicated   Final Result by Earlene Morton MD (12/26 1141)   Addendum (preliminary) 1 of 1 by Earlene Morton MD (12/26 1141)        Left Ventricle: Left ventricular cavity size is normal. Wall thickness    is normal. There is borderline concentric hypertrophy. The left    ventricular ejection fraction is 60%. Systolic  function is normal. Wall    motion is normal. Diastolic function is normal.     IVS: There is abnormal septal motion consistent with post-operative    status.     Atrial Septum: There is a possible patent foramen ovale.  There was no    right-to-left shunting visualized with contrast saline study done at rest,    with cough, and with Valsalva.  However, there was bubble washout adjacent    to the intra-atrial septum suggesting possible left-to-right flow through    possible PFO or ASD.     Tricuspid Valve: There is mild regurgitation.      There was no right-to-left shunting visualized with contrast saline study    done at rest, with cough, and with Valsalva. However, there was bubble    washout adjacent to the intra-atrial septum suggesting possible    left-to-right flow through PFO or ASD.  Additionally, review of    transesophageal echocardiogram done in 4/2021 shows possible PFO. No    definitive flow across PFO seen. Recommend DERECK if clinically indicated.         ECG 12 lead   Final Result by Sherry Martinez MD (12/30 0316)   Normal sinus rhythm   Right bundle branch block   Abnormal ECG   Confirmed by Sherry Martinez ([id number]) on 12/30/2024 3:16:10 AM        GI:  No orders to display           Results from last 7 days   Lab Units 12/28/24  0509 12/27/24  0546 12/26/24  0553 12/25/24  1224   WBC Thousand/uL 16.20* 7.29 8.70 11.64*   HEMOGLOBIN g/dL 13.9 15.1 15.4 14.9   HEMATOCRIT % 41.4 45.5 46.6 46.3   PLATELETS Thousands/uL 206 202 212 231         Results from last 7 days   Lab Units 12/28/24  0509 12/27/24  0546 12/26/24  0553 12/25/24  1224   SODIUM mmol/L 138 141 138 136   POTASSIUM mmol/L 4.2 4.4 4.1 4.2   CHLORIDE mmol/L 106 106 103 100   CO2 mmol/L 23 29 29 28   ANION GAP mmol/L 9 6 6 8   BUN mg/dL 16 17 17 22   CREATININE mg/dL 0.94 1.13 1.17 1.14   EGFR ml/min/1.73sq m 83 66 64 66   CALCIUM mg/dL 9.0 9.5 9.4 9.9   MAGNESIUM mg/dL 1.9  --  2.2  --    PHOSPHORUS mg/dL 4.0  --  3.2  --      Results from  "last 7 days   Lab Units 12/26/24  0553   ALBUMIN g/dL 4.0     Results from last 7 days   Lab Units 12/27/24  1159 12/25/24  1219   POC GLUCOSE mg/dl 83 88     Results from last 7 days   Lab Units 12/28/24  0509 12/27/24  0546 12/26/24  0553 12/25/24  1224   GLUCOSE RANDOM mg/dL 113 107 99 91         Results from last 7 days   Lab Units 12/25/24  1224   HEMOGLOBIN A1C % 6.1*   EAG mg/dl 128     No results found for: \"BETA-HYDROXYBUTYRATE\"                   Results from last 7 days   Lab Units 12/25/24  1439 12/25/24  1224   HS TNI 0HR ng/L  --  5   HS TNI 2HR ng/L 4  --    HSTNI D2 ng/L -1  --          Results from last 7 days   Lab Units 12/28/24  0509 12/25/24  1312   PROTIME seconds 15.0 13.9   INR  1.10 1.00   PTT seconds 27 24                                     Results from last 7 days   Lab Units 12/28/24  1432   UNIT PRODUCT CODE  [id number]  [id number]   UNIT NUMBER  [id number]  [id number]   UNITABO  A  A   UNITRH  POS  POS   CROSSMATCH  Compatible  Compatible   UNIT DISPENSE STATUS  Return to Inv  Return to Inv   UNIT PRODUCT VOL ml 350  350                                                                           Network Utilization Review Department  ATTENTION: Please call with any questions or concerns to [phone] and carefully listen to the prompts so that you are directed to the right person. All voicemails are confidential.   For Discharge needs, contact Care Management DC Support Team at [phone] opt. 2  Send all requests for admission clinical reviews, approved or denied determinations and any other requests to dedicated fax number below belonging to the campus where the patient is receiving treatment. List of dedicated fax numbers for the Facilities:  FACILITY NAME UR FAX NUMBER   ADMISSION DENIALS (Administrative/Medical Necessity) [phone]   DISCHARGE SUPPORT TEAM (NETWORK) [phone]   PARENT CHILD HEALTH (Maternity/NICU/Pediatrics) [phone]   [facility] " [facility] [phone]   [facility] [phone]   [facility] [phone]   [facility] [phone]   [facility] [phone]   [facility] [phone]   [facility] [phone]   [facility] [phone]   [facility] [phone]   [facility] [phone]   [facility] [phone]   [facility] [phone]

## 2024-12-30 NOTE — TELEPHONE ENCOUNTER
PT spouse called in to make a Post- Op appt, Pt had surgery on 12/27/24 UBALDO -ENDARTERECTOMY ARTERY CAROTID-  ( Was an In house Pt) Pt needs an appt 7-10 days post op appt. Pt willing to travel to [facility] [place], [facility], [facility] .Please call Pt Spouse at  [phone] her name is Susan.  
Pt is scheduled for 1/14/25 po   
41 yr old female, , 38 weeks of gestation, KENDRA 10/12/2021, presents to PST for scheduled repeat  w /bilateral salpingectomy on 10/10/2021. Denies fever, chills, no acute complaints. Denies sick contacts. Covid PCR 10/8/2021.     ---------------------------------------------------  40yo  @39.5wks presents for scheduled rLTCS. +FM. -LOF. -CTXs. -VB. Pt denies any other concerns.    – PNC: Denies prenatal issues. EFW 8#.  – OBHx: FT pLTCS for arrest 2018, TOP   – GynHx: denies h/o fibroids, STIs, abnormal cervical exams  – PMH: denies  – PSH: denies  – Psych: denies h/o anxiety/depression  – Social: denies h/o toxic habits in pregnancy  – Meds: PNV   – Allergies: NKDA

## 2024-12-31 NOTE — TELEPHONE ENCOUNTER
Post Op appointment scheduled for 1/14/25 at 3:30 pm with SHIV Whelan at [facility]

## 2024-12-31 NOTE — UTILIZATION REVIEW
NOTIFICATION OF ADMISSION DISCHARGE   This is a Notification of Discharge from [facility]. Please be advised that this patient has been discharge from our facility. Below you will find the admission and discharge date and time including the patient’s disposition.   UTILIZATION REVIEW CONTACT:  Chrissy Dos Santos  Utilization   Network Utilization Review Department  Phone: [phone] x carefully listen to the prompts. All voicemails are confidential.  Email: [email or web address]     ADMISSION INFORMATION  PRESENTATION DATE: 12/25/2024 12:12 PM  OBERVATION ADMISSION DATE: 12/25/2024 1333  INPATIENT ADMISSION DATE: 12/26/24  2:11 PM   DISCHARGE DATE: 12/28/2024  2:11 PM   DISPOSITION:Home/Self Care    Network Utilization Review Department  ATTENTION: Please call with any questions or concerns to [phone] and carefully listen to the prompts so that you are directed to the right person. All voicemails are confidential.   For Discharge needs, contact Care Management DC Support Team at [phone] opt. 2  Send all requests for admission clinical reviews, approved or denied determinations and any other requests to dedicated fax number below belonging to the campus where the patient is receiving treatment. List of dedicated fax numbers for the Facilities:  FACILITY NAME UR FAX NUMBER   ADMISSION DENIALS (Administrative/Medical Necessity) [phone]   DISCHARGE SUPPORT TEAM ([facility]) [phone]   PARENT CHILD HEALTH (Maternity/NICU/Pediatrics) [phone]   [facility] [phone]   [facility] [phone]   Count includes the [facility] [phone]   [facility] [phone]   [facility] [phone]   [facility] [phone]   [facility] [phone]   [facility]  [phone]   [facility] [phone]   [facility] [phone]   [facility] [phone]   [facility] [phone]

## 2025-01-02 ENCOUNTER — OFFICE VISIT (OUTPATIENT)
Dept: FAMILY MEDICINE CLINIC | Facility: CLINIC | Age: 70
End: 2025-01-02
Payer: COMMERCIAL

## 2025-01-02 VITALS
SYSTOLIC BLOOD PRESSURE: 110 MMHG | WEIGHT: 173 LBS | TEMPERATURE: 96.5 F | OXYGEN SATURATION: 97 % | DIASTOLIC BLOOD PRESSURE: 82 MMHG | HEART RATE: 58 BPM | BODY MASS INDEX: 26.22 KG/M2 | HEIGHT: 68 IN

## 2025-01-02 DIAGNOSIS — I65.22 STENOSIS OF LEFT CAROTID ARTERY: Primary | ICD-10-CM

## 2025-01-02 PROCEDURE — 99495 TRANSJ CARE MGMT MOD F2F 14D: CPT | Performed by: FAMILY MEDICINE

## 2025-01-02 RX ORDER — METHOCARBAMOL 500 MG/1
TABLET, FILM COATED ORAL
COMMUNITY
Start: 2024-12-24 | End: 2025-01-02

## 2025-01-02 NOTE — PROGRESS NOTES
Transition of Care Visit  Name: Mateo Franco      : 1955      MRN: [id number]  Encounter Provider: Kiara Pillai DO  Encounter Date: 2025   Encounter department: [facility]    Assessment & Plan  Stenosis of left carotid artery    Patient presents today for hospital follow-up after admission for left carotid artery stenosis causing strokelike symptoms.  Patient continues on aspirin, Plavix, and Lipitor without side effects.  Has follow-up scheduled with vascular surgery on 2025.  Also has follow-up with neurology in February.  Recommend follow-up as scheduled for annual wellness visit or sooner as needed.  Discussed consideration for RSV vaccination.              History of Present Illness     Transitional Care Management Review:   Mateo Franco is a 69 y.o. male here for TCM follow up.     During the TCM phone call patient stated:  TCM Call     Date and time call was made  2024  3:20 PM    Hospital care reviewed  Records reviewed    Patient was hospitialized at  [facility]    Date of Admission  24    Date of discharge  24    Diagnosis  Symptomatic carotid artery stenosis with infarction    Disposition  Home    Were the patients medications reviewed and updated  No    Current Symptoms  None      TCM Call     Post hospital issues  None    Should patient be enrolled in anticoag monitoring?  No    Scheduled for follow up?  Yes    Did you obtain your prescribed medications  Yes    Do you need help managing your prescriptions or medications  No    Is transportation to your appointment needed  No    I have advised the patient to call PCP with any new or worsening symptoms  daryl baldwin ma    Living Arrangements  Spouse or Significiant other    Support System  Spouse    The type of support provided  Emotional; Financial; Physical    Do you have social support  Yes, as much as I need    Are you recieving any outpatient services  No    Are you recieving  "home care services  No    Are you using any community resources  No    Current waiver services  No        Ana M is a 69-year-old male with a past medical history of CAD status post CABG, carotid stenosis, hiatal hernia, colon polyp, vitamin D deficiency, hyperlipidemia who presents today for transition of care management visit.      Patient was admitted on 12/25/2024 - 12/28/2024 at [facility] for symptomatic carotid artery stenosis with infarction.  CTA of the head and neck noted 90% focal stenosis of the left internal carotid artery along with other stenotic findings of the right vertebral artery.  Patient underwent left carotid endarterectomy on 12/27.  Patient notes that at discharge she was feeling a bit disoriented following anesthesia but notes that things are improving since discharge.  He denies symptomatic concerns at today's visit.      Review of Systems   Respiratory:  Negative for shortness of breath.    Cardiovascular:  Negative for chest pain.   Musculoskeletal:  Negative for myalgias.   Neurological:  Negative for dizziness, light-headedness and headaches.     Objective   /82 (BP Location: Left arm, Patient Position: Sitting, Cuff Size: Standard)   Pulse 58   Temp (!) 96.5 °F (35.8 °C) (Tympanic)   Ht 5' 8\" (1.727 m)   Wt 78.5 kg (173 lb)   SpO2 97%   BMI 26.30 kg/m²     Physical Exam  Vitals reviewed.   Constitutional:       General: He is not in acute distress.     Appearance: Normal appearance.   HENT:      Head: Normocephalic and atraumatic.      Right Ear: External ear normal.      Left Ear: External ear normal.      Mouth/Throat:      Mouth: Mucous membranes are moist.      Pharynx: Oropharynx is clear.   Eyes:      General: No scleral icterus.        Right eye: No discharge.         Left eye: No discharge.      Conjunctiva/sclera: Conjunctivae normal.   Neck:      Vascular: No carotid bruit.      Comments: Well-healing surgical wound without signs of infection on the left " anterior neck.  Cardiovascular:      Rate and Rhythm: Normal rate and regular rhythm.      Heart sounds: No murmur heard.     No friction rub. No gallop.   Pulmonary:      Effort: Pulmonary effort is normal.      Breath sounds: No wheezing, rhonchi or rales.   Abdominal:      General: Bowel sounds are normal.      Palpations: Abdomen is soft.      Tenderness: There is no abdominal tenderness. There is no guarding or rebound.   Musculoskeletal:      Right lower leg: No edema.      Left lower leg: No edema.   Skin:     General: Skin is warm and dry.   Neurological:      General: No focal deficit present.      Mental Status: He is alert and oriented to person, place, and time.   Psychiatric:         Mood and Affect: Mood normal.         Behavior: Behavior normal.       Medications have been reviewed by provider in current encounter

## 2025-01-02 NOTE — UTILIZATION REVIEW
"Initial Clinical Review    OBSERVATION 12/25 CHANGED TO INPATIENT ON 12/26 @ 1411 - STROKE ALERT WITH + MRI BRAIN FOR STROKE AND GOING TO OR ON 12/27 FOR CEA.      Admission: Date/Time/Statement:   Admission Orders (From admission, onward)       Ordered        12/26/24 1411  INPATIENT ADMISSION  Once            12/25/24 1333  Place in Observation  Once                          Orders Placed This Encounter   Procedures    INPATIENT ADMISSION     Standing Status:   Standing     Number of Occurrences:   1     Level of Care:   Med Surg [16]     Estimated length of stay:   More than 2 Midnights     Certification:   I certify that inpatient services are medically necessary for this patient for a duration of greater than two midnights. See H&P and MD Progress Notes for additional information about the patient's course of treatment.     ED Arrival Information       Expected   -    Arrival   12/25/2024 12:09    Acuity   Emergent              Means of arrival   Ambulance    Escorted by   [facility]    Service   Hospitalist    Admission type   Emergency              Arrival complaint   EMS             Chief Complaint   Patient presents with    CVA/TIA-like Symptoms     Pt arrives via EMS from home with \"not being himself\" for days. Wife reports to EMS right hand weakness that started today at 1130       Initial Presentation: 69 y.o. male presents to the ED via EMS from home with c/o fall on day of admit, has not been himself x several days, less talkative, confused, dysarthria RUE, RLE paresthesia, weakness.  PMH: CABG, HLD, CAD.  In the ED stroke alert called. Labs - + leukocytosis.  Imaging - 90% focal stenosis of left internal carotid artery along with other stenotic findings right vertebral artery. Treated with ASA, Plavix, sq Lovenox.  On exam motor weakness, 4/5 with flexion of right elbow, 4/5 strength left dorsi flexion.  Admitted to Observation Status with Stroke-like symptoms -   PLAN: continue ASA, Plavix, statin, " MRI Brain, lipid panel, tele, neuro and vascular consult, therapy evals.      Anticipated Length of Stay/Certification Statement:  Patient will be admitted on an observation basis with an anticipated length of stay of less than 2 midnights secondary to stroke like symptoms.     12/25 Neuro Consult - NIHSS 0 - no TNK, symptoms resolving.  CTH L globidus pallidus grey/white differentiation. CTA showed LICA with 90%.  MRI Brain pending. Echo, continue tele, vascular consult, therapy evals. No deficits on exam.      Date: 12/26  CHANGED TO INPATIENT STATUS TODAY:     Stroke alert, stroke like symptoms - MRI Brain - acute infarcts involving the L cerebral hemisphere and internal capsule as described above and predominantly watershed distribution. Pt will have CEA on 12/27 NPO p MN, hold Lovenox.  Echo with bubble washout, suggesting possible L to R flow through PFO or ASD. Mobility score 10/16.  MRI - mild to moderate stroke burden left hemisphere correlating to symptomatic left extracranial carotid.  On exam having episodes including this morning of blurry vision in the left eye, and yesterday events of blurry vision in the left eye multiple times as well. Keep BP at higher end of normal.      12/26 Vascular Surgery Consult - has irregular compliance with statin.  Known carotid stenosis.  Imaging - high-grade near occlusive left carotid stenosis with some irregular plaque projection into the lumen. Small stroke burden seen on MRI in the left cerebral hemisphere internal capsule area. Neuro symptoms resolved. Needs CEA and agreeable will do 12/27.      12/26 Cardio Consult - may proceed with CEA w/ stenting w/o cardiac work up.  Mod surgical risk.  Continue AP, statin.  There is no benefit to PFO closure even if he has one. Would recommend holding off on repeat DERECK at this time.     Date: 12/27  Day 2 IP:   Stroke alert, stroke like symptoms - pt is on the schedule for CEA at 1230 today. NPO.  A line inserted for OR.       +++++++++++++++++++  12/27 OPERATIVE NOTE      +++++++++++++++++++    ED Treatment-Medication Administration from 12/25/2024 1209 to 12/25/2024 1819         Date/Time Order Dose Route Action     12/25/2024 1222 iohexol (OMNIPAQUE) 350 MG/ML injection (SINGLE-DOSE) 75 mL 75 mL Intravenous Given     12/25/2024 1308 aspirin chewable tablet 243 mg 243 mg Oral Given     12/25/2024 1308 clopidogrel (PLAVIX) tablet 300 mg 300 mg Oral Given     12/25/2024 1524 enoxaparin (LOVENOX) subcutaneous injection 40 mg 40 mg Subcutaneous Given            Scheduled Medications:  No current facility-administered medications for this encounter.    Continuous IV Infusions:  No current facility-administered medications for this encounter.    PRN Meds:  No current facility-administered medications for this encounter.    ED Triage Vitals   Temperature Pulse Respirations Blood Pressure SpO2 Pain Score   12/25/24 1743 12/25/24 1218 12/25/24 1218 12/25/24 1218 12/25/24 1212 12/25/24 1820   97.7 °F (36.5 °C) 67 18 142/73 100 % No Pain     Weight (last 2 days) before discharge       Date/Time Weight    12/26/24 1000 79.4 (175)            Vital Signs (last 3 days) before discharge       Date/Time Temp Pulse Resp BP MAP (mmHg) Arterial Line BP MAP SpO2 O2 Device Patient Position - Orthostatic VS Law Coma Scale Score Pain    12/28/24 1000 -- 73 24 -- -- 157/63 94 mmHg 99 % -- -- -- --    12/28/24 0945 -- 72 22 -- -- 152/63 92 mmHg 98 % -- -- -- --    12/28/24 0930 -- 72 17 -- -- 152/63 93 mmHg 98 % -- -- -- --    12/28/24 0915 -- 70 18 -- -- 154/63 93 mmHg 96 % -- -- -- --    12/28/24 0900 -- 68 22 -- -- 160/65 96 mmHg 98 % -- -- -- --    12/28/24 0845 -- 67 18 -- -- 161/65 97 mmHg 99 % -- -- -- --    12/28/24 0830 -- 69 18 -- -- 155/63 95 mmHg 100 % -- -- -- --    12/28/24 0815 -- 71 26 -- -- 155/64 94 mmHg 99 % -- -- -- --    12/28/24 0800 -- 68 16 -- -- 151/63 93 mmHg 98 % -- -- 15 No Pain    12/28/24 0745 -- 69 18 -- -- 157/65 96 mmHg  100 % -- -- -- --    12/28/24 0730 -- 71 -- -- -- 157/67 97 mmHg 99 % -- -- -- --    12/28/24 0715 98.7 °F (37.1 °C) 67 18 -- -- 151/64 93 mmHg 98 % None (Room air) Lying -- --    12/28/24 0700 -- 72 23 -- -- 147/65 93 mmHg 98 % -- -- -- --    12/28/24 0645 -- 69 16 -- -- 158/66 96 mmHg 98 % -- -- -- --    12/28/24 0630 -- 65 13 -- -- 159/65 97 mmHg 97 % -- -- -- --    12/28/24 0615 -- 70 23 -- -- 152/62 91 mmHg 98 % -- -- -- --    12/28/24 0600 -- 68 16 -- -- 155/64 93 mmHg 97 % -- -- 15 --    12/28/24 0545 -- 69 21 -- -- 151/63 92 mmHg 96 % -- -- -- --    12/28/24 0530 -- 71 14 127/59 84 148/62 90 mmHg 97 % -- -- -- --    12/28/24 0515 -- 71 20 -- -- 149/62 90 mmHg 96 % -- -- -- --    12/28/24 0500 -- 69 16 -- -- 154/63 92 mmHg 98 % -- -- -- --    12/28/24 0400 -- 71 18 -- -- 132/69 94 mmHg 97 % -- -- 15 --    12/28/24 0330 -- -- -- -- -- 153/62 90 mmHg -- -- -- -- --    12/28/24 0315 -- -- -- -- -- 154/61 90 mmHg -- -- -- -- --    12/28/24 0300 -- 69 14 -- -- 152/61 89 mmHg 93 % -- -- -- --    12/28/24 0245 -- -- -- -- -- 148/60 87 mmHg -- -- -- -- --    12/28/24 0230 -- -- -- -- -- 130/60 83 mmHg -- -- -- -- --    12/28/24 0215 -- -- -- -- -- 139/58 84 mmHg -- -- -- -- --    12/28/24 0200 -- 73 20 -- -- 148/62 88 mmHg 95 % -- -- 15 --    12/28/24 0130 -- -- -- -- -- 147/58 85 mmHg -- -- -- -- --    12/28/24 0115 -- -- -- -- -- 146/58 85 mmHg -- -- -- -- --    12/28/24 0100 -- 69 19 -- -- 146/59 86 mmHg 95 % -- -- -- --    12/28/24 0045 -- -- -- -- -- 142/58 84 mmHg -- -- -- -- --    12/28/24 0030 -- -- -- -- -- 139/57 82 mmHg -- -- -- -- --    12/28/24 0015 -- -- -- -- -- 139/58 83 mmHg -- -- -- -- --    12/28/24 0000 -- 74 16 -- -- 135/55 79 mmHg 96 % -- -- 15 --    12/27/24 2345 -- -- -- -- -- 138/59 83 mmHg -- -- -- -- --    12/27/24 2330 -- -- -- -- -- 134/56 80 mmHg -- -- -- -- --    12/27/24 2317 97.9 °F (36.6 °C) -- -- -- -- -- -- -- -- -- -- --    12/27/24 2315 -- -- -- -- -- 136/56 80 mmHg -- -- -- --  --    12/27/24 2305 -- 77 18 -- -- -- -- 94 % -- -- -- --    12/27/24 2300 -- 76 20 -- -- 139/56 81 mmHg 94 % -- -- -- --    12/27/24 2245 -- -- -- -- -- 134/55 78 mmHg -- -- -- -- --    12/27/24 2230 -- -- -- -- -- 129/54 76 mmHg -- -- -- -- --    12/27/24 2215 -- -- -- -- -- 133/54 77 mmHg -- -- -- -- --    12/27/24 2200 -- 75 20 -- -- 135/55 79 mmHg 93 % -- -- 15 --    12/27/24 2145 -- -- -- -- -- 134/54 77 mmHg -- -- -- -- --    12/27/24 2130 -- -- -- -- -- 125/50 71 mmHg -- -- -- -- --    12/27/24 2115 -- -- -- -- -- 119/49 70 mmHg -- -- -- -- --    12/27/24 2100 -- 77 15 -- -- 121/50 71 mmHg 95 % -- -- 15 --    12/27/24 2045 -- -- -- -- -- 122/50 72 mmHg -- -- -- -- --    12/27/24 2030 -- -- -- -- -- 123/50 72 mmHg -- -- -- -- --    12/27/24 2015 -- -- -- -- -- 127/51 73 mmHg -- -- -- -- --    12/27/24 2000 -- 76 -- -- -- 141/51 80 mmHg 98 % -- -- 15 --    12/27/24 1945 -- -- -- -- -- 122/49 70 mmHg -- -- -- -- --    12/27/24 1930 -- -- -- -- -- 133/49 73 mmHg -- -- -- -- --    12/27/24 1915 -- -- -- -- -- 126/47 70 mmHg -- -- -- -- --    12/27/24 1900 97.7 °F (36.5 °C) 61 18 120/45 67 99/54 69 mmHg 97 % None (Room air) Lying 15 No Pain    12/27/24 1845 -- 62 -- -- -- 106/56 73 mmHg 97 % -- -- -- --    12/27/24 1830 -- 62 -- -- -- 122/48 69 mmHg 97 % -- -- -- --    12/27/24 1815 -- 62 -- -- -- 122/48 69 mmHg 97 % -- -- -- --    12/27/24 1800 -- 62 -- -- -- 127/50 72 mmHg 97 % -- -- -- --    12/27/24 1745 -- 57 -- -- -- 117/42 65 mmHg 96 % -- -- -- --    12/27/24 1730 98.2 °F (36.8 °C) 66 29 -- -- 115/44 65 mmHg 96 % -- -- 15 No Pain    12/27/24 1715 -- 66 20 101/54 74 116/67 -- 94 % -- -- -- --    12/27/24 1700 -- 70 20 112/57 80 119/52 72 mmHg 95 % None (Room air) -- -- No Pain    12/27/24 1645 -- 76 20 110/55 77 122/52 -- 95 % None (Room air) -- -- No Pain    12/27/24 1639 97.8 °F (36.6 °C) 82 18 116/56 79 137/59 79 mmHg 96 % None (Room air) -- -- No Pain    12/27/24 1155 97 °F (36.1 °C) 63 18 120/64 -- --  -- 98 % None (Room air) -- -- --    12/27/24 1100 -- -- -- -- -- -- -- -- -- -- 15 --    12/27/24 0920 97.9 °F (36.6 °C) 61 18 123/71 88 -- -- 97 % None (Room air) Lying 15 No Pain    12/27/24 07:49:42 97.7 °F (36.5 °C) 62 -- 127/65 86 -- -- 97 % -- -- -- --    12/26/24 23:22:27 97.3 °F (36.3 °C) 67 -- 128/73 91 -- -- 96 % -- -- -- --    12/26/24 2120 -- -- -- -- -- -- -- -- -- -- 15 No Pain    12/26/24 19:19:57 97.8 °F (36.6 °C) 78 18 104/71 82 -- -- 95 % -- -- -- --    12/26/24 15:27:59 98.2 °F (36.8 °C) 72 -- 139/85 103 -- -- 96 % -- -- -- --    12/26/24 1320 -- -- -- -- -- -- -- -- -- -- 15 --    12/26/24 11:21:37 97.6 °F (36.4 °C) 58 21 140/69 93 -- -- 98 % None (Room air) Lying -- --    12/26/24 1000 -- 63 -- 130/75 -- -- -- -- -- -- -- --    12/26/24 0920 -- -- -- -- -- -- -- -- -- -- 15 No Pain    12/26/24 0827 -- -- -- -- -- -- -- -- -- -- -- No Pain    12/26/24 0808 -- -- -- -- -- -- -- -- -- -- -- No Pain    12/26/24 07:53:19 97.6 °F (36.4 °C) 63 16 130/75 93 -- -- 97 % -- -- -- --    12/26/24 0520 97.4 °F (36.3 °C) -- 18 129/70 90 -- -- -- None (Room air) Lying -- --    12/26/24 0320 -- 57 18 133/80 98 -- -- -- None (Room air) Lying -- --    12/25/24 2220 -- -- -- 107/73 84 -- -- -- -- -- -- --    12/25/24 21:24:12 97.1 °F (36.2 °C) 60 18 131/62 85 -- -- 95 % -- -- -- --    12/25/24 2020 -- -- 18 120/69 86 -- -- 94 % None (Room air) Lying -- --    12/25/24 1920 97.4 °F (36.3 °C) 61 20 116/72 87 -- -- 95 % None (Room air) Lying 15 --    12/25/24 1845 -- -- -- -- -- -- -- -- -- -- -- No Pain    12/25/24 18:23:06 98.2 °F (36.8 °C) -- -- 143/77 99 -- -- -- -- -- -- --    12/25/24 1820 -- -- -- -- -- -- -- -- -- -- 15 No Pain    12/25/24 1745 -- 57 22 121/67 89 -- -- 96 % -- -- -- --    12/25/24 1743 97.7 °F (36.5 °C) -- -- -- -- -- -- -- -- -- -- --    12/25/24 1700 -- 59 24 122/71 -- -- -- 96 % -- -- 15 --    12/25/24 1615 -- 59 20 122/66 88 -- -- 97 % -- -- -- --    12/25/24 1600 -- -- -- -- -- -- -- -- --  -- 15 --    12/25/24 1527 -- 58 22 122/63 88 -- -- 97 % -- -- -- --    12/25/24 1500 -- -- -- -- -- -- -- -- -- -- 15 --    12/25/24 1445 -- -- -- -- -- -- -- -- -- -- 15 --    12/25/24 1430 -- 59 20 128/67 92 -- -- 97 % -- -- 15 --    12/25/24 1415 -- -- -- -- -- -- -- -- -- -- 15 --    12/25/24 1400 -- -- 16 124/65 -- -- -- 97 % -- -- 15 --    12/25/24 1345 -- -- 21 128/67 -- -- -- 98 % -- -- 15 --    12/25/24 1330 -- 62 18 141/70 98 -- -- 97 % -- -- 15 --    12/25/24 1315 -- -- -- -- -- -- -- -- -- -- 15 --    12/25/24 1300 -- -- -- -- -- -- -- -- -- -- 15 --    12/25/24 1245 -- 69 21 143/75 -- -- -- 98 % -- -- 15 --    12/25/24 1230 -- 76 18 138/63 -- -- -- 97 % -- -- 15 --    12/25/24 1218 -- 67 18 142/73 -- -- -- 97 % None (Room air) Sitting 15 --    12/25/24 1212 -- -- -- -- -- -- -- 100 % -- -- -- --              Pertinent Labs/Diagnostic Test Results:   Radiology:  VAS intra-op ultrasound CEA   Final Interpretation by Eduar Carlos MD (12/27 6204)      MRI brain wo contrast   Final Interpretation by Mauricio Robles MD (12/26 9111)      Acute infarcts involving the left cerebral hemisphere and internal capsule as described above and predominantly watershed in distribution. No acute intracranial hemorrhage.      The study was marked in EPIC for immediate notification.      Workstation performed: [id number]         VAS carotid complete study   Final Interpretation by Eduar Carlos MD (12/26 2812)      CT stroke alert brain   Final Interpretation by Ghulam Ladd MD (12/25 7367)         1. Loss of normal gray-white matter differentiation in the left globus pallidus (series 2, image 21) worrisome for age-indeterminate left middle cerebral artery infarction.   2. No acute intracranial hemorrhage.   3. Mild microangiopathy, also age indeterminant without the benefit of prior imaging..         Workstation performed: [id number]         CTA stroke alert (head/neck)   Final Interpretation by Ghulam  Dg Ladd MD (12/25 9244)      1. At least 90% focal high-grade stenosis left internal carotid artery origin. Vascular surgery assessment advised.   2. Nonvisualization of the intradural segment of the right vertebral artery possibly related to vascular occlusion or flow that is too slow to detect.   3. Wisp like enhancement of the extradural right vertebral artery possibly on the basis of developmental hypoplasia and/or severe stenosis.      I personally provided preliminary results of this study with Dr. Markie French and Bob Ayers on 12/25/2024 at 12:38 p.m.      Workstation performed: [id number]           Cardiology:  Echo complete w/ contrast if indicated   Final Result by Earlene Morton MD (12/26 0811)   Addendum (preliminary) 1 of 1 by Earlene Mortno MD (12/26 3201)        Left Ventricle: Left ventricular cavity size is normal. Wall thickness    is normal. There is borderline concentric hypertrophy. The left    ventricular ejection fraction is 60%. Systolic function is normal. Wall    motion is normal. Diastolic function is normal.     IVS: There is abnormal septal motion consistent with post-operative    status.     Atrial Septum: There is a possible patent foramen ovale.  There was no    right-to-left shunting visualized with contrast saline study done at rest,    with cough, and with Valsalva.  However, there was bubble washout adjacent    to the intra-atrial septum suggesting possible left-to-right flow through    possible PFO or ASD.     Tricuspid Valve: There is mild regurgitation.      There was no right-to-left shunting visualized with contrast saline study    done at rest, with cough, and with Valsalva. However, there was bubble    washout adjacent to the intra-atrial septum suggesting possible    left-to-right flow through PFO or ASD.  Additionally, review of    transesophageal echocardiogram done in 4/2021 shows possible PFO. No    definitive flow across PFO seen. Recommend DERECK if clinically  indicated.         ECG 12 lead   Final Result by Sherry Martinez MD (12/30 0316)   Normal sinus rhythm   Right bundle branch block   Abnormal ECG   Confirmed by Sherry Martinez ([id number]) on 12/30/2024 3:16:10 AM        GI:  No orders to display           Results from last 7 days   Lab Units 12/28/24  0509 12/27/24  0546   WBC Thousand/uL 16.20* 7.29   HEMOGLOBIN g/dL 13.9 15.1   HEMATOCRIT % 41.4 45.5   PLATELETS Thousands/uL 206 202         Results from last 7 days   Lab Units 12/28/24  0509 12/27/24  0546   SODIUM mmol/L 138 141   POTASSIUM mmol/L 4.2 4.4   CHLORIDE mmol/L 106 106   CO2 mmol/L 23 29   ANION GAP mmol/L 9 6   BUN mg/dL 16 17   CREATININE mg/dL 0.94 1.13   EGFR ml/min/1.73sq m 83 66   CALCIUM mg/dL 9.0 9.5   MAGNESIUM mg/dL 1.9  --    PHOSPHORUS mg/dL 4.0  --            Results from last 7 days   Lab Units 12/27/24  1159   POC GLUCOSE mg/dl 83     Results from last 7 days   Lab Units 12/28/24  0509 12/27/24  0546   GLUCOSE RANDOM mg/dL 113 107                         Results from last 7 days   Lab Units 12/28/24  0509   PROTIME seconds 15.0   INR  1.10   PTT seconds 27     Past Medical History:   Diagnosis Date    CAD (coronary artery disease)     Cancer (HCC)     basal skin ca    Colon polyp     Coronary artery disease     Former tobacco use     History of dysphagia     resolved    Hyperlipidemia     Pre-diabetes      Present on Admission:   Dyslipidemia   Coronary artery disease   Stroke-like symptoms      Admitting Diagnosis: CVA (cerebral vascular accident) ([facility]) [I63.9]  Stenosis of left carotid artery [I65.22]  Right sided weakness [R53.1]  Ambulatory dysfunction [R26.2]  Age/Sex: 69 y.o. male    Network Utilization Review Department  ATTENTION: Please call with any questions or concerns to [phone] and carefully listen to the prompts so that you are directed to the right person. All voicemails are confidential.   For Discharge needs, contact Care Management DC Support Team at [phone]  opt. 2  Send all requests for admission clinical reviews, approved or denied determinations and any other requests to dedicated fax number below belonging to the campus where the patient is receiving treatment. List of dedicated fax numbers for the Facilities:  FACILITY NAME UR FAX NUMBER   ADMISSION DENIALS (Administrative/Medical Necessity) [phone]   DISCHARGE SUPPORT TEAM (NETWORK) [phone]   PARENT CHILD HEALTH (Maternity/NICU/Pediatrics) [phone]   [facility] [phone]   [facility] [phone]   [facility] [phone]   [facility] [phone]   [facility] [phone]   [facility] [phone]   [facility] [phone]   [facility] [phone]   [facility] [phone]   [facility] [phone]   [facility] [phone]   [facility] [phone]

## 2025-01-03 NOTE — ANESTHESIA POSTPROCEDURE EVALUATION
Post-Op Assessment Note    Last Filed PACU Vitals:  Vitals Value Taken Time   Temp 97.8 °F (36.6 °C) 12/27/24 1639   Pulse 66 12/27/24 1715   /54 12/27/24 1715   Resp 20 12/27/24 1715   SpO2 94 % 12/27/24 1715       Modified Shane:     Vitals Value Taken Time   Activity 2 12/27/24 1639   Respiration 2 12/27/24 1639   Circulation 2 12/27/24 1639   Consciousness 2 12/27/24 1639   Oxygen Saturation 2 12/27/24 1639     Modified Shane Score: 10

## 2025-01-06 ENCOUNTER — TELEPHONE (OUTPATIENT)
Dept: NEUROLOGY | Facility: CLINIC | Age: 70
End: 2025-01-06

## 2025-01-06 NOTE — TELEPHONE ENCOUNTER
Post CVA Discharge Follow Up  Hospitalization: 12/25/24-12/28/24    Called patient to obtain an update. There was no answer. Left a voice message requesting for a return call. Provided the office's phone number.

## 2025-01-06 NOTE — ASSESSMENT & PLAN NOTE
Patient presents today for hospital follow-up after admission for left carotid artery stenosis causing strokelike symptoms.  Patient continues on aspirin, Plavix, and Lipitor without side effects.  Has follow-up scheduled with vascular surgery on 1/14/2025.  Also has follow-up with neurology in February.  Recommend follow-up as scheduled for annual wellness visit or sooner as needed.  Discussed consideration for RSV vaccination.

## 2025-01-13 NOTE — TELEPHONE ENCOUNTER
Post CVA Discharge Follow Up  Hospitalization: 12/25/24-12/28/24    Called patient to obtain an update. Since discharge, he denies experiencing any new or worsening stroke-like symptoms. Patient reports he is doing well and how he has almost returned to his baseline.     Patient continues to have the following symptom: right leg weakness. He claims this has improved since discharge.     He is ambulating independently as well as preforming his own ADLs. Patient manages his own medications, appointments, and affairs.     Reviewed appointments - patient successfully followed up with PCP. Patient scheduled for the following: vascular surgery on 1/14/25, neurology on 2/26/25.    Reviewed neurology-related medications with him. Reports he is taking as prescribed with no medication side effects or signs of bleeding.     During this call, we reviewed stroke education.     As for risk factors, patient relies on checking his BP at provider office visits. BP at PCP office visit on 1/2/25 was 110/82.  He is a non smoker.   Patient is trying to follow a well balanced diet.     All of his questions were addressed. At the conclusion of the conversation, patient denies having any further questions or concerns.

## 2025-01-14 ENCOUNTER — OFFICE VISIT (OUTPATIENT)
Dept: VASCULAR SURGERY | Facility: CLINIC | Age: 70
End: 2025-01-14

## 2025-01-14 VITALS
WEIGHT: 174 LBS | TEMPERATURE: 98 F | SYSTOLIC BLOOD PRESSURE: 122 MMHG | OXYGEN SATURATION: 97 % | HEART RATE: 63 BPM | DIASTOLIC BLOOD PRESSURE: 80 MMHG | HEIGHT: 68 IN | BODY MASS INDEX: 26.37 KG/M2

## 2025-01-14 DIAGNOSIS — Z98.890 S/P CAROTID ENDARTERECTOMY: Primary | ICD-10-CM

## 2025-01-14 DIAGNOSIS — I65.22 STENOSIS OF LEFT CAROTID ARTERY: ICD-10-CM

## 2025-01-14 PROCEDURE — 99024 POSTOP FOLLOW-UP VISIT: CPT | Performed by: NURSE PRACTITIONER

## 2025-01-14 NOTE — PATIENT INSTRUCTIONS
-your neck incision is healing well.  Continue to clean incision daily with soap and water.  No lotions, ointments or creams to incision x4 weeks.  No soaking or submerging incision x4 weeks.  -We will initiate postoperative surveillance protocol.  We will obtain carotid ultrasound at 3 months and 9 months postoperatively.  -return to office in 3 months with surgeon with carotid duplex for routine postoperative follow-up.  -return to office in 9 months with carotid ultrasound for routine postoperative surveillance.  -continue aspirin and plavix   -continue statin therapy.  -please contact the office with new symptoms or changes in incision.  -call 911 immediately for signs/symptoms of TIA/CVA.

## 2025-01-14 NOTE — PROGRESS NOTES
Name: Mateo Franco      : 1955      MRN: [id number]  Encounter Provider: SHIV Castaneda  Encounter Date: 2025   Encounter department: [facility]  :  Assessment & Plan  Stenosis of left carotid artery  69-year-old male, former smoker with dyslipidemia,  CAD s/p CABG '21 (LLE vein harvest), carotid artery stenosis with symptomatic L ICA stenosis s/p L CEA 2024 ([facility]) presents for postop follow-up.    -Patient doing well postoperatively. POD #18  -Denies residual symptoms.  Denies TIA or strokelike symptoms  -Left neck incision CDI with surgical glue.  No dehiscence, drainage, bleeding, erythema or signs of infection    Plan:  -Incision care reviewed.  Wash daily soap and water, pat dry.  Let surgical glue fall off naturally.  -Carotid duplex in 3 months with return office visit with Dr. Carlos  -Carotid duplex in 9 months (VQI-AP okay)  -Continue Plavix for minimum of 60 days  -Continue aspirin  -Continue statin  -Educated on TIA and strokelike symptoms and when to seek medical attention  -Call with any questions or concerns or changes to incisions.    Orders:    VAS carotid complete study; Future    VAS carotid complete study; Future    S/P carotid endarterectomy    Orders:    VAS carotid complete study; Future    VAS carotid complete study; Future        History of Present Illness   Cc:PO Left - ENDARTERECTOMY ARTERY CAROTID, UBALDO 24. Patient stated his appetite is better. Patient denies any TIA/ stoke symptoms .  HPI  Mateo Franco is a 69 y.o. male who presents for postop follow-up s/p L CEA for symptomatic L ICA stenosis.  Patient presents without complaints.  He is accompanied by his wife.  Patient's wife feels he is back to baseline, however overall generalized fatigue and deconditioned.    He denies TIA or strokelike symptoms.  Neuroexam intact.    Maintained on aspirin, Plavix and statin    Discussed incision care  Discussed postop surveillance.  Will  obtain first carotid duplex at 3 months with return office visit with vascular surgeon.  Discussed signs and symptoms to contact our office.  Educated on TIA and strokelike symptoms and when to seek medical attention.      History obtained from: patient    Review of Systems   Constitutional: Negative.    HENT: Negative.     Eyes: Negative.    Respiratory: Negative.     Cardiovascular: Negative.    Gastrointestinal: Negative.    Endocrine: Negative.    Genitourinary: Negative.    Musculoskeletal: Negative.    Skin: Negative.    Allergic/Immunologic: Negative.    Neurological: Negative.    Hematological: Negative.    Psychiatric/Behavioral: Negative.       Medical History Reviewed by provider this encounter:  Tobacco  Allergies  Meds  Problems  Med Hx  Surg Hx  Fam Hx     .  Past Medical History   Past Medical History:   Diagnosis Date    CAD (coronary artery disease)     Cancer (HCC)     basal skin ca    Colon polyp     Coronary artery disease     Former tobacco use     History of dysphagia     resolved    Hyperlipidemia     Pre-diabetes      Past Surgical History:   Procedure Laterality Date    CARDIAC CATHETERIZATION      CAROTID ENDARTARECTOMY Left 12/27/2024    Procedure: ENDARTERECTOMY ARTERY CAROTID;  Surgeon: Eduar Carlos MD;  Location: [facility];  Service: Vascular    COLONOSCOPY N/A 05/18/2018    Procedure: COLONOSCOPY;  Surgeon: Sven Payne MD;  Location: [facility];  Service: Gastroenterology    CORONARY ARTERY BYPASS GRAFT  04/26/2021    EGD  08/26/2019    FINGER AMPUTATION Left     partial 3rd finger    NC CORONARY ARTERY BYP W/VEIN & ARTERY GRAFT 3 VEIN N/A 04/26/2021    Procedure: CORONARY ARTERY BYPASS GRAFT (CABG) x 3; LIMA to LAD; Left EVH/SVG to OM1 and D1;  Surgeon: ALLISON Conde MD;  Location: [facility];  Service: Cardiac Surgery    NC XCAPSL CTRC RMVL INSJ IO LENS PROSTH W/O ECP Right 07/24/2023    Procedure: EXTRACTION EXTRACAPSULAR CATARACT PHACO INTRAOCULAR  LENS (IOL);  Surgeon: Huy Barton MD;  Location: [facility] OR;  Service: Ophthalmology    SKIN BIOPSY       Family History   Problem Relation Age of Onset    Heart attack Father 57    No Known Problems Mother     Prostate cancer Neg Hx     Colon cancer Neg Hx     Diabetes Neg Hx     Stroke Neg Hx       reports that he quit smoking about 35 years ago. His smoking use included cigarettes. He started smoking about 45 years ago. He has a 10 pack-year smoking history. He has never used smokeless tobacco. He reports that he does not currently use alcohol. He reports that he does not use drugs.  Current Outpatient Medications on File Prior to Visit   Medication Sig Dispense Refill    ascorbic acid (VITAMIN C) 500 mg tablet Take 500 mg by mouth daily      aspirin (ECOTRIN LOW STRENGTH) 81 mg EC tablet Take 1 tablet (81 mg total) by mouth daily 90 tablet 1    atorvastatin (LIPITOR) 80 mg tablet Take 1 tablet (80 mg total) by mouth every evening 30 tablet 0    cholecalciferol (VITAMIN D3) 1,000 units tablet Take 1,000 Units by mouth daily      clopidogrel (PLAVIX) 75 mg tablet Take 1 tablet (75 mg total) by mouth daily Do not start before December 29, 2024. 90 tablet 0    co-enzyme Q-10 30 MG capsule Take 200 mg by mouth daily      Multiple Vitamin (multivitamin) tablet Take 1 tablet by mouth daily       No current facility-administered medications on file prior to visit.   No Known Allergies   Current Outpatient Medications on File Prior to Visit   Medication Sig Dispense Refill    ascorbic acid (VITAMIN C) 500 mg tablet Take 500 mg by mouth daily      aspirin (ECOTRIN LOW STRENGTH) 81 mg EC tablet Take 1 tablet (81 mg total) by mouth daily 90 tablet 1    atorvastatin (LIPITOR) 80 mg tablet Take 1 tablet (80 mg total) by mouth every evening 30 tablet 0    cholecalciferol (VITAMIN D3) 1,000 units tablet Take 1,000 Units by mouth daily      clopidogrel (PLAVIX) 75 mg tablet Take 1 tablet (75 mg total) by mouth daily Do  "not start before 2024. 90 tablet 0    co-enzyme Q-10 30 MG capsule Take 200 mg by mouth daily      Multiple Vitamin (multivitamin) tablet Take 1 tablet by mouth daily       No current facility-administered medications on file prior to visit.      Social History     Tobacco Use    Smoking status: Former     Current packs/day: 0.00     Average packs/day: 1 pack/day for 10.0 years (10.0 ttl pk-yrs)     Types: Cigarettes     Start date:      Quit date:      Years since quittin.0    Smokeless tobacco: Never    Tobacco comments:     quit    Vaping Use    Vaping status: Never Used   Substance and Sexual Activity    Alcohol use: Not Currently    Drug use: No    Sexual activity: Yes     Partners: Female     Birth control/protection: None        Objective   /80 (BP Location: Right arm, Patient Position: Sitting, Cuff Size: Adult)   Pulse 63   Temp 98 °F (36.7 °C) (Temporal)   Ht 5' 8\" (1.727 m)   Wt 78.9 kg (174 lb)   SpO2 97%   BMI 26.46 kg/m²      Physical Exam  Vitals reviewed.   Constitutional:       General: He is not in acute distress.     Appearance: He is normal weight.   Neck:      Comments: Left neck incision CDI with surgical glue.  No dehiscence, drainage, bleeding, erythema, hematoma or signs infection.  Cardiovascular:      Rate and Rhythm: Normal rate.      Pulses: Normal pulses.           Radial pulses are 2+ on the right side and 2+ on the left side.   Pulmonary:      Effort: Pulmonary effort is normal. No respiratory distress.   Musculoskeletal:         General: Normal range of motion.      Cervical back: Normal range of motion and neck supple.   Skin:     General: Skin is warm.   Neurological:      General: No focal deficit present.      Mental Status: He is alert and oriented to person, place, and time.      Cranial Nerves: No cranial nerve deficit.      Sensory: No sensory deficit.      Motor: No weakness.      Comments: Smile symmetrical, tongue midline  Shoulder " shrug normal  BUE/BLE 5/5   Psychiatric:         Behavior: Behavior normal.         Left neck  Administrative Statements   I have spent a total time of 20 minutes in caring for this patient on the day of the visit/encounter including Instructions for management, Patient and family education, Importance of tx compliance, Impressions, Documenting in the medical record, Reviewing / ordering tests, medicine, procedures  , and Obtaining or reviewing history  .

## 2025-01-14 NOTE — ASSESSMENT & PLAN NOTE
69-year-old male, former smoker with dyslipidemia,  CAD s/p CABG '21 (LLE vein harvest), carotid artery stenosis with symptomatic L ICA stenosis s/p L CEA 12/27/2024 ([facility]) presents for postop follow-up.    -Patient doing well postoperatively. POD #18  -Denies residual symptoms.  Denies TIA or strokelike symptoms  -Left neck incision CDI with surgical glue.  No dehiscence, drainage, bleeding, erythema or signs of infection    Plan:  -Incision care reviewed.  Wash daily soap and water, pat dry.  Let surgical glue fall off naturally.  -Carotid duplex in 3 months with return office visit with Dr. Carlos  -Carotid duplex in 9 months (VQI-AP okay)  -Continue Plavix for minimum of 60 days  -Continue aspirin  -Continue statin  -Educated on TIA and strokelike symptoms and when to seek medical attention  -Call with any questions or concerns or changes to incisions.    Orders:    VAS carotid complete study; Future    VAS carotid complete study; Future

## 2025-01-17 ENCOUNTER — TELEPHONE (OUTPATIENT)
Age: 70
End: 2025-01-17

## 2025-01-17 NOTE — TELEPHONE ENCOUNTER
S/P L carotid endarterectomy 12/27/24.  Pt had post op appointment on 1/14/25 with SONU Eldridge but pt's wife forgot to ask if the pt still needs to follow any sort of lifting restrictions.  Please review and advise.

## 2025-01-17 NOTE — TELEPHONE ENCOUNTER
Reviewed. Patient should avoid lifting more than 15 pounds until neck incision is completely healed, ~6 weeks.

## 2025-01-23 DIAGNOSIS — I25.10 CORONARY ARTERY DISEASE: ICD-10-CM

## 2025-01-27 RX ORDER — ASPIRIN 81 MG/1
TABLET, COATED ORAL
Qty: 90 TABLET | Refills: 1 | Status: SHIPPED | OUTPATIENT
Start: 2025-01-27

## 2025-01-27 NOTE — TELEPHONE ENCOUNTER
Patient called to refill Aspirin Low Dose 81 MG EC tablet . I informed him in was sent in this morning.

## 2025-02-03 DIAGNOSIS — I65.22 STENOSIS OF LEFT CAROTID ARTERY: ICD-10-CM

## 2025-02-03 NOTE — TELEPHONE ENCOUNTER
Reason for call:   [x] Refill   [] Prior Auth  [] Other:     Office:   [] PCP/Provider -   [x] Specialty/Provider - CARD Josh Nichole    Medication: Atorvastatin     Dose/Frequency: 80 mg Q PM     Quantity: 90    Ph[facility]: [facility]      Does the patient have enough for 3 days?   [x] Yes   [] No - Send as HP to POD

## 2025-02-04 RX ORDER — ATORVASTATIN CALCIUM 80 MG/1
80 TABLET, FILM COATED ORAL EVERY EVENING
Qty: 90 TABLET | Refills: 3 | Status: SHIPPED | OUTPATIENT
Start: 2025-02-04 | End: 2025-02-05 | Stop reason: SDUPTHER

## 2025-02-05 ENCOUNTER — OFFICE VISIT (OUTPATIENT)
Dept: CARDIOLOGY CLINIC | Facility: CLINIC | Age: 70
End: 2025-02-05
Payer: COMMERCIAL

## 2025-02-05 VITALS
HEART RATE: 60 BPM | WEIGHT: 176.9 LBS | BODY MASS INDEX: 26.81 KG/M2 | OXYGEN SATURATION: 99 % | HEIGHT: 68 IN | SYSTOLIC BLOOD PRESSURE: 122 MMHG | DIASTOLIC BLOOD PRESSURE: 70 MMHG

## 2025-02-05 DIAGNOSIS — I25.10 CORONARY ARTERY DISEASE INVOLVING NATIVE CORONARY ARTERY OF NATIVE HEART, UNSPECIFIED WHETHER ANGINA PRESENT: ICD-10-CM

## 2025-02-05 DIAGNOSIS — I65.22 STENOSIS OF LEFT CAROTID ARTERY: ICD-10-CM

## 2025-02-05 DIAGNOSIS — E78.5 DYSLIPIDEMIA: Primary | ICD-10-CM

## 2025-02-05 PROCEDURE — 99214 OFFICE O/P EST MOD 30 MIN: CPT | Performed by: NURSE PRACTITIONER

## 2025-02-05 RX ORDER — ATORVASTATIN CALCIUM 80 MG/1
80 TABLET, FILM COATED ORAL EVERY EVENING
Qty: 90 TABLET | Refills: 3 | Status: SHIPPED | OUTPATIENT
Start: 2025-02-05

## 2025-02-05 NOTE — ASSESSMENT & PLAN NOTE
12/27/24 status post left carotid endarterectomy.  Follow-up with vascular surgery in the near future  Continue on aspirin 81 mg daily  Atorvastatin 80 mg daily  75 mg daily

## 2025-02-05 NOTE — ASSESSMENT & PLAN NOTE
4/2021 sp CABG x 3 LIMA to LAD, SVG to OM1, SVG to D1 without complications.  Continue aspirin 81 mg daily, Lipitor 80 mg daily not on AV alecia blocker.  Heart rate and blood pressure controlled.  Continue on a heart healthy diet and daily walking.  Don pointed out a black spot in the left anterior tibial area which he feels is a stitch from his cardiac surgery.  He was instructed to discuss further with vascular surgery for their input.  He may need to discuss further with cardiac surgery.  He was since did not to disturb the probable stitch.  It does not cause him any increased redness he admits to itching.

## 2025-02-05 NOTE — ASSESSMENT & PLAN NOTE
2/25/24 , TG 68, HDL 26,  on Lipitor 20mg daily, Lipitor increased to 80 mg daily with his recent discharge in December.  I have Instructed Don to undergo a fasting lipid panel in near futue   Heart healthy diet

## 2025-02-05 NOTE — PROGRESS NOTES
Cardiology Follow Up    Mateo Franco  1955  [id number]  [facility]  [address]  [place] [address] [phone]    Assessment & Plan  Dyslipidemia  2/25/24 , TG 68, HDL 26,  on Lipitor 20mg daily, Lipitor increased to 80 mg daily with his recent discharge in December.  I have Instructed Oli to undergo a fasting lipid panel in near futue   Heart healthy diet   Stenosis of left carotid artery  12/27/24 status post left carotid endarterectomy.  Follow-up with vascular surgery in the near future  Continue on aspirin 81 mg daily  Atorvastatin 80 mg daily  75 mg daily  Coronary artery disease involving native coronary artery of native heart, unspecified whether angina present  4/2021 sp CABG x 3 LIMA to LAD, SVG to OM1, SVG to D1 without complications.  Continue aspirin 81 mg daily, Lipitor 80 mg daily not on AV alecia blocker.  Heart rate and blood pressure controlled.  Continue on a heart healthy diet and daily walking.  Oli pointed out a black spot in the left anterior tibial area which he feels is a stitch from his cardiac surgery.  He was instructed to discuss further with vascular surgery for their input.  He may need to discuss further with cardiac surgery.  He was since did not to disturb the probable stitch.  It does not cause him any increased redness he admits to itching.        Interval History:   Mr Oli Franco was admitted to [facility] on 12/25 - 12/28/24 with carotid artery stenosis with infarction, 12/27/24 status post left carotid endarterectomy.  Atorvastatin was increased from 20 mg daily to 80 mg daily.  He was discharged home.     Oli presents to our office for follow-up visit.  He denies dyspnea with minimal or moderate exertion chest pain palpitations lightheadedness or dizziness.  He points to a block dot on his left lower leg which he feels is a stitch from his cardiac  surgery.  He was instructed to discuss this with vascular surgery and then possibly cardiac surgery.  He was instructed to leave the spot alone until further direction.  Don is Walking daily for 20 minutes.  He admits to a heart healthy diet.      Medical History   Primary Cardiologist Dr ANTHONY Nichole   CAD  2021 sp CABG x 3 LIMA to LAD, SVG to OM1, SVG to D1 without complications.  Left carotid stenosis  Dyslipidemia 24 , TG 68, HDL 26,  on Lipitor 20mg daily will undergo fasting lipid panel in near futue     24 TTE: Ventricle cavity size normal LVEF 60% diastolic function normal.  Atrial septum possible patent foramen ovale.  No right-to-left shunt visualized.  However bubble washout adjacent to the intra-atrial septum suggest possible left to right flow through possible PFO or ASD.  Mild TR.    Patient Active Problem List   Diagnosis    Polyp of colon    Dyslipidemia    Vitamin D deficiency    Hiatal hernia    Coronary artery disease    Elevated troponin    S/P CABG (coronary artery bypass graft)    Stenosis of left carotid artery    Stroke-like symptoms    Symptomatic carotid artery stenosis with infarction (HCC)    S/P carotid endarterectomy     Past Medical History:   Diagnosis Date    CAD (coronary artery disease)     Cancer (HCC)     basal skin ca    Colon polyp     Coronary artery disease     Former tobacco use     History of dysphagia     resolved    Hyperlipidemia     Pre-diabetes      Social History     Socioeconomic History    Marital status: /Civil Union     Spouse name: Not on file    Number of children: Not on file    Years of education: Not on file    Highest education level: Not on file   Occupational History    Not on file   Tobacco Use    Smoking status: Former     Current packs/day: 0.00     Average packs/day: 1 pack/day for 10.0 years (10.0 ttl pk-yrs)     Types: Cigarettes     Start date:      Quit date:      Years since quittin.1    Smokeless  tobacco: Never    Tobacco comments:     quit    Vaping Use    Vaping status: Never Used   Substance and Sexual Activity    Alcohol use: Not Currently    Drug use: No    Sexual activity: Yes     Partners: Female     Birth control/protection: None   Other Topics Concern    Not on file   Social History Narrative    Caffeine - Iced Tea 2cups/d     Social Drivers of Health     Financial Resource Strain: Not on file   Food Insecurity: No Food Insecurity (2024)    Nursing - Inadequate Food Risk Classification     Worried About Running Out of Food in the Last Year: Not on file     Ran Out of Food in the Last Year: Not on file     Ran Out of Food in the Last Year: Never true   Transportation Needs: No Transportation Needs (2024)    Nursing - Transportation Risk Classification     Lack of Transportation: Not on file     Lack of Transportation: No   Physical Activity: Not on file   Stress: Not on file   Social Connections: Not on file   Intimate Partner Violence: Unknown (2024)    Nursing IPS     Feels Physically and Emotionally Safe: Not on file     Physically Hurt by Someone: Not on file     Humiliated or Emotionally Abused by Someone: Not on file     Physically Hurt by Someone: No     Hurt or Threatened by Someone: No   Housing Stability: Unknown (2024)    Nursing: Inadequate Housing Risk Classification     Has Housing: Not on file     Worried About Losing Housing: Not on file     Unable to Get Utilities: Not on file     Unable to Pay for Housing in the Last Year: No     Has Housin      Family History   Problem Relation Age of Onset    Heart attack Father 57    No Known Problems Mother     Prostate cancer Neg Hx     Colon cancer Neg Hx     Diabetes Neg Hx     Stroke Neg Hx      Past Surgical History:   Procedure Laterality Date    CARDIAC CATHETERIZATION      CAROTID ENDARTARECTOMY Left 2024    Procedure: ENDARTERECTOMY ARTERY CAROTID;  Surgeon: Eduar Carlos MD;  Location:   Main OR;  Service: Vascular    COLONOSCOPY N/A 05/18/2018    Procedure: COLONOSCOPY;  Surgeon: Sven Payne MD;  Location: [facility] GI LAB;  Service: Gastroenterology    CORONARY ARTERY BYPASS GRAFT  04/26/2021    EGD  08/26/2019    FINGER AMPUTATION Left     partial 3rd finger    [place] CORONARY ARTERY BYP W/VEIN & ARTERY GRAFT 3 VEIN N/A 04/26/2021    Procedure: CORONARY ARTERY BYPASS GRAFT (CABG) x 3; LIMA to LAD; Left EVH/SVG to OM1 and D1;  Surgeon: ALLISON Conde MD;  Location:  MAIN OR;  Service: Cardiac Surgery    [place] XCAPSL CTRC RMVL INSJ IO LENS PROSTH W/O ECP Right 07/24/2023    Procedure: EXTRACTION EXTRACAPSULAR CATARACT PHACO INTRAOCULAR LENS (IOL);  Surgeon: Huy Bartno MD;  Location: [facility] MAIN OR;  Service: Ophthalmology    SKIN BIOPSY         Current Outpatient Medications:     ascorbic acid (VITAMIN C) 500 mg tablet, Take 500 mg by mouth daily, Disp: , Rfl:     Aspirin Low Dose 81 MG EC tablet, TAKE ONE TABLET BY MOUTH ONCE EVERY DAY, Disp: 90 tablet, Rfl: 1    atorvastatin (LIPITOR) 80 mg tablet, Take 1 tablet (80 mg total) by mouth every evening, Disp: 90 tablet, Rfl: 3    cholecalciferol (VITAMIN D3) 1,000 units tablet, Take 1,000 Units by mouth daily, Disp: , Rfl:     clopidogrel (PLAVIX) 75 mg tablet, Take 1 tablet (75 mg total) by mouth daily Do not start before December 29, 2024., Disp: 90 tablet, Rfl: 0    co-enzyme Q-10 30 MG capsule, Take 200 mg by mouth daily, Disp: , Rfl:     Multiple Vitamin (multivitamin) tablet, Take 1 tablet by mouth daily, Disp: , Rfl:   No Known Allergies    Labs:  Admission on 12/25/2024, Discharged on 12/28/2024   Component Date Value    Sodium 12/25/2024 136     Potassium 12/25/2024 4.2     Chloride 12/25/2024 100     CO2 12/25/2024 28     ANION GAP 12/25/2024 8     BUN 12/25/2024 22     Creatinine 12/25/2024 1.14     Glucose 12/25/2024 91     Calcium 12/25/2024 9.9     eGFR 12/25/2024 66     WBC 12/25/2024 11.64 (H)     RBC 12/25/2024 5.23      Hemoglobin 12/25/2024 14.9     Hematocrit 12/25/2024 46.3     MCV 12/25/2024 89     MCH 12/25/2024 28.5     MCHC 12/25/2024 32.2     RDW 12/25/2024 14.2     Platelets 12/25/2024 231     MPV 12/25/2024 9.5     Protime 12/25/2024 13.9     INR 12/25/2024 1.00     PTT 12/25/2024 24     hs TnI 0hr 12/25/2024 5     POC Glucose 12/25/2024 88     Ventricular Rate 12/25/2024 71     Atrial Rate 12/25/2024 71     OR Interval 12/25/2024 168     QRSD Interval 12/25/2024 134     QT Interval 12/25/2024 422     QTC Interval 12/25/2024 458     P Axis 12/25/2024 51     QRS Axis 12/25/2024 35     T Wave Laona 12/25/2024 39     hs TnI 2hr 12/25/2024 4     Delta 2hr hsTnI 12/25/2024 -1     Cholesterol 12/25/2024 163     Triglycerides 12/25/2024 68     HDL, Direct 12/25/2024 26 (L)     LDL Calculated 12/25/2024 123 (H)     Hemoglobin A1C 12/25/2024 6.1 (H)     EAG 12/25/2024 128     BSA 12/26/2024 1.93     A4C EF 12/26/2024 56     LVIDd 12/26/2024 4.10     LVIDS 12/26/2024 2.60     IVSd 12/26/2024 1.00     LVPWd 12/26/2024 1.20     FS 12/26/2024 37     MV E' Tissue Velocity Se* 12/26/2024 10     MV E' Tissue Velocity La* 12/26/2024 14     LA Volume Index (BP) 12/26/2024 30.1     E/A ratio 12/26/2024 0.77     E wave deceleration time 12/26/2024 283     MV Peak E Lonnie 12/26/2024 66     MV Peak A Lonnie 12/26/2024 0.86     RVID d 12/26/2024 3.5     Tricuspid annular plane * 12/26/2024 2.00     LA size 12/26/2024 3.5     LA length (A2C) 12/26/2024 5.60     LA volume (BP) 12/26/2024 58     RAA A4C 12/26/2024 17.4     MV stenosis pressure 1/2* 12/26/2024 82     MV valve area p 1/2 meth* 12/26/2024 2.68     TR Peak Lonnie 12/26/2024 2.3     Triscuspid Valve Regurgi* 12/26/2024 20.0     Ao root 12/26/2024 3.20     Asc Ao 12/26/2024 3.5     Tricuspid valve peak reg* 12/26/2024 2.26     Left ventricular stroke * 12/26/2024 50.00     IVS 12/26/2024 1     LEFT VENTRICLE SYSTOLIC * 12/26/2024 25     LV DIASTOLIC VOLUME (MOD* 12/26/2024 76     Left Atrium  Area-systoli* 12/26/2024 20.3     Left Atrium Area-systoli* 12/26/2024 20.5     LVSV, 2D 12/26/2024 50     LV EF 12/26/2024 60     WBC 12/26/2024 8.70     RBC 12/26/2024 5.28     Hemoglobin 12/26/2024 15.4     Hematocrit 12/26/2024 46.6     MCV 12/26/2024 88     MCH 12/26/2024 29.2     MCHC 12/26/2024 33.0     RDW 12/26/2024 14.2     Platelets 12/26/2024 212     MPV 12/26/2024 9.5     Albumin 12/26/2024 4.0     Calcium 12/26/2024 9.4     Phosphorus 12/26/2024 3.2     Glucose 12/26/2024 99     BUN 12/26/2024 17     Creatinine 12/26/2024 1.17     Sodium 12/26/2024 138     Potassium 12/26/2024 4.1     Chloride 12/26/2024 103     CO2 12/26/2024 29     ANION GAP 12/26/2024 6     eGFR 12/26/2024 64     Glucose, Fasting 12/26/2024 99     Magnesium 12/26/2024 2.2     ABO Grouping 12/26/2024 A     Rh Factor 12/26/2024 Positive     Antibody Screen 12/26/2024 Negative     Specimen Expiration Date 12/26/2024 [id number]     Unit Product Code 12/28/2024 [id number]     Unit Number 12/28/2024 [id number]     Unit ABO 12/28/2024 A     Unit RH 12/28/2024 POS     Crossmatch 12/28/2024 Compatible     Unit Dispense Status 12/28/2024 Return to Inv     Unit Product Volume 12/28/2024 350     Unit Product Code 12/28/2024 [id number]     Unit Number 12/28/2024 [id number]     Unit ABO 12/28/2024 A     Unit RH 12/28/2024 POS     Crossmatch 12/28/2024 Compatible     Unit Dispense Status 12/28/2024 Return to Inv     Unit Product Volume 12/28/2024 350     WBC 12/27/2024 7.29     RBC 12/27/2024 5.14     Hemoglobin 12/27/2024 15.1     Hematocrit 12/27/2024 45.5     MCV 12/27/2024 89     MCH 12/27/2024 29.4     MCHC 12/27/2024 33.2     RDW 12/27/2024 14.2     Platelets 12/27/2024 202     MPV 12/27/2024 9.5     Sodium 12/27/2024 141     Potassium 12/27/2024 4.4     Chloride 12/27/2024 106     CO2 12/27/2024 29     ANION GAP 12/27/2024 6     BUN 12/27/2024 17     Creatinine 12/27/2024 1.13     Glucose 12/27/2024 107     Calcium 12/27/2024 9.5      eGFR 12/27/2024 66     POC Glucose 12/27/2024 83     Activated Clotting Time,* 12/27/2024 201 (H)     Specimen Type 12/27/2024 ARTERIAL     Activated Clotting Time,* 12/27/2024 195 (H)     Specimen Type 12/27/2024 ARTERIAL     Activated Clotting Time,* 12/27/2024 183 (H)     Specimen Type 12/27/2024 ARTERIAL     Sodium 12/28/2024 138     Potassium 12/28/2024 4.2     Chloride 12/28/2024 106     CO2 12/28/2024 23     ANION GAP 12/28/2024 9     BUN 12/28/2024 16     Creatinine 12/28/2024 0.94     Glucose 12/28/2024 113     Calcium 12/28/2024 9.0     eGFR 12/28/2024 83     WBC 12/28/2024 16.20 (H)     RBC 12/28/2024 4.71     Hemoglobin 12/28/2024 13.9     Hematocrit 12/28/2024 41.4     MCV 12/28/2024 88     MCH 12/28/2024 29.5     MCHC 12/28/2024 33.6     RDW 12/28/2024 14.1     Platelets 12/28/2024 206     MPV 12/28/2024 9.7     PTT 12/28/2024 27     Protime 12/28/2024 15.0     INR 12/28/2024 1.10     Magnesium 12/28/2024 1.9     Phosphorus 12/28/2024 4.0      Imaging: No results found.    Review of Systems:  Review of Systems   Skin:         Itching of the skin on his left lower tibial area, site of black spot which appears possibly to be a stitch   All other systems reviewed and are negative.      Physical Exam:  Physical Exam  Vitals reviewed.   Constitutional:       Appearance: Normal appearance.   HENT:      Head: Normocephalic.   Cardiovascular:      Rate and Rhythm: Normal rate and regular rhythm.      Pulses: Normal pulses.      Heart sounds: Normal heart sounds.   Pulmonary:      Effort: Pulmonary effort is normal.      Breath sounds: Normal breath sounds.   Musculoskeletal:         General: Normal range of motion.      Cervical back: Normal range of motion and neck supple.      Right lower leg: No edema.      Left lower leg: No edema.   Skin:     General: Skin is warm and dry.      Capillary Refill: Capillary refill takes less than 2 seconds.   Neurological:      General: No focal deficit present.      Mental  Status: He is alert and oriented to person, place, and time.   Psychiatric:         Mood and Affect: Mood normal.         Behavior: Behavior normal.

## 2025-02-17 ENCOUNTER — TELEPHONE (OUTPATIENT)
Age: 70
End: 2025-02-17

## 2025-02-17 DIAGNOSIS — E78.5 DYSLIPIDEMIA: ICD-10-CM

## 2025-02-17 DIAGNOSIS — I25.10 CORONARY ARTERY DISEASE INVOLVING NATIVE CORONARY ARTERY OF NATIVE HEART, UNSPECIFIED WHETHER ANGINA PRESENT: ICD-10-CM

## 2025-02-17 NOTE — TELEPHONE ENCOUNTER
Patient's wife calling in to speakto someone in regards to patients leg; Called office and transferred call

## 2025-02-18 NOTE — TELEPHONE ENCOUNTER
Refill must be reviewed and completed by the office or provider. The refill is unable to be approved or denied by the medication management team.    Please review to see if the refill is appropriate.     The original prescription was discontinued on 12/28/2024 by SHIV [place]. Renewing this prescription may not be appropriate.

## 2025-02-19 RX ORDER — ATORVASTATIN CALCIUM 20 MG/1
TABLET, FILM COATED ORAL
Qty: 90 TABLET | Refills: 3 | Status: SHIPPED | OUTPATIENT
Start: 2025-02-19

## 2025-02-24 ENCOUNTER — TELEPHONE (OUTPATIENT)
Dept: NEUROLOGY | Facility: CLINIC | Age: 70
End: 2025-02-24

## 2025-02-26 ENCOUNTER — OFFICE VISIT (OUTPATIENT)
Dept: NEUROLOGY | Facility: CLINIC | Age: 70
End: 2025-02-26
Payer: COMMERCIAL

## 2025-02-26 VITALS
WEIGHT: 171.6 LBS | SYSTOLIC BLOOD PRESSURE: 100 MMHG | HEART RATE: 70 BPM | HEIGHT: 68 IN | TEMPERATURE: 97.6 F | OXYGEN SATURATION: 96 % | BODY MASS INDEX: 26.01 KG/M2 | DIASTOLIC BLOOD PRESSURE: 68 MMHG

## 2025-02-26 DIAGNOSIS — Z86.73 HISTORY OF CVA (CEREBROVASCULAR ACCIDENT): Primary | ICD-10-CM

## 2025-02-26 PROCEDURE — 99215 OFFICE O/P EST HI 40 MIN: CPT | Performed by: PSYCHIATRY & NEUROLOGY

## 2025-02-26 NOTE — PROGRESS NOTES
"Name: Mateo Franco      : 1955      MRN: [id number]  Encounter Provider: Josselin Abbasi MD  Encounter Date: 2025   Encounter department: [facility] [place]  :  Assessment & Plan  History of CVA (cerebrovascular accident)  Secondary Prevention -   -for medications - recommend continuation of combination of aspirin, plavix and lipitor   -Blood Pressure goal < 130/80, BP is at goal   -LDL goal <70, last LDL is 123  -patient following vascular surgery, and has carotid doppler scheduled    Counseling/stroke education -   -I advised patient to avoid using NSAIDs for headaches or other pain and to stick to tylenol if needed  -Recommend lifestyle modifications such as mediterranean diet & regular exercise regimen atleast 4-5 times a week for 20-30 minutes.   -I educated patient/family regarding medication compliance  -encourage control of hypertension; defer management to primary      Follow up in 1 year.     I would be happy to see the patient sooner if any new questions/concerns arise.  Patient/Guardian was advised to the call the office if they have any questions and concerns in the meantime.     We discussed \"red flag\" headache symptoms including symptoms such as facial droop on one side, weakness/paralysis on either side, speech trouble, numbness on one side, balance issues, any vision changes, extreme dizziness or significant increase in severity of headache or a sudden onset severe headache, patient is to call  immediately or to proceed to the nearest ER.           History of Present Illness     HPI     This is a 68 y/o  Male with prior CABG history who is here as a hospital follow up for history of stroke like symptoms.    Patient had RUE numbness/paraesthesias over the past 2 weeks with flat affect and change in mood over the last 3 days. CT head showed L globus/pallidus grey/white differentiation. CTA head and neck showed left ICA stenosis of 90%. TNK was " "not given due to symptoms resolving initially. He was admitted for stroke workup. Mri brain showed mild moderate watershed infarct on the L MCA/PCA strokes.     Etiology was large vessel disease (atheroembolic in the setting of critical L ICA stenosis), patient had several recurrent symptoms, so underwent left CEA few days after.  Patient has been following up with vascular surgery does have 2 carotid ultrasound scheduled.  Overall he is improving and doing really well.  Does not have any new TIAs or CVA-like symptoms he is compliant with aspirin Plavix and atorvastatin.  Does not smoke no diabetes no concerns about snoring either.  Really driving without any issues.      Review of Systems   Constitutional: Negative.    HENT: Negative.     Eyes: Negative.    Respiratory: Negative.     Cardiovascular: Negative.    Gastrointestinal: Negative.    Endocrine: Negative.    Genitourinary: Negative.    Musculoskeletal: Negative.    Skin: Negative.    Allergic/Immunologic: Negative.    Neurological: Negative.    Hematological: Negative.    Psychiatric/Behavioral: Negative.     All other systems reviewed and are negative.   I have personally reviewed the MA's review of systems and made changes as necessary.         Objective   /68 (Patient Position: Sitting, Cuff Size: Standard)   Pulse 70   Temp 97.6 °F (36.4 °C) (Temporal)   Ht 5' 8\" (1.727 m)   Wt 77.8 kg (171 lb 9.6 oz)   SpO2 96%   BMI 26.09 kg/m²     Physical Exam  General - patient is alert   Speech - no dysarthria noted, no aphasia noted.     Neuro:   Cranial nerves: PERRL, EOMI, facial sensation intact to soft touch in V1, V2 and V3, no facial asymmetry noted, uvula/palate midline, tongue midline.   Motor: 5/5 throughout, normal tone, no pronator drift noted.   Sensory - intact to soft touch throughout  Reflexes - 2+ throughout  Coordination - no ataxia/dysmetria noted  Gait - normal    Neurological Exam        Administrative Statements   I have spent a " total time of 40 minutes in caring for this patient on the day of the visit/encounter including Risks and benefits of tx options, Instructions for management, Patient and family education, Counseling / Coordination of care, Documenting in the medical record, Reviewing/placing orders in the medical record (including tests, medications, and/or procedures), Obtaining or reviewing history  , and Communicating with other healthcare professionals .

## 2025-04-09 ENCOUNTER — RESULTS FOLLOW-UP (OUTPATIENT)
Dept: VASCULAR SURGERY | Facility: CLINIC | Age: 70
End: 2025-04-09

## 2025-04-09 ENCOUNTER — HOSPITAL ENCOUNTER (OUTPATIENT)
Dept: NON INVASIVE DIAGNOSTICS | Facility: CLINIC | Age: 70
Discharge: HOME | End: 2025-04-09
Payer: COMMERCIAL

## 2025-04-09 DIAGNOSIS — I65.22 STENOSIS OF LEFT CAROTID ARTERY: ICD-10-CM

## 2025-04-09 DIAGNOSIS — Z98.890 S/P CAROTID ENDARTERECTOMY: ICD-10-CM

## 2025-04-09 PROCEDURE — 93880 EXTRACRANIAL BILAT STUDY: CPT

## 2025-04-09 PROCEDURE — 93880 EXTRACRANIAL BILAT STUDY: CPT | Performed by: SURGERY

## 2025-04-16 ENCOUNTER — OFFICE VISIT (OUTPATIENT)
Dept: VASCULAR SURGERY | Facility: CLINIC | Age: 70
End: 2025-04-16
Payer: COMMERCIAL

## 2025-04-16 VITALS
TEMPERATURE: 97.4 F | HEIGHT: 68 IN | OXYGEN SATURATION: 98 % | SYSTOLIC BLOOD PRESSURE: 110 MMHG | HEART RATE: 50 BPM | DIASTOLIC BLOOD PRESSURE: 70 MMHG | WEIGHT: 167 LBS | BODY MASS INDEX: 25.31 KG/M2

## 2025-04-16 DIAGNOSIS — I63.239 SYMPTOMATIC CAROTID ARTERY STENOSIS WITH INFARCTION (HCC): Primary | ICD-10-CM

## 2025-04-16 DIAGNOSIS — E78.5 DYSLIPIDEMIA: ICD-10-CM

## 2025-04-16 PROCEDURE — 99213 OFFICE O/P EST LOW 20 MIN: CPT | Performed by: SURGERY

## 2025-04-16 NOTE — PROGRESS NOTES
Name: aMteo Franco      : 1955      MRN: [id number]  Encounter Provider: Eduar Carlos MD  Encounter Date: 2025   [facility] [place]  :  Assessment & Plan  Symptomatic carotid artery stenosis with infarction (HCC)  He underwent left CEA on 2024 with good results.  He has recovered completely.    Carotid doppler shows that left carotid is widely open without any narrowing.  Repeat doppler in 6 months and if stable then yearly thereafter.           Dyslipidemia  Was increased to 80mg atorvastatin as the 20mg of atorvastatin is not enough to keep the LDL lower.  His LDL is much higher in the past.  I think best is to do long term atorvastatin 80mg per day.  We could consider to go to 40mg but not lower.  Ideally LDL should be <55mg/dl .               History of Present Illness   HPI  Mateo Franco is a 69 y.o. male who presents left CEA postop.    Patient presents for RR CV 25 L CEA 24.  Patient denies any TIA/ stroke like symptoms.   History obtained from: patient    Review of Systems   All other systems reviewed and are negative.    Past Medical History   Past Medical History:   Diagnosis Date   • CAD (coronary artery disease)    • Cancer (HCC)     basal skin ca   • Colon polyp    • Coronary artery disease    • Former tobacco use    • History of dysphagia     resolved   • Hyperlipidemia    • Pre-diabetes      Past Surgical History:   Procedure Laterality Date   • CARDIAC CATHETERIZATION     • CAROTID ENDARTARECTOMY Left 2024    Procedure: ENDARTERECTOMY ARTERY CAROTID;  Surgeon: Eduar Carlos MD;  Location: [facility];  Service: Vascular   • COLONOSCOPY N/A 2018    Procedure: COLONOSCOPY;  Surgeon: Sven Payne MD;  Location: [facility] GI LAB;  Service: Gastroenterology   • CORONARY ARTERY BYPASS GRAFT  2021   • EGD  2019   • FINGER AMPUTATION Left     partial 3rd finger   • [place] CORONARY ARTERY BYP W/VEIN &  "ARTERY GRAFT 3 VEIN N/A 04/26/2021    Procedure: CORONARY ARTERY BYPASS GRAFT (CABG) x 3; LIMA to LAD; Left EVH/SVG to OM1 and D1;  Surgeon: ALLISON Conde MD;  Location:  MAIN OR;  Service: Cardiac Surgery   • [place] XCAPSL CTRC RMVL INSJ IO LENS PROSTH W/O ECP Right 07/24/2023    Procedure: EXTRACTION EXTRACAPSULAR CATARACT PHACO INTRAOCULAR LENS (IOL);  Surgeon: Huy Barton MD;  Location: [facility] MAIN OR;  Service: Ophthalmology   • SKIN BIOPSY       Family History   Problem Relation Age of Onset   • Heart attack Father 57   • No Known Problems Mother    • Prostate cancer Neg Hx    • Colon cancer Neg Hx    • Diabetes Neg Hx    • Stroke Neg Hx       reports that he quit smoking about 35 years ago. His smoking use included cigarettes. He started smoking about 45 years ago. He has a 10 pack-year smoking history. He has never used smokeless tobacco. He reports that he does not currently use alcohol. He reports that he does not use drugs.  Current Outpatient Medications   Medication Instructions   • ascorbic acid (VITAMIN C) 500 mg, Daily   • Aspirin Low Dose 81 MG EC tablet TAKE ONE TABLET BY MOUTH ONCE EVERY DAY   • atorvastatin (LIPITOR) 80 mg, Oral, Every evening   • cholecalciferol (VITAMIN D3) 1,000 Units, Daily   • co-enzyme Q-10 200 mg, Daily   • Multiple Vitamin (multivitamin) tablet 1 tablet, Daily   No Known Allergies      Objective   /70 (BP Location: Left arm, Patient Position: Sitting, Cuff Size: Adult)   Pulse (!) 50   Temp (!) 97.4 °F (36.3 °C) (Temporal)   Ht 5' 8\" (1.727 m)   Wt 75.8 kg (167 lb)   SpO2 98%   BMI 25.39 kg/m²      Physical Exam  Vitals and nursing note reviewed.   Constitutional:       Appearance: Normal appearance.   Neck:      Comments: Left carotid well healed incision.  Cardiovascular:      Rate and Rhythm: Normal rate and regular rhythm.   Neurological:      Mental Status: He is alert.           "

## 2025-04-16 NOTE — PATIENT INSTRUCTIONS
Symptomatic carotid artery stenosis with infarction (HCC)  He underwent left CEA on 12/27/2024 with good results.  He has recovered completely.    Carotid doppler shows that left carotid is widely open without any narrowing.  Repeat doppler in 6 months and if stable then yearly thereafter.             Dyslipidemia  Was increased to 80mg atorvastatin as the 20mg of atorvastatin is not enough to keep the LDL lower.  His LDL is much higher in the past.  I think best is to do long term atorvastatin 80mg per day.  We could consider to go to 40mg but not lower.  Ideally LDL should be <55mg/dl .

## 2025-04-16 NOTE — ASSESSMENT & PLAN NOTE
He underwent left CEA on 12/27/2024 with good results.  He has recovered completely.    Carotid doppler shows that left carotid is widely open without any narrowing.  Repeat doppler in 6 months and if stable then yearly thereafter.

## 2025-04-16 NOTE — ASSESSMENT & PLAN NOTE
Was increased to 80mg atorvastatin as the 20mg of atorvastatin is not enough to keep the LDL lower.  His LDL is much higher in the past.  I think best is to do long term atorvastatin 80mg per day.  We could consider to go to 40mg but not lower.  Ideally LDL should be <55mg/dl .

## 2025-04-25 LAB
CHOLEST SERPL-MCNC: 99 MG/DL
CHOLEST/HDLC SERPL: 3.4 (CALC)
HDLC SERPL-MCNC: 29 MG/DL
LDLC SERPL CALC-MCNC: 55 MG/DL (CALC)
NONHDLC SERPL-MCNC: 70 MG/DL (CALC)
TRIGL SERPL-MCNC: 66 MG/DL

## 2025-04-27 ENCOUNTER — RESULTS FOLLOW-UP (OUTPATIENT)
Dept: CARDIOLOGY CLINIC | Facility: CLINIC | Age: 70
End: 2025-04-27

## (undated) DEVICE — SUT PROLENE 7-0 BV175-8/BV175-8 24 IN EPM8747

## (undated) DEVICE — PROXIMATE SKIN STAPLERS (35 WIDE) CONTAINS 35 STAINLESS STEEL STAPLES (FIXED HEAD): Brand: PROXIMATE

## (undated) DEVICE — TRAY FOLEY 16FR SURESTEP TEMP SENS URIMETER STAT LOK

## (undated) DEVICE — BLADE BEAVER MINI SZ 69

## (undated) DEVICE — GLOVE INDICATOR PI UNDERGLOVE SZ 8 BLUE

## (undated) DEVICE — SUT SILK 2 60 IN SA8H

## (undated) DEVICE — THERMOFLECT BLANKET, L, 25EA                               TS THERMOFLECT BLANKET, 48" X 84", SILVER, 5/BG, 5 BG/CS NW: Brand: THERMOFLECT

## (undated) DEVICE — NEEDLE 25G X 1 1/2

## (undated) DEVICE — PROVE COVER: Brand: UNBRANDED

## (undated) DEVICE — SUT MONOCRYL 4-0 PS-2 18 IN Y496G

## (undated) DEVICE — SYRINGE 1ML TB 27G X 3/8 SAFETY

## (undated) DEVICE — ELECTRODE BLADE E-Z CLEAN 4IN -0014A

## (undated) DEVICE — 3M™ IOBAN™ 2 ANTIMICROBIAL INCISE DRAPE 6640EZ: Brand: IOBAN™ 2

## (undated) DEVICE — SYRINGE 50ML LL

## (undated) DEVICE — SUT MONOCRYL PLUS 3-0 PS-2 27 IN MCP427H

## (undated) DEVICE — SUT ETHIBOND 2-0 SH-1/SH-1 30 IN X763H

## (undated) DEVICE — PLEDGET CARDIO PTFE 9.5 X 4.8 SOFT LF (6EA/PK)

## (undated) DEVICE — AORTIC PUNCH 5.2 MM DISP

## (undated) DEVICE — CENTURION VISION SYSTEM FMS

## (undated) DEVICE — INTENDED FOR TISSUE SEPARATION, AND OTHER PROCEDURES THAT REQUIRE A SHARP SURGICAL BLADE TO PUNCTURE OR CUT.: Brand: BARD-PARKER ® CARBON RIB-BACK BLADES

## (undated) DEVICE — OASIS DRAIN, DUAL, IN-LINE, ATS COMPATIBLE: Brand: OASIS

## (undated) DEVICE — HEMOCLIP CARTRIDGE LRG

## (undated) DEVICE — SUT VICRYL PLUS 1 CTB-1 36 IN VCPB947H

## (undated) DEVICE — ACE WRAP 6 IN UNSTERILE

## (undated) DEVICE — 40601 PROLONGED POSITIONING SYSTEM: Brand: 40601 PROLONGED POSITIONING SYSTEM

## (undated) DEVICE — GLOVE SRG BIOGEL ECLIPSE 8

## (undated) DEVICE — SUT PROLENE 6-0 BV130 30 IN 8709H

## (undated) DEVICE — Device: Brand: RETRACT-O-TAPE 18G X 30.5CM BLUNT NEEDLE

## (undated) DEVICE — SUT MONOCRYL 4-0 PS-2 27 IN Y426H

## (undated) DEVICE — THYROID SHEET: Brand: CONVERTORS

## (undated) DEVICE — ANTIBACTERIAL UNDYED BRAIDED (POLYGLACTIN 910), SYNTHETIC ABSORBABLE SUTURE: Brand: COATED VICRYL

## (undated) DEVICE — AIR INJECT CANNULA 27GA: Brand: OPHTHALMIC CANNULA

## (undated) DEVICE — 32 FR RIGHT ANGLE – SOFT PVC CATHETER: Brand: PVC THORACIC CATHETERS

## (undated) DEVICE — RECIP.STERNUM SAW BLADE 34/7.5/0.7MM: Brand: AESCULAP

## (undated) DEVICE — EVERGRIP INSERT SET 86MM: Brand: FOGARTY EVERGRIP

## (undated) DEVICE — GLOVE SRG BIOGEL 7.5

## (undated) DEVICE — SUCTION CATH 18 FR

## (undated) DEVICE — PLUMEPEN PRO 10FT

## (undated) DEVICE — BLANKET HYPOTHERMIA ADULT GAYMAR

## (undated) DEVICE — STERNAL WIRE

## (undated) DEVICE — DECANTER: Brand: UNBRANDED

## (undated) DEVICE — ADHESIVE SKIN HIGH VISCOSITY EXOFIN 1ML

## (undated) DEVICE — ALCON OPHTHALMIC KNIFE 15 °: Brand: ALCON

## (undated) DEVICE — SUT ETHIBOND 2-0 SH/SH 36 IN X523H

## (undated) DEVICE — 3000CC GUARDIAN II: Brand: GUARDIAN

## (undated) DEVICE — PACK CABG PBDS

## (undated) DEVICE — LIGACLIP MCA MULTIPLE CLIP APPLIERS, 20 MEDIUM CLIPS: Brand: LIGACLIP

## (undated) DEVICE — SUT SILK 2-0 SH CR/8 18 IN C012D

## (undated) DEVICE — INTENDED FOR TISSUE SEPARATION, AND OTHER PROCEDURES THAT REQUIRE A SHARP SURGICAL BLADE TO PUNCTURE OR CUT.: Brand: BARD-PARKER SAFETY BLADES SIZE 15, STERILE

## (undated) DEVICE — ACTIVE FMS W/ INTREPID* ULTRA SLEEVES, 0.9MM 45° ABS* INTREPID* BALANCED TIP: Brand: ALCON

## (undated) DEVICE — EYE PACK CUSTOM -FINNEGAN

## (undated) DEVICE — 32 FR STRAIGHT – SOFT PVC CATHETER: Brand: PVC THORACIC CATHETERS

## (undated) DEVICE — FILTER SMOKE EVAC VIROSAFE

## (undated) DEVICE — LIGHT HANDLE COVER SLEEVE DISP BLUE STELLAR

## (undated) DEVICE — SUT SILK 0 CT-1 30 IN 424H

## (undated) DEVICE — SILVER-COATED ANTIBACTERIAL BARRIER DRESSING: Brand: ACTICOAT SURGIC 10X20CM 5PK US

## (undated) DEVICE — SUT SILK 4-0 30 IN A303H

## (undated) DEVICE — BONE WAX WHITE: Brand: BONE WAX WHITE

## (undated) DEVICE — HEMOSTATIC MATRIX SURGIFLO 8ML W/THROMBIN

## (undated) DEVICE — LIGACLIP MCA MULTIPLE CLIP APPLIERS, 20 SMALL CLIPS: Brand: LIGACLIP

## (undated) DEVICE — MICROSURGICAL INSTRUMENT IRR. CYSTITOME 25GA STRAIGHT-REVERSE CUTTING: Brand: ALCON

## (undated) DEVICE — BETHL CAROTID ENDARTERECTOMY: Brand: CARDINAL HEALTH

## (undated) DEVICE — CHLORAPREP HI-LITE 26ML ORANGE

## (undated) DEVICE — B-H IRRIGATING CAN 19GA FLAT ANGLED 8MM: Brand: OPHTHALMIC CANNULA

## (undated) DEVICE — SUT SILK 3-0 18 IN A184H

## (undated) DEVICE — CAROTID ARTERY SHUNT KIT,RADIOPAQUE LINE, STRAIGHT: Brand: ARGYLE

## (undated) DEVICE — SUT PDS PLUS 1 CTB 36 IN PDPB359T

## (undated) DEVICE — SUT VICRYL 2 TP-1 27 IN J849G

## (undated) DEVICE — SILVER-COATED ANTIBACTERIAL BARRIER DRESSING: Brand: ACTICOAT SURGIC 10X12CM 5PK US

## (undated) DEVICE — VASOVIEW HEMOPRO 2: Brand: VASOVIEW HEMOPRO 2

## (undated) DEVICE — SUT SILK 2-0 18 IN A185H

## (undated) DEVICE — CATH STRAIGHT RED RUBBER 20 FR

## (undated) DEVICE — TUBING INSUFFLATION SET ISO CONNECTOR

## (undated) DEVICE — EXOFIN PRECISION PEN HIGH VISCOSITY TOPICAL SKIN ADHESIVE: Brand: EXOFIN PRECISION PEN, 1G

## (undated) DEVICE — SUT PROLENE 5-0 C-1/C-1 36 IN 8321H

## (undated) DEVICE — CLEARCUT® SLIT KNIFE INTREPID MICRO-COAXIAL SYSTEM 2.4 SB: Brand: CLEARCUT®; INTREPID

## (undated) DEVICE — DRESSING ALLEVYN LIFE HEEL 25 X 25.2CM

## (undated) DEVICE — PENCIL ELECTROSURG E-Z CLEAN -0035H

## (undated) DEVICE — INTREPID® TRANSFORMER IA HP: Brand: INTREPID®

## (undated) DEVICE — SUT PROLENE 4-0 BB 36 IN 8581H

## (undated) DEVICE — SUT MONOCRYL 3-0 SH 27 IN Y416H

## (undated) DEVICE — THE MONARCH® "D" CARTRIDGE IS A SINGLE-USE POLYPROPYLENE CARTRIDGE FOR POSTERIOR CHAMBER IOL DELIVERY: Brand: MONARCH® III